# Patient Record
Sex: FEMALE | Race: WHITE | NOT HISPANIC OR LATINO | Employment: OTHER | URBAN - METROPOLITAN AREA
[De-identification: names, ages, dates, MRNs, and addresses within clinical notes are randomized per-mention and may not be internally consistent; named-entity substitution may affect disease eponyms.]

---

## 2017-02-01 ENCOUNTER — DOCUMENTATION (OUTPATIENT)
Dept: PHYSICAL THERAPY | Facility: CLINIC | Age: 67
End: 2017-02-01

## 2017-02-01 NOTE — PROGRESS NOTES
Discharge Summary  Discharge Summary from Physical Therapy Report      Dates  PT visit: 11/30/16  Number of Visits: 1     Discharge Status of Patient: See initial evaluation dated 11/30/16    Goals: Partially Met    Discharge Plan: Continue with current home exercise program as instructed    Comments Patient seen for one visit only with HEP established    Date of Discharge 02/01/17        Grace Yip, PT, DPT  Physical Therapist

## 2017-05-02 ENCOUNTER — OFFICE VISIT (OUTPATIENT)
Dept: INTERNAL MEDICINE | Facility: CLINIC | Age: 67
End: 2017-05-02

## 2017-05-02 VITALS
HEART RATE: 71 BPM | DIASTOLIC BLOOD PRESSURE: 82 MMHG | WEIGHT: 209 LBS | TEMPERATURE: 98.9 F | HEIGHT: 65 IN | SYSTOLIC BLOOD PRESSURE: 124 MMHG | BODY MASS INDEX: 34.82 KG/M2 | OXYGEN SATURATION: 96 %

## 2017-05-02 DIAGNOSIS — L30.9 DERMATITIS: ICD-10-CM

## 2017-05-02 DIAGNOSIS — J06.9 ACUTE URI: Primary | ICD-10-CM

## 2017-05-02 PROCEDURE — 99214 OFFICE O/P EST MOD 30 MIN: CPT | Performed by: NURSE PRACTITIONER

## 2017-05-02 RX ORDER — AZITHROMYCIN 250 MG/1
TABLET, FILM COATED ORAL
Qty: 6 TABLET | Refills: 0 | Status: SHIPPED | OUTPATIENT
Start: 2017-05-02 | End: 2017-05-16

## 2017-05-02 RX ORDER — LORATADINE 10 MG/1
10 TABLET ORAL DAILY
Qty: 10 TABLET
Start: 2017-05-02 | End: 2017-05-12

## 2017-05-02 RX ORDER — GUAIFENESIN 600 MG/1
1200 TABLET, EXTENDED RELEASE ORAL 2 TIMES DAILY
Qty: 14 TABLET
Start: 2017-05-02 | End: 2017-05-09

## 2017-05-02 RX ORDER — CLOBETASOL PROPIONATE 0.5 MG/G
CREAM TOPICAL 2 TIMES DAILY
Qty: 15 G | Refills: 0 | Status: SHIPPED | OUTPATIENT
Start: 2017-05-02 | End: 2020-01-03

## 2017-05-16 ENCOUNTER — OFFICE VISIT (OUTPATIENT)
Dept: INTERNAL MEDICINE | Facility: CLINIC | Age: 67
End: 2017-05-16

## 2017-05-16 VITALS
SYSTOLIC BLOOD PRESSURE: 108 MMHG | DIASTOLIC BLOOD PRESSURE: 68 MMHG | WEIGHT: 207 LBS | BODY MASS INDEX: 34.49 KG/M2 | HEIGHT: 65 IN

## 2017-05-16 DIAGNOSIS — R73.01 ELEVATED FASTING GLUCOSE: ICD-10-CM

## 2017-05-16 DIAGNOSIS — E03.9 HYPOTHYROIDISM, ADULT: ICD-10-CM

## 2017-05-16 DIAGNOSIS — E78.5 HYPERLIPIDEMIA, UNSPECIFIED HYPERLIPIDEMIA TYPE: Primary | ICD-10-CM

## 2017-05-16 LAB
ALBUMIN SERPL-MCNC: 4.18 G/DL (ref 3.4–4.6)
ALBUMIN/GLOB SERPL: 1.3 G/DL
ALP SERPL-CCNC: 98 U/L (ref 46–116)
ALT SERPL W P-5'-P-CCNC: 21 U/L (ref 14–59)
ANION GAP SERPL CALCULATED.3IONS-SCNC: 13 MMOL/L
AST SERPL-CCNC: 22 U/L (ref 7–37)
BILIRUB SERPL-MCNC: 0.4 MG/DL (ref 0.2–1)
BUN BLD-MCNC: 22 MG/DL (ref 6–22)
BUN/CREAT SERPL: 19.1 (ref 7–25)
CALCIUM SPEC-SCNC: 9.3 MG/DL (ref 8.6–10.5)
CHLORIDE SERPL-SCNC: 101 MMOL/L (ref 95–107)
CHOLEST SERPL-MCNC: 218 MG/DL (ref 0–200)
CO2 SERPL-SCNC: 25 MMOL/L (ref 23–32)
CREAT BLD-MCNC: 1.15 MG/DL (ref 0.55–1.02)
GFR SERPL CREATININE-BSD FRML MDRD: 47 ML/MIN/1.73
GLOBULIN UR ELPH-MCNC: 3.2 GM/DL
GLUCOSE BLD-MCNC: 97 MG/DL (ref 70–100)
HBA1C MFR BLD: 5.6 % (ref 4–6)
HDLC SERPL-MCNC: 53 MG/DL (ref 40–81)
LDLC SERPL CALC-MCNC: 132 MG/DL (ref 0–100)
LDLC/HDLC SERPL: 2.49 {RATIO}
POTASSIUM BLD-SCNC: 4.5 MMOL/L (ref 3.3–5.3)
PROT SERPL-MCNC: 7.4 G/DL (ref 6.3–8.4)
SODIUM BLD-SCNC: 139 MMOL/L (ref 136–145)
TRIGL SERPL-MCNC: 166 MG/DL (ref 0–150)
TSH SERPL DL<=0.05 MIU/L-ACNC: 2.18 MIU/ML (ref 0.4–4.2)
VLDLC SERPL-MCNC: 33.2 MG/DL

## 2017-05-16 PROCEDURE — 99214 OFFICE O/P EST MOD 30 MIN: CPT | Performed by: INTERNAL MEDICINE

## 2017-05-16 PROCEDURE — 80053 COMPREHEN METABOLIC PANEL: CPT | Performed by: INTERNAL MEDICINE

## 2017-05-16 PROCEDURE — 80061 LIPID PANEL: CPT | Performed by: INTERNAL MEDICINE

## 2017-05-16 PROCEDURE — 36415 COLL VENOUS BLD VENIPUNCTURE: CPT | Performed by: INTERNAL MEDICINE

## 2017-05-16 PROCEDURE — 84443 ASSAY THYROID STIM HORMONE: CPT | Performed by: INTERNAL MEDICINE

## 2017-05-16 PROCEDURE — 83036 HEMOGLOBIN GLYCOSYLATED A1C: CPT | Performed by: INTERNAL MEDICINE

## 2017-05-16 RX ORDER — GUAIFENESIN 600 MG/1
1200 TABLET, EXTENDED RELEASE ORAL 2 TIMES DAILY
Qty: 60 TABLET | Refills: 3 | Status: SHIPPED | OUTPATIENT
Start: 2017-05-16 | End: 2018-10-25

## 2017-05-19 RX ORDER — SUCRALFATE 1 G/1
TABLET ORAL
Qty: 180 TABLET | Refills: 3 | Status: SHIPPED | OUTPATIENT
Start: 2017-05-19 | End: 2021-07-02

## 2017-05-19 RX ORDER — LEVOTHYROXINE SODIUM 50 MCG
TABLET ORAL
Qty: 90 TABLET | Refills: 0 | Status: SHIPPED | OUTPATIENT
Start: 2017-05-19 | End: 2017-08-14 | Stop reason: SDUPTHER

## 2017-06-06 RX ORDER — FLUOXETINE HYDROCHLORIDE 90 MG/1
CAPSULE, DELAYED RELEASE PELLETS ORAL
Qty: 24 CAPSULE | Refills: 1 | Status: SHIPPED | OUTPATIENT
Start: 2017-06-06 | End: 2017-08-14 | Stop reason: SDUPTHER

## 2017-06-06 NOTE — TELEPHONE ENCOUNTER
She takes it every 6 days which is more frequent. In the past, I have have prescribed it twice a week so she gets #180

## 2017-06-06 NOTE — TELEPHONE ENCOUNTER
"Sorry.  It it were twice a day, she would get #180.  For twice a week, #24 should be correct for 90 day supply.  My math was not \"clicking\"  "

## 2017-06-06 NOTE — TELEPHONE ENCOUNTER
----- Message from Cee Mora sent at 6/6/2017 10:32 AM EDT -----  Contact: pt - Dr Serrano' pt - RE: Rx refill  Pt calling and would like a refill on Rx      FLUoxetine weekly (PROzac WEEKLY) 90 MG DR capsule 12 capsule 3 7/19/2016      Sig - Route: Take 1 capsule by mouth every 7 days. - Oral    [facility] - [place], [place] - [address] AT [address] [facility] - [phone]  - [phone] (Phone)  [phone] (Fax)    Pt # [phone]

## 2017-06-06 NOTE — TELEPHONE ENCOUNTER
Dr. Serrano, not sure if I sent the wrong request to you.  The one pt asked for says 1 po weekly, but the one sent in says 1 po twice a week.

## 2017-07-28 ENCOUNTER — TELEPHONE (OUTPATIENT)
Dept: INTERNAL MEDICINE | Facility: CLINIC | Age: 67
End: 2017-07-28

## 2017-07-28 DIAGNOSIS — T63.461A WASP STING, ACCIDENTAL OR UNINTENTIONAL, INITIAL ENCOUNTER: Primary | ICD-10-CM

## 2017-07-28 RX ORDER — EPINEPHRINE 0.3 MG/.3ML
0.3 INJECTION SUBCUTANEOUS ONCE
Qty: 1 EACH | Refills: 3 | Status: SHIPPED | OUTPATIENT
Start: 2017-07-28 | End: 2017-07-28

## 2017-07-28 NOTE — TELEPHONE ENCOUNTER
----- Message from Miky Donald sent at 7/28/2017  1:40 PM EDT -----  Contact: pt  Pt calling says that she go stung by a wasp , and was fine took benedryl. She then was stung by a yellow winston a couple of days later while hiking and did the same, but she had swelling all the way up her arm . Pt is requesting a prescription for an epi pen. Please advise.     #[phone]    She says to send it to [facility] or [facility] because she has seen then before.

## 2017-08-08 ENCOUNTER — TELEPHONE (OUTPATIENT)
Dept: INTERNAL MEDICINE | Facility: CLINIC | Age: 67
End: 2017-08-08

## 2017-08-08 NOTE — TELEPHONE ENCOUNTER
----- Message from Cee Mora sent at 8/8/2017 10:56 AM EDT -----  Contact: pt - Dr Serrano' pt - RE: referral / appt  Pt calling and would like a return call regarding pt had sx on knee. Pt informs had knee buckle, was seen in ER. Pt was informed to see Dr Jose WHITLOCK. Pt informs was told to call PCP to get appt w. Ortho ASAP. Could you please call ortho and put referral in pt's chart? Please advise. Thanks      Pt # [phone]

## 2017-08-08 NOTE — TELEPHONE ENCOUNTER
----- Message from Cee Mora sent at 8/8/2017  1:42 PM EDT -----  Contact: pt - Dr Serrano' pt - RE: (R) knee  Pt calling and would like to inform pt was seen @ [place], [place] - [phone]. Pt informs (R) knee was put in a knee stabilizer. Pt informs that arthritis may have caused pt's knee to buckle. Pt is on vacation and injury occurred during a hiking trip. Could you please call pt to discuss? Please advise. Thanks    Records have been requested and will be faxed to office. ( Flxpu [phone] fax #)

## 2017-08-08 NOTE — TELEPHONE ENCOUNTER
Returned her call, got voicemail and left message.  Recommended she make an appointment for follow-up on return from vacation.

## 2017-08-14 ENCOUNTER — OFFICE VISIT (OUTPATIENT)
Dept: INTERNAL MEDICINE | Facility: CLINIC | Age: 67
End: 2017-08-14

## 2017-08-14 VITALS
DIASTOLIC BLOOD PRESSURE: 80 MMHG | TEMPERATURE: 99 F | OXYGEN SATURATION: 98 % | HEIGHT: 65 IN | WEIGHT: 209 LBS | SYSTOLIC BLOOD PRESSURE: 132 MMHG | HEART RATE: 63 BPM | BODY MASS INDEX: 34.82 KG/M2

## 2017-08-14 DIAGNOSIS — M25.561 ACUTE PAIN OF RIGHT KNEE: Primary | ICD-10-CM

## 2017-08-14 PROCEDURE — 99213 OFFICE O/P EST LOW 20 MIN: CPT | Performed by: NURSE PRACTITIONER

## 2017-08-14 NOTE — PROGRESS NOTES
Subjective   Aydee Moore is a 67 y.o. female who presents due to right knee pain.    HPI Comments: She was hiking in [place], [place] last week when her right knee buckled and gave out on her. She denies previous knee pain (reports tear in knee when she was 15 but no problems since then). She was unable to bear weight and therefore presented to the ER 8/8/17, xray showed no significant abnl. She reports some improvement in discomfort (started taking Tylenol but c/o nausea with medication).    Knee Pain    The incident occurred more than 1 week ago. There was no injury mechanism. The pain is present in the right knee. The quality of the pain is described as aching. The pain is moderate. Associated symptoms include an inability to bear weight and a loss of motion. The symptoms are aggravated by movement, palpation and weight bearing. She has tried ice and acetaminophen for the symptoms. The treatment provided mild relief.        The following portions of the patient's history were reviewed and updated as appropriate: allergies, current medications, past social history and problem list.    Past Medical History:   Diagnosis Date   • Anxiety and depression    • Elevated fasting glucose    • Hyperlipidemia          Current Outpatient Prescriptions:   •  Bromelain 100 MG tablet, Take  by mouth., Disp: , Rfl:   •  clobetasol (TEMOVATE) 0.05 % cream, Apply  topically 2 (Two) Times a Day., Disp: 15 g, Rfl: 0  •  CRESTOR 10 MG tablet, TAKE 1 TABLET DAILY, Disp: 90 tablet, Rfl: 3  •  FIBER SELECT GUMMIES PO, Take  by mouth., Disp: , Rfl:   •  FLUoxetine weekly (PROzac WEEKLY) 90 MG DR capsule, Take 1 capsule by mouth every 7 days., Disp: 12 capsule, Rfl: 3  •  fluticasone (FLONASE) 50 MCG/ACT nasal spray, 2 sprays into each nostril daily. Administer 2 sprays in each nostril for each dose., Disp: , Rfl:   •  guaiFENesin (MUCINEX) 600 MG 12 hr tablet, Take 2 tablets by mouth 2 (Two) Times a Day., Disp: 60 tablet, Rfl: 3  •   "levothyroxine (SYNTHROID, LEVOTHROID) 50 MCG tablet, Take 50 mcg by mouth daily., Disp: , Rfl:   •  pantoprazole (PROTONIX) 40 MG EC tablet, Take 1 tablet by mouth daily., Disp: 90 tablet, Rfl: 1  •  sucralfate (CARAFATE) 1 G tablet, TAKE 1 TABLET TWICE A DAY, Disp: 180 tablet, Rfl: 3    Allergies   Allergen Reactions   • Penicillins    • Tetracyclines & Related        Review of Systems   Constitutional: Negative for chills, fatigue, fever and unexpected weight change.   HENT: Negative for congestion, ear pain, postnasal drip, sinus pressure, sore throat and trouble swallowing.    Eyes: Negative for visual disturbance.   Respiratory: Negative for cough, chest tightness and wheezing.    Cardiovascular: Negative for chest pain, palpitations and leg swelling.   Gastrointestinal: Negative for abdominal pain, blood in stool, nausea and vomiting.   Endocrine: Negative for cold intolerance and heat intolerance.   Genitourinary: Negative for dysuria, frequency and urgency.   Musculoskeletal: Positive for arthralgias and joint swelling.   Skin: Negative for color change.   Allergic/Immunologic: Negative for immunocompromised state.   Neurological: Negative for syncope, weakness and headaches.   Hematological: Does not bruise/bleed easily.       Objective   Vitals:    08/14/17 0755   BP: 132/80   BP Location: Left arm   Patient Position: Sitting   Cuff Size: Adult   Pulse: 63   Temp: 99 °F (37.2 °C)   TempSrc: Oral   SpO2: 98%   Weight: 209 lb (94.8 kg)   Height: 65\" (165.1 cm)     Physical Exam   Constitutional: She is oriented to person, place, and time. She appears well-developed and well-nourished. No distress.   HENT:   Head: Normocephalic and atraumatic.   Right Ear: External ear normal.   Left Ear: External ear normal.   Eyes: Conjunctivae are normal.   Neck: Normal range of motion. Neck supple.   Cardiovascular: Normal rate, regular rhythm, normal heart sounds and intact distal pulses.  Exam reveals no gallop and no " friction rub.    No murmur heard.  Pulmonary/Chest: Effort normal and breath sounds normal. No respiratory distress.   Musculoskeletal:        Right knee: She exhibits swelling. She exhibits normal range of motion and no erythema. Tenderness found. Lateral joint line tenderness noted.   Lymphadenopathy:     She has no cervical adenopathy.   Neurological: She is alert and oriented to person, place, and time.   Skin: Skin is warm and dry. No rash noted. No erythema.   Psychiatric: She has a normal mood and affect. Her behavior is normal.   Nursing note and vitals reviewed.      Assessment/Plan   Aydee was seen today for knee pain.    Diagnoses and all orders for this visit:    Acute pain of right knee  Comments:  reviewed xray results from ER, will elevated & apply ice and f/u with ortho for further mgmt  Orders:  -     Ambulatory Referral to Orthopedic Surgery  -     Ambulatory Referral to Physical Therapy Evaluate and treat

## 2017-09-14 ENCOUNTER — LAB (OUTPATIENT)
Dept: LAB | Facility: HOSPITAL | Age: 67
End: 2017-09-14
Attending: SPECIALIST

## 2017-09-14 ENCOUNTER — TRANSCRIBE ORDERS (OUTPATIENT)
Dept: ADMINISTRATIVE | Facility: HOSPITAL | Age: 67
End: 2017-09-14

## 2017-09-14 ENCOUNTER — HOSPITAL ENCOUNTER (OUTPATIENT)
Dept: CARDIOLOGY | Facility: HOSPITAL | Age: 67
Discharge: HOME | End: 2017-09-14
Attending: SPECIALIST | Admitting: SPECIALIST

## 2017-09-14 DIAGNOSIS — Z01.818 PRE-OP TESTING: ICD-10-CM

## 2017-09-14 DIAGNOSIS — I10 ESSENTIAL HYPERTENSION, MALIGNANT: ICD-10-CM

## 2017-09-14 DIAGNOSIS — Z01.818 PRE-OP TESTING: Primary | ICD-10-CM

## 2017-09-14 LAB
ANION GAP SERPL CALCULATED.3IONS-SCNC: 12.1 MMOL/L
BUN BLD-MCNC: 19 MG/DL (ref 8–23)
BUN/CREAT SERPL: 17.8 (ref 7–25)
CALCIUM SPEC-SCNC: 9.9 MG/DL (ref 8.6–10.5)
CHLORIDE SERPL-SCNC: 99 MMOL/L (ref 98–107)
CO2 SERPL-SCNC: 24.9 MMOL/L (ref 22–29)
CREAT BLD-MCNC: 1.07 MG/DL (ref 0.57–1)
DEPRECATED RDW RBC AUTO: 43.2 FL (ref 37–54)
ERYTHROCYTE [DISTWIDTH] IN BLOOD BY AUTOMATED COUNT: 13.1 % (ref 11.7–13)
GFR SERPL CREATININE-BSD FRML MDRD: 51 ML/MIN/1.73
GLUCOSE BLD-MCNC: 104 MG/DL (ref 65–99)
HCT VFR BLD AUTO: 44 % (ref 35.6–45.5)
HGB BLD-MCNC: 14.6 G/DL (ref 11.9–15.5)
MCH RBC QN AUTO: 30.1 PG (ref 26.9–32)
MCHC RBC AUTO-ENTMCNC: 33.2 G/DL (ref 32.4–36.3)
MCV RBC AUTO: 90.7 FL (ref 80.5–98.2)
PLATELET # BLD AUTO: 224 10*3/MM3 (ref 140–500)
PMV BLD AUTO: 11.1 FL (ref 6–12)
POTASSIUM BLD-SCNC: 4.9 MMOL/L (ref 3.5–5.2)
RBC # BLD AUTO: 4.85 10*6/MM3 (ref 3.9–5.2)
SODIUM BLD-SCNC: 136 MMOL/L (ref 136–145)
WBC NRBC COR # BLD: 7.65 10*3/MM3 (ref 4.5–10.7)

## 2017-09-14 PROCEDURE — 36415 COLL VENOUS BLD VENIPUNCTURE: CPT

## 2017-09-14 PROCEDURE — 93010 ELECTROCARDIOGRAM REPORT: CPT | Performed by: INTERNAL MEDICINE

## 2017-09-14 PROCEDURE — 80048 BASIC METABOLIC PNL TOTAL CA: CPT

## 2017-09-14 PROCEDURE — 93005 ELECTROCARDIOGRAM TRACING: CPT | Performed by: SPECIALIST

## 2017-09-14 PROCEDURE — 85027 COMPLETE CBC AUTOMATED: CPT

## 2017-09-28 ENCOUNTER — OFFICE VISIT (OUTPATIENT)
Dept: INTERNAL MEDICINE | Facility: CLINIC | Age: 67
End: 2017-09-28

## 2017-09-28 VITALS
DIASTOLIC BLOOD PRESSURE: 80 MMHG | BODY MASS INDEX: 33.15 KG/M2 | WEIGHT: 199 LBS | HEIGHT: 65 IN | SYSTOLIC BLOOD PRESSURE: 130 MMHG

## 2017-09-28 DIAGNOSIS — E03.9 HYPOTHYROIDISM, ADULT: ICD-10-CM

## 2017-09-28 DIAGNOSIS — F32.A ANXIETY AND DEPRESSION: ICD-10-CM

## 2017-09-28 DIAGNOSIS — R73.01 ELEVATED FASTING GLUCOSE: ICD-10-CM

## 2017-09-28 DIAGNOSIS — F41.9 ANXIETY AND DEPRESSION: ICD-10-CM

## 2017-09-28 DIAGNOSIS — E78.5 HYPERLIPIDEMIA, UNSPECIFIED HYPERLIPIDEMIA TYPE: Primary | ICD-10-CM

## 2017-09-28 LAB
ALBUMIN SERPL-MCNC: 4.9 G/DL (ref 3.5–5.2)
ALP SERPL-CCNC: 90 U/L (ref 39–117)
ALT SERPL W P-5'-P-CCNC: 15 U/L (ref 1–33)
AST SERPL-CCNC: 18 U/L (ref 1–32)
BILIRUB CONJ SERPL-MCNC: <0.2 MG/DL (ref 0–0.3)
BILIRUB INDIRECT SERPL-MCNC: NORMAL MG/DL
BILIRUB SERPL-MCNC: 0.4 MG/DL (ref 0.1–1.2)
CHOLEST SERPL-MCNC: 167 MG/DL (ref 0–200)
HBA1C MFR BLD: 5.7 % (ref 4.8–5.6)
HDLC SERPL-MCNC: 59 MG/DL (ref 40–60)
LDLC SERPL CALC-MCNC: 87 MG/DL (ref 0–100)
LDLC/HDLC SERPL: 1.47 {RATIO}
PROT SERPL-MCNC: 7.6 G/DL (ref 6–8.5)
TRIGL SERPL-MCNC: 105 MG/DL (ref 0–150)
VLDLC SERPL-MCNC: 21 MG/DL (ref 5–40)

## 2017-09-28 PROCEDURE — 80061 LIPID PANEL: CPT | Performed by: INTERNAL MEDICINE

## 2017-09-28 PROCEDURE — 99214 OFFICE O/P EST MOD 30 MIN: CPT | Performed by: INTERNAL MEDICINE

## 2017-09-28 PROCEDURE — 80076 HEPATIC FUNCTION PANEL: CPT | Performed by: INTERNAL MEDICINE

## 2017-09-28 PROCEDURE — 36415 COLL VENOUS BLD VENIPUNCTURE: CPT | Performed by: INTERNAL MEDICINE

## 2017-09-28 PROCEDURE — 83036 HEMOGLOBIN GLYCOSYLATED A1C: CPT | Performed by: INTERNAL MEDICINE

## 2017-09-28 NOTE — PROGRESS NOTES
Chief Complaint   Patient presents with   • Hypothyroidism   • Hyperlipidemia        Subjective   Aydee Moore is a 67 y.o. female     HPI:      Hypothyroidism: Current treatment levothyroxine. She is compliant with the medication, tolerates it well and reports good control of symptoms of hypothyroidism.  She has not noted any change in tolerance of heat or cold.  No change in hair or skin.  No constipation.  No symptoms of hyperthyroidism.      Hyperlipidemia:  This is a chronic problem.   No management changes were made at her last appointment.   Her most recent lipid panel was done on 5/16/2016.  Total cholesterol was 218, Triglycerides 166, HDL 53, .   Current treatment: statin.   Prudent diet and regular exercise have also been recommended. By report, there is good compliance with treatment and good tolerance.    She has not experienced statin associated myalgias, dyspepsia.  She has not had liver enzyme elevation.     Elevated fasting glucose:  She  has had elevated fasting glucose in the past.  She denies polyuria, polydipsia, vision change appetite change, unexplained weight loss.   Lab Results   Component Value Date    HGBA1C 5.6 05/16/2017        Knee injury: She has had surgery for meniscal tear.  She states that she is recovering well.  She is going to start physical therapy soon.    Anxiety and depression: She continues to take Prozac 90 mg weekly.  She sometimes takes it twice a week.  She tolerates twice a week well.  Most of the time, her symptoms are controlled with taking it once a week.  With her recent knee injury, she had increased anxiety area and she is feeling better from that standpoint.    The following portions of the patient's history were reviewed and updated as appropriate: allergies, current medications, past social history, problem list, past surgical history    Review of Systems   Constitutional: Negative for activity change and appetite change.   HENT: Negative for  "nosebleeds.    Eyes: Negative for visual disturbance.   Respiratory: Negative for cough and shortness of breath.    Cardiovascular: Negative for chest pain, palpitations and leg swelling.   Neurological: Negative for headaches.   Psychiatric/Behavioral: Negative for sleep disturbance.           Objective     /80  Ht 65\" (165.1 cm)  Wt 199 lb (90.3 kg)  BMI 33.12 kg/m2     Physical Exam   Constitutional: She is oriented to person, place, and time. She appears well-developed and well-nourished. No distress.   Neck: Normal carotid pulses present. Carotid bruit is not present.   Cardiovascular: Normal rate, regular rhythm, S1 normal, S2 normal and intact distal pulses.  Exam reveals no gallop and no friction rub.    No murmur heard.  Pulses:       Carotid pulses are 2+ on the right side, and 2+ on the left side.  Pulmonary/Chest: Effort normal and breath sounds normal. No respiratory distress. She has no wheezes. She has no rhonchi. She has no rales. Chest wall is not dull to percussion.   Musculoskeletal: She exhibits no edema.   Neurological: She is alert and oriented to person, place, and time.   Skin: Skin is warm and dry.   Nursing note and vitals reviewed.      Assessment/Plan   Problem List Items Addressed This Visit        Cardiovascular and Mediastinum    Hyperlipidemia - Primary    Relevant Orders    Lipid Panel    Hepatic Function Panel       Endocrine    Elevated fasting glucose    Relevant Orders    Hemoglobin A1c    Hypothyroidism, adult       Other    Anxiety and depression            "

## 2017-09-29 ENCOUNTER — TREATMENT (OUTPATIENT)
Dept: PHYSICAL THERAPY | Facility: CLINIC | Age: 67
End: 2017-09-29

## 2017-09-29 ENCOUNTER — TRANSCRIBE ORDERS (OUTPATIENT)
Dept: PHYSICAL THERAPY | Facility: CLINIC | Age: 67
End: 2017-09-29

## 2017-09-29 DIAGNOSIS — Z98.890 S/P ARTHROSCOPIC KNEE SURGERY: ICD-10-CM

## 2017-09-29 DIAGNOSIS — Z98.890 S/P ARTHROSCOPIC PARTIAL MEDIAL MENISCECTOMY: ICD-10-CM

## 2017-09-29 DIAGNOSIS — M25.561 RIGHT KNEE PAIN, UNSPECIFIED CHRONICITY: Primary | ICD-10-CM

## 2017-09-29 DIAGNOSIS — M25.561 ACUTE PAIN OF RIGHT KNEE: Primary | ICD-10-CM

## 2017-09-29 PROCEDURE — G8979 MOBILITY GOAL STATUS: HCPCS | Performed by: PHYSICAL THERAPIST

## 2017-09-29 PROCEDURE — G8978 MOBILITY CURRENT STATUS: HCPCS | Performed by: PHYSICAL THERAPIST

## 2017-09-29 PROCEDURE — 97161 PT EVAL LOW COMPLEX 20 MIN: CPT | Performed by: PHYSICAL THERAPIST

## 2017-09-29 PROCEDURE — 97530 THERAPEUTIC ACTIVITIES: CPT | Performed by: PHYSICAL THERAPIST

## 2017-09-29 PROCEDURE — 97110 THERAPEUTIC EXERCISES: CPT | Performed by: PHYSICAL THERAPIST

## 2017-09-29 NOTE — PROGRESS NOTES
"Physical Therapy Initial Evaluation and Plan of Care    TIME  TIME OUT 0914  Patient: Aydee Moore   : 1950  Diagnosis/ICD-10 Code:  Acute pain of right knee [M25.561]  Referring practitioner: No ref. provider found    Subjective Evaluation    History of Present Illness  Date of surgery: 9/15/2017  Mechanism of injury: S/p (R) knee partial medial meniscectomy.    In Aug 2017 patient had been hiking in [place] in [place] including climbing metal steps, clambering and crawling up mountains.  Shortly after she was walking on level ground and her (R) knee buckled/gave way.  She went to ER and was told she had OA (R) knee.  Once returned to [place] she saw Dr. Garcia who ordered MRI and found (R) medial meniscus tear.  She was scheduled for surgery.    \"Thought I'd be feeling better by now.\" Pain seems to be worse at night and patient has difficulty falling asleep. Uses pillow under (R) LE.      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 [employer] Quality of life: good    Pain  Current pain ratin  At best pain ratin  At worst pain ratin  Location: (R) anteromedial knee along joint line  Quality: throbbing  Relieving factors: ice  Aggravating factors: standing, stairs, ambulation, prolonged positioning and squatting (kneeling in [employer] (hasn't tried), prolonged sitting)  Progression: improved    Social Support  Lives in: multiple-level home  Lives with: spouse    Hand dominance: right    Diagnostic Tests  MRI studies: abnormal (R) knee medial meniscus tear    Treatments  Current treatment: physical therapy  Patient Goals  Patient goals for therapy: decreased pain  Patient goal: \"improve the pain, get leg stronger so I can walk on uneven ground and get back to hiking\"           Objective     Observations   Left Knee   Positive for incision.     Additional Observation Details  3 healing arthroscopic portals (R) anterior knee  Visible bruising around " incisions      Tenderness     Additional Tenderness Details  Min (+) TTP over incisions/portals    Active Range of Motion   Left Knee   Flexion: 130 degrees   Extension: 0 degrees   Extensor la degrees     Right Knee   Flexion: 108 degrees with pain  Extension: 4 degrees with pain  Extensor la degrees with pain    Passive Range of Motion     Right Knee   Flexion: 126 degrees with pain    Strength/Myotome Testing     Left Hip   Planes of Motion   Left hip flexors strength: 5-/5.    Right Hip   Planes of Motion   Flexion: 4+    Left Knee   Flexion: 5  Extension: 5  Quadriceps contraction: good    Right Knee   Flexion: 4+  Extension: 4+  Quadriceps contraction: fair    Left Ankle/Foot   Dorsiflexion: 5    Right Ankle/Foot   Right ankle dorsiflexion strength: 5-/5.    Swelling     Left Knee Girth Measurement (cm)   Joint line: 41.5 cm.    Right Knee Girth Measurement (cm)   Joint line: 43.5 cm.         Assessment & Plan     Assessment  Impairments: abnormal gait, abnormal muscle firing, abnormal or restricted ROM, activity intolerance, impaired balance, impaired physical strength, lacks appropriate home exercise program, pain with function, safety issue and weight-bearing intolerance  Other impairment: significantly limited ADL and functional mobility tolerance  Assessment details: STGs: to be met in 2 weeks  1. Patient will be independent with initial HEP  2. Patient will exhibit improved (R) knee girth by >/= 1.0 cm  3. Patient will report improved (R) knee s/s 0-3/10  4. Patient will demonstrate improved (R) knee AROM 0-130 deg, painfree  5. Patient will ambulate short community distances without notable antalgia (R) LE  6. Patient will report 50% improved sleep ability    LTGs: to be met in 4 weeks  1. Patient will be independent with progressed strength and balance HEP  2. Patient will report improved (R) knee s/s 0-1/10  3. Patient will demonstrate improved (R) LE strength 5/5, grossly  4. Patient will  "exhibit (R) knee girth within 0.5 cm of (L) knee  5. Patient will be neg TTP (R) knee  6. Patient will negotiate 12 (6\") steps reciprocally with handrail without increased s/s  7. Patient will walk short distances on uneven terrain without increased (R) knee s/s  8. Patient will have improved LEFS score >/= 50/80  Prognosis: good  Functional Limitations: lifting, sleeping, walking, pushing, uncomfortable because of pain, sitting, standing and stooping  Plan  Therapy options: will be seen for skilled physical therapy services  Planned modality interventions: cryotherapy, electrical stimulation/Russian stimulation, thermotherapy (hydrocollator packs) and ultrasound  Planned therapy interventions: balance/weight-bearing training, flexibility, functional ROM exercises, gait training, home exercise program and joint mobilization  Frequency: 3x week  Duration in weeks: 4  Treatment plan discussed with: patient  Plan details: Patient does not have scheduled follow-up appointment with Dr. Garcia        Manual Therapy:         mins  33167;  Therapeutic Exercise:    22     mins  19063;     Neuromuscular Enedelia:        mins  88905;    Therapeutic Activity:     14     mins  74675;     Gait Training:           mins  90061;     Ultrasound:          mins  10232;    Electrical Stimulation:         mins  15911 ( );  Dry Needling          mins self-pay    Timed Treatment:   36   mins   Total Treatment:     81   mins    PT SIGNATURE: Grace Yip PT, DPT   DATE TREATMENT INITIATED: 9/29/2017    Medicare Initial Certification  Certification Period: 12/28/2017  I certify that the therapy services are furnished while this patient is under my care.  The services outlined above are required by this patient, and will be reviewed every 90 days.     PHYSICIAN:       DATE:     Please sign and return via fax to [phone].. Thank you, [facility].        "

## 2017-10-03 ENCOUNTER — TREATMENT (OUTPATIENT)
Dept: PHYSICAL THERAPY | Facility: CLINIC | Age: 67
End: 2017-10-03

## 2017-10-03 DIAGNOSIS — Z98.890 S/P ARTHROSCOPIC KNEE SURGERY: ICD-10-CM

## 2017-10-03 DIAGNOSIS — Z98.890 S/P ARTHROSCOPIC PARTIAL MEDIAL MENISCECTOMY: ICD-10-CM

## 2017-10-03 DIAGNOSIS — M25.561 ACUTE PAIN OF RIGHT KNEE: Primary | ICD-10-CM

## 2017-10-03 PROCEDURE — 97530 THERAPEUTIC ACTIVITIES: CPT | Performed by: PHYSICAL THERAPIST

## 2017-10-03 PROCEDURE — G0283 ELEC STIM OTHER THAN WOUND: HCPCS | Performed by: PHYSICAL THERAPIST

## 2017-10-03 PROCEDURE — 97110 THERAPEUTIC EXERCISES: CPT | Performed by: PHYSICAL THERAPIST

## 2017-10-03 NOTE — PROGRESS NOTES
"Physical Therapy Daily Progress Note    Time In 1451  Time Out 1554    Aydee Moore reports: \"My knee has been hurting pretty bad since Friday, so I haven't been able to do the exercises very much.\"    Subjective     Objective   See Exercise, Manual, and Modality Logs for complete treatment.   *Added hip add trinity vs ball and hip flexion sitting  *HELD SLR and hip abduction sidelying    Assessment & Plan     Assessment  Assessment details: Patient presents to PT this date with an apparent exacerbation of s/s after initial PT session.  She struggles with SLR activities in plane of hip abduction > hip flexion, therefore held these activities for now as the likelihood of patient performing incorrectly or substituting, especially with home performance, is great.  She seemed to tolerate all activities performed this date without pain reported at the time of performance. She responds positively to application of electrical stimulation and ice this date.         Progress per Plan of Care         Manual Therapy:         mins  83169;  Therapeutic Exercise:    21     mins  87529;     Neuromuscular Enedelia:        mins  73201;    Therapeutic Activity:     13     mins  79324;     Gait Training:           mins  31666;     Ultrasound:          mins  40518;    Electrical Stimulation:    15     mins  42115 ( );  Dry Needling          mins self-pay    Timed Treatment:   34   mins   Total Treatment:     63   mins    Grace Yip, PT, DPT  Physical Therapist  [place] License # [id number]    "

## 2017-10-04 ENCOUNTER — TREATMENT (OUTPATIENT)
Dept: PHYSICAL THERAPY | Facility: CLINIC | Age: 67
End: 2017-10-04

## 2017-10-04 DIAGNOSIS — Z98.890 S/P ARTHROSCOPIC KNEE SURGERY: ICD-10-CM

## 2017-10-04 DIAGNOSIS — M25.561 ACUTE PAIN OF RIGHT KNEE: Primary | ICD-10-CM

## 2017-10-04 DIAGNOSIS — Z98.890 S/P ARTHROSCOPIC PARTIAL MEDIAL MENISCECTOMY: ICD-10-CM

## 2017-10-04 PROCEDURE — 97110 THERAPEUTIC EXERCISES: CPT | Performed by: PHYSICAL THERAPIST

## 2017-10-04 PROCEDURE — 97530 THERAPEUTIC ACTIVITIES: CPT | Performed by: PHYSICAL THERAPIST

## 2017-10-04 PROCEDURE — G0283 ELEC STIM OTHER THAN WOUND: HCPCS | Performed by: PHYSICAL THERAPIST

## 2017-10-04 NOTE — PROGRESS NOTES
"Physical Therapy Daily Progress Note    Time In 1330  Time Out 1443    Aydee Moore reports: \"My knee is doing alright I guess.  I didn't do any exercises last night or this morning and decided to give my leg a rest.  I had it elevated most of the evening yesterday.\"    Subjective     Objective   See Exercise, Manual, and Modality Logs for complete treatment.   *Added standing weightshifts  *Added LAQ  *Added SAQ    Assessment & Plan     Assessment  Assessment details: Patient tolerates exercise progressions well for strengthening of (R) LE.  She does not complain of significantly increased s/s during any activity performed. She ambulates short distances within PT clinic without A.D. and minimal evidence of (R) LE antalgia (decreased R weightshift and stance time).  She responds positively to ice and electrical stimulation application.        Progress strengthening /stabilization /functional activity.  Reassess (R) knee AROM, girth, and strength.         Manual Therapy:         mins  62035;  Therapeutic Exercise:    22     mins  97310;     Neuromuscular Enedelia:        mins  58801;    Therapeutic Activity:     14    mins  88477;     Gait Training:           mins  20930;     Ultrasound:          mins  90367;    Electrical Stimulation:    15     mins  09829 ( );  Dry Needling          mins self-pay    Timed Treatment:   36   mins   Total Treatment:     73   mins    Grace Yip PT, DPT  Physical Therapist  [place] License # [id number]    "

## 2017-10-05 ENCOUNTER — TREATMENT (OUTPATIENT)
Dept: PHYSICAL THERAPY | Facility: CLINIC | Age: 67
End: 2017-10-05

## 2017-10-05 DIAGNOSIS — Z98.890 S/P ARTHROSCOPIC KNEE SURGERY: Primary | ICD-10-CM

## 2017-10-05 DIAGNOSIS — Z98.890 S/P ARTHROSCOPIC PARTIAL MEDIAL MENISCECTOMY: ICD-10-CM

## 2017-10-05 DIAGNOSIS — M25.561 ACUTE PAIN OF RIGHT KNEE: ICD-10-CM

## 2017-10-05 PROCEDURE — 97110 THERAPEUTIC EXERCISES: CPT | Performed by: PHYSICAL THERAPIST

## 2017-10-05 PROCEDURE — 97530 THERAPEUTIC ACTIVITIES: CPT | Performed by: PHYSICAL THERAPIST

## 2017-10-05 PROCEDURE — G0283 ELEC STIM OTHER THAN WOUND: HCPCS | Performed by: PHYSICAL THERAPIST

## 2017-10-05 NOTE — PROGRESS NOTES
"Physical Therapy Daily Progress Note    Time In 1446  Time Out 1544    Aydee Moore reports: \"My knee is doing a little better.  It mainly just bothers me at night.  I did walk out onto the grass in the backyard earlier just to see if I could do it and I felt pretty good.\"     Subjective     Objective     Active Range of Motion     Right Knee   Flexion: 116 (\"tightness in the calf\") degrees     Swelling     Right Knee Girth Measurement (cm)   Joint line: 43.1 cm.     See Exercise, Manual, and Modality Logs for complete treatment.   *Increased LE bike time to 12 min    Assessment & Plan     Assessment  Assessment details: Patient demonstrates improved (R) knee AROM flexion and swelling at joint line as measured this date. She is highly motivated to progress all activities as allowed by PT.  It appears that (R) knee s/s are beginning to settle and stabilize a bit as compared to second session when patient appeared to be experiencing an exacerbation.  She requires verbal cueing and demo for control and speed of exercise performance for maximal effectiveness.        Progress strengthening /stabilization /functional activity, pending apparent settling of s/s.         Manual Therapy:         mins  70207;  Therapeutic Exercise:    25     mins  14459;     Neuromuscular Enedelia:        mins  95119;    Therapeutic Activity:     15     mins  57804;     Gait Training:           mins  30542;     Ultrasound:          mins  53771;    Electrical Stimulation:    15     mins  72245 ( );  Dry Needling          mins self-pay    Timed Treatment:   40   mins   Total Treatment:     58   mins    Grace Yip PT, DPT  Physical Therapist  [place] License # [id number]    "

## 2017-10-11 ENCOUNTER — TREATMENT (OUTPATIENT)
Dept: PHYSICAL THERAPY | Facility: CLINIC | Age: 67
End: 2017-10-11

## 2017-10-11 DIAGNOSIS — Z98.890 S/P ARTHROSCOPIC KNEE SURGERY: Primary | ICD-10-CM

## 2017-10-11 DIAGNOSIS — M25.561 ACUTE PAIN OF RIGHT KNEE: ICD-10-CM

## 2017-10-11 DIAGNOSIS — Z98.890 S/P ARTHROSCOPIC PARTIAL MEDIAL MENISCECTOMY: ICD-10-CM

## 2017-10-11 PROCEDURE — G0283 ELEC STIM OTHER THAN WOUND: HCPCS | Performed by: PHYSICAL THERAPIST

## 2017-10-11 PROCEDURE — 97110 THERAPEUTIC EXERCISES: CPT | Performed by: PHYSICAL THERAPIST

## 2017-10-11 PROCEDURE — 97530 THERAPEUTIC ACTIVITIES: CPT | Performed by: PHYSICAL THERAPIST

## 2017-10-11 NOTE — PROGRESS NOTES
"Physical Therapy Daily Progress Note    Time In 0956  Time Out 1116    Aydee Moore reports: \"I'm depressed.  I don't feel like my knee is getting better.  I haven't done anything except keep ice on it.  I did my exercises once since I saw you last.\"    Subjective     Objective   See Exercise, Manual, and Modality Logs for complete treatment.   *Added 4\" FW and LAT step-ups and standing heel raises  *Re-implemented SLR (R) LE    Assessment & Plan     Assessment  Assessment details: Patient expresses discouragement in general this date, requiring encouragement and verbal reminder of the importance of HEP compliance in quicker healing and recovery.  She is able to progress LE bike time as well as add 4\" fw and lat step-ups and re-implement SLR with emphasis on terminal knee extension throughout activity, smaller ROM arc, and control during performance.  She verbalizes understanding and renewed motivation to perform HEP to facilitate improved recovery.        Progress strengthening /stabilization /functional activity. Reassess (R) knee AROM and girth. Add resisted TKE.         Manual Therapy:         mins  55523;  Therapeutic Exercise:    23     mins  40391;     Neuromuscular Enedelia:        mins  35787;    Therapeutic Activity:     16     mins  39095;     Gait Training:           mins  09355;     Ultrasound:          mins  74768;    Electrical Stimulation:    15     mins  17865 ( );  Dry Needling          mins self-pay    Timed Treatment:   39   mins   Total Treatment:     80   mins    Grace Yip PT, DPT  Physical Therapist  [place] License # [id number]    "

## 2017-10-12 ENCOUNTER — TREATMENT (OUTPATIENT)
Dept: PHYSICAL THERAPY | Facility: CLINIC | Age: 67
End: 2017-10-12

## 2017-10-12 DIAGNOSIS — Z98.890 S/P ARTHROSCOPIC KNEE SURGERY: Primary | ICD-10-CM

## 2017-10-12 DIAGNOSIS — M25.561 ACUTE PAIN OF RIGHT KNEE: ICD-10-CM

## 2017-10-12 DIAGNOSIS — Z98.890 S/P ARTHROSCOPIC PARTIAL MEDIAL MENISCECTOMY: ICD-10-CM

## 2017-10-12 PROCEDURE — G0283 ELEC STIM OTHER THAN WOUND: HCPCS | Performed by: PHYSICAL THERAPIST

## 2017-10-12 PROCEDURE — 97110 THERAPEUTIC EXERCISES: CPT | Performed by: PHYSICAL THERAPIST

## 2017-10-12 PROCEDURE — 97530 THERAPEUTIC ACTIVITIES: CPT | Performed by: PHYSICAL THERAPIST

## 2017-10-12 NOTE — PROGRESS NOTES
"Physical Therapy Daily Progress Note    Time In 1014  Time Out 1120    Aydee Moore reports: \"My knee is feeling much better today.  I actually even did my exercises yesterday.\"    Subjective     Objective       Active Range of Motion     Right Knee   Flexion: 125 degrees   Extension: 0 degrees   Extensor la degrees      See Exercise, Manual, and Modality Logs for complete treatment.   *Increased to 6\" step height  *Increased most LE strengthening repetitions    Assessment & Plan     Assessment  Assessment details: Patient verbalizes moderate subjective improvements this date and overall seems more upbeat and encouraged about her progress and knee in general.  She ambulates short community distances without assistive device and mild antalgia (R) knee.  She demonstrates improved (R) knee AROM in planes of flexion and extension.  She remains motivated with all PT activities and interventions and is progressing toward all established PT goals.        Other - add resisted TKE.         Manual Therapy:         mins  11955;  Therapeutic Exercise:    32    mins  89443;     Neuromuscular Enedelia:        mins  56027;    Therapeutic Activity:     17     mins  77483;     Gait Training:           mins  68889;     Ultrasound:          mins  34293;    Electrical Stimulation:    15     mins  92325 ( );  Dry Needling          mins self-pay    Timed Treatment:   49   mins   Total Treatment:     66   mins    Grace Yip, PT, DPT  Physical Therapist  [place] License # [id number]    "

## 2017-10-16 ENCOUNTER — TREATMENT (OUTPATIENT)
Dept: PHYSICAL THERAPY | Facility: CLINIC | Age: 67
End: 2017-10-16

## 2017-10-16 DIAGNOSIS — M25.561 ACUTE PAIN OF RIGHT KNEE: ICD-10-CM

## 2017-10-16 DIAGNOSIS — Z98.890 S/P ARTHROSCOPIC PARTIAL MEDIAL MENISCECTOMY: ICD-10-CM

## 2017-10-16 DIAGNOSIS — Z98.890 S/P ARTHROSCOPIC KNEE SURGERY: Primary | ICD-10-CM

## 2017-10-16 PROCEDURE — 97110 THERAPEUTIC EXERCISES: CPT | Performed by: PHYSICAL THERAPIST

## 2017-10-16 PROCEDURE — G0283 ELEC STIM OTHER THAN WOUND: HCPCS | Performed by: PHYSICAL THERAPIST

## 2017-10-16 PROCEDURE — 97530 THERAPEUTIC ACTIVITIES: CPT | Performed by: PHYSICAL THERAPIST

## 2017-10-16 NOTE — PROGRESS NOTES
"Physical Therapy Daily Progress Note    Time In 0857  Time Out 1007    Aydee Moore reports: \"My knee is getting better.  I slept last night and the knee didn't wake me up.  On Saturday I walked on a flat gravel trail at [facility] but I had to carry a chair with me and only did about 10 steps at a time before stopping to rest.  When I would rest my knee would hurt.  I could only do about 0.5 mile total.\"    Subjective     Objective   See Exercise, Manual, and Modality Logs for complete treatment.   *Added resisted TKE vs red LATEX FREE band    Assessment & Plan     Assessment  Assessment details: Patient reports gradually but steadily improving (R) knee s/s and functional ability. She experiences apparent moderate difficulty fully extending (R) knee in closed chain position against red theraband, indicating persistent quadriceps weakness in weightbearing position.  She performs most strengthening exercises with increased speed this date, requiring occasional cueing to emphasize control and eccentric strengthening components of activity.         Progress strengthening /stabilization /functional activity         Manual Therapy:         mins  25435;  Therapeutic Exercise:    33     mins  37116;     Neuromuscular Enedelia:        mins  39865;    Therapeutic Activity:     18     mins  28153;     Gait Training:           mins  81208;     Ultrasound:          mins  45431;    Electrical Stimulation:    15     mins  88615 ( );  Dry Needling          mins self-pay    Timed Treatment:   51   mins   Total Treatment:     70   mins    Grace Yip PT, DPT  Physical Therapist  [place] License # [id number]    "

## 2017-10-18 ENCOUNTER — TREATMENT (OUTPATIENT)
Dept: PHYSICAL THERAPY | Facility: CLINIC | Age: 67
End: 2017-10-18

## 2017-10-18 DIAGNOSIS — Z98.890 S/P ARTHROSCOPIC KNEE SURGERY: Primary | ICD-10-CM

## 2017-10-18 DIAGNOSIS — M25.561 ACUTE PAIN OF RIGHT KNEE: ICD-10-CM

## 2017-10-18 DIAGNOSIS — Z98.890 S/P ARTHROSCOPIC PARTIAL MEDIAL MENISCECTOMY: ICD-10-CM

## 2017-10-18 PROCEDURE — 97530 THERAPEUTIC ACTIVITIES: CPT | Performed by: PHYSICAL THERAPIST

## 2017-10-18 PROCEDURE — 97110 THERAPEUTIC EXERCISES: CPT | Performed by: PHYSICAL THERAPIST

## 2017-10-18 PROCEDURE — G0283 ELEC STIM OTHER THAN WOUND: HCPCS | Performed by: PHYSICAL THERAPIST

## 2017-10-18 NOTE — PROGRESS NOTES
"Physical Therapy Daily Progress Note    Time In 0856  Time Out 1015    Aydee Moore reports: \"My knee feels ok this morning.  Yesterday while Nixon was mowing the grass I pushed the wheelbarrow and picked up piles of grass clippings.  My knee hurt terribly that night.\"     Subjective     Objective   See Exercise, Manual, and Modality Logs for complete treatment.   *Added sidelying hip abduction and bridging  *Progressed hamstring curls to green LATEX-FREE theraband    Assessment & Plan     Assessment  Assessment details: Patient performs most LE exercises without compensation and ambulates within PT clinic with very minimal antalgia.  She is able to initiate sidelying hip abduction and bridging with verbal and tactile cueing for proper technique, especially hip abduction in order to achieve and maintain correct technique for optimal benefit. She remains highly motivated and compliant with all activities and strength progressions.  Cautioned patient against overactivity outside of PT and avoiding \"rushing\" back into more strenuous activities (i.e yardwork). Patient hesitantly verbalizes understanding.        Progress strengthening /stabilization /functional activity. Reassess (R) knee AROM, girth, and strength.         Manual Therapy:         mins  99756;  Therapeutic Exercise:    32     mins  42254;     Neuromuscular Enedelia:        mins  27246;    Therapeutic Activity:     17     mins  91970;     Gait Training:           mins  95491;     Ultrasound:          mins  12156;    Electrical Stimulation:    15     mins  73784 ( );  Dry Needling          mins self-pay    Timed Treatment:   49   mins   Total Treatment:     79   mins    Grace Yip PT, DPT  Physical Therapist  [place] License # [id number]    "

## 2017-10-20 ENCOUNTER — TREATMENT (OUTPATIENT)
Dept: PHYSICAL THERAPY | Facility: CLINIC | Age: 67
End: 2017-10-20

## 2017-10-20 DIAGNOSIS — Z98.890 S/P ARTHROSCOPIC PARTIAL MEDIAL MENISCECTOMY: ICD-10-CM

## 2017-10-20 DIAGNOSIS — M25.561 ACUTE PAIN OF RIGHT KNEE: ICD-10-CM

## 2017-10-20 DIAGNOSIS — Z98.890 S/P ARTHROSCOPIC KNEE SURGERY: Primary | ICD-10-CM

## 2017-10-20 PROCEDURE — 97110 THERAPEUTIC EXERCISES: CPT | Performed by: PHYSICAL THERAPIST

## 2017-10-20 PROCEDURE — G0283 ELEC STIM OTHER THAN WOUND: HCPCS | Performed by: PHYSICAL THERAPIST

## 2017-10-20 PROCEDURE — 97530 THERAPEUTIC ACTIVITIES: CPT | Performed by: PHYSICAL THERAPIST

## 2017-10-20 NOTE — PROGRESS NOTES
"Physical Therapy Daily Progress Note    Time In 0859  Time Out 1012    Aydee Moore reports: \"I was carrying a laundry basket downstairs and accidentally stepped down with my left foot first instead of my right.  I had a terrible sharp pain in my knee, and I even had pain that woke me up in my sleep last night.\"    Subjective     Objective   See Exercise, Manual, and Modality Logs for complete treatment.       Assessment & Plan     Assessment  Assessment details: Patient experienced apparent mild to moderate exacerbation of (R) knee s/s due to attempt at descending steps reciprocally while carrying weighted object (laundry basket).  Encouraged patient to ice more frequently in an attempt to manage and decrease inflammation.  She verbalized understanding and intent to comply.  Otherwise, she is progressing well with ROM and strengthening activities s/p (R) knee arthroscopy.        Progress strengthening /stabilization /functional activity. Reassess (R) knee AROM, strength, and swelling.         Manual Therapy:         mins  50888;  Therapeutic Exercise:    14     mins  52192;     Neuromuscular Enedelia:        mins  90474;    Therapeutic Activity:     22     mins  29035;     Gait Training:           mins  63109;     Ultrasound:          mins  34396;    Electrical Stimulation:    15     mins  02717 ( );  Dry Needling          mins self-pay    Timed Treatment:   36   mins   Total Treatment:     73   mins    Grace Yip PT, DPT  Physical Therapist  [place] License # [id number]    "

## 2017-10-23 ENCOUNTER — TREATMENT (OUTPATIENT)
Dept: PHYSICAL THERAPY | Facility: CLINIC | Age: 67
End: 2017-10-23

## 2017-10-23 DIAGNOSIS — M25.561 ACUTE PAIN OF RIGHT KNEE: ICD-10-CM

## 2017-10-23 DIAGNOSIS — Z98.890 S/P ARTHROSCOPIC KNEE SURGERY: Primary | ICD-10-CM

## 2017-10-23 DIAGNOSIS — Z98.890 S/P ARTHROSCOPIC PARTIAL MEDIAL MENISCECTOMY: ICD-10-CM

## 2017-10-23 PROCEDURE — 97530 THERAPEUTIC ACTIVITIES: CPT | Performed by: PHYSICAL THERAPIST

## 2017-10-23 PROCEDURE — G0283 ELEC STIM OTHER THAN WOUND: HCPCS | Performed by: PHYSICAL THERAPIST

## 2017-10-23 PROCEDURE — 97110 THERAPEUTIC EXERCISES: CPT | Performed by: PHYSICAL THERAPIST

## 2017-10-23 NOTE — PROGRESS NOTES
"Physical Therapy Daily Progress Note    Time In 902  Time Out 1036    Aydee Moore reports: \"My knee is doing really well.  I went up and down my cellar steps about 5 times over the weekend, holding onto 2 handrails, and though I relied on the handrails heavily, I wasn't too sore afterward.  The only time my knee really gives me pain is laying in bed with my knee flat and nothing under it.  I just have to reposition a few times throughout the night.  I still have only walked about 1 block though.\"    Subjective Evaluation    Pain  Current pain ratin  At best pain ratin  At worst pain ratin  Location: (R) knee at anteromedial joint line and medial patella  Quality: sharp           Objective       Tenderness     Right Knee   Tenderness in the medial joint line and medial patella.     Active Range of Motion     Right Knee   Flexion: 130 degrees   Extensor lag: 10 degrees     Strength/Myotome Testing     Right Hip   Planes of Motion   Right hip flexors strength: 5-/5.    Right Knee   Flexion: 4 (with pain)  Extension: 4+  Quadriceps contraction: good    Swelling     Right Knee Girth Measurement (cm)   Joint line: 45.0 cm.     See Exercise, Manual, and Modality Logs for complete treatment.   *Progressed to forward stepovers  *Initiated treadmill ambulation    Assessment/Plan   STGs: to be met in 2 weeks  1. Patient will be independent with initial HEP - MET  2. Patient will exhibit improved (R) knee girth by >/= 1.0 cm - NOT MET  3. Patient will report improved (R) knee s/s 0-3/10 - PARTIALLY MET  4. Patient will demonstrate improved (R) knee AROM 0-130 deg, painfree - PARTIALLY MET  5. Patient will ambulate short community distances without notable antalgia (R) LE - NOT MET  6. Patient will report 50% improved sleep ability - MET    LTGs: to be met in 4 weeks  1. Patient will be independent with progressed strength and balance HEP - NOT MET  2. Patient will report improved (R) knee s/s 0-1/10 - NOT " "MET  3. Patient will demonstrate improved (R) LE strength 5/5, grossly - NOT MET  4. Patient will exhibit (R) knee girth within 0.5 cm of (L) knee - NOT MET  5. Patient will be neg TTP (R) knee - NOT MET  6. Patient will negotiate 12 (6-8\") steps reciprocally with handrail without increased s/s - PARTIALLY MET  7. Patient will walk short distances on uneven terrain without increased (R) knee s/s - NOT MET  8. Patient will have improved LEFS score >/= 40/80 - NOT MET    LEFS score = 13/80    Other - see POC updated this date.         Manual Therapy:         mins  47783;  Therapeutic Exercise:    17     mins  23972;     Neuromuscular Enedelia:        mins  23004;    Therapeutic Activity:     31     mins  66616;     Gait Training:           mins  67965;     Ultrasound:          mins  04861;    Electrical Stimulation:    15     mins  56742 ( );  Dry Needling          mins self-pay    Timed Treatment:   48   mins   Total Treatment:     94   mins    Grace Yip PT, DPT  Physical Therapist  [place] License # [id number]    "

## 2017-10-25 ENCOUNTER — TREATMENT (OUTPATIENT)
Dept: PHYSICAL THERAPY | Facility: CLINIC | Age: 67
End: 2017-10-25

## 2017-10-25 DIAGNOSIS — Z98.890 S/P ARTHROSCOPIC PARTIAL MEDIAL MENISCECTOMY: ICD-10-CM

## 2017-10-25 DIAGNOSIS — Z98.890 S/P ARTHROSCOPIC KNEE SURGERY: Primary | ICD-10-CM

## 2017-10-25 DIAGNOSIS — M25.561 ACUTE PAIN OF RIGHT KNEE: ICD-10-CM

## 2017-10-25 PROCEDURE — 97530 THERAPEUTIC ACTIVITIES: CPT | Performed by: PHYSICAL THERAPIST

## 2017-10-25 PROCEDURE — 97110 THERAPEUTIC EXERCISES: CPT | Performed by: PHYSICAL THERAPIST

## 2017-10-25 PROCEDURE — G0283 ELEC STIM OTHER THAN WOUND: HCPCS | Performed by: PHYSICAL THERAPIST

## 2017-10-25 NOTE — PROGRESS NOTES
"Physical Therapy Daily Progress Note    Time In 0856  Time Out 1014    Aydee Moore reports: \"I walked farther than I have so far after surgery to get my flu shot yesterday.  Should that make all of my joints hurt?\"    Subjective     Objective   See Exercise, Manual, and Modality Logs for complete treatment.       Assessment & Plan     Assessment  Assessment details: Patient experiences significantly increased (R) knee s/s midway through PT session this date, limiting her ability to actively flex (R) knee in sitting as well as perform supine straight leg raise.  Encouraged patient to perform exercises as able within painfree ROM but not hesitate to hold an exercise if need be if s/s escalate to the point that they do not allow painfree performance to any degree.  She verbalizes understanding.  Treadmill ambulation this date is limited by fatigue rather than pain.        Other - patient to follow up at Dr. Garcia's office tomorrow, 10/26/17.         Manual Therapy:         mins  89396;  Therapeutic Exercise:    20     mins  08380;     Neuromuscular Enedelia:        mins  91629;    Therapeutic Activity:     26     mins  09229;     Gait Training:           mins  00724;     Ultrasound:          mins  82083;    Electrical Stimulation:    15     mins  72707 ( );  Dry Needling          mins self-pay    Timed Treatment:   46   mins   Total Treatment:     78   mins    Grace Yip PT, DPT  Physical Therapist  [place] License # [id number]    "

## 2017-10-31 ENCOUNTER — TREATMENT (OUTPATIENT)
Dept: PHYSICAL THERAPY | Facility: CLINIC | Age: 67
End: 2017-10-31

## 2017-10-31 DIAGNOSIS — Z98.890 S/P ARTHROSCOPIC KNEE SURGERY: Primary | ICD-10-CM

## 2017-10-31 DIAGNOSIS — Z98.890 S/P ARTHROSCOPIC PARTIAL MEDIAL MENISCECTOMY: ICD-10-CM

## 2017-10-31 DIAGNOSIS — M25.561 ACUTE PAIN OF RIGHT KNEE: ICD-10-CM

## 2017-10-31 PROCEDURE — 97530 THERAPEUTIC ACTIVITIES: CPT | Performed by: PHYSICAL THERAPIST

## 2017-10-31 PROCEDURE — 97110 THERAPEUTIC EXERCISES: CPT | Performed by: PHYSICAL THERAPIST

## 2017-10-31 PROCEDURE — G0283 ELEC STIM OTHER THAN WOUND: HCPCS | Performed by: PHYSICAL THERAPIST

## 2017-10-31 NOTE — PROGRESS NOTES
"Physical Therapy Daily Progress Note    Time In 0916  Time Out 1041    Aydee Moore reports: \"I saw Dr. Garcia last Thursday.  She thinks my arthritis is holding me back a little bit and gave me a cortisone injection and told me not to exercise for about 72 hours. She wants to see me back in 2 weeks. Yesterday I was going down my front step and I think my left foot caught on a crack and I fell straight forward and down into a faceplant.  My knee really hurts.  I went inside and put ice on it immediately but it really hurts.\"     Subjective     Objective       Active Range of Motion     Right Knee   Flexion: 127 degrees     Swelling     Right Knee Girth Measurement (cm)   Joint line: 43.5.     See Exercise, Manual, and Modality Logs for complete treatment.       Assessment & Plan     Assessment  Assessment details: Despite report of fall yesterday, patient is not significantly tender to palpation (R) knee and exhibits no significant change in swelling, (R) knee flexion AROM, or gait pattern. Encouraged patient to persist with increased frequency of ice application over the next couple of days but it does not appear immediately evident that patient would have damaged the knee or surgical procedure in any way. She is able to progress treadmill ambulation time and responds positively to modality application.        Progress strengthening /stabilization /functional activity. Consider initiation of static vs dynamic LE balance activity.         Manual Therapy:         mins  10859;  Therapeutic Exercise:    23     mins  37225;     Neuromuscular Enedelia:        mins  94416;    Therapeutic Activity:     21     mins  69726;     Gait Training:           mins  64054;     Ultrasound:          mins  18429;    Electrical Stimulation:    15     mins  35722 ( );  Dry Needling          mins self-pay    Timed Treatment:   44   mins   Total Treatment:     85   mins    Grace Yip PT, DPT  Physical Therapist  [place] License # " [id number]

## 2017-11-01 ENCOUNTER — TREATMENT (OUTPATIENT)
Dept: PHYSICAL THERAPY | Facility: CLINIC | Age: 67
End: 2017-11-01

## 2017-11-01 DIAGNOSIS — Z98.890 S/P ARTHROSCOPIC PARTIAL MEDIAL MENISCECTOMY: ICD-10-CM

## 2017-11-01 DIAGNOSIS — M25.561 ACUTE PAIN OF RIGHT KNEE: ICD-10-CM

## 2017-11-01 DIAGNOSIS — Z98.890 S/P ARTHROSCOPIC KNEE SURGERY: Primary | ICD-10-CM

## 2017-11-01 PROCEDURE — 97110 THERAPEUTIC EXERCISES: CPT | Performed by: PHYSICAL THERAPIST

## 2017-11-01 PROCEDURE — G0283 ELEC STIM OTHER THAN WOUND: HCPCS | Performed by: PHYSICAL THERAPIST

## 2017-11-01 PROCEDURE — 97530 THERAPEUTIC ACTIVITIES: CPT | Performed by: PHYSICAL THERAPIST

## 2017-11-01 NOTE — PROGRESS NOTES
"Physical Therapy Daily Progress Note    Time In 0910  Time Out 1029    Aydee Moore reports: Not much new since yesterday.  May have a bruise coming out on the front of knee from fall.  \"Should I send my doctor a message/email letting them know about my fall?\"    Subjective     Objective   See Exercise, Manual, and Modality Logs for complete treatment.       Assessment & Plan     Assessment  Assessment details: Patient is able to increase LE bike time and treadmill ambulation distance and speed without significant increase in discernible antalgia or compensation. She is motivated to progress strengthening exercises as appropriate, but requires occasional minimal cueing for positional correction and proper performance for optimal anticipated benefit.  Light ecchymosis in an oval shape slightly larger than a quarter is developing at (R) anterolateral knee along joint line below patella.  Patient reports she is insure whether this is from cortisone injection or her fall.        Progress strengthening /stabilization /functional activity         Manual Therapy:         mins  65927;  Therapeutic Exercise:    27     mins  62630;     Neuromuscular Enedelia:        mins  79843;    Therapeutic Activity:     14     mins  37571;     Gait Training:           mins  77593;     Ultrasound:          mins  25435;    Electrical Stimulation:    15     mins  58544 ( );  Dry Needling          mins self-pay    Timed Treatment:   41   mins   Total Treatment:     79   mins    Grace Yip PT, DPT  Physical Therapist  [place] License # [id number]    "

## 2017-11-03 ENCOUNTER — TREATMENT (OUTPATIENT)
Dept: PHYSICAL THERAPY | Facility: CLINIC | Age: 67
End: 2017-11-03

## 2017-11-03 DIAGNOSIS — Z98.890 S/P ARTHROSCOPIC KNEE SURGERY: Primary | ICD-10-CM

## 2017-11-03 DIAGNOSIS — Z98.890 S/P ARTHROSCOPIC PARTIAL MEDIAL MENISCECTOMY: ICD-10-CM

## 2017-11-03 DIAGNOSIS — M25.561 ACUTE PAIN OF RIGHT KNEE: ICD-10-CM

## 2017-11-03 PROCEDURE — G0283 ELEC STIM OTHER THAN WOUND: HCPCS | Performed by: PHYSICAL THERAPIST

## 2017-11-03 PROCEDURE — 97530 THERAPEUTIC ACTIVITIES: CPT | Performed by: PHYSICAL THERAPIST

## 2017-11-03 PROCEDURE — 97110 THERAPEUTIC EXERCISES: CPT | Performed by: PHYSICAL THERAPIST

## 2017-11-03 NOTE — PROGRESS NOTES
"Physical Therapy Daily Progress Note    Time In 0948  Time Out 1112    Aydee Moore reports: \"I felt great on Wednesday.  Then I did quite a bit after therapy and when I woke up yesterday I could hardly bend my knee.  It could have also been the weather [rain] coming in.  Today it feels better than it did yesterday.\"    Subjective     Objective   See Exercise, Manual, and Modality Logs for complete treatment.       Assessment & Plan     Assessment  Assessment details: Patient tolerates all activities well this date despite apparent flare-up and/or arthritis exacerbation yesterday.  She is able to slightly increase treadmill ambulation time without increased antalgia (R) LE.  She exhibits good concentric and fair to good eccentric control with most strengthening activities, though she does require occasional cueing to decrease speed for improved benefit/gain.        Progress strengthening /stabilization /functional activity         Manual Therapy:         mins  09611;  Therapeutic Exercise:    32     mins  35586;     Neuromuscular Enedelia:        mins  42948;    Therapeutic Activity:     19     mins  98546;     Gait Training:           mins  74705;     Ultrasound:          mins  66481;    Electrical Stimulation:    15     mins  44429 ( );  Dry Needling          mins self-pay    Timed Treatment:   51   mins   Total Treatment:     74   mins    Grace Yip PT, DPT  Physical Therapist  [place] License # [id number]    "

## 2017-11-06 ENCOUNTER — TREATMENT (OUTPATIENT)
Dept: PHYSICAL THERAPY | Facility: CLINIC | Age: 67
End: 2017-11-06

## 2017-11-06 DIAGNOSIS — M25.561 ACUTE PAIN OF RIGHT KNEE: ICD-10-CM

## 2017-11-06 DIAGNOSIS — Z98.890 S/P ARTHROSCOPIC KNEE SURGERY: Primary | ICD-10-CM

## 2017-11-06 DIAGNOSIS — Z98.890 S/P ARTHROSCOPIC PARTIAL MEDIAL MENISCECTOMY: ICD-10-CM

## 2017-11-06 PROCEDURE — 97530 THERAPEUTIC ACTIVITIES: CPT | Performed by: PHYSICAL THERAPIST

## 2017-11-06 PROCEDURE — G0283 ELEC STIM OTHER THAN WOUND: HCPCS | Performed by: PHYSICAL THERAPIST

## 2017-11-06 PROCEDURE — 97110 THERAPEUTIC EXERCISES: CPT | Performed by: PHYSICAL THERAPIST

## 2017-11-06 NOTE — PROGRESS NOTES
"Physical Therapy Daily Progress Note    Time In 1019  Time Out 1147    Aydee Moore reports: \"My knee feels great. Can I start walking outside a little?  I'm having this weird, sharp pain along my right side [patient indicates along the shape of her ribs] that just started when I got in the car.  It almost feels like when I had my bruised ribs before.\"     Subjective     Objective   See Exercise, Manual, and Modality Logs for complete treatment.       Assessment & Plan     Assessment  Assessment details: Patient is more consistently reporting apparently stabilizing (R) knee s/s and performs most physical therapy activities without significant exacerbation of s/s.  She is able to ambulate 10 minutes on treadmill at 1.5 mph without evidence of increasing antalgia, so discussed with patient initiating outdoor ambulation beginning with reasonable distance and being cautious on uneven terrain.  Patient verbalizes understanding.  Persistent weakness is evident in hip stabilizer muscles, both in open and closed chain positions, but this is gradually improving as well.        Progress strengthening /stabilization /functional activity         Manual Therapy:         mins  06938;  Therapeutic Exercise:    18     mins  86131;     Neuromuscular Enedelia:        mins  47427;    Therapeutic Activity:     26     mins  93206;     Gait Training:           mins  56657;     Ultrasound:          mins  16620;    Electrical Stimulation:    15     mins  73067 ( );  Dry Needling          mins self-pay    Timed Treatment:   44   mins   Total Treatment:     88   mins    Grace Yip PT, DPT  Physical Therapist  [place] License # [id number]    "

## 2017-11-08 ENCOUNTER — TREATMENT (OUTPATIENT)
Dept: PHYSICAL THERAPY | Facility: CLINIC | Age: 67
End: 2017-11-08

## 2017-11-08 DIAGNOSIS — Z98.890 S/P ARTHROSCOPIC KNEE SURGERY: Primary | ICD-10-CM

## 2017-11-08 DIAGNOSIS — M25.561 ACUTE PAIN OF RIGHT KNEE: ICD-10-CM

## 2017-11-08 DIAGNOSIS — Z98.890 S/P ARTHROSCOPIC PARTIAL MEDIAL MENISCECTOMY: ICD-10-CM

## 2017-11-08 PROCEDURE — 97110 THERAPEUTIC EXERCISES: CPT | Performed by: PHYSICAL THERAPIST

## 2017-11-08 PROCEDURE — 97530 THERAPEUTIC ACTIVITIES: CPT | Performed by: PHYSICAL THERAPIST

## 2017-11-08 PROCEDURE — G0283 ELEC STIM OTHER THAN WOUND: HCPCS | Performed by: PHYSICAL THERAPIST

## 2017-11-10 ENCOUNTER — TREATMENT (OUTPATIENT)
Dept: PHYSICAL THERAPY | Facility: CLINIC | Age: 67
End: 2017-11-10

## 2017-11-10 DIAGNOSIS — Z98.890 S/P ARTHROSCOPIC PARTIAL MEDIAL MENISCECTOMY: ICD-10-CM

## 2017-11-10 DIAGNOSIS — M25.561 ACUTE PAIN OF RIGHT KNEE: ICD-10-CM

## 2017-11-10 DIAGNOSIS — Z98.890 S/P ARTHROSCOPIC KNEE SURGERY: Primary | ICD-10-CM

## 2017-11-10 PROCEDURE — 97530 THERAPEUTIC ACTIVITIES: CPT | Performed by: PHYSICAL THERAPIST

## 2017-11-10 PROCEDURE — G0283 ELEC STIM OTHER THAN WOUND: HCPCS | Performed by: PHYSICAL THERAPIST

## 2017-11-10 PROCEDURE — 97110 THERAPEUTIC EXERCISES: CPT | Performed by: PHYSICAL THERAPIST

## 2017-11-10 RX ORDER — LEVOTHYROXINE SODIUM 50 MCG
TABLET ORAL
Qty: 90 TABLET | Refills: 1 | Status: SHIPPED | OUTPATIENT
Start: 2017-11-10 | End: 2018-01-30 | Stop reason: SDUPTHER

## 2017-11-10 NOTE — PROGRESS NOTES
"Physical Therapy Daily Progress Note    Time In 1243  Time Out 1405    Aydee Moore reports: \"I saw Dr. Garcia yesterday.  She said I am doing really well but she wants me to continue PT to get stronger, better endurance, and get back to hiking/walking on level and unlevel ground.  I walked down the steps at home normally without even thinking about it.  It did hurt afterward.  Yesterday I started to walk down a small incline but I got so scared of falling.  I want my confidence back. I am doing so much better sleeping at night without the knee waking me up.\"     Subjective     Objective   See Exercise, Manual, and Modality Logs for complete treatment.   *Increased treadmill ambulation time  *Increased repetitions of closed chain knee extension  *Added 1# weight to LAQ, SLR, and SAQ  *Initiated SLR with VMO emphasis    Assessment & Plan     Assessment  Assessment details: Patient tolerates all activities and exercise progressions well this date.  She demonstrates fair cardiovascular endurance with aerobic and strengthening activities.  Mild VMO weakness is noted with initiation of VMO emphasis SLR with 1# weight.  Encouraged patient to begin using 1# ankle weights at home as well as utilizing treadmill to progress endurance and muscle strength.        Progress strengthening /stabilization /functional activity as able. Initiate ambulation with treadmill on incline.         Manual Therapy:         mins  42790;  Therapeutic Exercise:    29     mins  24869;     Neuromuscular Enedelia:        mins  15352;    Therapeutic Activity:     22     mins  63088;     Gait Training:           mins  73635;     Ultrasound:          mins  11005;    Electrical Stimulation:    15     mins  74727 ( );  Dry Needling          mins self-pay    Timed Treatment:   51   mins   Total Treatment:     82   mins    Grace Yip PT, DPT  Physical Therapist  [place] License # [id number]    "

## 2017-11-13 ENCOUNTER — TREATMENT (OUTPATIENT)
Dept: PHYSICAL THERAPY | Facility: CLINIC | Age: 67
End: 2017-11-13

## 2017-11-13 DIAGNOSIS — M25.561 ACUTE PAIN OF RIGHT KNEE: ICD-10-CM

## 2017-11-13 DIAGNOSIS — Z98.890 S/P ARTHROSCOPIC KNEE SURGERY: Primary | ICD-10-CM

## 2017-11-13 DIAGNOSIS — Z98.890 S/P ARTHROSCOPIC PARTIAL MEDIAL MENISCECTOMY: ICD-10-CM

## 2017-11-13 PROCEDURE — 97110 THERAPEUTIC EXERCISES: CPT | Performed by: PHYSICAL THERAPIST

## 2017-11-13 PROCEDURE — 97530 THERAPEUTIC ACTIVITIES: CPT | Performed by: PHYSICAL THERAPIST

## 2017-11-13 NOTE — PROGRESS NOTES
"Physical Therapy Daily Progress Note    Time In 0834  Time Out 0940    Aydee Moore reports: \"My knee actually hurt all weekend. But I didn't give into it and decided I must push onward with all the things I need to do anyway. I have to leave early today because Nixon has a doctor's appointment.    Subjective     Objective   See Exercise, Manual, and Modality Logs for complete treatment.   *Initiated     Assessment & Plan     Assessment  Assessment details: Patient wears backpack with one change of clothes inside throughout session.  Patient is able to initiate treadmill ambulation on incline this date without significant fatigue.  She overall demonstrates steadily improving strength and functional mobility, despite c/o knee pain over the weekend. She exhibits mildly improved apparent stability during single limb stance position. She remains highly motivated and compliant with PT interventions.        Progress per Plan of Care - progress to 8\" step.         Manual Therapy:         mins  24782;  Therapeutic Exercise:    13     mins  69165;     Neuromuscular Enedelia:        mins  41041;    Therapeutic Activity:     26     mins  57891;     Gait Training:           mins  25937;     Ultrasound:          mins  82897;    Electrical Stimulation:         mins  55733 ( );  Dry Needling          mins self-pay    Timed Treatment:   39   mins   Total Treatment:     66   mins    Grace Yip PT, DPT  Physical Therapist  [place] License # [id number]    "

## 2017-11-14 ENCOUNTER — OFFICE VISIT (OUTPATIENT)
Dept: INTERNAL MEDICINE | Facility: CLINIC | Age: 67
End: 2017-11-14

## 2017-11-14 VITALS
OXYGEN SATURATION: 99 % | HEIGHT: 65 IN | HEART RATE: 67 BPM | WEIGHT: 202 LBS | BODY MASS INDEX: 33.66 KG/M2 | TEMPERATURE: 98.5 F | SYSTOLIC BLOOD PRESSURE: 130 MMHG | DIASTOLIC BLOOD PRESSURE: 82 MMHG

## 2017-11-14 DIAGNOSIS — R07.89 RIGHT-SIDED CHEST WALL PAIN: Primary | ICD-10-CM

## 2017-11-14 PROCEDURE — 99213 OFFICE O/P EST LOW 20 MIN: CPT | Performed by: NURSE PRACTITIONER

## 2017-11-14 RX ORDER — MELOXICAM 15 MG/1
15 TABLET ORAL DAILY
Qty: 30 TABLET | Refills: 0 | Status: SHIPPED | OUTPATIENT
Start: 2017-11-14 | End: 2018-01-30

## 2017-11-15 ENCOUNTER — TREATMENT (OUTPATIENT)
Dept: PHYSICAL THERAPY | Facility: CLINIC | Age: 67
End: 2017-11-15

## 2017-11-15 DIAGNOSIS — M25.561 ACUTE PAIN OF RIGHT KNEE: ICD-10-CM

## 2017-11-15 DIAGNOSIS — Z98.890 S/P ARTHROSCOPIC KNEE SURGERY: Primary | ICD-10-CM

## 2017-11-15 DIAGNOSIS — Z98.890 S/P ARTHROSCOPIC PARTIAL MEDIAL MENISCECTOMY: ICD-10-CM

## 2017-11-15 PROCEDURE — 97530 THERAPEUTIC ACTIVITIES: CPT | Performed by: PHYSICAL THERAPIST

## 2017-11-15 PROCEDURE — 97110 THERAPEUTIC EXERCISES: CPT | Performed by: PHYSICAL THERAPIST

## 2017-11-15 PROCEDURE — G0283 ELEC STIM OTHER THAN WOUND: HCPCS | Performed by: PHYSICAL THERAPIST

## 2017-11-15 NOTE — PROGRESS NOTES
Subjective   Aydee Moore is a 67 y.o. female who presents due to right lateral rib pain.    HPI Comments: She was driving last week when she c/o sudden onset of right lateral rib pain (describes as sharp in nature), pain has persisted since then and is worse with twisting/turning. She c/o nausea earlier this week when leaning over. She denies shortness of breath, she did lose her balance and fall forward meri 1 week ago. Denies head injury or LOC.  She underwent surgery 9/2017 for medial mensicus tear and is doing well.    Chest Pain    This is a new problem. The current episode started 1 to 4 weeks ago (sx began suddenly last Monday). The onset quality is sudden. The problem occurs daily. The problem has been unchanged. The pain is present in the lateral region. The pain is moderate. The quality of the pain is described as sharp. Radiates to: anterior chest wall. Associated symptoms include nausea (c/o nausea x1 with leaning over). Pertinent negatives include no abdominal pain, back pain, cough, fever, headaches, palpitations, vomiting or weakness. The pain is aggravated by movement (twisting/turning). Risk factors: fell forward meri 1 week ago.        The following portions of the patient's history were reviewed and updated as appropriate: allergies, current medications, past social history and problem list.    Past Medical History:   Diagnosis Date   • Allergic     Penicillins, tetracyclines, surgical tape; latex sensitivity   • Anxiety and depression    • Elevated fasting glucose    • Hyperlipidemia          Current Outpatient Prescriptions:   •  ALOE PO, Take  by mouth Daily., Disp: , Rfl:   •  Bromelain 100 MG tablet, Take  by mouth., Disp: , Rfl:   •  clobetasol (TEMOVATE) 0.05 % cream, Apply  topically 2 (Two) Times a Day., Disp: 15 g, Rfl: 0  •  CRESTOR 10 MG tablet, TAKE 1 TABLET DAILY, Disp: 90 tablet, Rfl: 3  •  FIBER SELECT GUMMIES PO, Take  by mouth., Disp: , Rfl:   •  FLUoxetine weekly (PROzac  WEEKLY) 90 MG DR capsule, Take 1 capsule by mouth every 7 days., Disp: 12 capsule, Rfl: 3  •  fluticasone (FLONASE) 50 MCG/ACT nasal spray, 2 sprays into each nostril daily. Administer 2 sprays in each nostril for each dose., Disp: , Rfl:   •  guaiFENesin (MUCINEX) 600 MG 12 hr tablet, Take 2 tablets by mouth 2 (Two) Times a Day., Disp: 60 tablet, Rfl: 3  •  levothyroxine (SYNTHROID, LEVOTHROID) 50 MCG tablet, Take 50 mcg by mouth daily., Disp: , Rfl:   •  pantoprazole (PROTONIX) 40 MG EC tablet, Take 1 tablet by mouth daily., Disp: 90 tablet, Rfl: 1  •  sucralfate (CARAFATE) 1 G tablet, TAKE 1 TABLET TWICE A DAY, Disp: 180 tablet, Rfl: 3  •  SYNTHROID 50 MCG tablet, TAKE 1 TABLET DAILY, Disp: 90 tablet, Rfl: 1  •  meloxicam (MOBIC) 15 MG tablet, Take 1 tablet by mouth Daily. Take with food, Disp: 30 tablet, Rfl: 0    Allergies   Allergen Reactions   • Penicillins    • Tetracyclines & Related        Review of Systems   Constitutional: Negative for chills, fatigue, fever and unexpected weight change.   HENT: Negative for congestion, ear pain, postnasal drip, sinus pressure, sore throat and trouble swallowing.    Eyes: Negative for visual disturbance.   Respiratory: Negative for cough, chest tightness and wheezing.    Cardiovascular: Positive for chest pain. Negative for palpitations and leg swelling.   Gastrointestinal: Positive for nausea (c/o nausea x1 with leaning over). Negative for abdominal pain, blood in stool and vomiting.   Endocrine: Negative for cold intolerance and heat intolerance.   Genitourinary: Negative for dysuria, frequency and urgency.   Musculoskeletal: Negative for arthralgias, back pain and joint swelling.   Skin: Negative for color change.   Allergic/Immunologic: Negative for immunocompromised state.   Neurological: Negative for syncope, weakness and headaches.   Hematological: Does not bruise/bleed easily.       Objective   Vitals:    11/14/17 1344   BP: 130/82   BP Location: Left arm  "  Patient Position: Sitting   Cuff Size: Adult   Pulse: 67   Temp: 98.5 °F (36.9 °C)   TempSrc: Oral   SpO2: 99%   Weight: 202 lb (91.6 kg)   Height: 65\" (165.1 cm)     Physical Exam   Constitutional: She is oriented to person, place, and time. She appears well-developed and well-nourished. No distress.   HENT:   Head: Normocephalic and atraumatic.   Right Ear: External ear normal.   Left Ear: External ear normal.   Eyes: Conjunctivae are normal.   Neck: Normal range of motion. Neck supple.   Cardiovascular: Normal rate, regular rhythm, normal heart sounds and intact distal pulses.  Exam reveals no gallop and no friction rub.    No murmur heard.  Pulmonary/Chest: Effort normal and breath sounds normal. No respiratory distress.   Right lateral chest wall tenderness, no erythema or rashes noted   Genitourinary: There is breast tenderness.   Lymphadenopathy:     She has no cervical adenopathy.   Neurological: She is alert and oriented to person, place, and time.   Skin: Skin is warm and dry. No rash noted. No erythema.   Psychiatric: She has a normal mood and affect. Her behavior is normal.   Nursing note and vitals reviewed.      Assessment/Plan   Aydee was seen today for right side pain.    Diagnoses and all orders for this visit:    Right-sided chest wall pain  Comments:  sx appear more r/t cartilage inflammation (from recent fall?) will start Meloxicam and apply heat to area, consider CXR if sx persist (discussed)  Orders:  -     Cancel: XR Chest 2 View  -     meloxicam (MOBIC) 15 MG tablet; Take 1 tablet by mouth Daily. Take with food               "

## 2017-11-15 NOTE — PROGRESS NOTES
"Physical Therapy Daily Progress Note    Time In 0855  Time Out 1007    Aydee Moore reports: \"I finally went to my doctor to see why my side has been hurting me so badly. She said when I fell flat on my face the other day I reaggravated my rib fractures from one year ago. I actually did my stairs this morning without thinking about it.\"    Subjective     Objective   See Exercise, Manual, and Modality Logs for complete treatment.   *Progressed to 8 inch stepovers  *Increased treadmill speed and incline    Assessment & Plan     Assessment  Assessment details: Patient is able to increase step height as well as treadmill speed and incline this date without increased s/s, but noted increased effort and mild shortness of breath after completing.  Patient remains highly motivated with all PT activities and interventions. She exhibits fair to good apparent stability with eccentric component of step activity, both forward and lateral.        Progress strengthening /stabilization /functional activity         Manual Therapy:         mins  17893;  Therapeutic Exercise:    15     mins  89174;     Neuromuscular Enedelia:        mins  04213;    Therapeutic Activity:     19     mins  35039;     Gait Training:           mins  70921;     Ultrasound:          mins  40412;    Electrical Stimulation:    15     mins  73412 ( );  Dry Needling          mins self-pay    Timed Treatment:   34   mins   Total Treatment:     72   mins    Grace Yip, PT, DPT  Physical Therapist  [place] License # [id number]    "

## 2017-11-21 ENCOUNTER — TREATMENT (OUTPATIENT)
Dept: PHYSICAL THERAPY | Facility: CLINIC | Age: 67
End: 2017-11-21

## 2017-11-21 DIAGNOSIS — M25.561 ACUTE PAIN OF RIGHT KNEE: ICD-10-CM

## 2017-11-21 DIAGNOSIS — Z98.890 S/P ARTHROSCOPIC KNEE SURGERY: Primary | ICD-10-CM

## 2017-11-21 DIAGNOSIS — Z98.890 S/P ARTHROSCOPIC PARTIAL MEDIAL MENISCECTOMY: ICD-10-CM

## 2017-11-21 PROCEDURE — 97110 THERAPEUTIC EXERCISES: CPT | Performed by: PHYSICAL THERAPIST

## 2017-11-21 PROCEDURE — 97530 THERAPEUTIC ACTIVITIES: CPT | Performed by: PHYSICAL THERAPIST

## 2017-11-21 PROCEDURE — G0283 ELEC STIM OTHER THAN WOUND: HCPCS | Performed by: PHYSICAL THERAPIST

## 2017-11-21 NOTE — PROGRESS NOTES
"Physical Therapy Daily Progress Note    Time In 0850  Time Out 1003    Aydee Moore reports: \"I walked a gravel path for about 20 minutes on Friday and my knee acted achey as a result.  I tried to pretty much live a normal life and do my normal activities this weekend but last night I had a sleepless night.  Part of it was my ribs but my knee was definitely unhappy too.  I feel like I should be doing better than this. I'm frustrated.\"     Subjective     Objective   See Exercise, Manual, and Modality Logs for complete treatment.   *Decreased therapeutic exercise repetitions due to knee irritation this date    Assessment & Plan     Assessment  Assessment details: Patient expresses frustration at variable knee s/s over the weekend with attempts at normalizing functional mobility level and progression of ambulation. Discussed with patient the potential fluctuation of s/s (R) knee due to known presence of arthritis and necessity of management strategies to avoid further s/s exacerbation.  As such, patient decreases time on LE bike as well as repetitions of step activity (8\" height).        Progress strengthening /stabilization /functional activity         Manual Therapy:         mins  99610;  Therapeutic Exercise:    36     mins  87577;     Neuromuscular Enedelia:        mins  02159;    Therapeutic Activity:     14     mins  51656;     Gait Training:           mins  51579;     Ultrasound:          mins  54768;    Electrical Stimulation:    15     mins  42693 ( );  Dry Needling          mins self-pay    Timed Treatment:   50   mins   Total Treatment:     73   mins    Grace Yip PT, DPT  Physical Therapist  [place] License # [id number]    "

## 2017-11-22 ENCOUNTER — TREATMENT (OUTPATIENT)
Dept: PHYSICAL THERAPY | Facility: CLINIC | Age: 67
End: 2017-11-22

## 2017-11-22 DIAGNOSIS — Z98.890 S/P ARTHROSCOPIC KNEE SURGERY: Primary | ICD-10-CM

## 2017-11-22 DIAGNOSIS — Z98.890 S/P ARTHROSCOPIC PARTIAL MEDIAL MENISCECTOMY: ICD-10-CM

## 2017-11-22 DIAGNOSIS — M25.561 ACUTE PAIN OF RIGHT KNEE: ICD-10-CM

## 2017-11-22 PROCEDURE — 97530 THERAPEUTIC ACTIVITIES: CPT | Performed by: PHYSICAL THERAPIST

## 2017-11-22 PROCEDURE — 97110 THERAPEUTIC EXERCISES: CPT | Performed by: PHYSICAL THERAPIST

## 2017-11-22 PROCEDURE — G0283 ELEC STIM OTHER THAN WOUND: HCPCS | Performed by: PHYSICAL THERAPIST

## 2017-11-22 NOTE — PROGRESS NOTES
"Physical Therapy Daily Progress Note    Time In 0958  Time Out 1119    Aydee Moore reports: \"I'm feeling better today.  I didn't work my knee too hard yesterday. It feels a little better today, my stress level is down, and I was able to sleep last night. Today I was able to push our mattress back by myself after I changed the bed, whereas usually I call my  to do it because I don't feel like I can.\"    Subjective     Objective   See Exercise, Manual, and Modality Logs for complete treatment.   *Increased treadmill incline variability up to 6%    Assessment & Plan     Assessment  Assessment details: Patient reports improved (R) knee s/s this date compared to yesterday, after performing scaled-back version of HEP as well as resting the leg more yesterday.  She is able to increase treadmill incline variation to 6% with mildly increased cardiovascular effort and fatigue but no pain.  She reaches the point of muscular fatigue during SLR activity with 1# weight when performing it as next to last activity.  Overall, patient is steadily progressing toward all unmet PT goals.        Progress strengthening /stabilization /functional activity. Reassess (R) knee AROM and strength.         Manual Therapy:         mins  47339;  Therapeutic Exercise:    20    mins  52154;     Neuromuscular Enedelia:        mins  64414;    Therapeutic Activity:     28     mins  38160;     Gait Training:           mins  05113;     Ultrasound:          mins  97889;    Electrical Stimulation:    15     mins  27734 ( );  Dry Needling          mins self-pay    Timed Treatment:   48   mins   Total Treatment:     81   mins    Grace Yip PT, DPT  Physical Therapist  [place] License # [id number]    "

## 2017-11-27 ENCOUNTER — TREATMENT (OUTPATIENT)
Dept: PHYSICAL THERAPY | Facility: CLINIC | Age: 67
End: 2017-11-27

## 2017-11-27 DIAGNOSIS — Z98.890 S/P ARTHROSCOPIC PARTIAL MEDIAL MENISCECTOMY: ICD-10-CM

## 2017-11-27 DIAGNOSIS — Z98.890 S/P ARTHROSCOPIC KNEE SURGERY: Primary | ICD-10-CM

## 2017-11-27 DIAGNOSIS — M25.561 ACUTE PAIN OF RIGHT KNEE: ICD-10-CM

## 2017-11-27 PROCEDURE — 97110 THERAPEUTIC EXERCISES: CPT | Performed by: PHYSICAL THERAPIST

## 2017-11-27 PROCEDURE — 97530 THERAPEUTIC ACTIVITIES: CPT | Performed by: PHYSICAL THERAPIST

## 2017-11-27 PROCEDURE — G0283 ELEC STIM OTHER THAN WOUND: HCPCS | Performed by: PHYSICAL THERAPIST

## 2017-11-27 NOTE — PROGRESS NOTES
"Physical Therapy Daily Progress Note    Time In 0857  Time Out 1012    Aydee Moore reports: \"My knee is going to be my knee for the rest of my life.  It's just pesky. I went to my sister's farm for Thanksgiving and walked up a steep hill on her property, and I was very out of breath by the top but my knee wasn't really bothering me.\"    Subjective     Objective   See Exercise, Manual, and Modality Logs for complete treatment.   *Increased LE bike resistance to L5  *Progressed ambulation speed, time, and incline    Assessment & Plan     Assessment  Assessment details: Patient appears slightly SOA with treadmill ambulation activities this date, though she declines decreasing speed or incline.  She attempts 8% incline with increased s/s (R) knee so she decreases max incline to 7% with mild provocation (R) knee; this is varied at random intervals throughout 20 min time period and changed to random inclines.  She reports muscular fatigue and some irritation of (R) knee s/s with lateral step ups (by repetition 19) at 8\" height.        Other - reassess (R) knee AROM and strength.         Manual Therapy:         mins  77932;  Therapeutic Exercise:    15     mins  75982;     Neuromuscular Enedelia:        mins  71533;    Therapeutic Activity:     27     mins  69329;     Gait Training:           mins  67832;     Ultrasound:          mins  63375;    Electrical Stimulation:    15     mins  91980 ( );  Dry Needling          mins self-pay    Timed Treatment:   42   mins   Total Treatment:     75   mins    Grace Yip PT, DPT  Physical Therapist  [place] License # [id number]    "

## 2017-11-30 ENCOUNTER — TREATMENT (OUTPATIENT)
Dept: PHYSICAL THERAPY | Facility: CLINIC | Age: 67
End: 2017-11-30

## 2017-11-30 DIAGNOSIS — Z98.890 S/P ARTHROSCOPIC KNEE SURGERY: Primary | ICD-10-CM

## 2017-11-30 DIAGNOSIS — M25.561 ACUTE PAIN OF RIGHT KNEE: ICD-10-CM

## 2017-11-30 DIAGNOSIS — Z98.890 S/P ARTHROSCOPIC PARTIAL MEDIAL MENISCECTOMY: ICD-10-CM

## 2017-11-30 PROCEDURE — 97530 THERAPEUTIC ACTIVITIES: CPT | Performed by: PHYSICAL THERAPIST

## 2017-11-30 NOTE — PROGRESS NOTES
"Physical Therapy Daily Progress Note    Time In 0845  Time Out 0942    Aydee Moore reports: \"My  is sick today so I have to do a little shorter session so I can get back home.\"  No new c/o re: (R) knee.    Subjective     Objective   See Exercise, Manual, and Modality Logs for complete treatment.       Assessment & Plan     Assessment  Assessment details: Patient performs LE bike and variable hill climbing on treadmill this date as well as more progressed, weightbearing activities only due to need for shortened PT session with her  being sick at home.  She appears to experience mild cardiovascular fatigue but minimal muscular fatigue and does not report any increased s/s compared to arriving to PT.  Periodic variability of (R) knee s/s may be at least partially due to arthritic component of her knee.        Progress strengthening /stabilization /functional activity as able.         Manual Therapy:         mins  14269;  Therapeutic Exercise:    5     mins  58125;     Neuromuscular Enedelia:        mins  39702;    Therapeutic Activity:     24     mins  31369;     Gait Training:           mins  34289;     Ultrasound:          mins  51491;    Electrical Stimulation:         mins  53700 ( );  Dry Needling          mins self-pay    Timed Treatment:   29   mins   Total Treatment:     57   mins    Grace Yip PT, DPT  Physical Therapist  [place] License # [id number]    "

## 2017-12-04 ENCOUNTER — TREATMENT (OUTPATIENT)
Dept: PHYSICAL THERAPY | Facility: CLINIC | Age: 67
End: 2017-12-04

## 2017-12-04 DIAGNOSIS — Z98.890 S/P ARTHROSCOPIC KNEE SURGERY: Primary | ICD-10-CM

## 2017-12-04 DIAGNOSIS — Z98.890 S/P ARTHROSCOPIC PARTIAL MEDIAL MENISCECTOMY: ICD-10-CM

## 2017-12-04 DIAGNOSIS — M25.561 ACUTE PAIN OF RIGHT KNEE: ICD-10-CM

## 2017-12-04 PROCEDURE — G0283 ELEC STIM OTHER THAN WOUND: HCPCS | Performed by: PHYSICAL THERAPIST

## 2017-12-04 PROCEDURE — 97110 THERAPEUTIC EXERCISES: CPT | Performed by: PHYSICAL THERAPIST

## 2017-12-04 PROCEDURE — 97530 THERAPEUTIC ACTIVITIES: CPT | Performed by: PHYSICAL THERAPIST

## 2017-12-04 NOTE — PROGRESS NOTES
"Physical Therapy Daily Progress Note    Time In 0852  Time Out 0953    Aydee Moore reports: \"I did something I haven't done in over a year - I vacuumed.  My knee woke me up during the night after doing that.\"  Patient reports that she is slightly under the weather.    Subjective     Objective   See Exercise, Manual, and Modality Logs for complete treatment.       Assessment & Plan     Assessment  Assessment details: Patient attempts treadmill incline progression up to 11% but is unable to perform for very long at this degree of steepness.  She is increasing in independence level but verbalizes varying degree of tolerance to functional mobility activities as well as intermittent 'flareups' which may possibly be attributed in part to arthritic nature of her knee.          Progress strengthening /stabilization /functional activity. Reassess LE strength.         Manual Therapy:         mins  72872;  Therapeutic Exercise:    13     mins  15071;     Neuromuscular Enedelia:        mins  74128;    Therapeutic Activity:     18    mins  00098;     Gait Training:           mins  50240;     Ultrasound:          mins  00021;    Electrical Stimulation:    15     mins  71863 ( );  Dry Needling          mins self-pay    Timed Treatment:   31   mins   Total Treatment:     61   mins    Grace Yip PT, DPT  Physical Therapist  [place] License # [id number]    "

## 2017-12-07 ENCOUNTER — TREATMENT (OUTPATIENT)
Dept: PHYSICAL THERAPY | Facility: CLINIC | Age: 67
End: 2017-12-07

## 2017-12-07 DIAGNOSIS — Z98.890 S/P ARTHROSCOPIC PARTIAL MEDIAL MENISCECTOMY: ICD-10-CM

## 2017-12-07 DIAGNOSIS — Z98.890 S/P ARTHROSCOPIC KNEE SURGERY: Primary | ICD-10-CM

## 2017-12-07 DIAGNOSIS — M25.561 ACUTE PAIN OF RIGHT KNEE: ICD-10-CM

## 2017-12-07 PROCEDURE — 97530 THERAPEUTIC ACTIVITIES: CPT | Performed by: PHYSICAL THERAPIST

## 2017-12-07 PROCEDURE — 97110 THERAPEUTIC EXERCISES: CPT | Performed by: PHYSICAL THERAPIST

## 2017-12-07 NOTE — PROGRESS NOTES
"Physical Therapy Daily Progress Note    Time In 0852  Time Out 1014    Aydee Moore reports: \"My knee feels good today, and I haven't done any vacuuming. I am ready to work on the exercises on my own and hopefully start transitioning back into hiking outdoors.  I will start on pretty flat surfaces first and build up distance and then work toward some elevation changes.\"     Subjective Evaluation    Pain  Current pain ratin           Objective       Tenderness     Additional Tenderness Details  No significant TTP (R) knee.    Active Range of Motion     Right Knee   Flexion: 126 degrees   Extension: 0 degrees   Extensor la degrees     Strength/Myotome Testing     Right Hip   Planes of Motion   Flexion: 5    Right Knee   Flexion: 5  Extension: 5  Quadriceps contraction: good    Right Ankle/Foot   Dorsiflexion: 5    Ambulation     Observational Gait   Gait: within functional limits     Additional Observational Gait Details  Patient ambulates without discernible gait abnormalities or compensation for up to 1 mile on treadmill at variable inclines as well as short community distances.     See Exercise, Manual, and Modality Logs for complete treatment.       Assessment & Plan     Assessment  Assessment details: Patient demonstrates ability to walk for 1.0 mile on treadmill at varying inclines up to 14.5% without pain.  She exhibits normalized gait pattern for community distances on level and unlevel surfaces as well as normal (R) LE AROM and strength.  She has been encouraged to persist with HEP compliance until she is able to resume recreational hiking activities at desired level. Patient has met all established PT goals except STG #4 and LTG #8. She is likely ready for discharge from skilled PT services at this time to independent self management with HEP.        Other - discharge skilled PT services.          Manual Therapy:         mins  36586;  Therapeutic Exercise:    14     mins  39886;     Neuromuscular " Enedelia:        mins  03603;    Therapeutic Activity:     22     mins  29449;     Gait Training:           mins  16662;     Ultrasound:          mins  98041;    Electrical Stimulation:         mins  81574 ( );  Dry Needling          mins self-pay    Timed Treatment:   36   mins   Total Treatment:     82   mins    Grace Yip PT, DPT  Physical Therapist  [place] License # [id number]

## 2017-12-28 ENCOUNTER — TELEPHONE (OUTPATIENT)
Dept: INTERNAL MEDICINE | Facility: CLINIC | Age: 67
End: 2017-12-28

## 2017-12-28 RX ORDER — PANTOPRAZOLE SODIUM 40 MG/1
40 TABLET, DELAYED RELEASE ORAL DAILY
Qty: 90 TABLET | Refills: 1 | Status: SHIPPED | OUTPATIENT
Start: 2017-12-28 | End: 2018-01-30

## 2017-12-28 NOTE — TELEPHONE ENCOUNTER
----- Message from Sheryl Lowe MA sent at 12/28/2017  3:29 PM EST -----  Pt asks these msgs be sent to [facility] calling to restart medication and refill to mail order    Protonix 40mg     [facility]     ALSO pt says rib pains continue

## 2018-01-30 ENCOUNTER — OFFICE VISIT (OUTPATIENT)
Dept: INTERNAL MEDICINE | Facility: CLINIC | Age: 68
End: 2018-01-30

## 2018-01-30 VITALS
SYSTOLIC BLOOD PRESSURE: 154 MMHG | HEART RATE: 66 BPM | OXYGEN SATURATION: 100 % | BODY MASS INDEX: 34.59 KG/M2 | DIASTOLIC BLOOD PRESSURE: 82 MMHG | HEIGHT: 65 IN | WEIGHT: 207.6 LBS | TEMPERATURE: 98.5 F

## 2018-01-30 DIAGNOSIS — R73.01 ELEVATED FASTING GLUCOSE: ICD-10-CM

## 2018-01-30 DIAGNOSIS — E78.5 HYPERLIPIDEMIA, UNSPECIFIED HYPERLIPIDEMIA TYPE: Primary | ICD-10-CM

## 2018-01-30 DIAGNOSIS — E03.9 HYPOTHYROIDISM, ADULT: ICD-10-CM

## 2018-01-30 DIAGNOSIS — R12 HEARTBURN: ICD-10-CM

## 2018-01-30 LAB
ALBUMIN SERPL-MCNC: 4.3 G/DL (ref 3.5–5.2)
ALBUMIN/GLOB SERPL: 1.3 G/DL
ALP SERPL-CCNC: 94 U/L (ref 39–117)
ALT SERPL W P-5'-P-CCNC: 18 U/L (ref 1–33)
ANION GAP SERPL CALCULATED.3IONS-SCNC: 13.5 MMOL/L
AST SERPL-CCNC: 20 U/L (ref 1–32)
BACTERIA UR QL AUTO: ABNORMAL /HPF
BILIRUB SERPL-MCNC: 0.4 MG/DL (ref 0.1–1.2)
BILIRUB UR QL STRIP: NEGATIVE
BUN BLD-MCNC: 21 MG/DL (ref 8–23)
BUN/CREAT SERPL: 20.8 (ref 7–25)
CALCIUM SPEC-SCNC: 9.6 MG/DL (ref 8.6–10.5)
CHLORIDE SERPL-SCNC: 101 MMOL/L (ref 98–107)
CHOLEST SERPL-MCNC: 143 MG/DL (ref 0–200)
CLARITY UR: CLEAR
CO2 SERPL-SCNC: 26.5 MMOL/L (ref 22–29)
COLOR UR: YELLOW
CREAT BLD-MCNC: 1.01 MG/DL (ref 0.57–1)
GFR SERPL CREATININE-BSD FRML MDRD: 55 ML/MIN/1.73
GLOBULIN UR ELPH-MCNC: 3.2 GM/DL
GLUCOSE BLD-MCNC: 103 MG/DL (ref 65–99)
GLUCOSE UR STRIP-MCNC: NEGATIVE MG/DL
HBA1C MFR BLD: 5.28 % (ref 4.8–5.6)
HDLC SERPL-MCNC: 65 MG/DL (ref 40–60)
HGB UR QL STRIP.AUTO: NEGATIVE
HYALINE CASTS UR QL AUTO: ABNORMAL /LPF
KETONES UR QL STRIP: NEGATIVE
LDLC SERPL CALC-MCNC: 57 MG/DL (ref 0–100)
LDLC/HDLC SERPL: 0.87 {RATIO}
LEUKOCYTE ESTERASE UR QL STRIP.AUTO: ABNORMAL
NITRITE UR QL STRIP: NEGATIVE
PH UR STRIP.AUTO: 7 [PH] (ref 5–8)
POTASSIUM BLD-SCNC: 4.2 MMOL/L (ref 3.5–5.2)
PROT SERPL-MCNC: 7.5 G/DL (ref 6–8.5)
PROT UR QL STRIP: NEGATIVE
RBC # UR: ABNORMAL /HPF
REF LAB TEST METHOD: ABNORMAL
SODIUM BLD-SCNC: 141 MMOL/L (ref 136–145)
SP GR UR STRIP: 1.01 (ref 1–1.03)
SQUAMOUS #/AREA URNS HPF: ABNORMAL /HPF
TRIGL SERPL-MCNC: 106 MG/DL (ref 0–150)
TSH SERPL-ACNC: 3.18 MIU/ML (ref 0.27–4.2)
UROBILINOGEN UR QL STRIP: ABNORMAL
VLDLC SERPL-MCNC: 21.2 MG/DL (ref 5–40)
WBC UR QL AUTO: ABNORMAL /HPF

## 2018-01-30 PROCEDURE — 99214 OFFICE O/P EST MOD 30 MIN: CPT | Performed by: INTERNAL MEDICINE

## 2018-01-30 PROCEDURE — 83036 HEMOGLOBIN GLYCOSYLATED A1C: CPT | Performed by: INTERNAL MEDICINE

## 2018-01-30 PROCEDURE — 81001 URINALYSIS AUTO W/SCOPE: CPT | Performed by: INTERNAL MEDICINE

## 2018-01-30 PROCEDURE — 80053 COMPREHEN METABOLIC PANEL: CPT | Performed by: INTERNAL MEDICINE

## 2018-01-30 PROCEDURE — 80061 LIPID PANEL: CPT | Performed by: INTERNAL MEDICINE

## 2018-01-30 RX ORDER — SULFAMETHOXAZOLE AND TRIMETHOPRIM 800; 160 MG/1; MG/1
1 TABLET ORAL 2 TIMES DAILY
Qty: 10 TABLET | Refills: 1 | Status: SHIPPED | OUTPATIENT
Start: 2018-01-30 | End: 2018-06-14

## 2018-01-30 NOTE — PROGRESS NOTES
Chief Complaint   Patient presents with   • Follow-up     4 mon f/u    • Hyperlipidemia   • Heartburn   • Hypothyroidism   • Weight Gain        Subjective   Aydee Moore is a 67 y.o. female     Hyperlipidemia   This is a chronic problem. The problem is controlled. Recent lipid tests were reviewed and are normal. Exacerbating diseases include hypothyroidism. Pertinent negatives include no chest pain, leg pain, myalgias or shortness of breath. Current antihyperlipidemic treatment includes statins. The current treatment provides significant improvement of lipids.   Heartburn   She complains of heartburn. She reports no chest pain, no coughing or no nausea. This is a recurrent problem. The current episode started more than 1 month ago. The problem occurs constantly. The problem has been unchanged. The heartburn is located in the substernum. The heartburn is of moderate intensity. Chokes, wakes up at night coughing. Risk factors include caffeine use and ETOH use (she has cut back on etoh). She has tried a PPI, an antacid and head elevation for the symptoms. The treatment provided no relief. Past procedures include a UGI.   Hypothyroidism   Pertinent negatives include no chest pain, coughing, headaches, myalgias, nausea or vomiting.   :      Hypothyroidism: Current treatment levothyroxine. She is compliant with the medication, tolerates it well and reports good control of symptoms of hypothyroidism.  She has been more sensitive to cold recently.   No constipation.  No symptoms of hyperthyroidism.  She has noticed her fingernails break easily. She is having more problems with weight control.    Elevated fasting glucose:  She  has had elevated fasting glucose in the past.  She denies polyuria, polydipsia, vision change appetite change, unexplained weight loss.   Lab Results   Component Value Date    HGBA1C 5.70 (H) 09/28/2017      She has recurrent urinary tract infections and would like a refill on Bactrim DS.    The  "following portions of the patient's history were reviewed and updated as appropriate: allergies, current medications, past social history, problem list, past surgical history    Review of Systems   Constitutional: Negative for activity change and appetite change.   HENT: Negative for nosebleeds.    Eyes: Negative for visual disturbance.   Respiratory: Negative for cough and shortness of breath.    Cardiovascular: Negative for chest pain, palpitations and leg swelling.   Gastrointestinal: Positive for heartburn. Negative for diarrhea, nausea and vomiting.   Musculoskeletal: Negative for myalgias.   Neurological: Negative for headaches.   Psychiatric/Behavioral: Negative for sleep disturbance.           Objective     /82  Pulse 66  Temp 98.5 °F (36.9 °C)  Ht 165.1 cm (65\")  Wt 94.2 kg (207 lb 9.6 oz)  SpO2 100%  BMI 34.55 kg/m2     Physical Exam   Constitutional: She is oriented to person, place, and time. She appears well-developed and well-nourished. No distress.   Neck: Normal carotid pulses present. Carotid bruit is not present.   Cardiovascular: Normal rate, regular rhythm, S1 normal, S2 normal and intact distal pulses.  Exam reveals no gallop and no friction rub.    No murmur heard.  Pulses:       Carotid pulses are 2+ on the right side, and 2+ on the left side.  Pulmonary/Chest: Effort normal and breath sounds normal. No respiratory distress. She has no wheezes. She has no rhonchi. She has no rales. Chest wall is not dull to percussion.   Abdominal: Soft. She exhibits no distension. There is no hepatosplenomegaly. There is no tenderness. There is no rebound and no guarding.   Musculoskeletal: She exhibits no edema.   Neurological: She is alert and oriented to person, place, and time.   Skin: Skin is warm and dry.   Nursing note and vitals reviewed.      Assessment/Plan   Problem List Items Addressed This Visit        Cardiovascular and Mediastinum    Hyperlipidemia - Primary    Relevant Orders    " Urinalysis With / Microscopic If Indicated - Urine, Clean Catch (Completed)    Lipid Panel    Urinalysis, Microscopic Only - Urine, Clean Catch       Endocrine    Elevated fasting glucose    Relevant Orders    Hemoglobin A1c    Hypothyroidism, adult    Relevant Orders    Comprehensive Metabolic Panel    TSH Rfx On Abnormal To Free T4      Other Visit Diagnoses     Heartburn        Relevant Orders    FL Upper GI With Air Contrast      She will try taking Zantac for heartburn.  She has previously taken a PPI but has stopped that due to concern over kidney function.

## 2018-02-16 ENCOUNTER — HOSPITAL ENCOUNTER (OUTPATIENT)
Dept: GENERAL RADIOLOGY | Facility: HOSPITAL | Age: 68
Discharge: HOME | End: 2018-02-16
Attending: INTERNAL MEDICINE | Admitting: INTERNAL MEDICINE

## 2018-02-16 DIAGNOSIS — R12 HEARTBURN: ICD-10-CM

## 2018-02-16 PROCEDURE — 74246 X-RAY XM UPR GI TRC 2CNTRST: CPT

## 2018-02-16 RX ADMIN — BARIUM SULFATE 183 ML: 960 POWDER, FOR SUSPENSION ORAL at 10:05

## 2018-02-16 RX ADMIN — BARIUM SULFATE 135 ML: 980 POWDER, FOR SUSPENSION ORAL at 10:05

## 2018-02-16 RX ADMIN — ANTACID/ANTIFLATULENT 1 TABLET: 380; 550; 10; 10 GRANULE, EFFERVESCENT ORAL at 10:05

## 2018-02-19 DIAGNOSIS — R12 HEARTBURN: ICD-10-CM

## 2018-02-19 DIAGNOSIS — K44.9 HIATAL HERNIA: Primary | ICD-10-CM

## 2018-03-05 ENCOUNTER — OFFICE (AMBULATORY)
Dept: URBAN - METROPOLITAN AREA CLINIC 75 | Facility: CLINIC | Age: 68
End: 2018-03-05

## 2018-03-05 VITALS
HEIGHT: 65 IN | SYSTOLIC BLOOD PRESSURE: 130 MMHG | WEIGHT: 205 LBS | DIASTOLIC BLOOD PRESSURE: 80 MMHG | HEART RATE: 68 BPM

## 2018-03-05 DIAGNOSIS — Z86.010 PERSONAL HISTORY OF COLONIC POLYPS: ICD-10-CM

## 2018-03-05 DIAGNOSIS — Z12.11 ENCOUNTER FOR SCREENING FOR MALIGNANT NEOPLASM OF COLON: ICD-10-CM

## 2018-03-05 DIAGNOSIS — R93.3 ABNORMAL FINDINGS ON DIAGNOSTIC IMAGING OF OTHER PARTS OF DI: ICD-10-CM

## 2018-03-05 DIAGNOSIS — D12.3 BENIGN NEOPLASM OF TRANSVERSE COLON: ICD-10-CM

## 2018-03-05 DIAGNOSIS — K21.9 GASTRO-ESOPHAGEAL REFLUX DISEASE WITHOUT ESOPHAGITIS: ICD-10-CM

## 2018-03-05 DIAGNOSIS — J02.9 ACUTE PHARYNGITIS, UNSPECIFIED: ICD-10-CM

## 2018-03-05 DIAGNOSIS — K44.9 DIAPHRAGMATIC HERNIA WITHOUT OBSTRUCTION OR GANGRENE: ICD-10-CM

## 2018-03-05 DIAGNOSIS — K64.4 RESIDUAL HEMORRHOIDAL SKIN TAGS: ICD-10-CM

## 2018-03-05 PROCEDURE — 99214 OFFICE O/P EST MOD 30 MIN: CPT | Performed by: INTERNAL MEDICINE

## 2018-03-05 NOTE — SERVICENOTES
records reviewed.  TSH 3.18.  Hemoglobin A1c 5.28.  Glucose 103, remainder of comprehensive panel within normal limits.  Upper GI showed a small hiatal hernia with reflux and gastric polyps.

## 2018-03-05 NOTE — SERVICEHPINOTES
Ms. Buck presents for followup. She has a history of reflux, she was having problems over the holidays but she thinks it was due to drinking Egg Nog with bourbon in it. Currently she is doing better. She stopped her PPI. She's been using bicarbonate and Zantac when necessary. She also takes Carafate. She also has symptoms if she over doesn't terms of portion sizes. She also likes to have a glass of wine but she has a second last that causes GI upset. She also had a recent upper GI which did show small hiatal hernia and reflux, look and she also has some small gastric polyps. These are typically bowl benign but the patient's concerned about the finding. She says that she has remote family history of gastric cancer. Again I told her that these are typically benign findings but we'll await a know for sure is to do biopsies. She would like to proceed with endoscopy. She also has some nausea but there is no emesis. There is no dysphagia, odynophagia, melena or hematemesis.She's also had some constipation recently but she says that was due to the barium study. She also has a history of polyps. I did a colonoscopy on her in March of 2016. She did have a small adenoma, lipoma and hemorrhoids.She is in no distress, she does not look acutely ill. She's been having issues with her knee, she sees her orthopedic surgeon today. She's frustrated because she says she is on a low carb diet but still gaining weight because she cannot exercise. She is not a smoker. Otherwise there is no change in her past medical or past surgical history.

## 2018-03-19 VITALS
HEIGHT: 65 IN | SYSTOLIC BLOOD PRESSURE: 115 MMHG | SYSTOLIC BLOOD PRESSURE: 128 MMHG | HEART RATE: 63 BPM | HEART RATE: 67 BPM | OXYGEN SATURATION: 92 % | DIASTOLIC BLOOD PRESSURE: 72 MMHG | DIASTOLIC BLOOD PRESSURE: 96 MMHG | TEMPERATURE: 98.7 F | HEART RATE: 56 BPM | HEART RATE: 66 BPM | RESPIRATION RATE: 16 BRPM | SYSTOLIC BLOOD PRESSURE: 142 MMHG | OXYGEN SATURATION: 99 % | DIASTOLIC BLOOD PRESSURE: 68 MMHG | HEART RATE: 62 BPM | SYSTOLIC BLOOD PRESSURE: 112 MMHG | RESPIRATION RATE: 20 BRPM | OXYGEN SATURATION: 93 % | WEIGHT: 205 LBS | DIASTOLIC BLOOD PRESSURE: 69 MMHG | HEART RATE: 82 BPM | HEART RATE: 54 BPM | RESPIRATION RATE: 23 BRPM | SYSTOLIC BLOOD PRESSURE: 124 MMHG | TEMPERATURE: 97.3 F | OXYGEN SATURATION: 96 % | OXYGEN SATURATION: 88 %

## 2018-03-20 ENCOUNTER — OFFICE (AMBULATORY)
Dept: URBAN - METROPOLITAN AREA PATHOLOGY 4 | Facility: PATHOLOGY | Age: 68
End: 2018-03-20

## 2018-03-20 ENCOUNTER — AMBULATORY SURGICAL CENTER (AMBULATORY)
Dept: URBAN - METROPOLITAN AREA SURGERY 17 | Facility: SURGERY | Age: 68
End: 2018-03-20

## 2018-03-20 DIAGNOSIS — K29.50 UNSPECIFIED CHRONIC GASTRITIS WITHOUT BLEEDING: ICD-10-CM

## 2018-03-20 DIAGNOSIS — K31.7 POLYP OF STOMACH AND DUODENUM: ICD-10-CM

## 2018-03-20 DIAGNOSIS — K21.0 GASTRO-ESOPHAGEAL REFLUX DISEASE WITH ESOPHAGITIS: ICD-10-CM

## 2018-03-20 DIAGNOSIS — K31.89 OTHER DISEASES OF STOMACH AND DUODENUM: ICD-10-CM

## 2018-03-20 LAB
GI HISTOLOGY: A. UNSPECIFIED: (no result)
GI HISTOLOGY: B. UNSPECIFIED: (no result)
GI HISTOLOGY: C. UNSPECIFIED: (no result)
GI HISTOLOGY: D. UNSPECIFIED: (no result)
GI HISTOLOGY: PDF REPORT: (no result)

## 2018-03-20 PROCEDURE — 88305 TISSUE EXAM BY PATHOLOGIST: CPT | Performed by: INTERNAL MEDICINE

## 2018-03-20 PROCEDURE — 43239 EGD BIOPSY SINGLE/MULTIPLE: CPT | Mod: 59 | Performed by: INTERNAL MEDICINE

## 2018-03-20 PROCEDURE — 43251 EGD REMOVE LESION SNARE: CPT | Performed by: INTERNAL MEDICINE

## 2018-03-20 RX ADMIN — PROPOFOL 50 MG: 10 INJECTION, EMULSION INTRAVENOUS at 13:18

## 2018-03-20 RX ADMIN — PROPOFOL 25 MG: 10 INJECTION, EMULSION INTRAVENOUS at 13:20

## 2018-03-20 RX ADMIN — LIDOCAINE HYDROCHLORIDE 25 MG: 10 INJECTION, SOLUTION EPIDURAL; INFILTRATION; INTRACAUDAL; PERINEURAL at 13:17

## 2018-03-20 RX ADMIN — PROPOFOL 100 MG: 10 INJECTION, EMULSION INTRAVENOUS at 13:17

## 2018-03-20 RX ADMIN — PROPOFOL 25 MG: 10 INJECTION, EMULSION INTRAVENOUS at 13:22

## 2018-03-20 NOTE — SERVICEHPINOTES
Ms. Buck presents for followup. She has a history of reflux, she was having problems over the holidays but she thinks it was due to drinking Egg Nog with bourbon in it. Currently she is doing better. She stopped her PPI. She's been using bicarbonate and Zantac when necessary. She also takes Carafate. She also has symptoms if she over doesn't terms of portion sizes. She also likes to have a glass of wine but she has a second last that causes GI upset. She also had a recent upper GI which did show small hiatal hernia and reflux, look and she also has some small gastric polyps. These are typically bowl benign but the patient's concerned about the finding. She says that she has remote family history of gastric cancer. Again I told her that these are typically benign findings but we'll await a know for sure is to do biopsies. She would like to proceed with endoscopy. She also has some nausea but there is no emesis. There is no dysphagia, odynophagia, melena or hematemesis.She's also had some constipation recently but she says that was due to the barium study. She also has a history of polyps. I did a colonoscopy on her in March of 2016. She did have a small adenoma, lipoma and hemorrhoids.She is in no distress, she does not look acutely ill. She's been having issues with her knee, she sees her orthopedic surgeon today. She's frustrated because she says she is on a low carb diet but still gaining weight because she cannot exercise. She is not a smoker. Otherwise there is no change in her past medical or past surgical history. BR

## 2018-04-03 ENCOUNTER — DOCUMENTATION (OUTPATIENT)
Dept: PHYSICAL THERAPY | Facility: CLINIC | Age: 68
End: 2018-04-03

## 2018-05-10 RX ORDER — LEVOTHYROXINE SODIUM 50 MCG
TABLET ORAL
Qty: 90 TABLET | Refills: 1 | Status: SHIPPED | OUTPATIENT
Start: 2018-05-10 | End: 2019-01-11

## 2018-06-14 ENCOUNTER — OFFICE VISIT (OUTPATIENT)
Dept: INTERNAL MEDICINE | Facility: CLINIC | Age: 68
End: 2018-06-14

## 2018-06-14 VITALS
WEIGHT: 207 LBS | SYSTOLIC BLOOD PRESSURE: 122 MMHG | HEIGHT: 65 IN | DIASTOLIC BLOOD PRESSURE: 74 MMHG | BODY MASS INDEX: 34.49 KG/M2

## 2018-06-14 DIAGNOSIS — F41.9 ANXIETY AND DEPRESSION: ICD-10-CM

## 2018-06-14 DIAGNOSIS — E78.5 HYPERLIPIDEMIA, UNSPECIFIED HYPERLIPIDEMIA TYPE: ICD-10-CM

## 2018-06-14 DIAGNOSIS — F32.A ANXIETY AND DEPRESSION: ICD-10-CM

## 2018-06-14 DIAGNOSIS — R73.01 ELEVATED FASTING GLUCOSE: ICD-10-CM

## 2018-06-14 DIAGNOSIS — E03.9 HYPOTHYROIDISM, ADULT: Primary | ICD-10-CM

## 2018-06-14 LAB
ALBUMIN SERPL-MCNC: 4.6 G/DL (ref 3.5–5.2)
ALBUMIN/GLOB SERPL: 1.5 G/DL
ALP SERPL-CCNC: 96 U/L (ref 39–117)
ALT SERPL W P-5'-P-CCNC: 11 U/L (ref 1–33)
ANION GAP SERPL CALCULATED.3IONS-SCNC: 13.9 MMOL/L
AST SERPL-CCNC: 13 U/L (ref 1–32)
BILIRUB SERPL-MCNC: 0.4 MG/DL (ref 0.1–1.2)
BUN BLD-MCNC: 20 MG/DL (ref 8–23)
BUN/CREAT SERPL: 16.5 (ref 7–25)
CALCIUM SPEC-SCNC: 10.2 MG/DL (ref 8.6–10.5)
CHLORIDE SERPL-SCNC: 101 MMOL/L (ref 98–107)
CHOLEST SERPL-MCNC: 262 MG/DL (ref 0–200)
CO2 SERPL-SCNC: 27.1 MMOL/L (ref 22–29)
CREAT BLD-MCNC: 1.21 MG/DL (ref 0.57–1)
GFR SERPL CREATININE-BSD FRML MDRD: 45 ML/MIN/1.73
GLOBULIN UR ELPH-MCNC: 3 GM/DL
GLUCOSE BLD-MCNC: 104 MG/DL (ref 65–99)
HBA1C MFR BLD: 5.29 % (ref 4.8–5.6)
HDLC SERPL-MCNC: 69 MG/DL (ref 40–60)
LDLC SERPL CALC-MCNC: 157 MG/DL (ref 0–100)
LDLC/HDLC SERPL: 2.28 {RATIO}
POTASSIUM BLD-SCNC: 4.5 MMOL/L (ref 3.5–5.2)
PROT SERPL-MCNC: 7.6 G/DL (ref 6–8.5)
SODIUM BLD-SCNC: 142 MMOL/L (ref 136–145)
TRIGL SERPL-MCNC: 179 MG/DL (ref 0–150)
TSH SERPL-ACNC: 3.25 MIU/ML (ref 0.27–4.2)
VLDLC SERPL-MCNC: 35.8 MG/DL (ref 5–40)

## 2018-06-14 PROCEDURE — 83036 HEMOGLOBIN GLYCOSYLATED A1C: CPT | Performed by: INTERNAL MEDICINE

## 2018-06-14 PROCEDURE — 80061 LIPID PANEL: CPT | Performed by: INTERNAL MEDICINE

## 2018-06-14 PROCEDURE — 99214 OFFICE O/P EST MOD 30 MIN: CPT | Performed by: INTERNAL MEDICINE

## 2018-06-14 PROCEDURE — 80053 COMPREHEN METABOLIC PANEL: CPT | Performed by: INTERNAL MEDICINE

## 2018-06-14 RX ORDER — VIT C/B6/B5/MAGNESIUM/HERB 173 50-5-6-5MG
406 CAPSULE ORAL DAILY
COMMUNITY
End: 2021-02-04

## 2018-06-14 NOTE — PROGRESS NOTES
Chief Complaint   Patient presents with   • Hypothyroidism     4 month follow up   • Hyperlipidemia   • Anxiety   • Elevated fasting glucose       Subjective   Aydee Moore is a 68 y.o. female.     Hypothyroidism   This is a chronic problem. Pertinent negatives include no chest pain, coughing or myalgias.   Hyperlipidemia   This is a chronic problem. The problem is controlled. Recent lipid tests were reviewed and are normal. Exacerbating diseases include hypothyroidism. There are no known factors aggravating her hyperlipidemia. Pertinent negatives include no chest pain, leg pain, myalgias or shortness of breath. Current antihyperlipidemic treatment includes statins, exercise and diet change. The current treatment provides significant improvement of lipids. There are no compliance problems.    Anxiety   Presents for follow-up visit. Symptoms include depressed mood, excessive worry and nervous/anxious behavior. Patient reports no chest pain, palpitations or shortness of breath. Symptoms occur most days. The severity of symptoms is moderate.     Compliance with medications is %.      She continues to have problems with anxiety and depression.  She continues to take fluoxetine weekly.  She reports friction between she and her .  This apparently is an ongoing problem.  She attended counseling independently a number of years ago.  He declines to go.  The situation is aggravated, she reports,  due to his poor hearing.      Hypothyroidism: Current treatment levothyroxine. She is compliant with the medication, tolerates it well and reports good control of symptoms of hypothyroidism.  She has not noted any change in tolerance of heat or cold.  No change in hair or skin.  No constipation.  No symptoms of hyperthyroidism.      Elevated fasting glucose: This is a chronic problem.  She  has had elevated fasting glucose in the past.  She denies polyuria, polydipsia, vision change appetite change, unexplained weight  loss.   Lab Results   Component Value Date    HGBA1C 5.28 01/30/2018        The following portions of the patient's history were reviewed and updated as appropriate: allergies, current medications, past family history, past medical history, past social history, past surgical history and problem list.    Review of Systems   Constitutional: Negative for appetite change.   HENT: Negative for nosebleeds.    Eyes: Negative for blurred vision and double vision.   Respiratory: Negative for cough and shortness of breath.    Cardiovascular: Negative for chest pain, palpitations and leg swelling.   Endocrine: Negative for polydipsia and polyuria.   Musculoskeletal: Negative for myalgias.   Psychiatric/Behavioral: Positive for depressed mood. The patient is nervous/anxious.          Current Outpatient Prescriptions:   •  ALOE PO, Take  by mouth Daily., Disp: , Rfl:   •  Bromelain 100 MG tablet, Take  by mouth., Disp: , Rfl:   •  clobetasol (TEMOVATE) 0.05 % cream, Apply  topically 2 (Two) Times a Day., Disp: 15 g, Rfl: 0  •  CRESTOR 10 MG tablet, TAKE 1 TABLET DAILY, Disp: 90 tablet, Rfl: 3  •  FIBER SELECT GUMMIES PO, Take  by mouth., Disp: , Rfl:   •  FLUoxetine weekly (PROzac WEEKLY) 90 MG DR capsule, Take 1 capsule by mouth every 7 days., Disp: 12 capsule, Rfl: 3  •  fluticasone (FLONASE) 50 MCG/ACT nasal spray, 2 sprays into each nostril daily. Administer 2 sprays in each nostril for each dose., Disp: , Rfl:   •  GLUCOSAMINE-CHONDROITIN-MSM PO, Take 1,500 mg by mouth Daily., Disp: , Rfl:   •  guaiFENesin (MUCINEX) 600 MG 12 hr tablet, Take 2 tablets by mouth 2 (Two) Times a Day., Disp: 60 tablet, Rfl: 3  •  Magnesium 400 MG capsule, Take  by mouth., Disp: , Rfl:   •  sucralfate (CARAFATE) 1 G tablet, TAKE 1 TABLET TWICE A DAY, Disp: 180 tablet, Rfl: 3  •  SYNTHROID 50 MCG tablet, TAKE 1 TABLET DAILY., Disp: 90 tablet, Rfl: 1  •  Turmeric 500 MG capsule, Take 406 mg by mouth Daily., Disp: , Rfl:         Objective     BP  "122/74   Ht 165.1 cm (65\")   Wt 93.9 kg (207 lb)   BMI 34.45 kg/m²     Physical Exam   Constitutional: She is oriented to person, place, and time. She appears well-developed and well-nourished. No distress.   Neck: Normal carotid pulses present. Carotid bruit is not present.   Cardiovascular: Regular rhythm, S1 normal and S2 normal.  Exam reveals no gallop and no friction rub.    No murmur heard.  Pulses:       Carotid pulses are 2+ on the right side, and 2+ on the left side.  Pulmonary/Chest: Effort normal and breath sounds normal. She has no wheezes. She has no rhonchi. She has no rales. Chest wall is not dull to percussion.   Musculoskeletal: She exhibits no edema.   Neurological: She is alert and oriented to person, place, and time.   Skin: Skin is warm and dry.   Psychiatric: She has a normal mood and affect.   Nursing note and vitals reviewed.        Assessment/Plan   Aydee was seen today for hypothyroidism, hyperlipidemia, anxiety and elevated fasting glucose.    Diagnoses and all orders for this visit:    Hypothyroidism, adult  -     Comprehensive Metabolic Panel; Future  -     TSH Rfx On Abnormal To Free T4; Future  -     Lipid Panel; Future  -     Comprehensive Metabolic Panel  -     TSH Rfx On Abnormal To Free T4  -     Lipid Panel    Hyperlipidemia, unspecified hyperlipidemia type    Elevated fasting glucose  -     Hemoglobin A1c; Future  -     Hemoglobin A1c    Anxiety and depression               "

## 2018-10-01 ENCOUNTER — OFFICE (AMBULATORY)
Dept: URBAN - METROPOLITAN AREA CLINIC 75 | Facility: CLINIC | Age: 68
End: 2018-10-01

## 2018-10-01 VITALS
HEART RATE: 58 BPM | HEIGHT: 65 IN | WEIGHT: 213 LBS | DIASTOLIC BLOOD PRESSURE: 70 MMHG | SYSTOLIC BLOOD PRESSURE: 124 MMHG

## 2018-10-01 DIAGNOSIS — Z80.0 FAMILY HISTORY OF MALIGNANT NEOPLASM OF DIGESTIVE ORGANS: ICD-10-CM

## 2018-10-01 DIAGNOSIS — K29.70 GASTRITIS, UNSPECIFIED, WITHOUT BLEEDING: ICD-10-CM

## 2018-10-01 DIAGNOSIS — K64.4 RESIDUAL HEMORRHOIDAL SKIN TAGS: ICD-10-CM

## 2018-10-01 DIAGNOSIS — K20.9 ESOPHAGITIS, UNSPECIFIED: ICD-10-CM

## 2018-10-01 DIAGNOSIS — J02.9 ACUTE PHARYNGITIS, UNSPECIFIED: ICD-10-CM

## 2018-10-01 DIAGNOSIS — K31.7 POLYP OF STOMACH AND DUODENUM: ICD-10-CM

## 2018-10-01 DIAGNOSIS — R93.3 ABNORMAL FINDINGS ON DIAGNOSTIC IMAGING OF OTHER PARTS OF DI: ICD-10-CM

## 2018-10-01 DIAGNOSIS — Z86.010 PERSONAL HISTORY OF COLONIC POLYPS: ICD-10-CM

## 2018-10-01 DIAGNOSIS — K44.9 DIAPHRAGMATIC HERNIA WITHOUT OBSTRUCTION OR GANGRENE: ICD-10-CM

## 2018-10-01 DIAGNOSIS — K21.0 GASTRO-ESOPHAGEAL REFLUX DISEASE WITH ESOPHAGITIS: ICD-10-CM

## 2018-10-01 PROCEDURE — 99213 OFFICE O/P EST LOW 20 MIN: CPT | Performed by: INTERNAL MEDICINE

## 2018-10-04 ENCOUNTER — OFFICE VISIT (OUTPATIENT)
Dept: INTERNAL MEDICINE | Facility: CLINIC | Age: 68
End: 2018-10-04

## 2018-10-04 VITALS
WEIGHT: 214 LBS | TEMPERATURE: 98.4 F | SYSTOLIC BLOOD PRESSURE: 142 MMHG | OXYGEN SATURATION: 97 % | HEART RATE: 70 BPM | BODY MASS INDEX: 35.65 KG/M2 | DIASTOLIC BLOOD PRESSURE: 86 MMHG | HEIGHT: 65 IN

## 2018-10-04 DIAGNOSIS — L25.5 CONTACT DERMATITIS DUE TO PLANT: Primary | ICD-10-CM

## 2018-10-04 PROCEDURE — 99213 OFFICE O/P EST LOW 20 MIN: CPT | Performed by: NURSE PRACTITIONER

## 2018-10-04 RX ORDER — METHYLPREDNISOLONE 4 MG/1
TABLET ORAL
Qty: 21 TABLET | Refills: 0 | Status: SHIPPED | OUTPATIENT
Start: 2018-10-04 | End: 2018-10-25

## 2018-10-04 NOTE — PROGRESS NOTES
Teena Moore is a 68 y.o. female.     She was out in the yard cutting her flowers yesterday. She started with rash later that night. She has applied hydrocortisone but concerned because it has spread.      Rash   This is a new problem. The current episode started yesterday. The problem has been gradually worsening since onset. Location: left abdomen, bilateral arms  The rash is characterized by redness, itchiness and burning. Associated symptoms include a sore throat. Pertinent negatives include no cough, facial edema, fatigue, fever, rhinorrhea or shortness of breath. Past treatments include topical steroids (hydrocortisone ). The treatment provided mild relief.        The following portions of the patient's history were reviewed and updated as appropriate: allergies, current medications, past social history and problem list.    Review of Systems   Constitutional: Negative for activity change, appetite change, fatigue and fever.   HENT: Positive for sore throat. Negative for rhinorrhea.    Respiratory: Negative for cough, shortness of breath and wheezing.    Cardiovascular: Negative for chest pain, palpitations and leg swelling.   Gastrointestinal: Negative for abdominal pain.   Skin: Positive for rash (left abdomen, bilateral arms ).   Allergic/Immunologic: Positive for environmental allergies.       Objective   Physical Exam   Constitutional: She is oriented to person, place, and time. She appears well-developed and well-nourished.   HENT:   Head: Normocephalic.   Nose: Nose normal.   Cardiovascular: Regular rhythm and normal heart sounds.  Exam reveals no S3 and no S4.    No murmur heard.  Pulmonary/Chest: Effort normal and breath sounds normal. She has no decreased breath sounds. She has no wheezes. She has no rhonchi. She has no rales.   Musculoskeletal: She exhibits no edema.   Neurological: She is alert and oriented to person, place, and time. Gait normal.   Skin: Skin is warm and dry. Rash  noted. Rash is maculopapular.   Erythema based rash to left upper abdominal and   Bilateral arms    Psychiatric: She has a normal mood and affect.       Assessment/Plan   Aydee was seen today for rash.    Diagnoses and all orders for this visit:    Contact dermatitis due to plant  -     MethylPREDNISolone (MEDROL) 4 MG tablet; follow package directions  -     triamcinolone (KENALOG) 0.1 % ointment; Apply  topically to the appropriate area as directed 2 (Two) Times a Day. Apply to affected area    She will return for worsening of symptoms.

## 2018-10-04 NOTE — PATIENT INSTRUCTIONS
Contact Dermatitis  Dermatitis is redness, soreness, and swelling (inflammation) of the skin. Contact dermatitis is a reaction to certain substances that touch the skin. There are two types of contact dermatitis:  · Irritant contact dermatitis. This type is caused by something that irritates your skin, such as dry hands from washing them too much. This type does not require previous exposure to the substance for a reaction to occur. This type is more common.  · Allergic contact dermatitis. This type is caused by a substance that you are allergic to, such as a nickel allergy or poison ivy. This type only occurs if you have been exposed to the substance (allergen) before. Upon a repeat exposure, your body reacts to the substance. This type is less common.    What are the causes?  Many different substances can cause contact dermatitis. Irritant contact dermatitis is most commonly caused by exposure to:  · Makeup.  · Soaps.  · Detergents.  · Bleaches.  · Acids.  · Metal salts, such as nickel.    Allergic contact dermatitis is most commonly caused by exposure to:  · Poisonous plants.  · Chemicals.  · Jewelry.  · Latex.  · Medicines.  · Preservatives in products, such as clothing.    What increases the risk?  This condition is more likely to develop in:  · People who have jobs that expose them to irritants or allergens.  · People who have certain medical conditions, such as asthma or eczema.    What are the signs or symptoms?  Symptoms of this condition may occur anywhere on your body where the irritant has touched you or is touched by you. Symptoms include:  · Dryness or flaking.  · Redness.  · Cracks.  · Itching.  · Pain or a burning feeling.  · Blisters.  · Drainage of small amounts of blood or clear fluid from skin cracks.    With allergic contact dermatitis, there may also be swelling in areas such as the eyelids, mouth, or genitals.  How is this diagnosed?  This condition is diagnosed with a medical history and  physical exam. A patch skin test may be performed to help determine the cause. If the condition is related to your job, you may need to see an occupational medicine specialist.  How is this treated?  Treatment for this condition includes figuring out what caused the reaction and protecting your skin from further contact. Treatment may also include:  · Steroid creams or ointments. Oral steroid medicines may be needed in more severe cases.  · Antibiotics or antibacterial ointments, if a skin infection is present.  · Antihistamine lotion or an antihistamine taken by mouth to ease itching.  · A bandage (dressing).    Follow these instructions at home:  Skin Care  · Moisturize your skin as needed.  · Apply cool compresses to the affected areas.  · Try taking a bath with:  ? Epsom salts. Follow the instructions on the packaging. You can get these at your local pharmacy or grocery store.  ? Baking soda. Pour a small amount into the bath as directed by your health care provider.  ? Colloidal oatmeal. Follow the instructions on the packaging. You can get this at your local pharmacy or grocery store.  · Try applying baking soda paste to your skin. Stir water into baking soda until it reaches a paste-like consistency.  · Do not scratch your skin.  · Bathe less frequently, such as every other day.  · Bathe in lukewarm water. Avoid using hot water.  Medicines  · Take or apply over-the-counter and prescription medicines only as told by your health care provider.  · If you were prescribed an antibiotic medicine, take or apply your antibiotic as told by your health care provider. Do not stop using the antibiotic even if your condition starts to improve.  General instructions  · Keep all follow-up visits as told by your health care provider. This is important.  · Avoid the substance that caused your reaction. If you do not know what caused it, keep a journal to try to track what caused it. Write down:  ? What you eat.  ? What  cosmetic products you use.  ? What you drink.  ? What you wear in the affected area. This includes jewelry.  · If you were given a dressing, take care of it as told by your health care provider. This includes when to change and remove it.  Contact a health care provider if:  · Your condition does not improve with treatment.  · Your condition gets worse.  · You have signs of infection such as swelling, tenderness, redness, soreness, or warmth in the affected area.  · You have a fever.  · You have new symptoms.  Get help right away if:  · You have a severe headache, neck pain, or neck stiffness.  · You vomit.  · You feel very sleepy.  · You notice red streaks coming from the affected area.  · Your bone or joint underneath the affected area becomes painful after the skin has healed.  · The affected area turns darker.  · You have difficulty breathing.  This information is not intended to replace advice given to you by your health care provider. Make sure you discuss any questions you have with your health care provider.  Document Released: 12/15/2001 Document Revised: 05/25/2017 Document Reviewed: 05/04/2016  [employer] Interactive Patient Education © 2018 [employer]

## 2018-10-25 ENCOUNTER — OFFICE VISIT (OUTPATIENT)
Dept: INTERNAL MEDICINE | Facility: CLINIC | Age: 68
End: 2018-10-25

## 2018-10-25 VITALS
HEART RATE: 60 BPM | DIASTOLIC BLOOD PRESSURE: 80 MMHG | SYSTOLIC BLOOD PRESSURE: 124 MMHG | BODY MASS INDEX: 35.65 KG/M2 | WEIGHT: 214 LBS | OXYGEN SATURATION: 95 % | HEIGHT: 65 IN

## 2018-10-25 DIAGNOSIS — R73.01 ELEVATED FASTING GLUCOSE: ICD-10-CM

## 2018-10-25 DIAGNOSIS — E03.9 HYPOTHYROIDISM, ADULT: ICD-10-CM

## 2018-10-25 DIAGNOSIS — E78.5 HYPERLIPIDEMIA, UNSPECIFIED HYPERLIPIDEMIA TYPE: Primary | ICD-10-CM

## 2018-10-25 LAB
ALBUMIN SERPL-MCNC: 4.1 G/DL (ref 3.5–5.2)
ALBUMIN/GLOB SERPL: 1.5 G/DL
ALP SERPL-CCNC: 84 U/L (ref 39–117)
ALT SERPL W P-5'-P-CCNC: 16 U/L (ref 1–33)
ANION GAP SERPL CALCULATED.3IONS-SCNC: 11.9 MMOL/L
AST SERPL-CCNC: 16 U/L (ref 1–32)
BILIRUB SERPL-MCNC: 0.3 MG/DL (ref 0.1–1.2)
BUN BLD-MCNC: 16 MG/DL (ref 8–23)
BUN/CREAT SERPL: 15.1 (ref 7–25)
CALCIUM SPEC-SCNC: 10 MG/DL (ref 8.6–10.5)
CHLORIDE SERPL-SCNC: 103 MMOL/L (ref 98–107)
CHOLEST SERPL-MCNC: 185 MG/DL (ref 0–200)
CO2 SERPL-SCNC: 27.1 MMOL/L (ref 22–29)
CREAT BLD-MCNC: 1.06 MG/DL (ref 0.57–1)
GFR SERPL CREATININE-BSD FRML MDRD: 52 ML/MIN/1.73
GLOBULIN UR ELPH-MCNC: 2.8 GM/DL
GLUCOSE BLD-MCNC: 102 MG/DL (ref 65–99)
HBA1C MFR BLD: 5.39 % (ref 4.8–5.6)
HDLC SERPL-MCNC: 61 MG/DL (ref 40–60)
LDLC SERPL CALC-MCNC: 95 MG/DL (ref 0–100)
LDLC/HDLC SERPL: 1.55 {RATIO}
POTASSIUM BLD-SCNC: 4.6 MMOL/L (ref 3.5–5.2)
PROT SERPL-MCNC: 6.9 G/DL (ref 6–8.5)
SODIUM BLD-SCNC: 142 MMOL/L (ref 136–145)
TRIGL SERPL-MCNC: 146 MG/DL (ref 0–150)
TSH SERPL DL<=0.05 MIU/L-ACNC: 3.52 MIU/ML (ref 0.27–4.2)
VLDLC SERPL-MCNC: 29.2 MG/DL (ref 5–40)

## 2018-10-25 PROCEDURE — 80061 LIPID PANEL: CPT | Performed by: INTERNAL MEDICINE

## 2018-10-25 PROCEDURE — G0008 ADMIN INFLUENZA VIRUS VAC: HCPCS | Performed by: INTERNAL MEDICINE

## 2018-10-25 PROCEDURE — 84443 ASSAY THYROID STIM HORMONE: CPT | Performed by: INTERNAL MEDICINE

## 2018-10-25 PROCEDURE — 83036 HEMOGLOBIN GLYCOSYLATED A1C: CPT | Performed by: INTERNAL MEDICINE

## 2018-10-25 PROCEDURE — G0009 ADMIN PNEUMOCOCCAL VACCINE: HCPCS | Performed by: INTERNAL MEDICINE

## 2018-10-25 PROCEDURE — 90662 IIV NO PRSV INCREASED AG IM: CPT | Performed by: INTERNAL MEDICINE

## 2018-10-25 PROCEDURE — 90670 PCV13 VACCINE IM: CPT | Performed by: INTERNAL MEDICINE

## 2018-10-25 PROCEDURE — 36415 COLL VENOUS BLD VENIPUNCTURE: CPT | Performed by: INTERNAL MEDICINE

## 2018-10-25 PROCEDURE — 99214 OFFICE O/P EST MOD 30 MIN: CPT | Performed by: INTERNAL MEDICINE

## 2018-10-25 PROCEDURE — 80053 COMPREHEN METABOLIC PANEL: CPT | Performed by: INTERNAL MEDICINE

## 2018-10-25 RX ORDER — KETOROLAC TROMETHAMINE 4 MG/ML
SOLUTION/ DROPS OPHTHALMIC
COMMUNITY
Start: 2018-10-18 | End: 2019-01-11

## 2018-10-25 RX ORDER — OFLOXACIN 3 MG/ML
SOLUTION/ DROPS OPHTHALMIC
COMMUNITY
Start: 2018-10-18 | End: 2019-01-11

## 2018-10-25 RX ORDER — PREDNISOLONE ACETATE 10 MG/ML
SUSPENSION/ DROPS OPHTHALMIC
COMMUNITY
Start: 2018-10-18 | End: 2019-01-11

## 2018-10-25 NOTE — PROGRESS NOTES
Chief Complaint   Patient presents with   • Hyperlipidemia     4 month f/u   • Hypothyroidism       Subjective   Aydee Moore is a 68 y.o. female.     Hyperlipidemia   This is a chronic problem. Recent lipid tests were reviewed and are variable. Exacerbating diseases include hypothyroidism and obesity. She has no history of diabetes. Pertinent negatives include no chest pain, leg pain, myalgias or shortness of breath. Current antihyperlipidemic treatment includes statins, diet change and exercise. The current treatment provides moderate improvement of lipids. There are no compliance problems.    Hypothyroidism   Pertinent negatives include no chest pain, coughing or myalgias.        Hypothyroidism: Current treatment levothyroxine. She is compliant with the medication, tolerates it well and reports good control of symptoms of hypothyroidism.  She has not noted any change in tolerance of heat or cold.  No change in hair or skin.  No constipation.  No symptoms of hyperthyroidism.      Elevated fasting glucose:  She  has had elevated fasting glucose in the past.  She denies polyuria, polydipsia, vision change appetite change, unexplained weight loss.   Lab Results   Component Value Date    HGBA1C 5.29 06/14/2018      She has been checking her blood sugars at home.  Highest fasting has been 120    Anxiety and depression:  She is no longer taking SSRI    The following portions of the patient's history were reviewed and updated as appropriate: allergies, current medications, past family history, past medical history, past social history, past surgical history and problem list.    Review of Systems   Constitutional: Negative for appetite change.   HENT: Negative for nosebleeds.    Eyes: Positive for visual disturbance (right cataract removed yesterday). Negative for blurred vision and double vision.   Respiratory: Negative for cough and shortness of breath.    Cardiovascular: Negative for chest pain, palpitations and leg  "swelling.   Musculoskeletal: Negative for myalgias.         Current Outpatient Prescriptions:   •  ALOE PO, Take  by mouth Daily., Disp: , Rfl:   •  Bromelain 100 MG tablet, Take  by mouth., Disp: , Rfl:   •  clobetasol (TEMOVATE) 0.05 % cream, Apply  topically 2 (Two) Times a Day., Disp: 15 g, Rfl: 0  •  CRESTOR 10 MG tablet, TAKE 1 TABLET DAILY, Disp: 90 tablet, Rfl: 3  •  FIBER SELECT GUMMIES PO, Take  by mouth., Disp: , Rfl:   •  fluticasone (FLONASE) 50 MCG/ACT nasal spray, 2 sprays into each nostril daily. Administer 2 sprays in each nostril for each dose., Disp: , Rfl:   •  GLUCOSAMINE-CHONDROITIN-MSM PO, Take 1,500 mg by mouth Daily., Disp: , Rfl:   •  ketorolac (ACULAR) 0.4 % solution, , Disp: , Rfl:   •  Magnesium 400 MG capsule, Take  by mouth., Disp: , Rfl:   •  ofloxacin (OCUFLOX) 0.3 % ophthalmic solution, , Disp: , Rfl:   •  prednisoLONE acetate (PRED FORTE) 1 % ophthalmic suspension, , Disp: , Rfl:   •  sucralfate (CARAFATE) 1 G tablet, TAKE 1 TABLET TWICE A DAY, Disp: 180 tablet, Rfl: 3  •  SYNTHROID 50 MCG tablet, TAKE 1 TABLET DAILY., Disp: 90 tablet, Rfl: 1  •  triamcinolone (KENALOG) 0.1 % ointment, Apply  topically to the appropriate area as directed 2 (Two) Times a Day. Apply to affected area, Disp: 1 tube, Rfl: 2  •  Turmeric 500 MG capsule, Take 406 mg by mouth Daily., Disp: , Rfl:         Objective     /80 (BP Location: Right arm, Patient Position: Sitting, Cuff Size: Adult)   Pulse 60   Ht 165.1 cm (65\")   Wt 97.1 kg (214 lb)   SpO2 95%   BMI 35.61 kg/m²     Physical Exam   Constitutional: She is oriented to person, place, and time. She appears well-developed and well-nourished. No distress.   Neck: Normal carotid pulses present. Carotid bruit is not present.   Cardiovascular: Regular rhythm, S1 normal and S2 normal.  Exam reveals no gallop and no friction rub.    No murmur heard.  Pulses:       Carotid pulses are 2+ on the right side, and 2+ on the left side.  Pulmonary/Chest: " Effort normal and breath sounds normal. She has no wheezes. She has no rhonchi. She has no rales. Chest wall is not dull to percussion.   Musculoskeletal: She exhibits no edema.   Neurological: She is alert and oriented to person, place, and time.   Skin: Skin is warm and dry.   Nursing note and vitals reviewed.        Assessment/Plan   Aydee was seen today for hyperlipidemia and hypothyroidism.    Diagnoses and all orders for this visit:    Hyperlipidemia, unspecified hyperlipidemia type  -     Comprehensive Metabolic Panel; Future  -     Lipid Panel; Future    Hypothyroidism, adult  -     TSH Rfx On Abnormal To Free T4; Future    Elevated fasting glucose  -     Hemoglobin A1c; Future    Other orders  -     Fluzone High Dose =>65Years  -     Pneumococcal Conjugate Vaccine 13-[facility] All

## 2018-11-26 RX ORDER — ROSUVASTATIN CALCIUM 10 MG/1
TABLET, COATED ORAL
Qty: 90 TABLET | Refills: 3 | Status: SHIPPED | OUTPATIENT
Start: 2018-11-26 | End: 2020-05-06 | Stop reason: SDUPTHER

## 2019-01-09 RX ORDER — LEVOTHYROXINE SODIUM 50 MCG
TABLET ORAL
Qty: 90 TABLET | Refills: 1 | Status: SHIPPED | OUTPATIENT
Start: 2019-01-09 | End: 2019-08-27 | Stop reason: SDUPTHER

## 2019-01-11 ENCOUNTER — OFFICE VISIT (OUTPATIENT)
Dept: INTERNAL MEDICINE | Facility: CLINIC | Age: 69
End: 2019-01-11

## 2019-01-11 VITALS
OXYGEN SATURATION: 98 % | HEIGHT: 65 IN | TEMPERATURE: 98.6 F | WEIGHT: 217 LBS | SYSTOLIC BLOOD PRESSURE: 122 MMHG | DIASTOLIC BLOOD PRESSURE: 80 MMHG | BODY MASS INDEX: 36.15 KG/M2 | HEART RATE: 80 BPM

## 2019-01-11 DIAGNOSIS — R25.2 MUSCLE CRAMPS: ICD-10-CM

## 2019-01-11 DIAGNOSIS — R51.9 ACUTE NONINTRACTABLE HEADACHE, UNSPECIFIED HEADACHE TYPE: Primary | ICD-10-CM

## 2019-01-11 LAB
ANION GAP SERPL CALCULATED.3IONS-SCNC: 10.5 MMOL/L
BUN BLD-MCNC: 19 MG/DL (ref 8–23)
BUN/CREAT SERPL: 18.1 (ref 7–25)
CALCIUM SPEC-SCNC: 10.3 MG/DL (ref 8.6–10.5)
CHLORIDE SERPL-SCNC: 100 MMOL/L (ref 98–107)
CO2 SERPL-SCNC: 27.5 MMOL/L (ref 22–29)
CREAT BLD-MCNC: 1.05 MG/DL (ref 0.57–1)
GFR SERPL CREATININE-BSD FRML MDRD: 52 ML/MIN/1.73
GLUCOSE BLD-MCNC: 88 MG/DL (ref 65–99)
POTASSIUM BLD-SCNC: 4.1 MMOL/L (ref 3.5–5.2)
SODIUM BLD-SCNC: 138 MMOL/L (ref 136–145)

## 2019-01-11 PROCEDURE — 80048 BASIC METABOLIC PNL TOTAL CA: CPT | Performed by: NURSE PRACTITIONER

## 2019-01-11 PROCEDURE — 36415 COLL VENOUS BLD VENIPUNCTURE: CPT | Performed by: NURSE PRACTITIONER

## 2019-01-11 PROCEDURE — 99214 OFFICE O/P EST MOD 30 MIN: CPT | Performed by: NURSE PRACTITIONER

## 2019-01-11 RX ORDER — TIZANIDINE 4 MG/1
4 TABLET ORAL EVERY 8 HOURS PRN
Qty: 30 TABLET | Refills: 0 | Status: SHIPPED | OUTPATIENT
Start: 2019-01-11 | End: 2019-03-06

## 2019-01-12 LAB — ERYTHROCYTE [SEDIMENTATION RATE] IN BLOOD BY WESTERGREN METHOD: 18 MM/HR (ref 0–30)

## 2019-01-14 ENCOUNTER — TELEPHONE (OUTPATIENT)
Dept: INTERNAL MEDICINE | Facility: CLINIC | Age: 69
End: 2019-01-14

## 2019-01-14 NOTE — TELEPHONE ENCOUNTER
ER records from 1/8/19 obtained, discussed findings with patient. She continues to c/o neck stiffness which is gradually improving, will continue muscle relaxer and consider PT if sx persist. No acute abnl noted on xray.
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
## 2019-03-06 ENCOUNTER — OFFICE VISIT (OUTPATIENT)
Dept: INTERNAL MEDICINE | Facility: CLINIC | Age: 69
End: 2019-03-06

## 2019-03-06 VITALS
WEIGHT: 216 LBS | BODY MASS INDEX: 35.99 KG/M2 | SYSTOLIC BLOOD PRESSURE: 150 MMHG | DIASTOLIC BLOOD PRESSURE: 80 MMHG | HEIGHT: 65 IN

## 2019-03-06 DIAGNOSIS — Z76.89 SLEEP CONCERN: ICD-10-CM

## 2019-03-06 DIAGNOSIS — F32.A ANXIETY AND DEPRESSION: ICD-10-CM

## 2019-03-06 DIAGNOSIS — R53.83 FATIGUE, UNSPECIFIED TYPE: ICD-10-CM

## 2019-03-06 DIAGNOSIS — F41.9 ANXIETY AND DEPRESSION: ICD-10-CM

## 2019-03-06 DIAGNOSIS — E03.9 HYPOTHYROIDISM, ADULT: ICD-10-CM

## 2019-03-06 DIAGNOSIS — R73.01 ELEVATED FASTING GLUCOSE: ICD-10-CM

## 2019-03-06 DIAGNOSIS — R25.2 LEG CRAMPS: ICD-10-CM

## 2019-03-06 DIAGNOSIS — E78.5 HYPERLIPIDEMIA, UNSPECIFIED HYPERLIPIDEMIA TYPE: Primary | ICD-10-CM

## 2019-03-06 DIAGNOSIS — Z11.59 SCREENING FOR VIRAL DISEASE: ICD-10-CM

## 2019-03-06 LAB
ALBUMIN SERPL-MCNC: 4.6 G/DL (ref 3.5–5.2)
ALBUMIN/GLOB SERPL: 1.4 G/DL
ALP SERPL-CCNC: 95 U/L (ref 39–117)
ALT SERPL W P-5'-P-CCNC: 13 U/L (ref 1–33)
ANION GAP SERPL CALCULATED.3IONS-SCNC: 14.3 MMOL/L
AST SERPL-CCNC: 18 U/L (ref 1–32)
BASOPHILS # BLD AUTO: 0.03 10*3/MM3 (ref 0–0.2)
BASOPHILS NFR BLD AUTO: 0.4 % (ref 0–1.5)
BILIRUB SERPL-MCNC: 0.4 MG/DL (ref 0.1–1.2)
BUN BLD-MCNC: 18 MG/DL (ref 8–23)
BUN/CREAT SERPL: 14.4 (ref 7–25)
CALCIUM SPEC-SCNC: 10.5 MG/DL (ref 8.6–10.5)
CHLORIDE SERPL-SCNC: 103 MMOL/L (ref 98–107)
CHOLEST SERPL-MCNC: 139 MG/DL (ref 0–200)
CO2 SERPL-SCNC: 24.7 MMOL/L (ref 22–29)
CREAT BLD-MCNC: 1.25 MG/DL (ref 0.57–1)
DEPRECATED RDW RBC AUTO: 41 FL (ref 37–54)
EOSINOPHIL # BLD AUTO: 0.19 10*3/MM3 (ref 0–0.4)
EOSINOPHIL NFR BLD AUTO: 2.6 % (ref 0.3–6.2)
ERYTHROCYTE [DISTWIDTH] IN BLOOD BY AUTOMATED COUNT: 12.7 % (ref 12.3–15.4)
FOLATE SERPL-MCNC: 11.8 NG/ML (ref 4.78–24.2)
GFR SERPL CREATININE-BSD FRML MDRD: 43 ML/MIN/1.73
GLOBULIN UR ELPH-MCNC: 3.2 GM/DL
GLUCOSE BLD-MCNC: 100 MG/DL (ref 65–99)
HBA1C MFR BLD: 5.7 % (ref 4.8–5.6)
HCT VFR BLD AUTO: 41.7 % (ref 34–46.6)
HCV AB SER DONR QL: NORMAL
HDLC SERPL-MCNC: 64 MG/DL (ref 40–60)
HGB BLD-MCNC: 14 G/DL (ref 12–15.9)
LDLC SERPL CALC-MCNC: 58 MG/DL (ref 0–100)
LDLC/HDLC SERPL: 0.9 {RATIO}
LYMPHOCYTES # BLD AUTO: 2.1 10*3/MM3 (ref 0.7–3.1)
LYMPHOCYTES NFR BLD AUTO: 29.1 % (ref 19.6–45.3)
MCH RBC QN AUTO: 30.2 PG (ref 26.6–33)
MCHC RBC AUTO-ENTMCNC: 33.6 G/DL (ref 31.5–35.7)
MCV RBC AUTO: 89.9 FL (ref 79–97)
MONOCYTES # BLD AUTO: 0.41 10*3/MM3 (ref 0.1–0.9)
MONOCYTES NFR BLD AUTO: 5.7 % (ref 5–12)
NEUTROPHILS # BLD AUTO: 4.48 10*3/MM3 (ref 1.4–7)
NEUTROPHILS NFR BLD AUTO: 62.2 % (ref 42.7–76)
PLATELET # BLD AUTO: 218 10*3/MM3 (ref 140–450)
PMV BLD AUTO: 10.6 FL (ref 6–12)
POTASSIUM BLD-SCNC: 4.5 MMOL/L (ref 3.5–5.2)
PROT SERPL-MCNC: 7.8 G/DL (ref 6–8.5)
RBC # BLD AUTO: 4.64 10*6/MM3 (ref 3.77–5.28)
SODIUM BLD-SCNC: 142 MMOL/L (ref 136–145)
TRIGL SERPL-MCNC: 86 MG/DL (ref 0–150)
TSH SERPL-ACNC: 3.56 MIU/ML (ref 0.27–4.2)
VIT B12 SERPL-MCNC: 572 PG/ML (ref 211–946)
VLDLC SERPL-MCNC: 17.2 MG/DL (ref 5–40)
WBC NRBC COR # BLD: 7.21 10*3/MM3 (ref 3.4–10.8)

## 2019-03-06 PROCEDURE — 80053 COMPREHEN METABOLIC PANEL: CPT | Performed by: INTERNAL MEDICINE

## 2019-03-06 PROCEDURE — 85025 COMPLETE CBC W/AUTO DIFF WBC: CPT | Performed by: INTERNAL MEDICINE

## 2019-03-06 PROCEDURE — 80061 LIPID PANEL: CPT | Performed by: INTERNAL MEDICINE

## 2019-03-06 PROCEDURE — 86803 HEPATITIS C AB TEST: CPT | Performed by: INTERNAL MEDICINE

## 2019-03-06 PROCEDURE — 99214 OFFICE O/P EST MOD 30 MIN: CPT | Performed by: INTERNAL MEDICINE

## 2019-03-06 RX ORDER — QUININE SULFATE 324 MG/1
324 CAPSULE ORAL
Qty: 90 CAPSULE | Refills: 3 | Status: SHIPPED | OUTPATIENT
Start: 2019-03-06 | End: 2019-08-27

## 2019-03-06 RX ORDER — LISINOPRIL 5 MG/1
5 TABLET ORAL DAILY
Qty: 90 TABLET | Refills: 1 | Status: SHIPPED | OUTPATIENT
Start: 2019-03-06 | End: 2019-05-13

## 2019-03-06 RX ORDER — FLUOXETINE HYDROCHLORIDE 90 MG/1
90 CAPSULE, DELAYED RELEASE PELLETS ORAL
Qty: 12 CAPSULE | Refills: 3 | Status: SHIPPED | OUTPATIENT
Start: 2019-03-06 | End: 2020-09-25

## 2019-03-06 NOTE — PROGRESS NOTES
Chief Complaint   Patient presents with   • Hypothyroidism     4 month follow up   • Hyperlipidemia       Subjective   Aydee Moore is a 68 y.o. female.     History of Present Illness     She comes in for follow up of hypothyroidism and hyperlipidemia. She has several other concerns.    Weight:  She finds she needs to wear a larger bra.  By the afternoon, bra feels too tight. Feels OK in the am, but by 4 she has to take it off.  Feels tight and constricting so takes it off. Weight is not the same from day to day.  It can vary from 213 to 220. If she weighs 220, bras feel small and she does not wear one. Her diet does not change.  She does not notice swelling of her legs. With weight, she is wondering if KIAN is worsening.  She used CPAP about 10 years. She was in [place] then, weighed around 190 and electrical current was different so stopped using it.     She is wondering about B12 injections for fatigue and taking Vitamin d.     She is walking for exercise.     Leg cramps: Has at night.  Needs to get up and stand.  She has been drinking tonic water with quinine. She would like to try quinine capsues instead.    Pain left thigh:  Happens when she lies down to put eye drops in at night. Does not happen every time. Burning pain.     Rash back of her neck.  Itches.      Stress:   has CAD, DM.  May have some depression.  Worries about his blood glucose  She does not feel depressed.  However, she used to take prozac weekly.  She stopped taking it several months ago.      Hypothryoidism:  This is a chronic problem.  She continues to take levothyroxine. She has not had change in heat/cold tolerance.  Energy is fair to good.  Appetite good.  Weight fluctuates.  No palpitations, tremors, symptoms of hyperthyroidism.     Hyperlipidemia:     Her most recent lipid panel was done on 10/25/2018.    Total cholesterol was 185, Triglycerides 146, HDL 61, LDL 95.   Current treatment: statin  Prudent diet and regular  exercise have also been recommended.  No management changes were made at her last appointment.   By report, there is good compliance with treatment, good tolerance of treatment.  She has not experienced statin associated myalgias, dyspepsia.  She has not had liver enzyme elevation.              The following portions of the patient's history were reviewed and updated as appropriate: allergies, current medications, past family history, past medical history, past social history, past surgical history and problem list.    Review of Systems      Current Outpatient Medications:   •  ALOE PO, Take  by mouth Daily., Disp: , Rfl:   •  Bromelain 100 MG tablet, Take  by mouth., Disp: , Rfl:   •  Celecoxib (CELEBREX PO), Take  by mouth., Disp: , Rfl:   •  clobetasol (TEMOVATE) 0.05 % cream, Apply  topically 2 (Two) Times a Day., Disp: 15 g, Rfl: 0  •  FIBER SELECT GUMMIES PO, Take  by mouth., Disp: , Rfl:   •  fluticasone (FLONASE) 50 MCG/ACT nasal spray, 2 sprays into each nostril daily. Administer 2 sprays in each nostril for each dose., Disp: , Rfl:   •  GLUCOSAMINE-CHONDROITIN-MSM PO, Take 1,500 mg by mouth Daily., Disp: , Rfl:   •  Lifitegrast (XIIDRA) 5 % solution, Apply  to eye(s) as directed by provider., Disp: , Rfl:   •  Magnesium 400 MG capsule, Take  by mouth., Disp: , Rfl:   •  rosuvastatin (CRESTOR) 10 MG tablet, TAKE 1 TABLET DAILY, Disp: 90 tablet, Rfl: 3  •  sucralfate (CARAFATE) 1 G tablet, TAKE 1 TABLET TWICE A DAY, Disp: 180 tablet, Rfl: 3  •  SYNTHROID 50 MCG tablet, TAKE 1 TABLET DAILY., Disp: 90 tablet, Rfl: 1  •  triamcinolone (KENALOG) 0.1 % ointment, Apply  topically to the appropriate area as directed 2 (Two) Times a Day. Apply to affected area, Disp: 1 tube, Rfl: 2  •  Turmeric 500 MG capsule, Take 406 mg by mouth Daily., Disp: , Rfl:   •  FLUoxetine weekly (PROzac WEEKLY) 90 MG DR capsule, Take 1 capsule by mouth Every 7 (Seven) Days., Disp: 12 capsule, Rfl: 3  •  lisinopril (PRINIVIL,ZESTRIL) 5 MG  "tablet, Take 1 tablet by mouth Daily., Disp: 90 tablet, Rfl: 1  •  quiNINE (QUALAQUIN) 324 MG capsule, Take 1 capsule by mouth every night at bedtime., Disp: 90 capsule, Rfl: 3        Objective     /80   Ht 165.1 cm (65\")   Wt 98 kg (216 lb)   BMI 35.94 kg/m²     Physical Exam   Constitutional: She is oriented to person, place, and time. She appears well-developed and well-nourished. No distress.   Neck: Normal carotid pulses present. Carotid bruit is not present.   Cardiovascular: Regular rhythm, S1 normal and S2 normal. Exam reveals no gallop and no friction rub.   No murmur heard.  Pulses:       Carotid pulses are 2+ on the right side, and 2+ on the left side.  Pulmonary/Chest: Effort normal and breath sounds normal. She has no wheezes. She has no rhonchi. She has no rales. Chest wall is not dull to percussion.   Musculoskeletal: She exhibits no edema.   Neurological: She is alert and oriented to person, place, and time.   Skin: Skin is warm and dry.   Psychiatric: Her mood appears anxious.   Nursing note and vitals reviewed.        Assessment/Plan   Aydee was seen today for hypothyroidism and hyperlipidemia.    Diagnoses and all orders for this visit:    Hyperlipidemia, unspecified hyperlipidemia type    Hypothyroidism, adult  -     Comprehensive Metabolic Panel; Future  -     CBC & Differential; Future  -     Lipid Panel; Future  -     TSH Rfx On Abnormal To Free T4; Future  -     Comprehensive Metabolic Panel  -     CBC & Differential  -     Lipid Panel  -     TSH Rfx On Abnormal To Free T4  -     CBC Auto Differential    Elevated fasting glucose  -     Hemoglobin A1c; Future  -     Hemoglobin A1c    Anxiety and depression    Leg cramps    Fatigue, unspecified type  -     Vitamin B12 & Folate; Future  -     Vitamin B12 & Folate    Sleep concern  -     Ambulatory Referral to Sleep Medicine    Screening for viral disease  -     Hepatitis C Antibody; Future    Other orders  -     FLUoxetine weekly " (PROzac WEEKLY) 90 MG DR capsule; Take 1 capsule by mouth Every 7 (Seven) Days.  -     quiNINE (QUALAQUIN) 324 MG capsule; Take 1 capsule by mouth every night at bedtime.  -     lisinopril (PRINIVIL,ZESTRIL) 5 MG tablet; Take 1 tablet by mouth Daily.      Stress:  Discussed.  Recommended she resume fluoxetine weekly.  Systolic pressure high.  Lisinopril started today.

## 2019-03-19 ENCOUNTER — OFFICE VISIT (OUTPATIENT)
Dept: SLEEP MEDICINE | Facility: HOSPITAL | Age: 69
End: 2019-03-19

## 2019-03-19 VITALS
HEIGHT: 65 IN | OXYGEN SATURATION: 96 % | BODY MASS INDEX: 35.89 KG/M2 | WEIGHT: 215.4 LBS | DIASTOLIC BLOOD PRESSURE: 87 MMHG | HEART RATE: 69 BPM | SYSTOLIC BLOOD PRESSURE: 154 MMHG

## 2019-03-19 DIAGNOSIS — R53.82 CHRONIC FATIGUE: ICD-10-CM

## 2019-03-19 DIAGNOSIS — G47.33 OSA (OBSTRUCTIVE SLEEP APNEA): Primary | ICD-10-CM

## 2019-03-19 DIAGNOSIS — I10 ESSENTIAL HYPERTENSION: ICD-10-CM

## 2019-03-19 PROCEDURE — G0463 HOSPITAL OUTPT CLINIC VISIT: HCPCS

## 2019-03-21 ENCOUNTER — HOSPITAL ENCOUNTER (OUTPATIENT)
Dept: SLEEP MEDICINE | Facility: HOSPITAL | Age: 69
Discharge: HOME | End: 2019-03-21
Admitting: PSYCHIATRY & NEUROLOGY

## 2019-03-21 DIAGNOSIS — I10 ESSENTIAL HYPERTENSION: ICD-10-CM

## 2019-03-21 DIAGNOSIS — R53.82 CHRONIC FATIGUE: ICD-10-CM

## 2019-03-21 DIAGNOSIS — G47.33 OSA (OBSTRUCTIVE SLEEP APNEA): ICD-10-CM

## 2019-03-21 PROCEDURE — 95810 POLYSOM 6/> YRS 4/> PARAM: CPT

## 2019-04-02 ENCOUNTER — TELEPHONE (OUTPATIENT)
Dept: SLEEP MEDICINE | Facility: HOSPITAL | Age: 69
End: 2019-04-02

## 2019-04-02 NOTE — TELEPHONE ENCOUNTER
Tech called pt answered and tech went over neg  study results and 's impression. Patient had no questions and tech mailed out copy of study with interp for patient records. MAB

## 2019-04-11 ENCOUNTER — OFFICE (AMBULATORY)
Dept: URBAN - METROPOLITAN AREA CLINIC 75 | Facility: CLINIC | Age: 69
End: 2019-04-11

## 2019-04-11 VITALS
DIASTOLIC BLOOD PRESSURE: 78 MMHG | RESPIRATION RATE: 14 BRPM | OXYGEN SATURATION: 97 % | HEART RATE: 67 BPM | HEIGHT: 65 IN | SYSTOLIC BLOOD PRESSURE: 124 MMHG | WEIGHT: 211 LBS

## 2019-04-11 DIAGNOSIS — J02.9 ACUTE PHARYNGITIS, UNSPECIFIED: ICD-10-CM

## 2019-04-11 DIAGNOSIS — K64.4 RESIDUAL HEMORRHOIDAL SKIN TAGS: ICD-10-CM

## 2019-04-11 DIAGNOSIS — D12.3 BENIGN NEOPLASM OF TRANSVERSE COLON: ICD-10-CM

## 2019-04-11 DIAGNOSIS — K20.9 ESOPHAGITIS, UNSPECIFIED: ICD-10-CM

## 2019-04-11 DIAGNOSIS — R93.3 ABNORMAL FINDINGS ON DIAGNOSTIC IMAGING OF OTHER PARTS OF DI: ICD-10-CM

## 2019-04-11 DIAGNOSIS — Z12.11 ENCOUNTER FOR SCREENING FOR MALIGNANT NEOPLASM OF COLON: ICD-10-CM

## 2019-04-11 DIAGNOSIS — K21.9 GASTRO-ESOPHAGEAL REFLUX DISEASE WITHOUT ESOPHAGITIS: ICD-10-CM

## 2019-04-11 DIAGNOSIS — Z86.010 PERSONAL HISTORY OF COLONIC POLYPS: ICD-10-CM

## 2019-04-11 DIAGNOSIS — K21.0 GASTRO-ESOPHAGEAL REFLUX DISEASE WITH ESOPHAGITIS: ICD-10-CM

## 2019-04-11 DIAGNOSIS — K31.7 POLYP OF STOMACH AND DUODENUM: ICD-10-CM

## 2019-04-11 DIAGNOSIS — K44.9 DIAPHRAGMATIC HERNIA WITHOUT OBSTRUCTION OR GANGRENE: ICD-10-CM

## 2019-04-11 DIAGNOSIS — K29.70 GASTRITIS, UNSPECIFIED, WITHOUT BLEEDING: ICD-10-CM

## 2019-04-11 PROCEDURE — 99213 OFFICE O/P EST LOW 20 MIN: CPT | Performed by: NURSE PRACTITIONER

## 2019-04-11 RX ORDER — HYDROCORTISONE 25 MG/G
OINTMENT TOPICAL
Qty: 30 | Refills: 3 | Status: COMPLETED
Start: 2019-04-11 | End: 2024-02-21

## 2019-05-13 RX ORDER — AMLODIPINE BESYLATE 5 MG/1
5 TABLET ORAL DAILY
Qty: 30 TABLET | Refills: 1 | Status: SHIPPED | OUTPATIENT
Start: 2019-05-13 | End: 2019-06-28 | Stop reason: SDUPTHER

## 2019-06-28 RX ORDER — AMLODIPINE BESYLATE 5 MG/1
5 TABLET ORAL DAILY
Qty: 30 TABLET | Refills: 1 | Status: SHIPPED | OUTPATIENT
Start: 2019-06-28 | End: 2020-06-29

## 2019-07-11 RX ORDER — LEVOTHYROXINE SODIUM 50 MCG
TABLET ORAL
Qty: 90 TABLET | Refills: 1 | Status: SHIPPED | OUTPATIENT
Start: 2019-07-11 | End: 2020-02-17

## 2019-08-21 RX ORDER — AMLODIPINE BESYLATE 5 MG/1
TABLET ORAL
Qty: 30 TABLET | Refills: 5 | Status: SHIPPED | OUTPATIENT
Start: 2019-08-21 | End: 2019-08-27 | Stop reason: SDUPTHER

## 2019-08-27 ENCOUNTER — OFFICE VISIT (OUTPATIENT)
Dept: INTERNAL MEDICINE | Facility: CLINIC | Age: 69
End: 2019-08-27

## 2019-08-27 VITALS
DIASTOLIC BLOOD PRESSURE: 90 MMHG | SYSTOLIC BLOOD PRESSURE: 156 MMHG | OXYGEN SATURATION: 97 % | HEIGHT: 65 IN | BODY MASS INDEX: 36.32 KG/M2 | HEART RATE: 65 BPM | WEIGHT: 218 LBS

## 2019-08-27 DIAGNOSIS — R73.01 ELEVATED FASTING GLUCOSE: ICD-10-CM

## 2019-08-27 DIAGNOSIS — E03.9 HYPOTHYROIDISM, ADULT: ICD-10-CM

## 2019-08-27 DIAGNOSIS — I10 ESSENTIAL HYPERTENSION: ICD-10-CM

## 2019-08-27 DIAGNOSIS — E78.5 HYPERLIPIDEMIA, UNSPECIFIED HYPERLIPIDEMIA TYPE: Primary | ICD-10-CM

## 2019-08-27 LAB
ALBUMIN SERPL-MCNC: 4.3 G/DL (ref 3.5–5.2)
ALBUMIN/GLOB SERPL: 1.5 G/DL
ALP SERPL-CCNC: 85 U/L (ref 39–117)
ALT SERPL W P-5'-P-CCNC: 13 U/L (ref 1–33)
ANION GAP SERPL CALCULATED.3IONS-SCNC: 11.6 MMOL/L (ref 5–15)
AST SERPL-CCNC: 18 U/L (ref 1–32)
BASOPHILS # BLD AUTO: 0.02 10*3/MM3 (ref 0–0.2)
BASOPHILS NFR BLD AUTO: 0.3 % (ref 0–1.5)
BILIRUB SERPL-MCNC: 0.3 MG/DL (ref 0.2–1.2)
BUN BLD-MCNC: 19 MG/DL (ref 8–23)
BUN/CREAT SERPL: 18.4 (ref 7–25)
CALCIUM SPEC-SCNC: 9.8 MG/DL (ref 8.6–10.5)
CHLORIDE SERPL-SCNC: 105 MMOL/L (ref 98–107)
CHOLEST SERPL-MCNC: 219 MG/DL (ref 0–200)
CO2 SERPL-SCNC: 26.4 MMOL/L (ref 22–29)
CREAT BLD-MCNC: 1.03 MG/DL (ref 0.57–1)
DEPRECATED RDW RBC AUTO: 44.4 FL (ref 37–54)
EOSINOPHIL # BLD AUTO: 0.23 10*3/MM3 (ref 0–0.4)
EOSINOPHIL NFR BLD AUTO: 3.1 % (ref 0.3–6.2)
ERYTHROCYTE [DISTWIDTH] IN BLOOD BY AUTOMATED COUNT: 13.4 % (ref 12.3–15.4)
GFR SERPL CREATININE-BSD FRML MDRD: 53 ML/MIN/1.73
GLOBULIN UR ELPH-MCNC: 2.9 GM/DL
GLUCOSE BLD-MCNC: 102 MG/DL (ref 65–99)
HBA1C MFR BLD: 5.4 % (ref 4.8–5.6)
HCT VFR BLD AUTO: 41.7 % (ref 34–46.6)
HDLC SERPL-MCNC: 70 MG/DL (ref 40–60)
HGB BLD-MCNC: 13.7 G/DL (ref 12–15.9)
LDLC SERPL CALC-MCNC: 129 MG/DL (ref 0–100)
LDLC/HDLC SERPL: 1.84 {RATIO}
LYMPHOCYTES # BLD AUTO: 1.93 10*3/MM3 (ref 0.7–3.1)
LYMPHOCYTES NFR BLD AUTO: 26.3 % (ref 19.6–45.3)
MCH RBC QN AUTO: 30.1 PG (ref 26.6–33)
MCHC RBC AUTO-ENTMCNC: 32.9 G/DL (ref 31.5–35.7)
MCV RBC AUTO: 91.6 FL (ref 79–97)
MONOCYTES # BLD AUTO: 0.51 10*3/MM3 (ref 0.1–0.9)
MONOCYTES NFR BLD AUTO: 7 % (ref 5–12)
NEUTROPHILS # BLD AUTO: 4.64 10*3/MM3 (ref 1.7–7)
NEUTROPHILS NFR BLD AUTO: 63.3 % (ref 42.7–76)
PLATELET # BLD AUTO: 227 10*3/MM3 (ref 140–450)
PMV BLD AUTO: 10.8 FL (ref 6–12)
POTASSIUM BLD-SCNC: 4.8 MMOL/L (ref 3.5–5.2)
PROT SERPL-MCNC: 7.2 G/DL (ref 6–8.5)
RBC # BLD AUTO: 4.55 10*6/MM3 (ref 3.77–5.28)
SODIUM BLD-SCNC: 143 MMOL/L (ref 136–145)
TRIGL SERPL-MCNC: 100 MG/DL (ref 0–150)
TSH SERPL-ACNC: 3.77 MIU/ML (ref 0.27–4.2)
VLDLC SERPL-MCNC: 20 MG/DL (ref 5–40)
WBC NRBC COR # BLD: 7.33 10*3/MM3 (ref 3.4–10.8)

## 2019-08-27 PROCEDURE — 80061 LIPID PANEL: CPT | Performed by: NURSE PRACTITIONER

## 2019-08-27 PROCEDURE — 85025 COMPLETE CBC W/AUTO DIFF WBC: CPT | Performed by: NURSE PRACTITIONER

## 2019-08-27 PROCEDURE — 80053 COMPREHEN METABOLIC PANEL: CPT | Performed by: NURSE PRACTITIONER

## 2019-08-27 PROCEDURE — 99213 OFFICE O/P EST LOW 20 MIN: CPT | Performed by: NURSE PRACTITIONER

## 2019-08-27 NOTE — PROGRESS NOTES
Subjective   Aydee Moore is a 69 y.o. female.     She presents today for a 5 month f/u for HLD, elevated BP, hypothyroidism, and elevated glucose. She feel well overall today. Her energy level has improved from last visit. She just got back from a month long cruise in [place] and reports splurging and not eating the healthiest of diets. She did have a sleep study since her last visit but a CPAP was not ordered due to the lack of apnea episodes.      Hyperlipidemia   This is a chronic problem. The current episode started more than 1 year ago. The problem is controlled. Exacerbating diseases include hypothyroidism and obesity. Associated symptoms include leg pain (r/t previous injuries). Pertinent negatives include no chest pain, focal sensory loss, focal weakness, myalgias or shortness of breath. Current antihyperlipidemic treatment includes statins. The current treatment provides moderate improvement of lipids. Compliance problems include adherence to diet.  Risk factors for coronary artery disease include post-menopausal, dyslipidemia and obesity.   Hypothyroidism   This is a chronic problem. The current episode started more than 1 year ago. Pertinent negatives include no chest pain, coughing, fatigue, fever, myalgias, nausea or vomiting. Treatments tried: levothyroxine. The treatment provided moderate relief.   Hypertension   This is a new problem. Episode onset: 5 months ago. The problem is controlled. Pertinent negatives include no anxiety, blurred vision, chest pain, malaise/fatigue, palpitations, peripheral edema or shortness of breath. Current antihypertension treatment includes calcium channel blockers.        The following portions of the patient's history were reviewed and updated as appropriate: allergies, current medications, past family history, past medical history, past social history, past surgical history and problem list.    Review of Systems   Constitutional: Negative for fatigue, fever and  malaise/fatigue.   Eyes: Negative for blurred vision.   Respiratory: Negative for cough, shortness of breath and wheezing.    Cardiovascular: Negative for chest pain and palpitations.   Gastrointestinal: Negative for constipation, diarrhea, nausea and vomiting.   Musculoskeletal: Negative for myalgias.   Neurological: Negative for focal weakness.       Objective   Physical Exam   Constitutional: She appears well-developed and well-nourished.   HENT:   Head: Normocephalic and atraumatic.   Cardiovascular: Normal rate, regular rhythm and normal heart sounds.   No murmur heard.  Pulmonary/Chest: Effort normal and breath sounds normal. No respiratory distress.   Musculoskeletal: Normal range of motion.   Neurological: She is alert.   Skin: Skin is warm and dry.   Psychiatric: She has a normal mood and affect. Her behavior is normal. Judgment and thought content normal.   Vitals reviewed.      Assessment/Plan   Aydee was seen today for hyperlipidemia and hypothyroidism.    Diagnoses and all orders for this visit:    Hyperlipidemia, unspecified hyperlipidemia type  -     Lipid Panel    Hypothyroidism, adult  -     TSH Rfx On Abnormal To Free T4    Elevated fasting glucose  -     Hemoglobin A1c    Essential hypertension  -     CBC Auto Differential  -     Comprehensive Metabolic Panel      BP recheck 132/84-continue on amlopine 5 mg daily.    Encouraged healthy diet, regular exercise, maintenance of healthy weight, good sleep habits, avoidance of tobacco products and excessive alcohol.    Return in 4 months for AWV with Dr. Serrano.

## 2019-08-29 DIAGNOSIS — Z78.0 POST-MENOPAUSAL: Primary | ICD-10-CM

## 2019-08-30 ENCOUNTER — CLINICAL SUPPORT (OUTPATIENT)
Dept: INTERNAL MEDICINE | Facility: CLINIC | Age: 69
End: 2019-08-30

## 2019-08-30 DIAGNOSIS — Z78.0 POST-MENOPAUSAL: ICD-10-CM

## 2019-08-30 PROCEDURE — 77080 DXA BONE DENSITY AXIAL: CPT | Performed by: INTERNAL MEDICINE

## 2019-12-09 ENCOUNTER — OFFICE (AMBULATORY)
Dept: URBAN - METROPOLITAN AREA CLINIC 75 | Facility: CLINIC | Age: 69
End: 2019-12-09

## 2019-12-09 VITALS — HEIGHT: 65 IN

## 2019-12-09 DIAGNOSIS — K62.5 HEMORRHAGE OF ANUS AND RECTUM: ICD-10-CM

## 2019-12-09 DIAGNOSIS — K64.8 OTHER HEMORRHOIDS: ICD-10-CM

## 2019-12-09 PROCEDURE — 46221 LIGATION OF HEMORRHOID(S): CPT | Performed by: INTERNAL MEDICINE

## 2019-12-09 NOTE — SERVICEHPINOTES
69-year-old female with a history of polyps. She also has a history of hemorrhoids. She has occasional bleeding, she'll have burning and itching. She also has what sounds like a possible rectocele or cystocele. She says she'll have a small bowel movement which is formed with that occurs every time she urinates. She does use fiber gummies. She presents for hemorrhoid banding.

## 2020-01-03 ENCOUNTER — OFFICE VISIT (OUTPATIENT)
Dept: INTERNAL MEDICINE | Facility: CLINIC | Age: 70
End: 2020-01-03

## 2020-01-03 VITALS
WEIGHT: 220 LBS | DIASTOLIC BLOOD PRESSURE: 66 MMHG | SYSTOLIC BLOOD PRESSURE: 132 MMHG | HEART RATE: 76 BPM | BODY MASS INDEX: 36.65 KG/M2 | OXYGEN SATURATION: 96 % | HEIGHT: 65 IN

## 2020-01-03 DIAGNOSIS — E66.01 MORBIDLY OBESE (HCC): ICD-10-CM

## 2020-01-03 DIAGNOSIS — E03.9 HYPOTHYROIDISM, ADULT: ICD-10-CM

## 2020-01-03 DIAGNOSIS — I10 ESSENTIAL HYPERTENSION: ICD-10-CM

## 2020-01-03 DIAGNOSIS — E78.5 HYPERLIPIDEMIA, UNSPECIFIED HYPERLIPIDEMIA TYPE: ICD-10-CM

## 2020-01-03 DIAGNOSIS — R26.89 BALANCE PROBLEM: ICD-10-CM

## 2020-01-03 DIAGNOSIS — Z00.00 MEDICARE ANNUAL WELLNESS VISIT, SUBSEQUENT: Primary | ICD-10-CM

## 2020-01-03 DIAGNOSIS — R73.01 ELEVATED FASTING GLUCOSE: ICD-10-CM

## 2020-01-03 DIAGNOSIS — Z12.31 VISIT FOR SCREENING MAMMOGRAM: ICD-10-CM

## 2020-01-03 DIAGNOSIS — Z23 NEED FOR PNEUMOCOCCAL VACCINE: ICD-10-CM

## 2020-01-03 PROCEDURE — G0439 PPPS, SUBSEQ VISIT: HCPCS | Performed by: INTERNAL MEDICINE

## 2020-01-03 PROCEDURE — G0009 ADMIN PNEUMOCOCCAL VACCINE: HCPCS | Performed by: INTERNAL MEDICINE

## 2020-01-03 PROCEDURE — 90732 PPSV23 VACC 2 YRS+ SUBQ/IM: CPT | Performed by: INTERNAL MEDICINE

## 2020-01-03 PROCEDURE — 99214 OFFICE O/P EST MOD 30 MIN: CPT | Performed by: INTERNAL MEDICINE

## 2020-01-03 NOTE — PROGRESS NOTES
Chief Complaint   Patient presents with   • Medicare Wellness-subsequent   • Hypertension   • Hyperlipidemia       Subjective   Aydee Moore is a 69 y.o. female.     Hypertension   This is a chronic problem. The problem is unchanged. The problem is controlled. Associated symptoms include shortness of breath (some ALVAREZ). Pertinent negatives include no blurred vision, chest pain, orthopnea, palpitations, peripheral edema or PND. Associated agents: She takes an occasional NSAID. Risk factors for coronary artery disease include dyslipidemia, obesity and post-menopausal state. Current antihypertension treatment includes calcium channel blockers. The current treatment provides moderate improvement. There are no compliance problems.    Hyperlipidemia   This is a chronic problem. The problem is controlled. Recent lipid tests were reviewed and are high. Exacerbating diseases include hypothyroidism and obesity. She has no history of diabetes. Associated symptoms include shortness of breath (some ALVAERZ). Pertinent negatives include no chest pain. Current antihyperlipidemic treatment includes diet change, exercise and statins. The current treatment provides moderate improvement of lipids. There are no compliance problems.          Hypothyroidism:  This is a chronic problem.  Symptoms do not include unexpected weight changes, changes in heat/cold tolerance, fatigue, weakness, constipation, goiter, appetite change, change in hair/skin.  Current treatment: Levothyroxine.  By report, there is good compliance with treatment, good tolerance of treatment and good symptom control.       Pre-diabetes/Elevated fasting glucose:  This is a chronic problem.  Patient has been advised to follow prudent diet, avoid sugar, exercise regularly.  She denies polyuria, polydipsia, vision change appetite change, unexplained weight loss.   Lab Results   Component Value Date    HGBA1C 5.40 08/27/2019          The following portions of the patient's  history were reviewed and updated as appropriate: allergies, current medications, past family history, past medical history, past social history, past surgical history and problem list.    Review of Systems   Constitutional: Negative for appetite change.   HENT: Positive for sinus pressure. Negative for nosebleeds.    Eyes: Negative for blurred vision and double vision.   Respiratory: Positive for shortness of breath (some ALVAREZ). Negative for cough.    Cardiovascular: Negative for chest pain, palpitations, orthopnea, leg swelling and PND.   Gastrointestinal: Positive for GERD.   Endocrine: Negative for cold intolerance and heat intolerance.   Neurological: Positive for headache (sinus).   Hematological: Bruises/bleeds easily.         Current Outpatient Medications:   •  ALOE PO, Take  by mouth Daily., Disp: , Rfl:   •  amLODIPine (NORVASC) 5 MG tablet, Take 1 tablet by mouth Daily., Disp: 30 tablet, Rfl: 1  •  Bromelain 100 MG tablet, Take  by mouth., Disp: , Rfl:   •  Celecoxib (CELEBREX PO), Take  by mouth., Disp: , Rfl:   •  FIBER SELECT GUMMIES PO, Take  by mouth., Disp: , Rfl:   •  FLUoxetine weekly (PROzac WEEKLY) 90 MG DR capsule, Take 1 capsule by mouth Every 7 (Seven) Days., Disp: 12 capsule, Rfl: 3  •  fluticasone (FLONASE) 50 MCG/ACT nasal spray, 2 sprays into each nostril daily. Administer 2 sprays in each nostril for each dose., Disp: , Rfl:   •  GLUCOSAMINE-CHONDROITIN-MSM PO, Take 1,500 mg by mouth Daily., Disp: , Rfl:   •  Lifitegrast (XIIDRA) 5 % solution, Apply  to eye(s) as directed by provider., Disp: , Rfl:   •  Magnesium 400 MG capsule, Take  by mouth., Disp: , Rfl:   •  rosuvastatin (CRESTOR) 10 MG tablet, TAKE 1 TABLET DAILY, Disp: 90 tablet, Rfl: 3  •  sucralfate (CARAFATE) 1 G tablet, TAKE 1 TABLET TWICE A DAY, Disp: 180 tablet, Rfl: 3  •  SYNTHROID 50 MCG tablet, TAKE 1 TABLET DAILY., Disp: 90 tablet, Rfl: 1  •  triamcinolone (KENALOG) 0.1 % ointment, Apply  topically to the appropriate  "area as directed 2 (Two) Times a Day. Apply to affected area, Disp: 1 tube, Rfl: 2  •  Turmeric 500 MG capsule, Take 406 mg by mouth Daily., Disp: , Rfl:         Objective     /66   Pulse 76   Ht 165.1 cm (65\")   Wt 99.8 kg (220 lb)   SpO2 96%   BMI 36.61 kg/m²     Physical Exam   Constitutional: She is oriented to person, place, and time. She appears well-developed and well-nourished. No distress.   Neck: Normal carotid pulses present. Carotid bruit is not present.   Cardiovascular: Regular rhythm, S1 normal and S2 normal. Exam reveals no gallop and no friction rub.   No murmur heard.  Pulses:       Carotid pulses are 2+ on the right side, and 2+ on the left side.  Pulmonary/Chest: Effort normal and breath sounds normal. She has no wheezes. She has no rhonchi. She has no rales. Chest wall is not dull to percussion.   Musculoskeletal: She exhibits no edema.   Neurological: She is alert and oriented to person, place, and time.   Skin: Skin is warm and dry.   Nursing note and vitals reviewed.        Assessment/Plan   Aydee was seen today for medicare wellness-subsequent, hypertension and hyperlipidemia.    Diagnoses and all orders for this visit:    Medicare annual wellness visit, subsequent    Need for pneumococcal vaccine  -     Pneumococcal Polysaccharide Vaccine 23-Valent Greater Than or Equal To 1yo Subcutaneous / IM    Essential hypertension  -     Comprehensive Metabolic Panel; Future  -     CBC (No Diff); Future  -     Urinalysis With Microscopic If Indicated (No Culture) - Urine, Clean Catch; Future  -     Lipid Panel; Future  -     TSH Rfx On Abnormal To Free T4; Future    Hyperlipidemia, unspecified hyperlipidemia type    Elevated fasting glucose  -     Hemoglobin A1c; Future    Visit for screening mammogram  -     Mammo Screening Bilateral With CAD; Future    Morbidly obese (CMS/[facility])    Balance problem  -     Ambulatory Referral to Physical Therapy Evaluate and treat    Hypothyroidism, " adult      Encouraged prudent diet, regular exercise, maintenance of healthy weight, good sleep habits,  avoidance of tobacco products, excessive alcohol.  Discussed vaccines.  She states she received flu shot and Tdap at her local pharmacy in September.  Our records indicate that she also had the first Shingrix vaccine.  She states she did not.  Requested she go by her pharmacy and get a list of all the vaccines she has received there.  She currently has sensation of sinus pressure over her left forehead.  She will treat this conservatively.

## 2020-01-03 NOTE — PROGRESS NOTES
The ABCs of the Annual Wellness Visit  Subsequent Medicare Wellness Visit    Chief Complaint   Patient presents with   • Medicare Wellness-subsequent   • Hypertension   • Hyperlipidemia       Subjective   History of Present Illness:  Aydee Moore is a 69 y.o. female who presents for a Subsequent Medicare Wellness Visit.    HEALTH RISK ASSESSMENT    Recent Hospitalizations:  No hospitalization(s) within the last year.    Current Medical Providers:  Patient Care Team:  Oksana Serrano MD as PCP - General  Oksana Serrano MD as PCP - Family Medicine    Smoking Status:  Social History     Tobacco Use   Smoking Status Never Smoker   Smokeless Tobacco Never Used       Alcohol Consumption:  Social History     Substance and Sexual Activity   Alcohol Use Yes    Comment: social       Depression Screen:   PHQ-2/PHQ-9 Depression Screening 1/3/2020   Little interest or pleasure in doing things 0   Feeling down, depressed, or hopeless 1   Trouble falling or staying asleep, or sleeping too much 1   Feeling tired or having little energy 0   Poor appetite or overeating 1   Feeling bad about yourself - or that you are a failure or have let yourself or your family down 1   Trouble concentrating on things, such as reading the newspaper or watching television 0   Moving or speaking so slowly that other people could have noticed. Or the opposite - being so fidgety or restless that you have been moving around a lot more than usual 0   Thoughts that you would be better off dead, or of hurting yourself in some way 1   Total Score 5   If you checked off any problems, how difficult have these problems made it for you to do your work, take care of things at home, or get along with other people? Somewhat difficult       Fall Risk Screen:  STEADI Fall Risk Assessment was completed, and patient is at HIGH risk for falls. Assessment completed on:1/3/2020    Health Habits and Functional and Cognitive Screening:  Functional & Cognitive Status  1/3/2020   Do you have difficulty preparing food and eating? No   Do you have difficulty bathing yourself, getting dressed or grooming yourself? No   Do you have difficulty using the toilet? No   Do you have difficulty moving around from place to place? No   Do you have trouble with steps or getting out of a bed or a chair? No   Current Diet Well Balanced Diet   Dental Exam Up to date   Eye Exam Up to date   Exercise (times per week) 0 times per week   Current Exercise Activities Include (No Data)   Do you need help using the phone?  No   Are you deaf or do you have serious difficulty hearing?  No   Do you need help with transportation? No   Do you need help shopping? No   Do you need help preparing meals?  No   Do you need help with housework?  No   Do you need help with laundry? No   Do you need help taking your medications? No   Do you need help managing money? No   Do you ever drive or ride in a car without wearing a seat belt? No   Have you felt unusual stress, anger or loneliness in the last month? Yes   Who do you live with? Spouse   If you need help, do you have trouble finding someone available to you? No   Have you been bothered in the last four weeks by sexual problems? No   Do you have difficulty concentrating, remembering or making decisions? Yes         Does the patient have evidence of cognitive impairment? No    Asprin use counseling:Does not need ASA (and currently is not on it)    Age-appropriate Screening Schedule:  Refer to the list below for future screening recommendations based on patient's age, sex and/or medical conditions. Orders for these recommended tests are listed in the plan section. The patient has been provided with a written plan.    Health Maintenance   Topic Date Due   • COLONOSCOPY  06/15/2016   • MAMMOGRAM  12/02/2018   • ZOSTER VACCINE (2 of 2) 11/18/2019   • LIPID PANEL  08/27/2020   • TDAP/TD VACCINES (2 - Td) 09/23/2029   • INFLUENZA VACCINE  Completed          The following  portions of the patient's history were reviewed and updated as appropriate: allergies, current medications, past family history, past medical history, past social history, past surgical history and problem list.    Outpatient Medications Prior to Visit   Medication Sig Dispense Refill   • ALOE PO Take  by mouth Daily.     • amLODIPine (NORVASC) 5 MG tablet Take 1 tablet by mouth Daily. 30 tablet 1   • Bromelain 100 MG tablet Take  by mouth.     • Celecoxib (CELEBREX PO) Take  by mouth.     • FIBER SELECT GUMMIES PO Take  by mouth.     • FLUoxetine weekly (PROzac WEEKLY) 90 MG DR capsule Take 1 capsule by mouth Every 7 (Seven) Days. 12 capsule 3   • fluticasone (FLONASE) 50 MCG/ACT nasal spray 2 sprays into each nostril daily. Administer 2 sprays in each nostril for each dose.     • GLUCOSAMINE-CHONDROITIN-MSM PO Take 1,500 mg by mouth Daily.     • Lifitegrast (XIIDRA) 5 % solution Apply  to eye(s) as directed by provider.     • Magnesium 400 MG capsule Take  by mouth.     • rosuvastatin (CRESTOR) 10 MG tablet TAKE 1 TABLET DAILY 90 tablet 3   • sucralfate (CARAFATE) 1 G tablet TAKE 1 TABLET TWICE A  tablet 3   • SYNTHROID 50 MCG tablet TAKE 1 TABLET DAILY. 90 tablet 1   • triamcinolone (KENALOG) 0.1 % ointment Apply  topically to the appropriate area as directed 2 (Two) Times a Day. Apply to affected area 1 tube 2   • Turmeric 500 MG capsule Take 406 mg by mouth Daily.     • clobetasol (TEMOVATE) 0.05 % cream Apply  topically 2 (Two) Times a Day. 15 g 0     No facility-administered medications prior to visit.        Patient Active Problem List   Diagnosis   • Hyperlipidemia   • Elevated fasting glucose   • Anxiety and depression   • Hypothyroidism, adult   • Wasp sting   • Essential hypertension   • Morbidly obese (CMS/[facility])       Advanced Care Planning:  Patient does not have an advance directive - information provided to the patient today    Review of Systems    Compared to one year ago, the patient feels  "her physical health is the same.  Compared to one year ago, the patient feels her mental health is the same.    Reviewed chart for potential of high risk medication in the elderly: not applicable  Reviewed chart for potential of harmful drug interactions in the elderly:not applicable    Objective         Vitals:    01/03/20 1301   BP: 132/66   Pulse: 76   SpO2: 96%   Weight: 99.8 kg (220 lb)   Height: 165.1 cm (65\")       Body mass index is 36.61 kg/m².  Discussed the patient's BMI with her. The BMI above avderage -she will continue prudent diet, regular exercise.    Physical Exam          Assessment/Plan   Medicare Risks and Personalized Health Plan  CMS Preventative Services Quick Reference  Advance Directive Discussion  Breast Cancer/Mammogram Screening  Colon Cancer Screening  Obesity/Overweight   reviewed vaccines.  She states she has not had Shingrix.      The above risks/problems have been discussed with the patient.  Pertinent information has been shared with the patient in the After Visit Summary.  Follow up plans and orders are seen below in the Assessment/Plan Section.    Diagnoses and all orders for this visit:    1. Medicare annual wellness visit, subsequent (Primary)    2. Need for pneumococcal vaccine  -     Pneumococcal Polysaccharide Vaccine 23-Valent Greater Than or Equal To 1yo Subcutaneous / IM    3. Essential hypertension  -     Comprehensive Metabolic Panel; Future  -     CBC (No Diff); Future  -     Urinalysis With Microscopic If Indicated (No Culture) - Urine, Clean Catch; Future  -     Lipid Panel; Future  -     TSH Rfx On Abnormal To Free T4; Future    4. Hyperlipidemia, unspecified hyperlipidemia type    5. Elevated fasting glucose  -     Hemoglobin A1c; Future    6. Visit for screening mammogram  -     Mammo Screening Bilateral With CAD; Future    7. Morbidly obese (CMS/[facility])    8. Balance problem  -     Ambulatory Referral to Physical Therapy Evaluate and treat    9. Hypothyroidism, " adult      Follow Up:  Return in about 4 months (around 5/3/2020) for 30 min follow up, Fasting.     An After Visit Summary and PPPS were given to the patient.

## 2020-01-03 NOTE — PATIENT INSTRUCTIONS
Medicare Wellness  Personal Prevention Plan of Service     Date of Office Visit:  2020  Encounter Provider:  Oksana Serrano MD  Place of Service:  [facility] INTER[facility] MED[facility]  Patient Name: Aydee Moore  :  1950    As part of the Medicare Wellness portion of your visit today, we are providing you with this personalized preventive plan of services (PPPS). This plan is based upon recommendations of the [place] Preventive Services Task Force (USPSTF) and the Advisory Committee on Immunization Practices (ACIP).    This lists the preventive care services that should be considered, and provides dates of when you are due. Items listed as completed are up-to-date and do not require any further intervention.    Health Maintenance   Topic Date Due   • MEDICARE ANNUAL WELLNESS  06/15/2016   • COLONOSCOPY  06/15/2016   • Pneumococcal Vaccine Once at 65 Years Old  2017   • MAMMOGRAM  2018   • ZOSTER VACCINE (2 of 2) 2019   • LIPID PANEL  2020   • TDAP/TD VACCINES (2 - Td) 2029   • HEPATITIS C SCREENING  Completed   • INFLUENZA VACCINE  Completed       Orders Placed This Encounter   Procedures   • Mammo Screening Bilateral With CAD     Standing Status:   Future     Standing Expiration Date:   1/3/2021     Order Specific Question:   Reason for Exam:     Answer:   screening   • Pneumococcal Polysaccharide Vaccine 23-Valent Greater Than or Equal To 1yo Subcutaneous / IM   • Comprehensive Metabolic Panel     Standing Status:   Future   • CBC (No Diff)     Standing Status:   Future     Standing Expiration Date:   2021   • Urinalysis With Microscopic If Indicated (No Culture) - Urine, Clean Catch     Standing Status:   Future     Standing Expiration Date:   1/3/2021   • Lipid Panel     Standing Status:   Future     Standing Expiration Date:   2021   • TSH Rfx On Abnormal To Free T4     Standing Status:   Future     Standing Expiration Date:   2021   •  Hemoglobin A1c     Standing Status:   Future   • Ambulatory Referral to Physical Therapy Evaluate and treat     Referral Priority:   Routine     Referral Type:   Therapy     Referral Reason:   Specialty Services Required     Requested Specialty:   Physical Therapy     Number of Visits Requested:   1       Return in about 4 months (around 5/3/2020) for 30 min follow up, Fasting.    Ask Dr. Melissa when you are due for colonoscopy.      Advance Directive    Advance directives are legal documents that let you make choices ahead of time about your health care and medical treatment in case you become unable to communicate for yourself. Advance directives are a way for you to communicate your wishes to family, friends, and health care providers. This can help convey your decisions about end-of-life care if you become unable to communicate.  Discussing and writing advance directives should happen over time rather than all at once. Advance directives can be changed depending on your situation and what you want, even after you have signed the advance directives.  If you do not have an advance directive, some states assign family decision makers to act on your behalf based on how closely you are related to them. Each state has its own laws regarding advance directives. You may want to check with your health care provider, , or state representative about the laws in your state. There are different types of advance directives, such as:  · Medical power of .  · Living will.  · Do not resuscitate (DNR) or do not attempt resuscitation (DNAR) order.  Health care proxy and medical power of   A health care proxy, also called a health care agent, is a person who is appointed to make medical decisions for you in cases in which you are unable to make the decisions yourself. Generally, people choose someone they know well and trust to represent their preferences. Make sure to ask this person for an agreement to act as  your proxy. A proxy may have to exercise judgment in the event of a medical decision for which your wishes are not known.  A medical power of  is a legal document that names your health care proxy. Depending on the laws in your state, after the document is written, it may also need to be:  · Signed.  · Notarized.  · Dated.  · Copied.  · Witnessed.  · Incorporated into your medical record.  You may also want to appoint someone to manage your financial affairs in a situation in which you are unable to do so. This is called a durable power of  for finances. It is a separate legal document from the durable power of  for health care. You may choose the same person or someone different from your health care proxy to act as your agent in financial matters.  If you do not appoint a proxy, or if there is a concern that the proxy is not acting in your best interests, a court-appointed guardian may be designated to act on your behalf.  Living will  A living will is a set of instructions documenting your wishes about medical care when you cannot express them yourself. Health care providers should keep a copy of your living will in your medical record. You may want to give a copy to family members or friends. To alert caregivers in case of an emergency, you can place a card in your wallet to let them know that you have a living will and where they can find it. A living will is used if you become:  · Terminally ill.  · Incapacitated.  · Unable to communicate or make decisions.  Items to consider in your living will include:  · The use or non-use of life-sustaining equipment, such as dialysis machines and breathing machines (ventilators).  · A DNR or DNAR order, which is the instruction not to use cardiopulmonary resuscitation (CPR) if breathing or heartbeat stops.  · The use or non-use of tube feeding.  · Withholding of food and fluids.  · Comfort (palliative) care when the goal becomes comfort rather than  a cure.  · Organ and tissue donation.  A living will does not give instructions for distributing your money and property if you should pass away. It is recommended that you seek the advice of a  when writing a will. Decisions about taxes, beneficiaries, and asset distribution will be legally binding. This process can relieve your family and friends of any concerns surrounding disputes or questions that may come up about the distribution of your assets.  DNR or DNAR  A DNR or DNAR order is a request not to have CPR in the event that your heart stops beating or you stop breathing. If a DNR or DNAR order has not been made and shared, a health care provider will try to help any patient whose heart has stopped or who has stopped breathing. If you plan to have surgery, talk with your health care provider about how your DNR or DNAR order will be followed if problems occur.  Summary  · Advance directives are the legal documents that allow you to make choices ahead of time about your health care and medical treatment in case you become unable to communicate for yourself.  · The process of discussing and writing advance directives should happen over time. You can change the advance directives, even after you have signed them.  · Advance directives include DNR or DNAR orders, living cheng, and designating an agent as your medical power of .  This information is not intended to replace advice given to you by your health care provider. Make sure you discuss any questions you have with your health care provider.  Document Released: 03/26/2009 Document Revised: 11/06/2017 Document Reviewed: 11/06/2017  [employer] Interactive Patient Education © 2019 [employer]

## 2020-01-06 ENCOUNTER — OFFICE (AMBULATORY)
Dept: URBAN - METROPOLITAN AREA CLINIC 75 | Facility: CLINIC | Age: 70
End: 2020-01-06

## 2020-01-06 VITALS — HEIGHT: 65 IN

## 2020-01-06 DIAGNOSIS — K64.9 UNSPECIFIED HEMORRHOIDS: ICD-10-CM

## 2020-01-06 DIAGNOSIS — K62.5 HEMORRHAGE OF ANUS AND RECTUM: ICD-10-CM

## 2020-01-06 DIAGNOSIS — K64.0 FIRST DEGREE HEMORRHOIDS: ICD-10-CM

## 2020-01-06 PROCEDURE — 46221 LIGATION OF HEMORRHOID(S): CPT | Performed by: INTERNAL MEDICINE

## 2020-01-06 NOTE — SERVICEHPINOTES
69-year-old female with a history of polyps. She also has a history of hemorrhoids. She has occasional bleeding, she'll have burning and itching. She also has what sounds like a possible rectocele or cystocele. She says she'll have a small bowel movement which is formed with that occurs every time she urinates. She does use fiber gummies. She presents for her 2nd hemorrhoid banding. she also has reflux as well as nausea at times.  She wants to avoid PPIs.  I'm going to try her on Pepcid.  Also told her that since she specifically has symptoms of alcohol she may need to awarded or take Pepcid prior to consuming alcohol or try Pepto-Bismol or iron and acid prior to consuming alcohol.

## 2020-01-07 ENCOUNTER — LAB (OUTPATIENT)
Dept: INTERNAL MEDICINE | Facility: CLINIC | Age: 70
End: 2020-01-07

## 2020-01-07 ENCOUNTER — OFFICE VISIT (OUTPATIENT)
Dept: INTERNAL MEDICINE | Facility: CLINIC | Age: 70
End: 2020-01-07

## 2020-01-07 ENCOUNTER — APPOINTMENT (OUTPATIENT)
Dept: WOMENS IMAGING | Facility: HOSPITAL | Age: 70
End: 2020-01-07

## 2020-01-07 DIAGNOSIS — Z12.31 VISIT FOR SCREENING MAMMOGRAM: ICD-10-CM

## 2020-01-07 DIAGNOSIS — R73.01 ELEVATED FASTING GLUCOSE: ICD-10-CM

## 2020-01-07 DIAGNOSIS — I10 ESSENTIAL HYPERTENSION: Primary | ICD-10-CM

## 2020-01-07 DIAGNOSIS — R82.90 ABNORMAL URINE FINDINGS: ICD-10-CM

## 2020-01-07 PROCEDURE — 77067 SCR MAMMO BI INCL CAD: CPT | Performed by: RADIOLOGY

## 2020-01-07 PROCEDURE — 77067 SCR MAMMO BI INCL CAD: CPT | Performed by: INTERNAL MEDICINE

## 2020-01-08 LAB
ALBUMIN SERPL-MCNC: 4.5 G/DL (ref 3.5–5.2)
ALBUMIN/GLOB SERPL: 1.7 G/DL
ALP SERPL-CCNC: 103 U/L (ref 39–117)
ALT SERPL-CCNC: 15 U/L (ref 1–33)
APPEARANCE UR: CLEAR
AST SERPL-CCNC: 18 U/L (ref 1–32)
BILIRUB SERPL-MCNC: 0.4 MG/DL (ref 0.2–1.2)
BILIRUB UR QL STRIP: NEGATIVE
BUN SERPL-MCNC: 16 MG/DL (ref 8–23)
BUN/CREAT SERPL: 15.2 (ref 7–25)
CALCIUM SERPL-MCNC: 9.3 MG/DL (ref 8.6–10.5)
CASTS URNS MICRO: ABNORMAL
CHLORIDE SERPL-SCNC: 100 MMOL/L (ref 98–107)
CHOLEST SERPL-MCNC: 159 MG/DL (ref 0–200)
CO2 SERPL-SCNC: 23.6 MMOL/L (ref 22–29)
COLOR UR: YELLOW
CONV BACTERIA IN URINE MICRO: ABNORMAL /HPF
CREAT SERPL-MCNC: 1.05 MG/DL (ref 0.57–1)
EPI CELLS #/AREA URNS HPF: ABNORMAL /HPF
ERYTHROCYTE [DISTWIDTH] IN BLOOD BY AUTOMATED COUNT: 12.8 % (ref 12.3–15.4)
GLOBULIN SER CALC-MCNC: 2.6 GM/DL
GLUCOSE SERPL-MCNC: 104 MG/DL (ref 65–99)
GLUCOSE UR QL: NEGATIVE
HBA1C MFR BLD: 5.7 % (ref 4.8–5.6)
HCT VFR BLD AUTO: 43 % (ref 34–46.6)
HDLC SERPL-MCNC: 60 MG/DL (ref 40–60)
HGB BLD-MCNC: 13.7 G/DL (ref 12–15.9)
HGB UR QL STRIP: NEGATIVE
KETONES UR QL STRIP: NEGATIVE
LDLC SERPL CALC-MCNC: 76 MG/DL (ref 0–100)
LEUKOCYTE ESTERASE UR QL STRIP: ABNORMAL
MCH RBC QN AUTO: 29.5 PG (ref 26.6–33)
MCHC RBC AUTO-ENTMCNC: 31.9 G/DL (ref 31.5–35.7)
MCV RBC AUTO: 92.5 FL (ref 79–97)
NITRITE UR QL STRIP: NEGATIVE
PH UR STRIP.AUTO: 6 [PH] (ref 5–8)
PLATELET # BLD AUTO: 262 10*3/MM3 (ref 140–450)
POTASSIUM SERPL-SCNC: 4.5 MMOL/L (ref 3.5–5.2)
PROT SERPL-MCNC: 7.1 G/DL (ref 6–8.5)
PROTEIN: NEGATIVE
RBC # BLD AUTO: 4.65 10*6/MM3 (ref 3.77–5.28)
RBC #/AREA URNS HPF: ABNORMAL /HPF
SODIUM SERPL-SCNC: 138 MMOL/L (ref 136–145)
SP GR UR: 1.02 (ref 1–1.03)
TRIGL SERPL-MCNC: 117 MG/DL (ref 0–150)
TSH SERPL-ACNC: 3.89 UIU/ML (ref 0.27–4.2)
UROBILINOGEN UR STRIP-MCNC: ABNORMAL MG/DL
VLDLC SERPL-MCNC: 23.4 MG/DL
WBC # BLD AUTO: 7.31 10*3/MM3 (ref 3.4–10.8)
WBC #/AREA URNS HPF: ABNORMAL /HPF

## 2020-01-09 LAB
BACTERIA UR CULT: NORMAL
Lab: NORMAL

## 2020-01-14 ENCOUNTER — TREATMENT (OUTPATIENT)
Dept: PHYSICAL THERAPY | Facility: CLINIC | Age: 70
End: 2020-01-14

## 2020-01-14 DIAGNOSIS — R26.89 BALANCE PROBLEM: Primary | ICD-10-CM

## 2020-01-14 DIAGNOSIS — Z91.81 RISK FOR FALLS: ICD-10-CM

## 2020-01-14 PROCEDURE — 97110 THERAPEUTIC EXERCISES: CPT | Performed by: PHYSICAL THERAPIST

## 2020-01-14 PROCEDURE — 97112 NEUROMUSCULAR REEDUCATION: CPT | Performed by: PHYSICAL THERAPIST

## 2020-01-14 PROCEDURE — 97162 PT EVAL MOD COMPLEX 30 MIN: CPT | Performed by: PHYSICAL THERAPIST

## 2020-01-14 NOTE — PROGRESS NOTES
Physical Therapy Initial Evaluation and Plan of Care        Subjective Evaluation    History of Present Illness  Mechanism of injury: Has had several falls over past several months; Hx of R double ankle sprain, torn meniscus R knee with surgery; notice that left foot drags at times and toes tingle but no Dx tests of lumbar spine; denies dizziness problems but dry R eye      Patient Occupation: NA   Precautions and Work Restrictions: nonePain  No pain reported  Aggravating factors: stairs and ambulation    Social Support  Lives in: multiple-level home  Lives with: spouse    Diagnostic Tests  No diagnostic tests performed    Treatments  No previous or current treatments  Current treatment: physical therapy  Patient Goals  Patient goals for therapy: improved balance  Patient goal: stop falling           Objective       Neurological Testing     Sensation     Lumbar   Left   Intact: light touch    Right   Intact: light touch    Reflexes   Left   Patellar (L4): trace (1+)  Achilles (S1): trace (1+)    Right   Patellar (L4): trace (1+)  Achilles (S1): trace (1+)    Active Range of Motion     Additional Active Range of Motion Details  All WFL  All WFL without rad sxs into LEs or pain    Strength/Myotome Testing     Lumbar   Left   Normal strength  Heel walk: normal  Toe walk: normal    Right   Normal strength  Heel walk: normal  Toe walk: normal    Additional Strength Details  Abdominals 4-/5    Tests     Lumbar     Left   Negative passive SLR.     Right   Negative passive SLR.     Additional Tests Details  Good HS flexibility but poor gastroc flexibilty    Ambulation     Comments   No notable limp; heel-toe gait    Functional Assessment     Comments  Good balance EO stable; fair-good EC stable and EO unstable; poor balance (2 sec) EC and unstable         Assessment & Plan     Assessment  Impairments: impaired balance, impaired physical strength and safety issue  Assessment details: 70 yo F presents with report of multiple  falls and Hx of R knee and ankle injuries, L foot paresthesias and possible foot drag.  Moderate core weakness and calf tightness but no obvious radicular signs.  Balance deficits with eyes closed and unstable surface but no reports of dizziness.  Prognosis: good  Functional Limitations: walking  Goals  Plan Goals: STGs x 2 wks  1. Patient demos carla for and compliance with HEP  2. Improved LE flexibility for decreased foot drag and mm cramps  3. Increasing strength core for improved stability with ADLs  4. No falls in past week    LTGs x 8 wks  1. Independent with HEP and precautions for falls prevention  2. Functional ROM for improved gait and other ADLs  3. Balance on unstable surface EC for 30 sec without LOB and SLB > 15 sec without LOB  4. No falls for past 3 wks to allow return to normal ADLs    Plan  Therapy options: will be seen for skilled physical therapy services  Planned therapy interventions: abdominal trunk stabilization, balance/weight-bearing training, stretching, strengthening and home exercise program  Frequency: 1-2 x wk.  Duration in weeks: 8  Treatment plan discussed with: patient        Manual Therapy:    0     mins  11790;  Therapeutic Exercise:    14     mins  80682;     Neuromuscular Enedelia:    10    mins  [id number];      Timed Treatment:   24   mins   Total Treatment:     51   mins    PT SIGNATURE: Bianca Cadena PT   DATE TREATMENT INITIATED: 1/14/2020    Medicare Initial Certification  Certification Period: 4/13/2020  I certify that the therapy services are furnished while this patient is under my care.  The services outlined above are required by this patient, and will be reviewed every 90 days.     PHYSICIAN: Oksana Serrano MD      DATE:     Please sign and return via fax to [phone].. Thank you, [facility].

## 2020-01-21 ENCOUNTER — TREATMENT (OUTPATIENT)
Dept: PHYSICAL THERAPY | Facility: CLINIC | Age: 70
End: 2020-01-21

## 2020-01-21 DIAGNOSIS — R26.89 BALANCE PROBLEM: Primary | ICD-10-CM

## 2020-01-21 DIAGNOSIS — Z91.81 RISK FOR FALLS: ICD-10-CM

## 2020-01-21 PROCEDURE — 97112 NEUROMUSCULAR REEDUCATION: CPT | Performed by: PHYSICAL THERAPIST

## 2020-01-21 PROCEDURE — 97110 THERAPEUTIC EXERCISES: CPT | Performed by: PHYSICAL THERAPIST

## 2020-01-21 NOTE — PROGRESS NOTES
Physical Therapy Daily Progress Note      Visit # 2      Subjective   Doing fine with exercises.  No falls or near falls.    Objective   See Exercise, Manual, and Modality Logs for complete treatment.       Assessment/Plan    Responding favorably to Rx with improved balance with corner activities and tolerance for increased ex and activities    Progress core and LE strengthening and balance activities             Manual Therapy:    0     mins  81633;  Therapeutic Exercise:    28     mins  95255;     Neuromuscular Enedelia:    21    mins  10776;        Timed Treatment:   49   mins   Total Treatment:     49   mins    Bianca Cadena PT  Physical Therapist  [place] License #[id number]

## 2020-02-03 ENCOUNTER — TREATMENT (OUTPATIENT)
Dept: PHYSICAL THERAPY | Facility: CLINIC | Age: 70
End: 2020-02-03

## 2020-02-03 DIAGNOSIS — Z91.81 RISK FOR FALLS: ICD-10-CM

## 2020-02-03 DIAGNOSIS — R26.89 BALANCE PROBLEM: Primary | ICD-10-CM

## 2020-02-03 PROCEDURE — 97110 THERAPEUTIC EXERCISES: CPT | Performed by: PHYSICAL THERAPIST

## 2020-02-03 PROCEDURE — 97112 NEUROMUSCULAR REEDUCATION: CPT | Performed by: PHYSICAL THERAPIST

## 2020-02-03 NOTE — PROGRESS NOTES
Physical Therapy Daily Progress Note      Visit # 3      Subjective   Walked on uneven terrain in [place] and did well.      Objective   See Exercise, Manual, and Modality Logs for complete treatment.       Assessment/Plan    Balance improving with no episodes of falls or near falls.  Good progress.    Reassess in 2 wks for need to continue or D/C to HEP             Manual Therapy:    0     mins  85071;  Therapeutic Exercise:    24     mins  51519;     Neuromuscular Enedelia:    26    mins  81176;          Timed Treatment:   50   mins   Total Treatment:     50   mins    Bianca Cadena PT  Physical Therapist  [place] License #[id number]

## 2020-02-17 ENCOUNTER — TREATMENT (OUTPATIENT)
Dept: PHYSICAL THERAPY | Facility: CLINIC | Age: 70
End: 2020-02-17

## 2020-02-17 DIAGNOSIS — R26.89 BALANCE PROBLEM: Primary | ICD-10-CM

## 2020-02-17 DIAGNOSIS — Z91.81 RISK FOR FALLS: ICD-10-CM

## 2020-02-17 PROCEDURE — 97112 NEUROMUSCULAR REEDUCATION: CPT | Performed by: PHYSICAL THERAPIST

## 2020-02-17 RX ORDER — LEVOTHYROXINE SODIUM 50 MCG
TABLET ORAL
Qty: 90 TABLET | Refills: 1 | Status: SHIPPED | OUTPATIENT
Start: 2020-02-17 | End: 2020-07-16

## 2020-02-17 NOTE — PROGRESS NOTES
Physical Therapy  Progress Note        2/17/2020  Oksana Serrano MD    Re: Aydee Moore  ________________________________________________________________    Ms. Aydee Moore, has attended 4 PT sessions.  Treatment has consisted of: there-ex/act, NMR, pt education     S: Ms. Aydee Moore states: getting better.  Feel stronger and more sure of myself.  No falls or near falls.  Able to do normal activities and ready to get back to hiking trails.  Ready to just do at home now.        Subjective     Objective       Neurological Testing     Sensation     Lumbar   Left   Intact: light touch    Right   Intact: light touch    Reflexes   Left   Patellar (L4): trace (1+)  Achilles (S1): trace (1+)    Right   Patellar (L4): trace (1+)  Achilles (S1): trace (1+)    Active Range of Motion     Additional Active Range of Motion Details  All WFL  All WFL without rad sxs into LEs or pain    Strength/Myotome Testing     Lumbar   Left   Normal strength  Heel walk: normal  Toe walk: normal    Right   Normal strength  Heel walk: normal  Toe walk: normal    Additional Strength Details  Abdominals 4-/5    Tests     Lumbar     Left   Negative passive SLR.     Right   Negative passive SLR.     Additional Tests Details  Good HS flexibility but poor gastroc flexibilty    Ambulation     Comments   No notable limp; heel-toe gait    Functional Assessment     Comments  Good balance EO stable; fair-good EC stable and EO unstable; poor balance (2 sec) EC and unstable     See Exercise, Manual, and Modality Logs for complete treatment.       Assessment/Plan    LEFS score 63 (21% disability) today vs 53 (34%) at [facility].    Good overall improvement with no reports of falls or near falls, improved balance with activities in clinic and demonstrated independence with HEP.  Goals met.    D/C to HEP             Manual Therapy:    0     mins  77776;  Therapeutic Exercise:    0     mins  41397;     Neuromuscular Enedelia:    29    mins  05232;      Timed  Treatment:   29   mins   Total Treatment:     29   mins    Bianca Cadena, PT  Physical Therapist

## 2020-03-20 ENCOUNTER — TELEPHONE (OUTPATIENT)
Dept: INTERNAL MEDICINE | Facility: CLINIC | Age: 70
End: 2020-03-20

## 2020-03-20 ENCOUNTER — OFFICE VISIT (OUTPATIENT)
Dept: INTERNAL MEDICINE | Facility: CLINIC | Age: 70
End: 2020-03-20

## 2020-03-20 VITALS
HEIGHT: 65 IN | WEIGHT: 214 LBS | SYSTOLIC BLOOD PRESSURE: 142 MMHG | HEART RATE: 85 BPM | DIASTOLIC BLOOD PRESSURE: 82 MMHG | TEMPERATURE: 98.6 F | BODY MASS INDEX: 35.65 KG/M2 | OXYGEN SATURATION: 94 %

## 2020-03-20 DIAGNOSIS — J06.9 ACUTE URI: Primary | ICD-10-CM

## 2020-03-20 PROCEDURE — 99213 OFFICE O/P EST LOW 20 MIN: CPT | Performed by: INTERNAL MEDICINE

## 2020-03-20 RX ORDER — AZITHROMYCIN 250 MG/1
TABLET, FILM COATED ORAL
Qty: 6 TABLET | Refills: 0 | Status: SHIPPED | OUTPATIENT
Start: 2020-03-20 | End: 2020-03-27 | Stop reason: SDUPTHER

## 2020-03-20 NOTE — PROGRESS NOTES
Chief Complaint   Patient presents with   • Cough     cough, headache, sore throat   • Sore Throat   • Headache       Subjective   Aydee Moore is a 69 y.o. female.     History of Present Illness     She has had a loose cough for about 12 days.  It is rarely productive.  If it is, she brings up some clear mucus.  Initially, the cough was dry.  She has had a sore throat.  She has had a headache over her left eye.  No earache.  No dental pain, no shortness of breath, no sneezing, no nasal drainage.  She might have a little bit of wheezing when she first lies down but that does not last long.        The following portions of the patient's history were reviewed and updated as appropriate: allergies, current medications, past family history, past medical history, past social history, past surgical history and problem list.    Review of Systems   Constitutional: Positive for appetite change (decreased). Negative for chills, diaphoresis and fever.   HENT: Positive for sore throat. Negative for rhinorrhea, sinus pressure, sneezing and swollen glands.    Respiratory: Positive for cough and wheezing (a little). Negative for shortness of breath.    Cardiovascular: Negative for chest pain, palpitations and leg swelling.   Gastrointestinal: Negative for diarrhea, nausea and vomiting.         Current Outpatient Medications:   •  ALOE PO, Take  by mouth Daily., Disp: , Rfl:   •  amLODIPine (NORVASC) 5 MG tablet, Take 1 tablet by mouth Daily., Disp: 30 tablet, Rfl: 1  •  Bromelain 100 MG tablet, Take  by mouth., Disp: , Rfl:   •  Celecoxib (CELEBREX PO), Take  by mouth., Disp: , Rfl:   •  FIBER SELECT GUMMIES PO, Take  by mouth., Disp: , Rfl:   •  FLUoxetine weekly (PROzac WEEKLY) 90 MG DR capsule, Take 1 capsule by mouth Every 7 (Seven) Days., Disp: 12 capsule, Rfl: 3  •  fluticasone (FLONASE) 50 MCG/ACT nasal spray, 2 sprays into each nostril daily. Administer 2 sprays in each nostril for each dose., Disp: , Rfl:   •   "GLUCOSAMINE-CHONDROITIN-MSM PO, Take 1,500 mg by mouth Daily., Disp: , Rfl:   •  Lifitegrast (XIIDRA) 5 % solution, Apply  to eye(s) as directed by provider., Disp: , Rfl:   •  Magnesium 400 MG capsule, Take  by mouth., Disp: , Rfl:   •  rosuvastatin (CRESTOR) 10 MG tablet, TAKE 1 TABLET DAILY, Disp: 90 tablet, Rfl: 3  •  sucralfate (CARAFATE) 1 G tablet, TAKE 1 TABLET TWICE A DAY, Disp: 180 tablet, Rfl: 3  •  SYNTHROID 50 MCG tablet, TAKE 1 TABLET DAILY., Disp: 90 tablet, Rfl: 1  •  triamcinolone (KENALOG) 0.1 % ointment, Apply  topically to the appropriate area as directed 2 (Two) Times a Day. Apply to affected area, Disp: 1 tube, Rfl: 2  •  Turmeric 500 MG capsule, Take 406 mg by mouth Daily., Disp: , Rfl:   •  azithromycin (Zithromax Z-Stevenson) 250 MG tablet, Take 2 tablets the first day, then 1 tablet daily for 4 days., Disp: 6 tablet, Rfl: 0        Objective     /82   Pulse 85   Temp 98.6 °F (37 °C)   Ht 165.1 cm (65\")   Wt 97.1 kg (214 lb)   SpO2 94%   BMI 35.61 kg/m²     Physical Exam   Constitutional: She is oriented to person, place, and time. She appears well-developed and well-nourished. No distress.   HENT:   Right Ear: Tympanic membrane normal.   Left Ear: Tympanic membrane normal.   Nose: Right sinus exhibits no maxillary sinus tenderness and no frontal sinus tenderness. Left sinus exhibits no maxillary sinus tenderness and no frontal sinus tenderness.   Mouth/Throat: Oropharynx is clear and moist.   Neck: Normal carotid pulses present. Carotid bruit is not present.   Cardiovascular: Regular rhythm, S1 normal and S2 normal. Exam reveals no gallop and no friction rub.   No murmur heard.  Pulses:       Carotid pulses are 2+ on the right side, and 2+ on the left side.  Pulmonary/Chest: Effort normal and breath sounds normal. She has no wheezes. She has no rhonchi. She has no rales. Chest wall is not dull to percussion.   Moist cough present   Musculoskeletal: She exhibits no edema. "   Neurological: She is alert and oriented to person, place, and time.   Skin: Skin is warm and dry.   Nursing note and vitals reviewed.        Assessment/Plan   Aydee was seen today for cough, sore throat and headache.    Diagnoses and all orders for this visit:    Acute URI    Other orders  -     azithromycin (Zithromax Z-Stevenson) 250 MG tablet; Take 2 tablets the first day, then 1 tablet daily for 4 days.      Encouraged her to keep up with good fluid intake, she may also use Flonase nasal spray.  She has that at home but has not been using it.  She may use Delsym cough syrup over-the-counter.

## 2020-03-20 NOTE — TELEPHONE ENCOUNTER
Patient had sent a message through my chart in regards to what she has tried to help with her symptoms but today she feels worse, faint feeling, congestion, cough, sweating no fever. Wanted to be seen today

## 2020-03-27 RX ORDER — AZITHROMYCIN 250 MG/1
TABLET, FILM COATED ORAL
Qty: 6 TABLET | Refills: 0 | Status: SHIPPED | OUTPATIENT
Start: 2020-03-27 | End: 2020-05-06

## 2020-05-06 ENCOUNTER — OFFICE VISIT (OUTPATIENT)
Dept: INTERNAL MEDICINE | Facility: CLINIC | Age: 70
End: 2020-05-06

## 2020-05-06 DIAGNOSIS — E03.9 HYPOTHYROIDISM, ADULT: ICD-10-CM

## 2020-05-06 DIAGNOSIS — I10 ESSENTIAL HYPERTENSION: Primary | ICD-10-CM

## 2020-05-06 DIAGNOSIS — R73.01 ELEVATED FASTING GLUCOSE: ICD-10-CM

## 2020-05-06 DIAGNOSIS — E78.5 HYPERLIPIDEMIA, UNSPECIFIED HYPERLIPIDEMIA TYPE: ICD-10-CM

## 2020-05-06 PROCEDURE — 99442 PR PHYS/QHP TELEPHONE EVALUATION 11-20 MIN: CPT | Performed by: INTERNAL MEDICINE

## 2020-05-06 RX ORDER — ROSUVASTATIN CALCIUM 5 MG/1
5 TABLET, COATED ORAL DAILY
Qty: 90 TABLET | Refills: 3
Start: 2020-05-06 | End: 2020-09-25 | Stop reason: SDUPTHER

## 2020-05-06 NOTE — PROGRESS NOTES
Chief Complaint   Patient presents with   • Hypertension   • Hyperlipidemia   • Cough       Subjective   Aydee Moore is a 70 y.o. female.     You have chosen to receive care through a telephone visit. Do you consent to use a telephone visit for your medical care today? Yes     Telephone visit done today to follow-up on hypertension, hyperlipidemia, cough and elevated fasting glucose.  She reports her cough has resolved.  She continues on her medications as listed.    Hypertension   This is a chronic problem. The problem is controlled. Pertinent negatives include no blurred vision, chest pain, headaches, orthopnea, palpitations, peripheral edema, PND or shortness of breath. Agents associated with hypertension include NSAIDs (She sometimes takes Celebrex). Current antihypertension treatment includes calcium channel blockers and lifestyle changes. The current treatment provides moderate improvement. There are no compliance problems.    Hyperlipidemia   This is a chronic problem. The problem is controlled. Recent lipid tests were reviewed and are normal. Exacerbating diseases include obesity. She has no history of diabetes ( She does not have diabetes but has had elevated fasting glucose.). There are no known factors aggravating her hyperlipidemia. Pertinent negatives include no chest pain, leg pain, myalgias or shortness of breath. Current antihyperlipidemic treatment includes statins ( She would like to take 5 mg rather than 10 mg of rosuvastatin.). The current treatment provides significant improvement of lipids. There are no compliance problems.    Cough   The problem has been resolved. Pertinent negatives include no chest pain, headaches, myalgias or shortness of breath.      Pre-diabetes/Elevated fasting glucose:  This is a chronic problem.  Patient has been advised to follow prudent diet, avoid sugar, exercise regularly.  She denies polyuria, polydipsia, vision change appetite change, unexplained weight loss.    Lab Results   Component Value Date    HGBA1C 5.70 (H) 01/07/2020        The following portions of the patient's history were reviewed and updated as appropriate: allergies, current medications, past family history, past medical history, past social history, past surgical history and problem list.    Review of Systems   Constitutional: Negative for appetite change.   HENT: Negative for nosebleeds.    Eyes: Negative for blurred vision and double vision.   Respiratory: Negative for cough and shortness of breath.    Cardiovascular: Negative for chest pain, palpitations, orthopnea, leg swelling and PND.   Musculoskeletal: Negative for myalgias.   Neurological: Negative for headache.         Current Outpatient Medications:   •  ALOE PO, Take  by mouth Daily., Disp: , Rfl:   •  amLODIPine (NORVASC) 5 MG tablet, Take 1 tablet by mouth Daily., Disp: 30 tablet, Rfl: 1  •  Bromelain 100 MG tablet, Take  by mouth., Disp: , Rfl:   •  Celecoxib (CELEBREX PO), Take  by mouth., Disp: , Rfl:   •  FIBER SELECT GUMMIES PO, Take  by mouth., Disp: , Rfl:   •  FLUoxetine weekly (PROzac WEEKLY) 90 MG DR capsule, Take 1 capsule by mouth Every 7 (Seven) Days., Disp: 12 capsule, Rfl: 3  •  fluticasone (FLONASE) 50 MCG/ACT nasal spray, 2 sprays into each nostril daily. Administer 2 sprays in each nostril for each dose., Disp: , Rfl:   •  GLUCOSAMINE-CHONDROITIN-MSM PO, Take 1,500 mg by mouth Daily., Disp: , Rfl:   •  Lifitegrast (XIIDRA) 5 % solution, Apply  to eye(s) as directed by provider., Disp: , Rfl:   •  Magnesium 400 MG capsule, Take  by mouth., Disp: , Rfl:   •  rosuvastatin (CRESTOR) 5 MG tablet, Take 1 tablet by mouth Daily., Disp: 90 tablet, Rfl: 3  •  sucralfate (CARAFATE) 1 G tablet, TAKE 1 TABLET TWICE A DAY, Disp: 180 tablet, Rfl: 3  •  SYNTHROID 50 MCG tablet, TAKE 1 TABLET DAILY., Disp: 90 tablet, Rfl: 1  •  triamcinolone (KENALOG) 0.1 % ointment, Apply  topically to the appropriate area as directed 2 (Two) Times a Day.  Apply to affected area, Disp: 1 tube, Rfl: 2  •  Turmeric 500 MG capsule, Take 406 mg by mouth Daily., Disp: , Rfl:         Objective     There were no vitals taken for this visit.    Physical Exam   Constitutional: She is oriented to person, place, and time.   Pulmonary/Chest: Effort normal. No respiratory distress.   Neurological: She is alert and oriented to person, place, and time.   Psychiatric: Her speech is normal.         Assessment/Plan   Aydee was seen today for hypertension, hyperlipidemia and cough.    Diagnoses and all orders for this visit:    Essential hypertension  -     Comprehensive Metabolic Panel; Future  -     Lipid Panel; Future    Hyperlipidemia, unspecified hyperlipidemia type    Elevated fasting glucose  -     Hemoglobin A1c; Future    Hypothyroidism, adult  -     TSH Rfx On Abnormal To Free T4; Future    Other orders  -     rosuvastatin (CRESTOR) 5 MG tablet; Take 1 tablet by mouth Daily.      Advised her she may reduce rosuvastatin from 10 mg to 5 mg.  She will cut her current supply of 10 mg tablets in half.  In regard to COVID pandemic, encouraged social distancing, wearing a mask while out, frequent handwashing.     Telephone visit duration 11 minutes.

## 2020-05-11 ENCOUNTER — RESULTS ENCOUNTER (OUTPATIENT)
Dept: INTERNAL MEDICINE | Facility: CLINIC | Age: 70
End: 2020-05-11

## 2020-05-11 DIAGNOSIS — R73.01 ELEVATED FASTING GLUCOSE: ICD-10-CM

## 2020-05-11 DIAGNOSIS — E03.9 HYPOTHYROIDISM, ADULT: ICD-10-CM

## 2020-05-11 DIAGNOSIS — I10 ESSENTIAL HYPERTENSION: ICD-10-CM

## 2020-05-12 LAB
ALBUMIN SERPL-MCNC: 4.6 G/DL (ref 3.5–5.2)
ALBUMIN/GLOB SERPL: 1.9 G/DL
ALP SERPL-CCNC: 97 U/L (ref 39–117)
ALT SERPL-CCNC: 28 U/L (ref 1–33)
AST SERPL-CCNC: 26 U/L (ref 1–32)
BILIRUB SERPL-MCNC: 0.4 MG/DL (ref 0.2–1.2)
BUN SERPL-MCNC: 19 MG/DL (ref 8–23)
BUN/CREAT SERPL: 17.9 (ref 7–25)
CALCIUM SERPL-MCNC: 9.6 MG/DL (ref 8.6–10.5)
CHLORIDE SERPL-SCNC: 101 MMOL/L (ref 98–107)
CHOLEST SERPL-MCNC: 182 MG/DL (ref 0–200)
CO2 SERPL-SCNC: 24.4 MMOL/L (ref 22–29)
CREAT SERPL-MCNC: 1.06 MG/DL (ref 0.57–1)
GLOBULIN SER CALC-MCNC: 2.4 GM/DL
GLUCOSE SERPL-MCNC: 106 MG/DL (ref 65–99)
HBA1C MFR BLD: 5.5 % (ref 4.8–5.6)
HDLC SERPL-MCNC: 59 MG/DL (ref 40–60)
LDLC SERPL CALC-MCNC: 94 MG/DL (ref 0–100)
POTASSIUM SERPL-SCNC: 4.6 MMOL/L (ref 3.5–5.2)
PROT SERPL-MCNC: 7 G/DL (ref 6–8.5)
SODIUM SERPL-SCNC: 139 MMOL/L (ref 136–145)
TRIGL SERPL-MCNC: 143 MG/DL (ref 0–150)
TSH SERPL DL<=0.005 MIU/L-ACNC: 4.35 UIU/ML (ref 0.27–4.2)
VLDLC SERPL CALC-MCNC: 28.6 MG/DL

## 2020-05-13 LAB
T4 FREE SERPL-MCNC: 1.18 NG/DL (ref 0.93–1.7)
WRITTEN AUTHORIZATION: NORMAL

## 2020-05-15 ENCOUNTER — TELEMEDICINE (OUTPATIENT)
Dept: INTERNAL MEDICINE | Facility: CLINIC | Age: 70
End: 2020-05-15

## 2020-05-15 DIAGNOSIS — L30.9 DERMATITIS: Primary | ICD-10-CM

## 2020-05-15 PROCEDURE — 99213 OFFICE O/P EST LOW 20 MIN: CPT | Performed by: NURSE PRACTITIONER

## 2020-05-16 NOTE — PROGRESS NOTES
Subjective   Aydee Moore is a 70 y.o. female who presents due to redness and irritation of her left elbow.    You have chosen to receive care through a telehealth visit.  Do you consent to use a video/audio connection for your medical care today? Yes    She had blood drawn Tuesday morning and a gauze & band aid were applied to the area. She c/o irritation and redness when she removed the band-aid later that evening. However, she c/o worsening symptoms since then, reports difficulty sleeping due to discomfort and pain.       The following portions of the patient's history were reviewed and updated as appropriate: allergies, current medications, past social history and problem list.    Past Medical History:   Diagnosis Date   • Allergic     Penicillins, tetracyclines, surgical tape; latex sensitivity   • Anxiety and depression    • Elevated fasting glucose    • Hyperlipidemia          Current Outpatient Medications:   •  ALOE PO, Take  by mouth Daily., Disp: , Rfl:   •  amLODIPine (NORVASC) 5 MG tablet, Take 1 tablet by mouth Daily., Disp: 30 tablet, Rfl: 1  •  Bromelain 100 MG tablet, Take  by mouth., Disp: , Rfl:   •  Celecoxib (CELEBREX PO), Take  by mouth., Disp: , Rfl:   •  FIBER SELECT GUMMIES PO, Take  by mouth., Disp: , Rfl:   •  FLUoxetine weekly (PROzac WEEKLY) 90 MG DR capsule, Take 1 capsule by mouth Every 7 (Seven) Days., Disp: 12 capsule, Rfl: 3  •  fluticasone (FLONASE) 50 MCG/ACT nasal spray, 2 sprays into each nostril daily. Administer 2 sprays in each nostril for each dose., Disp: , Rfl:   •  GLUCOSAMINE-CHONDROITIN-MSM PO, Take 1,500 mg by mouth Daily., Disp: , Rfl:   •  Lifitegrast (XIIDRA) 5 % solution, Apply  to eye(s) as directed by provider., Disp: , Rfl:   •  Magnesium 400 MG capsule, Take  by mouth., Disp: , Rfl:   •  rosuvastatin (CRESTOR) 5 MG tablet, Take 1 tablet by mouth Daily., Disp: 90 tablet, Rfl: 3  •  sucralfate (CARAFATE) 1 G tablet, TAKE 1 TABLET TWICE A DAY, Disp: 180 tablet,  Rfl: 3  •  SYNTHROID 50 MCG tablet, TAKE 1 TABLET DAILY., Disp: 90 tablet, Rfl: 1  •  Turmeric 500 MG capsule, Take 406 mg by mouth Daily., Disp: , Rfl:   •  hydrocortisone 2.5 % cream, Apply  topically to the appropriate area as directed 2 (Two) Times a Day., Disp: 28 g, Rfl: 0  •  silver sulfadiazine (SILVADENE, SSD) 1 % cream, Apply  topically to the appropriate area as directed Daily., Disp: 20 g, Rfl: 0  •  triamcinolone (KENALOG) 0.1 % ointment, Apply  topically to the appropriate area as directed 2 (Two) Times a Day. Apply to affected area, Disp: 1 tube, Rfl: 2    Allergies   Allergen Reactions   • Penicillins    • Tetracyclines & Related        Review of Systems   Constitutional: Negative for chills, fatigue, fever and unexpected weight change.   HENT: Negative for congestion, ear pain, postnasal drip, sinus pressure, sore throat and trouble swallowing.    Eyes: Negative for visual disturbance.   Respiratory: Negative for cough, chest tightness and wheezing.    Cardiovascular: Negative for chest pain, palpitations and leg swelling.   Gastrointestinal: Negative for abdominal pain, blood in stool, nausea and vomiting.   Genitourinary: Negative for dysuria, frequency and urgency.   Musculoskeletal: Negative for arthralgias and joint swelling.   Skin: Positive for rash and wound. Negative for color change.   Neurological: Negative for syncope, weakness and headaches.   Hematological: Does not bruise/bleed easily.       Objective   There were no vitals filed for this visit.  There is no height or weight on file to calculate BMI.  Physical Exam   Skin:   Picture submitted (taken Tues)shows two erythematous patches with papules on the antecubital area of the left elbow, patch on each side of blood draw.   Picture submitted from today shows decreased erythema, no open wounds of drainage   Psychiatric: She has a normal mood and affect. Her behavior is normal. Judgment and thought content normal.       Assessment/Plan    Aydee was seen today for rash.    Diagnoses and all orders for this visit:    Dermatitis  -     silver sulfadiazine (SILVADENE, SSD) 1 % cream; Apply  topically to the appropriate area as directed Daily.  -     hydrocortisone 2.5 % cream; Apply  topically to the appropriate area as directed 2 (Two) Times a Day.    Sx are consistent with contact dermatits from recent bandage. She may apply Hdyrocortisone for itching and inflammation.  She requests Silvadene as well-advised to use only with an open wound and/or burn.  She may apply warm compresses to area due to pain (underlying phlebitis?) and continue to monitor, notify office if sx persist or worsen.    History and medical judgement obtained from video conversation with patient. No hands-on physical examination was performed. This visit has been rescheduled as a phone visit to comply with patient safety concerns in accordance with CDC recommendations. Total time of discussion was 12 minutes.

## 2020-06-29 RX ORDER — AMLODIPINE BESYLATE 5 MG/1
TABLET ORAL
Qty: 30 TABLET | Refills: 4 | Status: SHIPPED | OUTPATIENT
Start: 2020-06-29 | End: 2020-12-14

## 2020-07-16 RX ORDER — LEVOTHYROXINE SODIUM 50 MCG
TABLET ORAL
Qty: 90 TABLET | Refills: 1 | Status: SHIPPED | OUTPATIENT
Start: 2020-07-16 | End: 2020-12-21

## 2020-09-15 PROCEDURE — A6448 LT COMPRES BAND <3"/YD: HCPCS | Performed by: NURSE PRACTITIONER

## 2020-09-15 PROCEDURE — 73130 X-RAY EXAM OF HAND: CPT | Performed by: NURSE PRACTITIONER

## 2020-09-15 PROCEDURE — 99203 OFFICE O/P NEW LOW 30 MIN: CPT | Performed by: NURSE PRACTITIONER

## 2020-09-25 ENCOUNTER — OFFICE VISIT (OUTPATIENT)
Dept: INTERNAL MEDICINE | Facility: CLINIC | Age: 70
End: 2020-09-25

## 2020-09-25 VITALS
DIASTOLIC BLOOD PRESSURE: 74 MMHG | OXYGEN SATURATION: 98 % | BODY MASS INDEX: 31.16 KG/M2 | HEART RATE: 72 BPM | WEIGHT: 187 LBS | HEIGHT: 65 IN | TEMPERATURE: 98.4 F | SYSTOLIC BLOOD PRESSURE: 134 MMHG

## 2020-09-25 DIAGNOSIS — R73.01 ELEVATED FASTING GLUCOSE: ICD-10-CM

## 2020-09-25 DIAGNOSIS — E78.5 HYPERLIPIDEMIA, UNSPECIFIED HYPERLIPIDEMIA TYPE: ICD-10-CM

## 2020-09-25 DIAGNOSIS — I10 ESSENTIAL HYPERTENSION: Primary | ICD-10-CM

## 2020-09-25 DIAGNOSIS — E03.9 HYPOTHYROIDISM, ADULT: ICD-10-CM

## 2020-09-25 PROCEDURE — 99214 OFFICE O/P EST MOD 30 MIN: CPT | Performed by: INTERNAL MEDICINE

## 2020-09-25 RX ORDER — ROSUVASTATIN CALCIUM 5 MG/1
5 TABLET, COATED ORAL DAILY
Qty: 90 TABLET | Refills: 3 | Status: SHIPPED | OUTPATIENT
Start: 2020-09-25 | End: 2021-11-08

## 2020-09-25 RX ORDER — CEPHALEXIN 500 MG/1
500 CAPSULE ORAL 2 TIMES DAILY
Qty: 14 CAPSULE | Refills: 0 | Status: SHIPPED | OUTPATIENT
Start: 2020-09-25 | End: 2021-02-04

## 2020-09-25 NOTE — PROGRESS NOTES
Chief Complaint   Patient presents with   • Hypertension     follow up   • Hyperlipidemia   • Constipation     no normal bowel movements since 9-14-20   • Back Pain   • Rash       Subjective   Aydee Moore is a 70 y.o. female.     History of Present Illness     Hypertension: This is a chronic problem.  She has not had symptoms of confusion, visual disturbance, dizziness or shortness of breath. She has headaches from time to time.  Her blood pressures have generally been controlled.  She has not had associated symptoms of chest pain/chest pressure, PND, orthopnea, edema, syncope or near syncope.  Current treatment includes CCB.  Prudent diet and regular exercise have also been recommended.  By report, there is good compliance with treatment and good tolerance of treatment.    Hyperlipidemia:    This is a chronic problem.  Her most recent lipid panel showed:  Lab Results   Component Value Date    CHOL 219 (H) 08/27/2019    CHLPL 182 05/12/2020    TRIG 143 05/12/2020    HDL 59 05/12/2020    LDL 94 05/12/2020     Current treatment: rosuvastatin.  Prudent diet and regular exercise have also been recommended.  No management changes were made at her last appointment.   By report, there is good compliance with treatment, good tolerance of treatment.  She has not experienced statin associated myalgias, dyspepsia.  She has not had liver enzyme elevation.         Hypothyroidism:  This is a chronic problem.  Symptoms do not include unexpected weight changes, changes in heat/cold tolerance, fatigue, weakness, constipation, goiter, appetite change, change in hair/skin.  Current treatment: Levothyroxine.  By report, there is good compliance with treatment, good tolerance of treatment and good symptom control.     Pre-diabetes/Elevated fasting glucose:  This is a chronic problem.  Patient has been advised to follow prudent diet, avoid sugar, exercise regularly.  She denies polyuria, polydipsia, vision change appetite change,  "unexplained weight loss.   Lab Results   Component Value Date    HGBA1C 5.50 05/12/2020          She has been doing better with diet and continues to exercise regularly. She has lost weight.    Over Labor Day weekend, she did some gardening work.  This involved placing a drainage pipe and doing things do not work.  She \"wore out my back\".  She had some pain related to this.  She took some old hydrocodone from 2017 that she had on hand.  She has developed constipation as a result of that.  She is noticing bloating, gas.  Her  tried to administer an enema.  She states that he could not do it due to the presence of stool.  On 14 September, she had a fall at home.  She was scratched over to give her 's insulin shot.  She lost her balance, she broke a finger in her left hand.  She went to immediate care.  Her finger is braced.  She is to see a hand specialist.    She has been noticing small red dots on her skin.  This is been going on since about May.  They itch.  She currently has one small one on her right upper arm, a small wart on the side of her right breast, small one under her right breast.  She has been using hydrocortisone cream and Lotrisone.  It has not helped.    The following portions of the patient's history were reviewed and updated as appropriate: allergies, current medications, past family history, past medical history, past social history, past surgical history and problem list.    Review of Systems   HENT: Negative for nosebleeds.    Eyes: Negative for blurred vision and double vision.   Respiratory: Negative for cough and shortness of breath.    Cardiovascular: Negative for chest pain, palpitations and leg swelling.   Neurological: Positive for headache.         Current Outpatient Medications:   •  ALOE PO, Take  by mouth Daily., Disp: , Rfl:   •  amLODIPine (NORVASC) 5 MG tablet, TAKE ONE TABLET BY MOUTH DAILY, Disp: 30 tablet, Rfl: 4  •  Bromelain 100 MG tablet, Take  by mouth., Disp: " ", Rfl:   •  cephalexin (KEFLEX) 500 MG capsule, Take 1 capsule by mouth 2 (Two) Times a Day., Disp: 14 capsule, Rfl: 0  •  FIBER SELECT GUMMIES PO, Take  by mouth., Disp: , Rfl:   •  fluticasone (FLONASE) 50 MCG/ACT nasal spray, 2 sprays into each nostril daily. Administer 2 sprays in each nostril for each dose., Disp: , Rfl:   •  GLUCOSAMINE-CHONDROITIN-MSM PO, Take 1,500 mg by mouth Daily., Disp: , Rfl:   •  hydrocortisone 2.5 % cream, Apply  topically to the appropriate area as directed 2 (Two) Times a Day., Disp: 28 g, Rfl: 0  •  Lifitegrast (XIIDRA) 5 % solution, Apply  to eye(s) as directed by provider., Disp: , Rfl:   •  Magnesium 400 MG capsule, Take  by mouth., Disp: , Rfl:   •  rosuvastatin (CRESTOR) 5 MG tablet, Take 1 tablet by mouth Daily., Disp: 90 tablet, Rfl: 3  •  silver sulfadiazine (SILVADENE, SSD) 1 % cream, Apply  topically to the appropriate area as directed Daily., Disp: 20 g, Rfl: 0  •  sucralfate (CARAFATE) 1 G tablet, TAKE 1 TABLET TWICE A DAY, Disp: 180 tablet, Rfl: 3  •  SYNTHROID 50 MCG tablet, TAKE 1 TABLET DAILY., Disp: 90 tablet, Rfl: 1  •  triamcinolone (KENALOG) 0.1 % ointment, Apply  topically to the appropriate area as directed 2 (Two) Times a Day. Apply to affected area, Disp: 1 tube, Rfl: 2  •  Turmeric 500 MG capsule, Take 406 mg by mouth Daily., Disp: , Rfl:         Objective     /74   Pulse 72   Temp 98.4 °F (36.9 °C)   Ht 165.1 cm (65\")   Wt 84.8 kg (187 lb)   SpO2 98%   BMI 31.12 kg/m²     Physical Exam  Vitals signs and nursing note reviewed.   Constitutional:       General: She is not in acute distress.     Appearance: She is well-developed.   Neck:      Vascular: Normal carotid pulses. No carotid bruit.   Cardiovascular:      Rate and Rhythm: Regular rhythm.      Pulses:           Carotid pulses are 2+ on the right side and 2+ on the left side.     Heart sounds: S1 normal and S2 normal. No murmur. No friction rub. No gallop.    Pulmonary:      Effort: " Pulmonary effort is normal.      Breath sounds: Normal breath sounds. No wheezing, rhonchi or rales.   Chest:      Chest wall: There is no dullness to percussion.   Musculoskeletal:      Comments: Left hand splinted   Skin:     General: Skin is warm and dry.      Comments: Very small red lesions noted.  One on the right upper arm on the lateral aspect of her right breast, 1 under her right breast.    Neurological:      Mental Status: She is alert and oriented to person, place, and time.           Assessment/Plan   Aydee was seen today for hypertension, hyperlipidemia, constipation, back pain and rash.    Diagnoses and all orders for this visit:    Essential hypertension  -     Comprehensive Metabolic Panel  -     Lipid Panel With / Chol / HDL Ratio  -     TSH Rfx On Abnormal To Free T4    Hyperlipidemia, unspecified hyperlipidemia type  -     Lipid Panel With / Chol / HDL Ratio    Hypothyroidism, adult  -     TSH Rfx On Abnormal To Free T4    Elevated fasting glucose  -     Hemoglobin A1c    Other orders  -     cephalexin (KEFLEX) 500 MG capsule; Take 1 capsule by mouth 2 (Two) Times a Day.      She will treat lower back discomfort conservatively.  She is no longer taking the hydrocodone.  Advised her she could try MiraLAX for the constipation.  She may also try senna.  Advised her we could try cephalexin for the small skin lesions she has been noticing.  If this does not help, dermatology consultation would be the next step.  She will follow-up with hand surgery in regard to the hand injury.

## 2020-09-26 LAB
ALBUMIN SERPL-MCNC: 4.9 G/DL (ref 3.5–5.2)
ALBUMIN/GLOB SERPL: 2.3 G/DL
ALP SERPL-CCNC: 151 U/L (ref 39–117)
ALT SERPL-CCNC: 23 U/L (ref 1–33)
AST SERPL-CCNC: 23 U/L (ref 1–32)
BILIRUB SERPL-MCNC: 0.3 MG/DL (ref 0–1.2)
BUN SERPL-MCNC: 13 MG/DL (ref 8–23)
BUN/CREAT SERPL: 11.8 (ref 7–25)
CALCIUM SERPL-MCNC: 9.9 MG/DL (ref 8.6–10.5)
CHLORIDE SERPL-SCNC: 102 MMOL/L (ref 98–107)
CHOLEST SERPL-MCNC: 131 MG/DL (ref 0–200)
CHOLEST/HDLC SERPL: 2.11 {RATIO}
CO2 SERPL-SCNC: 25.2 MMOL/L (ref 22–29)
CREAT SERPL-MCNC: 1.1 MG/DL (ref 0.57–1)
GLOBULIN SER CALC-MCNC: 2.1 GM/DL
GLUCOSE SERPL-MCNC: 95 MG/DL (ref 65–99)
HBA1C MFR BLD: 5.3 % (ref 4.8–5.6)
HDLC SERPL-MCNC: 62 MG/DL (ref 40–60)
LDLC SERPL CALC-MCNC: 46 MG/DL (ref 0–100)
POTASSIUM SERPL-SCNC: 4.2 MMOL/L (ref 3.5–5.2)
PROT SERPL-MCNC: 7 G/DL (ref 6–8.5)
SODIUM SERPL-SCNC: 138 MMOL/L (ref 136–145)
TRIGL SERPL-MCNC: 113 MG/DL (ref 0–150)
TSH SERPL DL<=0.005 MIU/L-ACNC: 2.57 UIU/ML (ref 0.27–4.2)
VLDLC SERPL CALC-MCNC: 22.6 MG/DL

## 2020-10-02 DIAGNOSIS — R74.8 ALKALINE PHOSPHATASE ELEVATION: Primary | ICD-10-CM

## 2020-10-12 ENCOUNTER — APPOINTMENT (OUTPATIENT)
Dept: ULTRASOUND IMAGING | Facility: HOSPITAL | Age: 70
End: 2020-10-12

## 2020-10-15 ENCOUNTER — HOSPITAL ENCOUNTER (OUTPATIENT)
Dept: ULTRASOUND IMAGING | Facility: HOSPITAL | Age: 70
Discharge: HOME | End: 2020-10-15
Admitting: INTERNAL MEDICINE

## 2020-10-15 DIAGNOSIS — R74.8 ALKALINE PHOSPHATASE ELEVATION: ICD-10-CM

## 2020-10-15 PROCEDURE — 76705 ECHO EXAM OF ABDOMEN: CPT

## 2020-12-14 RX ORDER — AMLODIPINE BESYLATE 5 MG/1
TABLET ORAL
Qty: 30 TABLET | Refills: 3 | Status: SHIPPED | OUTPATIENT
Start: 2020-12-14 | End: 2021-04-15

## 2020-12-21 RX ORDER — LEVOTHYROXINE SODIUM 50 MCG
TABLET ORAL
Qty: 90 TABLET | Refills: 1 | Status: SHIPPED | OUTPATIENT
Start: 2020-12-21 | End: 2021-06-14

## 2021-02-04 ENCOUNTER — OFFICE VISIT (OUTPATIENT)
Dept: INTERNAL MEDICINE | Facility: CLINIC | Age: 71
End: 2021-02-04

## 2021-02-04 VITALS
WEIGHT: 178 LBS | OXYGEN SATURATION: 99 % | TEMPERATURE: 97.6 F | BODY MASS INDEX: 29.66 KG/M2 | SYSTOLIC BLOOD PRESSURE: 126 MMHG | DIASTOLIC BLOOD PRESSURE: 62 MMHG | HEART RATE: 66 BPM | HEIGHT: 65 IN

## 2021-02-04 DIAGNOSIS — I10 ESSENTIAL HYPERTENSION: Primary | ICD-10-CM

## 2021-02-04 DIAGNOSIS — E78.5 HYPERLIPIDEMIA, UNSPECIFIED HYPERLIPIDEMIA TYPE: ICD-10-CM

## 2021-02-04 DIAGNOSIS — R73.01 ELEVATED FASTING GLUCOSE: ICD-10-CM

## 2021-02-04 DIAGNOSIS — E03.9 HYPOTHYROIDISM, ADULT: ICD-10-CM

## 2021-02-04 LAB
ALBUMIN SERPL-MCNC: 4.5 G/DL (ref 3.5–5.2)
ALBUMIN/GLOB SERPL: 2 G/DL
ALP SERPL-CCNC: 103 U/L (ref 39–117)
ALT SERPL-CCNC: 10 U/L (ref 1–33)
AST SERPL-CCNC: 18 U/L (ref 1–32)
BILIRUB SERPL-MCNC: 0.3 MG/DL (ref 0–1.2)
BUN SERPL-MCNC: 18 MG/DL (ref 8–23)
BUN/CREAT SERPL: 18.2 (ref 7–25)
CALCIUM SERPL-MCNC: 9.9 MG/DL (ref 8.6–10.5)
CHLORIDE SERPL-SCNC: 101 MMOL/L (ref 98–107)
CHOLEST SERPL-MCNC: 178 MG/DL (ref 0–200)
CHOLEST/HDLC SERPL: 2.83 {RATIO}
CO2 SERPL-SCNC: 26.1 MMOL/L (ref 22–29)
CREAT SERPL-MCNC: 0.99 MG/DL (ref 0.57–1)
GLOBULIN SER CALC-MCNC: 2.2 GM/DL
GLUCOSE SERPL-MCNC: 105 MG/DL (ref 65–99)
HBA1C MFR BLD: 5.1 % (ref 4.8–5.6)
HDLC SERPL-MCNC: 63 MG/DL (ref 40–60)
LDLC SERPL CALC-MCNC: 99 MG/DL (ref 0–100)
POTASSIUM SERPL-SCNC: 4.5 MMOL/L (ref 3.5–5.2)
PROT SERPL-MCNC: 6.7 G/DL (ref 6–8.5)
SODIUM SERPL-SCNC: 138 MMOL/L (ref 136–145)
TRIGL SERPL-MCNC: 88 MG/DL (ref 0–150)
TSH SERPL DL<=0.005 MIU/L-ACNC: 3.49 UIU/ML (ref 0.27–4.2)
VLDLC SERPL CALC-MCNC: 16 MG/DL (ref 5–40)

## 2021-02-04 PROCEDURE — 99214 OFFICE O/P EST MOD 30 MIN: CPT | Performed by: INTERNAL MEDICINE

## 2021-02-04 RX ORDER — FLUTICASONE PROPIONATE 50 MCG
2 SPRAY, SUSPENSION (ML) NASAL DAILY
Qty: 9.9 ML | Refills: 3 | Status: SHIPPED | OUTPATIENT
Start: 2021-02-04 | End: 2021-02-05 | Stop reason: SDUPTHER

## 2021-02-04 NOTE — PROGRESS NOTES
Chief Complaint   Patient presents with   • Hypothyroidism   • Hyperlipidemia   • Med Refill   • Hypertension       Subjective   Aydee Moore is a 70 y.o. female.     History of Present Illness        Hypothyroidism:  This is a chronic problem.  Current treatment: Levothyroxine.  By report, there is good compliance with treatment, good tolerance of treatment and good symptom control.   Lab Results   Component Value Date    TSH 2.570 09/25/2020      Hyperlipidemia:    This is a chronic problem.  Her most recent lipid panel showed:  Lab Results   Component Value Date    CHOL 219 (H) 08/27/2019    CHLPL 131 09/25/2020    TRIG 113 09/25/2020    HDL 62 (H) 09/25/2020    LDL 46 09/25/2020     Current treatment: statin  Prudent diet and regular exercise have also been recommended.  No management changes were made at her last appointment.      Hypertension: This is a chronic problem.  Current treatment includes CCB.  Prudent diet and regular exercise have also been recommended.  By report, there is good compliance with treatment and good tolerance of treatment.       Pre-diabetes/Elevated fasting glucose:  This is a chronic problem.  Patient has been advised to follow prudent diet, avoid sugar, exercise regularly.  She denies polyuria, polydipsia, vision change appetite change, unexplained weight loss.   Lab Results   Component Value Date    HGBA1C 5.30 09/25/2020              The following portions of the patient's history were reviewed and updated as appropriate: allergies, current medications, past family history, past medical history, past social history, past surgical history and problem list.    Review of Systems   HENT: Negative for nosebleeds.    Eyes: Negative for blurred vision and double vision.   Respiratory: Negative for cough and shortness of breath.    Cardiovascular: Negative for chest pain, palpitations and leg swelling.   Endocrine: Negative for polydipsia and polyuria.   Neurological: Positive for  "headache.           Current Outpatient Medications:   •  ALOE PO, Take  by mouth Daily., Disp: , Rfl:   •  amLODIPine (NORVASC) 5 MG tablet, TAKE ONE TABLET BY MOUTH DAILY, Disp: 30 tablet, Rfl: 3  •  Bromelain 100 MG tablet, Take  by mouth., Disp: , Rfl:   •  FIBER SELECT GUMMIES PO, Take  by mouth., Disp: , Rfl:   •  fluticasone (FLONASE) 50 MCG/ACT nasal spray, 2 sprays into the nostril(s) as directed by provider Daily. Administer 2 sprays in each nostril for each dose., Disp: 9.9 mL, Rfl: 3  •  Magnesium 400 MG capsule, Take  by mouth., Disp: , Rfl:   •  rosuvastatin (CRESTOR) 5 MG tablet, Take 1 tablet by mouth Daily., Disp: 90 tablet, Rfl: 3  •  sucralfate (CARAFATE) 1 G tablet, TAKE 1 TABLET TWICE A DAY, Disp: 180 tablet, Rfl: 3  •  Synthroid 50 MCG tablet, TAKE 1 TABLET DAILY, Disp: 90 tablet, Rfl: 1  •  triamcinolone (KENALOG) 0.1 % ointment, Apply  topically to the appropriate area as directed 2 (Two) Times a Day. Apply to affected area, Disp: 1 tube, Rfl: 2        Objective     /62   Pulse 66   Temp 97.6 °F (36.4 °C) Comment: axillary  Ht 165.1 cm (65\")   Wt 80.7 kg (178 lb)   SpO2 99%   BMI 29.62 kg/m²     Physical Exam  Vitals signs and nursing note reviewed.   Constitutional:       General: She is not in acute distress.     Appearance: She is well-developed.   Neck:      Vascular: Normal carotid pulses. No carotid bruit.   Cardiovascular:      Rate and Rhythm: Regular rhythm.      Pulses:           Carotid pulses are 2+ on the right side and 2+ on the left side.     Heart sounds: S1 normal and S2 normal. No murmur. No friction rub. No gallop.    Pulmonary:      Effort: Pulmonary effort is normal.      Breath sounds: Normal breath sounds. No wheezing, rhonchi or rales.   Musculoskeletal:      Right lower leg: No edema.      Left lower leg: No edema.   Skin:     General: Skin is warm and dry.   Neurological:      Mental Status: She is alert and oriented to person, place, and time. "           Assessment/Plan   Diagnoses and all orders for this visit:    1. Essential hypertension (Primary)  -     Comprehensive Metabolic Panel    2. Hyperlipidemia, unspecified hyperlipidemia type  -     Lipid Panel With / Chol / HDL Ratio    3. Elevated fasting glucose  -     Hemoglobin A1c    4. Hypothyroidism, adult  -     TSH Rfx On Abnormal To Free T4    Other orders  -     fluticasone (FLONASE) 50 MCG/ACT nasal spray; 2 sprays into the nostril(s) as directed by provider Daily. Administer 2 sprays in each nostril for each dose.  Dispense: 9.9 mL; Refill: 3

## 2021-02-05 RX ORDER — FLUTICASONE PROPIONATE 50 MCG
2 SPRAY, SUSPENSION (ML) NASAL DAILY
Qty: 16 G | Refills: 3 | Status: SHIPPED | OUTPATIENT
Start: 2021-02-05 | End: 2022-06-16 | Stop reason: SDUPTHER

## 2021-03-19 ENCOUNTER — BULK ORDERING (OUTPATIENT)
Dept: CASE MANAGEMENT | Facility: OTHER | Age: 71
End: 2021-03-19

## 2021-03-19 DIAGNOSIS — Z23 IMMUNIZATION DUE: ICD-10-CM

## 2021-04-05 DIAGNOSIS — Z76.89 SLEEP CONCERN: Primary | ICD-10-CM

## 2021-04-15 RX ORDER — AMLODIPINE BESYLATE 5 MG/1
TABLET ORAL
Qty: 30 TABLET | Refills: 2 | Status: SHIPPED | OUTPATIENT
Start: 2021-04-15 | End: 2021-07-16

## 2021-06-08 VITALS
RESPIRATION RATE: 16 BRPM | RESPIRATION RATE: 8 BRPM | TEMPERATURE: 97.1 F | RESPIRATION RATE: 10 BRPM | DIASTOLIC BLOOD PRESSURE: 57 MMHG | RESPIRATION RATE: 14 BRPM | OXYGEN SATURATION: 96 % | HEART RATE: 58 BPM | HEART RATE: 54 BPM | SYSTOLIC BLOOD PRESSURE: 114 MMHG | RESPIRATION RATE: 18 BRPM | SYSTOLIC BLOOD PRESSURE: 119 MMHG | DIASTOLIC BLOOD PRESSURE: 68 MMHG | SYSTOLIC BLOOD PRESSURE: 120 MMHG | HEART RATE: 57 BPM | WEIGHT: 175 LBS | HEART RATE: 59 BPM | DIASTOLIC BLOOD PRESSURE: 66 MMHG | RESPIRATION RATE: 19 BRPM | SYSTOLIC BLOOD PRESSURE: 142 MMHG | DIASTOLIC BLOOD PRESSURE: 79 MMHG | DIASTOLIC BLOOD PRESSURE: 91 MMHG | SYSTOLIC BLOOD PRESSURE: 124 MMHG | HEART RATE: 60 BPM | HEART RATE: 53 BPM | DIASTOLIC BLOOD PRESSURE: 75 MMHG | OXYGEN SATURATION: 99 % | HEART RATE: 61 BPM | SYSTOLIC BLOOD PRESSURE: 116 MMHG | OXYGEN SATURATION: 98 % | DIASTOLIC BLOOD PRESSURE: 60 MMHG | HEART RATE: 55 BPM | TEMPERATURE: 98 F | DIASTOLIC BLOOD PRESSURE: 65 MMHG | HEIGHT: 65 IN | SYSTOLIC BLOOD PRESSURE: 117 MMHG | HEART RATE: 50 BPM | RESPIRATION RATE: 22 BRPM | OXYGEN SATURATION: 100 % | DIASTOLIC BLOOD PRESSURE: 62 MMHG | SYSTOLIC BLOOD PRESSURE: 131 MMHG

## 2021-06-09 ENCOUNTER — OFFICE (AMBULATORY)
Dept: URBAN - METROPOLITAN AREA PATHOLOGY 4 | Facility: PATHOLOGY | Age: 71
End: 2021-06-09

## 2021-06-09 ENCOUNTER — AMBULATORY SURGICAL CENTER (AMBULATORY)
Dept: URBAN - METROPOLITAN AREA SURGERY 17 | Facility: SURGERY | Age: 71
End: 2021-06-09

## 2021-06-09 DIAGNOSIS — D12.3 BENIGN NEOPLASM OF TRANSVERSE COLON: ICD-10-CM

## 2021-06-09 DIAGNOSIS — Z86.010 PERSONAL HISTORY OF COLONIC POLYPS: ICD-10-CM

## 2021-06-09 PROBLEM — K63.5 POLYP OF COLON: Status: ACTIVE | Noted: 2021-06-09

## 2021-06-09 LAB
GI HISTOLOGY: A. UNSPECIFIED: (no result)
GI HISTOLOGY: B. UNSPECIFIED: (no result)
GI HISTOLOGY: PDF REPORT: (no result)

## 2021-06-09 PROCEDURE — 45385 COLONOSCOPY W/LESION REMOVAL: CPT | Mod: PT | Performed by: INTERNAL MEDICINE

## 2021-06-09 PROCEDURE — 88305 TISSUE EXAM BY PATHOLOGIST: CPT | Performed by: INTERNAL MEDICINE

## 2021-06-09 NOTE — SERVICEHPINOTES
71-year-old without GI complaints.  Family history is negative for polyps or colon cancer.  She has a history of polyps and presents for a follow-up colonoscopy.

## 2021-06-14 RX ORDER — LEVOTHYROXINE SODIUM 50 MCG
TABLET ORAL
Qty: 90 TABLET | Refills: 1 | Status: SHIPPED | OUTPATIENT
Start: 2021-06-14 | End: 2021-11-08

## 2021-06-16 ENCOUNTER — OFFICE VISIT (OUTPATIENT)
Dept: INTERNAL MEDICINE | Facility: CLINIC | Age: 71
End: 2021-06-16

## 2021-06-16 VITALS
BODY MASS INDEX: 29.06 KG/M2 | WEIGHT: 174.4 LBS | TEMPERATURE: 98 F | HEIGHT: 65 IN | RESPIRATION RATE: 16 BRPM | DIASTOLIC BLOOD PRESSURE: 62 MMHG | SYSTOLIC BLOOD PRESSURE: 112 MMHG

## 2021-06-16 DIAGNOSIS — R10.32 LLQ ABDOMINAL PAIN: Primary | ICD-10-CM

## 2021-06-16 LAB
ALBUMIN SERPL-MCNC: 4.8 G/DL (ref 3.5–5.2)
ALBUMIN/GLOB SERPL: 2.1 G/DL
ALP SERPL-CCNC: 96 U/L (ref 39–117)
ALT SERPL-CCNC: 12 U/L (ref 1–33)
AST SERPL-CCNC: 16 U/L (ref 1–32)
BILIRUB SERPL-MCNC: 0.4 MG/DL (ref 0–1.2)
BUN SERPL-MCNC: 25 MG/DL (ref 8–23)
BUN/CREAT SERPL: 23.8 (ref 7–25)
CALCIUM SERPL-MCNC: 10.2 MG/DL (ref 8.6–10.5)
CHLORIDE SERPL-SCNC: 104 MMOL/L (ref 98–107)
CO2 SERPL-SCNC: 25 MMOL/L (ref 22–29)
CREAT SERPL-MCNC: 1.05 MG/DL (ref 0.57–1)
ERYTHROCYTE [DISTWIDTH] IN BLOOD BY AUTOMATED COUNT: 13 % (ref 12.3–15.4)
GLOBULIN SER CALC-MCNC: 2.3 GM/DL
GLUCOSE SERPL-MCNC: 106 MG/DL (ref 65–99)
HCT VFR BLD AUTO: 42.4 % (ref 34–46.6)
HGB BLD-MCNC: 13.9 G/DL (ref 12–15.9)
MCH RBC QN AUTO: 30 PG (ref 26.6–33)
MCHC RBC AUTO-ENTMCNC: 32.8 G/DL (ref 31.5–35.7)
MCV RBC AUTO: 91.4 FL (ref 79–97)
PLATELET # BLD AUTO: 225 10*3/MM3 (ref 140–450)
POTASSIUM SERPL-SCNC: 4.7 MMOL/L (ref 3.5–5.2)
PROT SERPL-MCNC: 7.1 G/DL (ref 6–8.5)
RBC # BLD AUTO: 4.64 10*6/MM3 (ref 3.77–5.28)
SODIUM SERPL-SCNC: 144 MMOL/L (ref 136–145)
WBC # BLD AUTO: 5.55 10*3/MM3 (ref 3.4–10.8)

## 2021-06-16 PROCEDURE — 99214 OFFICE O/P EST MOD 30 MIN: CPT | Performed by: INTERNAL MEDICINE

## 2021-06-16 RX ORDER — SODIUM PICOSULFATE, MAGNESIUM OXIDE, AND ANHYDROUS CITRIC ACID 10; 3.5; 12 MG/160ML; G/160ML; G/160ML
LIQUID ORAL
COMMUNITY
Start: 2021-06-01 | End: 2021-07-02

## 2021-06-16 NOTE — PROGRESS NOTES
Chief Complaint   Patient presents with   • Abdominal Pain     Pt states that her stomach hurts when sitting        Subjective   Aydee Moore is a 71 y.o. female.     History of Present Illness     On June 1, she did some heavy lifting. Later in the day, she noticed left lower abdominal pain.  Initially, she had twinges of pain.  She has continued to have pain and it seems to be getting worse.  She had a colonoscopy during this time.  That was normal.  The pain is cramping.  She has noticed that it has been worse with eating and drinking.  Also when she is seated.  She is more comfortable standing and walking.  She has not had constipation, diarrhea, fevers.  She has not noticed any abdominal swelling.    The following portions of the patient's history were reviewed and updated as appropriate: allergies, current medications, past family history, past medical history, past social history, past surgical history and problem list.      Current Outpatient Medications:   •  ALOE PO, Take  by mouth Daily., Disp: , Rfl:   •  amLODIPine (NORVASC) 5 MG tablet, TAKE ONE TABLET BY MOUTH DAILY, Disp: 30 tablet, Rfl: 2  •  Bromelain 100 MG tablet, Take  by mouth., Disp: , Rfl:   •  Clenpiq 10-3.5-12 MG-GM -GM/160ML solution, , Disp: , Rfl:   •  FIBER SELECT GUMMIES PO, Take  by mouth., Disp: , Rfl:   •  fluticasone (FLONASE) 50 MCG/ACT nasal spray, 2 sprays into the nostril(s) as directed by provider Daily. Administer 2 sprays in each nostril for each dose., Disp: 16 g, Rfl: 3  •  Magnesium 400 MG capsule, Take  by mouth., Disp: , Rfl:   •  rosuvastatin (CRESTOR) 5 MG tablet, Take 1 tablet by mouth Daily., Disp: 90 tablet, Rfl: 3  •  sucralfate (CARAFATE) 1 G tablet, TAKE 1 TABLET TWICE A DAY, Disp: 180 tablet, Rfl: 3  •  Synthroid 50 MCG tablet, TAKE 1 TABLET DAILY, Disp: 90 tablet, Rfl: 1  •  triamcinolone (KENALOG) 0.1 % ointment, Apply  topically to the appropriate area as directed 2 (Two) Times a Day. Apply to affected  "area, Disp: 1 tube, Rfl: 2          Review of Systems   Constitutional: Negative for appetite change and fever.   Respiratory: Negative for cough and shortness of breath.    Cardiovascular: Negative for chest pain and palpitations.           Objective     /62 (BP Location: Left arm, Patient Position: Standing, Cuff Size: Adult)   Temp 98 °F (36.7 °C) (Temporal)   Resp 16   Ht 165.1 cm (65\")   Wt 79.1 kg (174 lb 6.4 oz)   BMI 29.02 kg/m²       Physical Exam  Vitals and nursing note reviewed.   Constitutional:       Appearance: Normal appearance.   Eyes:      General: No scleral icterus.  Cardiovascular:      Rate and Rhythm: Normal rate and regular rhythm.      Heart sounds: No murmur heard.   No friction rub. No gallop.    Pulmonary:      Effort: Pulmonary effort is normal. No respiratory distress.      Breath sounds: Normal breath sounds. No wheezing, rhonchi or rales.   Abdominal:      General: Abdomen is flat.      Palpations: Abdomen is soft.      Tenderness: There is abdominal tenderness in the left lower quadrant. There is no guarding or rebound.      Hernia: No hernia is present.   Musculoskeletal:      Cervical back: Normal range of motion.   Lymphadenopathy:      Cervical: No cervical adenopathy.   Neurological:      Mental Status: She is alert and oriented to person, place, and time.   Psychiatric:         Speech: Speech normal.           Assessment/Plan   Diagnoses and all orders for this visit:    1. LLQ abdominal pain (Primary)  -     CT Abdomen Pelvis With Contrast; Future  -     Comprehensive Metabolic Panel  -     CBC (No Diff)      Will further evaluate abdominal pain as above.           "

## 2021-06-25 ENCOUNTER — HOSPITAL ENCOUNTER (OUTPATIENT)
Dept: CT IMAGING | Facility: HOSPITAL | Age: 71
Discharge: HOME | End: 2021-06-25
Admitting: INTERNAL MEDICINE

## 2021-06-25 DIAGNOSIS — R10.32 LLQ ABDOMINAL PAIN: ICD-10-CM

## 2021-06-25 PROCEDURE — 0 DIATRIZOATE MEGLUMINE & SODIUM PER 1 ML: Performed by: INTERNAL MEDICINE

## 2021-06-25 PROCEDURE — 25010000002 IOPAMIDOL 61 % SOLUTION: Performed by: INTERNAL MEDICINE

## 2021-06-25 PROCEDURE — 74177 CT ABD & PELVIS W/CONTRAST: CPT

## 2021-06-25 RX ADMIN — DIATRIZOATE MEGLUMINE AND DIATRIZOATE SODIUM 30 ML: 660; 100 LIQUID ORAL; RECTAL at 12:35

## 2021-06-25 RX ADMIN — IOPAMIDOL 85 ML: 612 INJECTION, SOLUTION INTRAVENOUS at 13:37

## 2021-07-02 ENCOUNTER — OFFICE VISIT (OUTPATIENT)
Dept: NEUROLOGY | Facility: CLINIC | Age: 71
End: 2021-07-02

## 2021-07-02 VITALS
HEART RATE: 68 BPM | BODY MASS INDEX: 29.32 KG/M2 | WEIGHT: 176 LBS | OXYGEN SATURATION: 98 % | SYSTOLIC BLOOD PRESSURE: 118 MMHG | DIASTOLIC BLOOD PRESSURE: 80 MMHG | HEIGHT: 65 IN

## 2021-07-02 DIAGNOSIS — G47.33 OBSTRUCTIVE SLEEP APNEA: Primary | ICD-10-CM

## 2021-07-02 DIAGNOSIS — G47.59 OTHER PARASOMNIA: ICD-10-CM

## 2021-07-02 PROCEDURE — 99214 OFFICE O/P EST MOD 30 MIN: CPT | Performed by: PSYCHIATRY & NEUROLOGY

## 2021-07-02 NOTE — PROGRESS NOTES
Subjective:     Patient ID: Aydee Moore is a 71 y.o. female.    Ms. Moore a 62-year-old right-handed female with a history of hypertension, hypothyroidism, allergies, hyperlipidemia, headache, cataracts who presents to the neurology clinic today as a new patient for sleep evaluation.  The patient was last seen in the sleep clinic in 2019.  She is accompanied by her  Nixon.  I reviewed the patient's records.  I reviewed her last sleep clinic note from 2019.  Reports that the patient had a history of sleep apnea and was on CPAP.  Her [place] was a 7 out of 24.  Reports that she was diagnosed with sleep apnea 1995 but was having recurrence of sleep symptoms.  She had a sleep study in 2019.  Her weight at that time was 215 pounds.  176 today.  It did not show sleep apnea with an AHI less than 1.  The patient drinks 2 mugs of coffee a day.  She frequently falls asleep watching TV.  She goes to bed around 10 PM it takes her hours to fall asleep.  She gets up at 7 AM.  She naps daily for an hour.  She does report gasping and choking and witnessed pauses in her breathing.  Her [place] sleepiness score today is an 8.  She also brings in a sleep study from 2005.  Her weight at that time was 214 pounds.  It reports that she had obstructive sleep apnea with an RDI of 21 that was best treated on CPAP 9.  The patient also feels like she may have had an EEG when she was a teenager.    In March, had an episode where she couldn't move and couldn't breath.  Had never happened before.  Not since.  Not sure how long it lasted.  Afterwards, felt terrible and drained.  Not sure what brought it on.  No med changes.  Feels like she stops breathing in her sleep.  This has been going on since she was a teenager.  Happens every night.  Happens once a night.  It is as she is falling asleep and she will sit up.  And then will start breathing again.  She reports that she continued to have the symptoms when she was on CPAP in the  past.  Some snoring.  Is gasping.  Does wake up with headaches.  Has nocturia 1-2/night.  Tends to be a light sleeper.  HOB is elevated and that has treated her nocturnal GERD.       The following portions of the patient's history were reviewed and updated as appropriate: allergies, current medications, past family history, past medical history, past social history, past surgical history and problem list.    Review of Systems     Objective:    Neurologic Exam    Physical Exam   Constitutional:  Vital signs reviewed.  No apparent distress.  Well groomed.  Eyes:  No injection, no icterus.    Ears, Nose, Mouth, Throat: Neck circumference is 37 cm  Respiratory:  Normal effort.  Clear to auscultation bilaterally.  Cardiovascular:  Regular rate and rhythm.  No murmurs.  No carotid bruits.   Musculoskeletal: Gait steady.  Muscle tone and bulk normal.  Strength is 5/5 in the bilateral upper and lower extremities proximally and distally.  Skin:  No rashes.  Warm, dry, and intact.  Psychiatric:  Good mood.  Normal affect.  Neurologic:  The patient is alert and oriented. Attention/concentration is within normal limits.  Speech is fluent without dysarthria.  The patient is able to follow complex commands without difficulty. Fund of knowledge normal.  Pupils equally round and reactive to light. Extraocular movements intact.  Facial sensation intact.    SCM and trapezius are 5/5 bilaterally.       Assessment/Plan:    The patient is a 62-year-old right-handed female with history of hypertension, hypothyroidism, allergies, hyperlipidemia, headaches, and cataracts who presents today for sleep evaluation.    1.  Sleep disturbance-the patient reports that as she falls asleep she feels like she is not breathing and will have to sit up to resume her breathing.  This has been happening since she was a teenager.  She reports that she had sleep apnea in the past but the symptoms continued when she was on CPAP.  She had a sleep study 2  years ago that did not show sleep apnea and her weight is down since that study.  She also had an episode back in March that sounds like sleep paralysis.  I do not feel that her symptoms are likely related to sleep apnea.  Other things in the differential include possible seizures.  We can repeat an in lab sleep study with a full EEG montage to evaluate for possible seizure-like activity.  If this is negative, then these could possibly be nocturnal panic attacks.  I will see the patient back after sleep study is completed.       Problems Addressed this Visit     None      Visit Diagnoses     Obstructive sleep apnea    -  Primary    Relevant Orders    Polysomnography 4 or More Parameters    Other parasomnia        Relevant Orders    Polysomnography 4 or More Parameters      Diagnoses       Codes Comments    Obstructive sleep apnea    -  Primary ICD-10-CM: G47.33  ICD-9-CM: 327.23     Other parasomnia     ICD-10-CM: G47.59  ICD-9-CM: 327.49

## 2021-07-02 NOTE — PATIENT INSTRUCTIONS
• At night, only use the bed and bedroom for sleep and sex only. Do not read, watch TV, eat, or worry in bed.   • Lie down only when you are sleepy.   • If you are unable to fall asleep, get up and go to another room. Avoid stimulating activities if you do get up.   • No clocks (if you tend to look at the clock throughout the night) or TV (or other electronic screens) in the bedroom.   • Avoid screens (TV, computer, tablet, cell phone) the hour prior to bedtime and during your sleep period.   • Establish a regular bedtime routine and a regular sleep/wake schedule. Keep this schedule 7 days a week.   • Do not eat or drink close to bedtime.   • Make sure your bedroom is dark, cool, and comfortable.   • If you need background noise, use a fan or a white noise machine.   • Avoid caffeine (regular coffee, decaf coffee, tea, sodas, chocolate, energy drinks).   • Avoid alcohol and nicotine close to bedtime.   • Exercise during the day, but not within 3 hours of bedtime.   • Avoid naps.   • Check if any of your night time medications may cause sleep problems.   • If you have heart burn or indigestion at night: avoid eating close to bedtime; elevated your head of bed; avoid spicy foods, tomatoes, citrus, alcohol, caffeine, and mint at night; take your antacid at night; sleep on your left side.   • If you have nasal stuffiness or congestion: consider taking an over the counter allergy medicine such as zyrtec, claritin, or allegra at night as well as a nasal spray such as Flonase or Nasacort.   • If you gasp for air at night, stop breathing in your sleep, have night sweats, snore, unrefreshing sleep, feel tired during the day, have morning headaches or dry mouth in the morning, you may be at risk for having problems with your breathing at night, likely sleep apnea. You may want to talk to your doctor about these symptoms.   • Consider trying melatonin at night. This can be purchased over the counter without a prescription.  I  recommend doses between 0.5-3 mg.  Try [employer], [employer], Life Extension (on Amazon) or REM Fresh brands.    • Consider the [employer]  meri or [employer]  for your smart device.

## 2021-07-14 DIAGNOSIS — I10 ESSENTIAL HYPERTENSION: Primary | ICD-10-CM

## 2021-07-16 RX ORDER — AMLODIPINE BESYLATE 5 MG/1
TABLET ORAL
Qty: 30 TABLET | Refills: 0 | Status: SHIPPED | OUTPATIENT
Start: 2021-07-16 | End: 2021-08-09

## 2021-07-16 NOTE — TELEPHONE ENCOUNTER
PATIENT CALLED TO CHECK ON STATUS OF REFILLS OF AMLODIPINE, WILL BE OUT OF MEDICATION OVER THE WEEKEND

## 2021-08-03 ENCOUNTER — TELEPHONE (OUTPATIENT)
Dept: NEUROLOGY | Facility: CLINIC | Age: 71
End: 2021-08-03

## 2021-08-03 DIAGNOSIS — G47.59 OTHER PARASOMNIA: ICD-10-CM

## 2021-08-03 DIAGNOSIS — G47.33 OBSTRUCTIVE SLEEP APNEA: Primary | ICD-10-CM

## 2021-08-03 NOTE — TELEPHONE ENCOUNTER
Please review and advise. Patient called in stating that she canceled her in lab sleep study, and is requesting other testing options. After reviewing your note it appears that an in lab one ws recommended due to her history.      Thank you

## 2021-08-03 NOTE — TELEPHONE ENCOUNTER
Provider:     Caller: PATIENT    Relationship to Patient: SELF    Pharmacy: NA    Phone Number: [phone]    Reason for Call: PATIENT STATES SHE CANCELLED POLYSOMNOGRAPH TESTING AND WANTED TO SPEAK WITH YI OR MA ABOUT WHAT OTHER OPTIONS THERE ARE OTHER THAN OVERNIGHT AT HOSPITAL. PLEASE ADVISE. PATIENT ALSO STATES SHE WAS RECENTLY EXPOSED TO SOMEONE WITH COVID.     When was the patient last seen: 7-2-21

## 2021-08-03 NOTE — TELEPHONE ENCOUNTER
The alternative would be two separate tests.  A home sleep study in addition to a 24 hour in home video EEG.

## 2021-08-04 NOTE — TELEPHONE ENCOUNTER
Patient is agreeable to doing the two separate tests at home. She asked that the orders be placed. However, she would like to put it off for about 6 weeks or so as she has been recently exposed to covid.     Please review.  Thank you

## 2021-08-08 DIAGNOSIS — I10 ESSENTIAL HYPERTENSION: ICD-10-CM

## 2021-08-09 RX ORDER — AMLODIPINE BESYLATE 5 MG/1
TABLET ORAL
Qty: 30 TABLET | Refills: 0 | Status: SHIPPED | OUTPATIENT
Start: 2021-08-09 | End: 2021-08-24 | Stop reason: SDUPTHER

## 2021-08-09 NOTE — TELEPHONE ENCOUNTER
[facility] [address] [place] [address]  Phone:  [phone] - Fax:  [phone]   [facility] Progress Report and Disability Certification  Date of Service: 5/9/2021   No Show:  No  Date / Time of Next Visit: 5/19/2021   Claim Information   Patient Name: Jenni Garcia  Claim Number:     Employer: [employer]  Date of Injury: 5/3/2021     Insurer / TPA: [employer]  ID / SSN:     Occupation:   Diagnosis: The encounter diagnosis was Sprain of left knee, unspecified ligament, subsequent encounter.    Medical Information   Related to Industrial Injury? Yes    Subjective Complaints:  Date of injury 5/3/2029: Patient presents to urgent care for her second visit after an anterior knee strain.  Her work restrictions have not been able to be accommodated so she has been resting at home.  She notes her pain has significantly improved.  She has not noticed any knee instability.  She has been wearing the brace well ambulatory without any difficulty.  She feels ready to go back to part-time, she is slightly concerned about working a full 40+ hour work week.  She has not noticed any new weakness or numbness.  She has no second job and no pre-existing contributory conditions.   Objective Findings: Alert nontoxic female no acute distress.  Knee brace in place on the left knee, when it is removed there is nontenderness, no edema, no deformity.  Ambulatory with a steady station and gait, no antalgia.  Neurovascularly intact with full strength of the extremity.  No ligamentous laxity   Pre-Existing Condition(s):     Assessment:   Condition Improved    Status: Additional Care Required  Permanent Disability:No    Plan:      Diagnostics:      Comments:       Disability Information   Status: Released to Restricted Duty    From:  5/9/2021  Through: 5/19/2021 Restrictions are: Temporary   Physical Restrictions   Sitting:  < or = to 6 hrs/day  Last OV 6/16/2021  Next OV 8/19/2021   Standing:  < or = to 6 hrs/day Stooping:    Bending:      Squattin hrs/day Walking:  < or = to 6 hrs/day Climbing:    Pushing:      Pulling:    Other:    Reaching Above Shoulder (L):   Reaching Above Shoulder (R):       Reaching Below Shoulder (L):    Reaching Below Shoulder (R):      Not to exceed Weight Limits   Carrying(hrs):   Weight Limit(lb):   Lifting(hrs):   Weight  Limit(lb):     Comments: Patient must wear knee brace at work.  I recommended patient have half of full day of work, or be reintroduced to work hours gradually.  She should continue her current home regimen with icing and elevating as much as possible.  She can continue over-the-counter anti-inflammatories.  Due to a vacation we will have her follow-up on the , she is to return to clinic sooner if she has any issues including being able to tolerate her limited work hours.  No squatting as above or activities where the patient's knee is  flexed and weighted.    Repetitive Actions   Hands: i.e. Fine Manipulations from Grasping:     Feet: i.e. Operating Foot Controls:     Driving / Operate Machinery:     Provider Name:   Nic Francisco P.A.-C. Physician Signature:  Physician Name:     Clinic Name / Location: [address] [address] Clinic Phone Number: Dept: [phone]   Appointment Time: 10:00 Am Visit Start Time: 10:19 AM   Check-In Time:  10:14 Am Visit Discharge Time:  11:00 AM   Original-Treating Physician or Chiropractor    Page 2-Insurer/TPA    Page 3-Employer    Page 4-Employee

## 2021-08-16 ENCOUNTER — APPOINTMENT (OUTPATIENT)
Dept: SLEEP MEDICINE | Facility: HOSPITAL | Age: 71
End: 2021-08-16

## 2021-08-19 ENCOUNTER — OFFICE VISIT (OUTPATIENT)
Dept: INTERNAL MEDICINE | Facility: CLINIC | Age: 71
End: 2021-08-19

## 2021-08-19 VITALS
HEART RATE: 67 BPM | DIASTOLIC BLOOD PRESSURE: 62 MMHG | BODY MASS INDEX: 28.82 KG/M2 | HEIGHT: 65 IN | OXYGEN SATURATION: 99 % | TEMPERATURE: 99.2 F | WEIGHT: 173 LBS | SYSTOLIC BLOOD PRESSURE: 126 MMHG

## 2021-08-19 DIAGNOSIS — E78.5 HYPERLIPIDEMIA, UNSPECIFIED HYPERLIPIDEMIA TYPE: Chronic | ICD-10-CM

## 2021-08-19 DIAGNOSIS — Z00.00 MEDICARE ANNUAL WELLNESS VISIT, SUBSEQUENT: Primary | ICD-10-CM

## 2021-08-19 DIAGNOSIS — I10 ESSENTIAL HYPERTENSION: Chronic | ICD-10-CM

## 2021-08-19 DIAGNOSIS — R73.01 ELEVATED FASTING GLUCOSE: Chronic | ICD-10-CM

## 2021-08-19 DIAGNOSIS — E03.9 HYPOTHYROIDISM, ADULT: Chronic | ICD-10-CM

## 2021-08-19 LAB
ALBUMIN SERPL-MCNC: 4.6 G/DL (ref 3.5–5.2)
ALBUMIN/GLOB SERPL: 2 G/DL
ALP SERPL-CCNC: 100 U/L (ref 39–117)
ALT SERPL-CCNC: 19 U/L (ref 1–33)
AST SERPL-CCNC: 29 U/L (ref 1–32)
BILIRUB SERPL-MCNC: 0.4 MG/DL (ref 0–1.2)
BUN SERPL-MCNC: 26 MG/DL (ref 8–23)
BUN/CREAT SERPL: 26.8 (ref 7–25)
CALCIUM SERPL-MCNC: 10 MG/DL (ref 8.6–10.5)
CHLORIDE SERPL-SCNC: 101 MMOL/L (ref 98–107)
CHOLEST SERPL-MCNC: 170 MG/DL (ref 0–200)
CHOLEST/HDLC SERPL: 2.7 {RATIO}
CO2 SERPL-SCNC: 26.8 MMOL/L (ref 22–29)
CREAT SERPL-MCNC: 0.97 MG/DL (ref 0.57–1)
GLOBULIN SER CALC-MCNC: 2.3 GM/DL
GLUCOSE SERPL-MCNC: 95 MG/DL (ref 65–99)
HBA1C MFR BLD: 4.9 % (ref 4.8–5.6)
HDLC SERPL-MCNC: 63 MG/DL (ref 40–60)
LDLC SERPL CALC-MCNC: 93 MG/DL (ref 0–100)
POTASSIUM SERPL-SCNC: 4.6 MMOL/L (ref 3.5–5.2)
PROT SERPL-MCNC: 6.9 G/DL (ref 6–8.5)
SODIUM SERPL-SCNC: 138 MMOL/L (ref 136–145)
TRIGL SERPL-MCNC: 75 MG/DL (ref 0–150)
TSH SERPL DL<=0.005 MIU/L-ACNC: 2.56 UIU/ML (ref 0.27–4.2)
VLDLC SERPL CALC-MCNC: 14 MG/DL (ref 5–40)

## 2021-08-19 PROCEDURE — G0439 PPPS, SUBSEQ VISIT: HCPCS | Performed by: INTERNAL MEDICINE

## 2021-08-19 NOTE — PATIENT INSTRUCTIONS
Medicare Wellness  Personal Prevention Plan of Service     Date of Office Visit:  2021  Encounter Provider:  Oksana Serrano MD  Place of Service:  [facility]  Patient Name: Aydee Moore  :  1950    As part of the Medicare Wellness portion of your visit today, we are providing you with this personalized preventive plan of services (PPPS). This plan is based upon recommendations of the [place] Preventive Services Task Force (USPSTF) and the Advisory Committee on Immunization Practices (ACIP).    This lists the preventive care services that should be considered, and provides dates of when you are due. Items listed as completed are up-to-date and do not require any further intervention.    Health Maintenance   Topic Date Due   • ZOSTER VACCINE (2 of 2) 2019   • ANNUAL WELLNESS VISIT  2021   • DXA SCAN  2021   • INFLUENZA VACCINE  10/01/2021   • MAMMOGRAM  2022   • LIPID PANEL  2022   • TDAP/TD VACCINES (2 - Td or Tdap) 2029   • COLORECTAL CANCER SCREENING  2031   • HEPATITIS C SCREENING  Completed   • COVID-19 Vaccine  Completed   • Pneumococcal Vaccine 65+  Completed   • PAP SMEAR  Discontinued       Orders Placed This Encounter   Procedures   • Comprehensive Metabolic Panel     Order Specific Question:   Release to patient     Answer:   Immediate   • Lipid Panel With / Chol / HDL Ratio     Order Specific Question:   Release to patient     Answer:   Immediate   • Hemoglobin A1c     Order Specific Question:   Release to patient     Answer:   Immediate   • TSH Rfx On Abnormal To Free T4     Order Specific Question:   Release to patient     Answer:   Immediate       Return in about 6 months (around 2022) for Follow up, Fasting.    Check with your pharmacy about the new zoster (Shingles) vaccine.    Please send us a copy of your living will/advanced directive       Fall Prevention in the Home, Adult  Falls can cause  injuries. They can happen to people of all ages. There are many things you can do to make your home safe and to help prevent falls. Ask for help when making these changes, if needed.  What actions can I take to prevent falls?  General Instructions  · Use good lighting in all rooms. Replace any light bulbs that burn out.  · Turn on the lights when you go into a dark area. Use night-lights.  · Keep items that you use often in easy-to-reach places. Lower the shelves around your home if necessary.  · Set up your furniture so you have a clear path. Avoid moving your furniture around.  · Do not have throw rugs and other things on the floor that can make you trip.  · Avoid walking on wet floors.  · If any of your floors are uneven, fix them.  · Add color or contrast paint or tape to clearly alma rosa and help you see:  ? Any grab bars or handrails.  ? First and last steps of stairways.  ? Where the edge of each step is.  · If you use a stepladder:  ? Make sure that it is fully opened. Do not climb a closed stepladder.  ? Make sure that both sides of the stepladder are locked into place.  ? Ask someone to hold the stepladder for you while you use it.  · If there are any pets around you, be aware of where they are.  What can I do in the bathroom?         · Keep the floor dry. Clean up any water that spills onto the floor as soon as it happens.  · Remove soap buildup in the tub or shower regularly.  · Use non-skid mats or decals on the floor of the tub or shower.  · Attach bath mats securely with double-sided, non-slip rug tape.  · If you need to sit down in the shower, use a plastic, non-slip stool.  · Install grab bars by the toilet and in the tub and shower. Do not use towel bars as grab bars.  What can I do in the bedroom?  · Make sure that you have a light by your bed that is easy to reach.  · Do not use any sheets or blankets that are too big for your bed. They should not hang down onto the floor.  · Have a firm chair that  has side arms. You can use this for support while you get dressed.  What can I do in the kitchen?  · Clean up any spills right away.  · If you need to reach something above you, use a strong step stool that has a grab bar.  · Keep electrical cords out of the way.  · Do not use floor polish or wax that makes floors slippery. If you must use wax, use non-skid floor wax.  What can I do with my stairs?  · Do not leave any items on the stairs.  · Make sure that you have a light switch at the top of the stairs and the bottom of the stairs. If you do not have them, ask someone to add them for you.  · Make sure that there are handrails on both sides of the stairs, and use them. Fix handrails that are broken or loose. Make sure that handrails are as long as the stairways.  · Install non-slip stair treads on all stairs in your home.  · Avoid having throw rugs at the top or bottom of the stairs. If you do have throw rugs, attach them to the floor with carpet tape.  · Choose a carpet that does not hide the edge of the steps on the stairway.  · Check any carpeting to make sure that it is firmly attached to the stairs. Fix any carpet that is loose or worn.  What can I do on the outside of my home?  · Use bright outdoor lighting.  · Regularly fix the edges of walkways and driveways and fix any cracks.  · Remove anything that might make you trip as you walk through a door, such as a raised step or threshold.  · Trim any bushes or trees on the path to your home.  · Regularly check to see if handrails are loose or broken. Make sure that both sides of any steps have handrails.  · Install guardrails along the edges of any raised decks and porches.  · Clear walking paths of anything that might make someone trip, such as tools or rocks.  · Have any leaves, snow, or ice cleared regularly.  · Use sand or salt on walking paths during winter.  · Clean up any spills in your garage right away. This includes grease or oil spills.  What other  actions can I take?  · Wear shoes that:  ? Have a low heel. Do not wear high heels.  ? Have rubber bottoms.  ? Are comfortable and fit you well.  ? Are closed at the toe. Do not wear open-toe sandals.  · Use tools that help you move around (mobility aids) if they are needed. These include:  ? Canes.  ? Walkers.  ? Scooters.  ? Crutches.  · Review your medicines with your doctor. Some medicines can make you feel dizzy. This can increase your chance of falling.  Ask your doctor what other things you can do to help prevent falls.  Where to find more information  · Centers for Disease Control and Prevention, STEADI: https://cdc.gov  · [place] [employer] A[place]: https://wu6cnzi.bassam.nih.gov  Contact a doctor if:  · You are afraid of falling at home.  · You feel weak, drowsy, or dizzy at home.  · You fall at home.  Summary  · There are many simple things that you can do to make your home safe and to help prevent falls.  · Ways to make your home safe include removing tripping hazards and installing grab bars in the bathroom.  · Ask for help when making these changes in your home.  This information is not intended to replace advice given to you by your health care provider. Make sure you discuss any questions you have with your health care provider.  Document Revised: 04/09/2020 Document Reviewed: 08/02/2018  Elsevier Patient Education © 2021 Elsevier Inc.

## 2021-08-19 NOTE — PROGRESS NOTES
The ABCs of the Annual Wellness Visit  Subsequent Medicare Wellness Visit    Chief Complaint   Patient presents with   • Medicare Wellness-subsequent       Subjective   History of Present Illness:  Aydee Moore is a 71 y.o. female who presents for a Subsequent Medicare Wellness Visit.    HEALTH RISK ASSESSMENT    Recent Hospitalizations:  No hospitalization(s) within the last year.    Current Medical Providers:  Patient Care Team:  Oksana Serrano MD as PCP - General  Oksana Serrano MD as PCP - Family Medicine    Smoking Status:  Social History     Tobacco Use   Smoking Status Never Smoker   Smokeless Tobacco Never Used       Alcohol Consumption:  Social History     Substance and Sexual Activity   Alcohol Use Yes    Comment: social       Depression Screen:   PHQ-2/PHQ-9 Depression Screening 8/19/2021   Little interest or pleasure in doing things 0   Feeling down, depressed, or hopeless 0   Trouble falling or staying asleep, or sleeping too much 1   Feeling tired or having little energy 0   Poor appetite or overeating 0   Feeling bad about yourself - or that you are a failure or have let yourself or your family down 0   Trouble concentrating on things, such as reading the newspaper or watching television 0   Moving or speaking so slowly that other people could have noticed. Or the opposite - being so fidgety or restless that you have been moving around a lot more than usual 0   Thoughts that you would be better off dead, or of hurting yourself in some way 0   Total Score 1   If you checked off any problems, how difficult have these problems made it for you to do your work, take care of things at home, or get along with other people? Not difficult at all       Fall Risk Screen:  STEADI Fall Risk Assessment was completed, and patient is at MODERATE risk for falls. Assessment completed on:8/19/2021    Health Habits and Functional and Cognitive Screening:  Functional & Cognitive Status 8/19/2021   Do you have  difficulty preparing food and eating? No   Do you have difficulty bathing yourself, getting dressed or grooming yourself? No   Do you have difficulty using the toilet? No   Do you have difficulty moving around from place to place? No   Do you have trouble with steps or getting out of a bed or a chair? No   Current Diet Well Balanced Diet   Dental Exam Up to date   Eye Exam Up to date   Exercise (times per week) 7 times per week   Current Exercises Include Hiking;Walking   Current Exercise Activities Include -   Do you need help using the phone?  No   Are you deaf or do you have serious difficulty hearing?  No   Do you need help with transportation? No   Do you need help shopping? No   Do you need help preparing meals?  No   Do you need help with housework?  No   Do you need help with laundry? No   Do you need help taking your medications? No   Do you need help managing money? No   Do you ever drive or ride in a car without wearing a seat belt? No   Have you felt unusual stress, anger or loneliness in the last month? Yes   Who do you live with? Spouse   If you need help, do you have trouble finding someone available to you? No   Have you been bothered in the last four weeks by sexual problems? No   Do you have difficulty concentrating, remembering or making decisions? No         Does the patient have evidence of cognitive impairment? No    Asprin use counseling:Does not need ASA (and currently is not on it)    Age-appropriate Screening Schedule:  Refer to the list below for future screening recommendations based on patient's age, sex and/or medical conditions. Orders for these recommended tests are listed in the plan section. The patient has been provided with a written plan.    Health Maintenance   Topic Date Due   • ZOSTER VACCINE (2 of 2) 11/18/2019   • DXA SCAN  08/30/2021   • INFLUENZA VACCINE  10/01/2021   • MAMMOGRAM  01/07/2022   • LIPID PANEL  02/04/2022   • TDAP/TD VACCINES (2 - Td or Tdap) 09/23/2029   •  PAP SMEAR  Discontinued          The following portions of the patient's history were reviewed and updated as appropriate: allergies, current medications, past family history, past medical history, past social history, past surgical history and problem list.    Outpatient Medications Prior to Visit   Medication Sig Dispense Refill   • amLODIPine (NORVASC) 5 MG tablet TAKE ONE TABLET BY MOUTH DAILY 30 tablet 0   • Bromelain 100 MG tablet Take  by mouth As Needed.     • FIBER SELECT GUMMIES PO Take  by mouth.     • fluticasone (FLONASE) 50 MCG/ACT nasal spray 2 sprays into the nostril(s) as directed by provider Daily. Administer 2 sprays in each nostril for each dose. 16 g 3   • Magnesium 400 MG capsule Take  by mouth.     • rosuvastatin (CRESTOR) 5 MG tablet Take 1 tablet by mouth Daily. 90 tablet 3   • Synthroid 50 MCG tablet TAKE 1 TABLET DAILY 90 tablet 1   • triamcinolone (KENALOG) 0.1 % ointment Apply  topically to the appropriate area as directed 2 (Two) Times a Day. Apply to affected area (Patient taking differently: Apply  topically to the appropriate area as directed As Needed. Apply to affected area) 1 tube 2     No facility-administered medications prior to visit.       Patient Active Problem List   Diagnosis   • Hyperlipidemia   • Elevated fasting glucose   • Anxiety and depression   • Hypothyroidism, adult   • Wasp sting   • Essential hypertension   • Morbidly obese (CMS/[facility])       Advanced Care Planning:  ACP discussion was held with the patient during this visit. Patient has an advance directive (not in EMR), copy requested.    Review of Systems    Compared to one year ago, the patient feels her physical health is better.  Compared to one year ago, the patient feels her mental health is the same.    Reviewed chart for potential of high risk medication in the elderly: yes  Reviewed chart for potential of harmful drug interactions in the elderly:yes    Objective         Vitals:    08/19/21 0841   BP:  "126/62   Pulse: 67   Temp: 99.2 °F (37.3 °C)   SpO2: 99%   Weight: 78.5 kg (173 lb)   Height: 165.1 cm (65\")   PainSc: 4  Comment: pain right knee       Body mass index is 28.79 kg/m².  Discussed the patient's BMI with her. The BMI is above average, she has lost weight and will continue prudent diet and regular exercise..    Physical Exam    Lab Results   Component Value Date     (H) 06/16/2021        Assessment/Plan   Medicare Risks and Personalized Health Plan  CMS Preventative Services Quick Reference  Advance Directive Discussion  Breast Cancer/Mammogram Screening  Fall Risk  Immunizations Discussed/Encouraged (specific immunizations; Shingrix )  Obesity/Overweight   Osteoporosis Risk    The above risks/problems have been discussed with the patient.  Pertinent information has been shared with the patient in the After Visit Summary.  Follow up plans and orders are seen below in the Assessment/Plan Section.    Diagnoses and all orders for this visit:    1. Medicare annual wellness visit, subsequent (Primary)    2. Essential hypertension  -     Comprehensive Metabolic Panel    3. Hyperlipidemia, unspecified hyperlipidemia type  -     Lipid Panel With / Chol / HDL Ratio    4. Elevated fasting glucose  -     Hemoglobin A1c    5. Hypothyroidism, adult  -     TSH Rfx On Abnormal To Free T4      Follow Up:  Return in about 6 months (around 2/19/2022) for Follow up, Fasting.     An After Visit Summary and PPPS were given to the patient.               "

## 2021-08-20 ENCOUNTER — APPOINTMENT (OUTPATIENT)
Dept: SLEEP MEDICINE | Facility: HOSPITAL | Age: 71
End: 2021-08-20

## 2021-08-20 ENCOUNTER — PATIENT MESSAGE (OUTPATIENT)
Dept: INTERNAL MEDICINE | Facility: CLINIC | Age: 71
End: 2021-08-20

## 2021-08-20 DIAGNOSIS — I10 ESSENTIAL HYPERTENSION: ICD-10-CM

## 2021-08-23 NOTE — TELEPHONE ENCOUNTER
From: Aydee Moore  To: Oksana Serrano MD  Sent: 8/20/2021 10:33 PM EDT  Subject: Prescription Question    could my blood pressure medicine come from the mail order pharmacy, please. I spoke to Jessie about this, but did not mention it to you. sorry. But I really want to limit my outside trips and going once a month to the [facility] [place] my avoid list now. Thanks  Aydee Moore

## 2021-08-24 ENCOUNTER — TELEPHONE (OUTPATIENT)
Dept: INTERNAL MEDICINE | Facility: CLINIC | Age: 71
End: 2021-08-24

## 2021-08-24 DIAGNOSIS — I10 ESSENTIAL HYPERTENSION: ICD-10-CM

## 2021-08-24 RX ORDER — AMLODIPINE BESYLATE 5 MG/1
5 TABLET ORAL DAILY
Qty: 90 TABLET | Refills: 1 | Status: SHIPPED | OUTPATIENT
Start: 2021-08-24 | End: 2022-02-10 | Stop reason: SDUPTHER

## 2021-08-24 RX ORDER — AMLODIPINE BESYLATE 5 MG/1
5 TABLET ORAL DAILY
Qty: 90 TABLET | Refills: 1 | Status: SHIPPED | OUTPATIENT
Start: 2021-08-24 | End: 2021-08-24 | Stop reason: SDUPTHER

## 2021-08-24 NOTE — TELEPHONE ENCOUNTER
Caller: Aydee Moore    Relationship: Self    Best call back number:   [phone]    Medication needed:   Requested Prescriptions     Pending Prescriptions Disp Refills   • amLODIPine (NORVASC) 5 MG tablet 30 tablet 0     Sig: Take 1 tablet by mouth Daily.       When do you need the refill by: ASAP    What additional details did the patient provide when requesting the medication: PATIENT IS OUT. SHE SAID SHE NEEDS THE PRESCRIPTION SENT TO THE [facility] AND NOT [facility]  Does the patient have less than a 3 day supply:  [x] Yes  [] No    What is the patient's preferred pharmacy: [facility] [place] [place] - [address] AT [address]AL TO [facility] - [phone]  - [phone]

## 2021-11-08 RX ORDER — LEVOTHYROXINE SODIUM 50 MCG
TABLET ORAL
Qty: 90 TABLET | Refills: 1 | Status: SHIPPED | OUTPATIENT
Start: 2021-11-08 | End: 2022-02-21 | Stop reason: SDUPTHER

## 2021-11-08 RX ORDER — ROSUVASTATIN CALCIUM 5 MG/1
TABLET, COATED ORAL
Qty: 90 TABLET | Refills: 3 | Status: SHIPPED | OUTPATIENT
Start: 2021-11-08 | End: 2022-02-21 | Stop reason: SDUPTHER

## 2021-11-11 ENCOUNTER — OFFICE VISIT (OUTPATIENT)
Dept: INTERNAL MEDICINE | Facility: CLINIC | Age: 71
End: 2021-11-11

## 2021-11-11 VITALS
TEMPERATURE: 98.6 F | RESPIRATION RATE: 18 BRPM | BODY MASS INDEX: 28.99 KG/M2 | OXYGEN SATURATION: 95 % | SYSTOLIC BLOOD PRESSURE: 109 MMHG | WEIGHT: 174 LBS | HEIGHT: 65 IN | DIASTOLIC BLOOD PRESSURE: 71 MMHG | HEART RATE: 67 BPM

## 2021-11-11 DIAGNOSIS — J40 BRONCHITIS: Primary | ICD-10-CM

## 2021-11-11 PROCEDURE — 99213 OFFICE O/P EST LOW 20 MIN: CPT | Performed by: NURSE PRACTITIONER

## 2021-11-11 RX ORDER — TRIAMCINOLONE ACETONIDE 1 MG/G
CREAM TOPICAL
COMMUNITY
Start: 2021-10-13 | End: 2022-01-24

## 2021-11-11 RX ORDER — GUAIFENESIN 600 MG/1
1200 TABLET, EXTENDED RELEASE ORAL 2 TIMES DAILY
Qty: 28 TABLET | Refills: 0 | Status: SHIPPED | OUTPATIENT
Start: 2021-11-11 | End: 2021-11-18

## 2021-11-11 RX ORDER — AZITHROMYCIN 250 MG/1
TABLET, FILM COATED ORAL
Qty: 6 TABLET | Refills: 0 | Status: SHIPPED | OUTPATIENT
Start: 2021-11-11 | End: 2021-12-13

## 2021-11-11 NOTE — PATIENT INSTRUCTIONS
Cough, Adult  Coughing is a reflex that clears your throat and your airways (respiratory system). Coughing helps to heal and protect your lungs. It is normal to cough occasionally, but a cough that happens with other symptoms or lasts a long time may be a sign of a condition that needs treatment. An acute cough may only last 2-3 weeks, while a chronic cough may last 8 or more weeks.  Coughing is commonly caused by:  · Infection of the respiratory systemby viruses or bacteria.  · Breathing in substances that irritate your lungs.  · Allergies.  · Asthma.  · Mucus that runs down the back of your throat (postnasal drip).  · Smoking.  · Acid backing up from the stomach into the esophagus (gastroesophageal reflux).  · Certain medicines.  · Chronic lung problems.  · Other medical conditions such as heart failure or a blood clot in the lung (pulmonary embolism).  Follow these instructions at home:  Medicines  · Take over-the-counter and prescription medicines only as told by your health care provider.  · Talk with your health care provider before you take a cough suppressant medicine.  Lifestyle    · Avoid cigarette smoke. Do not use any products that contain nicotine or tobacco, such as cigarettes, e-cigarettes, and chewing tobacco. If you need help quitting, ask your health care provider.  · Drink enough fluid to keep your urine pale yellow.  · Avoid caffeine.  · Do not drink alcohol if your health care provider tells you not to drink.    General instructions    · Pay close attention to changes in your cough. Tell your health care provider about them.  · Always cover your mouth when you cough.  · Avoid things that make you cough, such as perfume, candles, cleaning products, or campfire or tobacco smoke.  · If the air is dry, use a cool mist vaporizer or humidifier in your bedroom or your home to help loosen secretions.  · If your cough is worse at night, try to sleep in a semi-upright position.  · Rest as needed.  · Keep  all follow-up visits as told by your health care provider. This is important.    Contact a health care provider if you:  · Have new symptoms.  · Cough up pus.  · Have a cough that does not get better after 2-3 weeks or gets worse.  · Cannot control your cough with cough suppressant medicines and you are losing sleep.  · Have pain that gets worse or pain that is not helped with medicine.  · Have a fever.  · Have unexplained weight loss.  · Have night sweats.  Get help right away if:  · You cough up blood.  · You have difficulty breathing.  · Your heartbeat is very fast.  These symptoms may represent a serious problem that is an emergency. Do not wait to see if the symptoms will go away. Get medical help right away. Call your local emergency services (911 in the [place] Do not drive yourself to the hospital.  Summary  · Coughing is a reflex that clears your throat and your airways. It is normal to cough occasionally, but a cough that happens with other symptoms or lasts a long time may be a sign of a condition that needs treatment.  · Take over-the-counter and prescription medicines only as told by your health care provider.  · Always cover your mouth when you cough.  · Contact a health care provider if you have new symptoms or a cough that does not get better after 2-3 weeks or gets worse.  This information is not intended to replace advice given to you by your health care provider. Make sure you discuss any questions you have with your health care provider.  Document Revised: 01/06/2020 Document Reviewed: 01/06/2020  [employer] Patient Education © 2021 Elsevier Inc.

## 2021-11-11 NOTE — PROGRESS NOTES
"Chief Complaint  Cough (Pt presents here today with concerns of having bronchitis.) and Bronchitis    Subjective          Aydee Moore presents to [facility]  History of Present Illness    Patient is a pleasant 71 year old female who typically sees Dr. Serrano here in the office. She is new to me and here today with c/o fatigue, cough (worse at night), and SOB. \"Hurts to lay down\". Patient's  is currently being treated for Bronchitis and he has a negative Covid test. They both are UTD with Covid boosters and deny being around anybody who have been sick. She has not been taking anything at home. She denies any chest pressure/pain.     Objective   Vital Signs:   /71 (BP Location: Right arm, Patient Position: Sitting, Cuff Size: Adult)   Pulse 67   Temp 98.6 °F (37 °C)   Resp 18   Ht 165.1 cm (65\")   Wt 78.9 kg (174 lb)   SpO2 95%   BMI 28.96 kg/m²     Physical Exam  Vitals and nursing note reviewed.   Constitutional:       Appearance: Normal appearance.      Comments: Cough and fatigue.    HENT:      Head: Normocephalic.      Nose: Nose normal. No congestion or rhinorrhea.      Mouth/Throat:      Mouth: Mucous membranes are moist.      Pharynx: No oropharyngeal exudate or posterior oropharyngeal erythema.   Cardiovascular:      Rate and Rhythm: Normal rate and regular rhythm.      Pulses: Normal pulses.      Heart sounds: Normal heart sounds.      Comments: No peripheral edema noted.   Pulmonary:      Effort: Pulmonary effort is normal. No respiratory distress.      Breath sounds: Normal breath sounds. No stridor. No wheezing, rhonchi or rales.   Chest:      Chest wall: No tenderness.   Musculoskeletal:         General: Normal range of motion.   Skin:     General: Skin is warm.      Capillary Refill: Capillary refill takes less than 2 seconds.   Neurological:      Mental Status: She is alert and oriented to person, place, and time.   Psychiatric:         Behavior: " Behavior normal.        Result Review :            Office Visit with Oksana Serrano MD (08/19/2021)  Comprehensive Metabolic Panel (08/19/2021 9:22 AM)  CBC (No Diff) (06/16/2021 11:13 AM)       Assessment and Plan    Diagnoses and all orders for this visit:    1. Bronchitis (Primary)    Other orders  -     azithromycin (Zithromax Z-Stevenson) 250 MG tablet; Take 2 tablets by mouth on day 1, then 1 tablet daily on days 2-5  Dispense: 6 tablet; Refill: 0  -     guaiFENesin (Mucinex) 600 MG 12 hr tablet; Take 2 tablets by mouth 2 (Two) Times a Day for 7 days.  Dispense: 28 tablet; Refill: 0    Patient will stop at the pharmacy and  her prescriptions and take them as directed. If she has increased SOB, fatigue, or develops any chest pain or pressure to go to the ER. She is to take a MV and eat and drink well for the next couple weeks.       Follow Up   Return if symptoms worsen or fail to improve.  Patient was given instructions and counseling regarding her condition or for health maintenance advice. Please see specific information pulled into the AVS if appropriate.

## 2021-12-13 ENCOUNTER — APPOINTMENT (OUTPATIENT)
Dept: WOMENS IMAGING | Facility: HOSPITAL | Age: 71
End: 2021-12-13

## 2021-12-13 ENCOUNTER — OFFICE VISIT (OUTPATIENT)
Dept: INTERNAL MEDICINE | Facility: CLINIC | Age: 71
End: 2021-12-13

## 2021-12-13 VITALS
TEMPERATURE: 99 F | HEIGHT: 65 IN | OXYGEN SATURATION: 99 % | HEART RATE: 68 BPM | DIASTOLIC BLOOD PRESSURE: 64 MMHG | WEIGHT: 173 LBS | BODY MASS INDEX: 28.82 KG/M2 | SYSTOLIC BLOOD PRESSURE: 126 MMHG

## 2021-12-13 DIAGNOSIS — M25.552 LEFT HIP PAIN: Primary | ICD-10-CM

## 2021-12-13 DIAGNOSIS — R27.8 CLUMSINESS: ICD-10-CM

## 2021-12-13 PROCEDURE — 99213 OFFICE O/P EST LOW 20 MIN: CPT | Performed by: INTERNAL MEDICINE

## 2021-12-13 PROCEDURE — 73502 X-RAY EXAM HIP UNI 2-3 VIEWS: CPT | Performed by: RADIOLOGY

## 2021-12-13 PROCEDURE — 73502 X-RAY EXAM HIP UNI 2-3 VIEWS: CPT | Performed by: INTERNAL MEDICINE

## 2021-12-13 NOTE — PROGRESS NOTES
Chief Complaint   Patient presents with   • Toe Pain     left foot 2 toes numbness and pain   • Hip Pain     left hip/ buttock       Subjective   Aydee Moore is a 71 y.o. female.     History of Present Illness     Left toe problem:  The  two toes close to the big toe are affected on her left foot.  She has had numbness in these toes for a long time.  Recently, her  rolled over on her left foot and she had a searing pain in her foot that did not last long. It has not happened again.     Left hip/buttock pain: She is having pain in left hip/buttock area.  This has started recently.  She cannot go up stairs comfortably - hurts more.  Also hurts more when she sits. This is an achy sensation. It can bother her as she starts hiking. After about 2 miles, it feels better.     She also complains of clumsiness. She tends to drop things.    The following portions of the patient's history were reviewed and updated as appropriate: allergies, current medications, past family history, past medical history, past social history, past surgical history and problem list.      Current Outpatient Medications:   •  amLODIPine (NORVASC) 5 MG tablet, Take 1 tablet by mouth Daily., Disp: 90 tablet, Rfl: 1  •  Bromelain 100 MG tablet, Take  by mouth As Needed., Disp: , Rfl:   •  FIBER SELECT GUMMIES PO, Take  by mouth., Disp: , Rfl:   •  fluticasone (FLONASE) 50 MCG/ACT nasal spray, 2 sprays into the nostril(s) as directed by provider Daily. Administer 2 sprays in each nostril for each dose., Disp: 16 g, Rfl: 3  •  Magnesium 400 MG capsule, Take  by mouth., Disp: , Rfl:   •  rosuvastatin (CRESTOR) 5 MG tablet, TAKE 1 TABLET DAILY, Disp: 90 tablet, Rfl: 3  •  Synthroid 50 MCG tablet, TAKE 1 TABLET DAILY, Disp: 90 tablet, Rfl: 1  •  triamcinolone (KENALOG) 0.1 % cream, , Disp: , Rfl:   •  triamcinolone (KENALOG) 0.1 % ointment, Apply  topically to the appropriate area as directed 2 (Two) Times a Day. Apply to affected area (Patient  "taking differently: Apply  topically to the appropriate area as directed As Needed. Apply to affected area), Disp: 1 tube, Rfl: 2  •  azithromycin (Zithromax Z-Stevenson) 250 MG tablet, Take 2 tablets by mouth on day 1, then 1 tablet daily on days 2-5, Disp: 6 tablet, Rfl: 0           Review of Systems   Constitutional: Negative for fever.   Cardiovascular: Negative for leg swelling.   Musculoskeletal: Negative for gait problem.           Objective     /64   Pulse 68   Temp 99 °F (37.2 °C)   Ht 165.1 cm (65\")   Wt 78.5 kg (173 lb)   SpO2 99%   BMI 28.79 kg/m²       Physical Exam  Constitutional:       Appearance: Normal appearance.   Cardiovascular:      Rate and Rhythm: Normal rate and regular rhythm.   Pulmonary:      Effort: Pulmonary effort is normal. No respiratory distress.      Breath sounds: Normal breath sounds.   Musculoskeletal:      Lumbar back: No bony tenderness. Normal range of motion. Negative left straight leg raise test.      Right foot: Normal. Normal pulse.      Left foot: Normal. Normal pulse.   Neurological:      Mental Status: She is alert.           Assessment/Plan   Diagnoses and all orders for this visit:    1. Left hip pain (Primary)  -     XR Hip With or Without Pelvis 2 - 3 View Left    2. Clumsiness  -     Ambulatory Referral to Occupational Therapy      Possible bursitis of hip.  Discussed. OK to take tylenol.  Will do xray today. Observe isolated sensation of numbness left 2nd and 3rd toes. For clumsiness, will try OT.             No follow-ups on file.  "

## 2021-12-20 ENCOUNTER — TELEPHONE (OUTPATIENT)
Dept: INTERNAL MEDICINE | Facility: CLINIC | Age: 71
End: 2021-12-20

## 2021-12-20 NOTE — TELEPHONE ENCOUNTER
Caller: Aydee Moore    Relationship: Self    Best call back number: [phone] (H)    What test was performed: X-RAY    When was the test performed: 12/13/21    Where was the test performed: IN OFFICE     Additional notes:       PATIENT IS RETURNING A MISSED CALL. SHE WAS IN THE WOODS AND MISSED IT BY A SECOND  PLEASE REACH BACK OUT TO PATIENT, SHE IS HOME AND AVAILABLE NOW.

## 2021-12-23 DIAGNOSIS — M16.12 ARTHRITIS OF LEFT HIP: Primary | ICD-10-CM

## 2022-01-04 ENCOUNTER — HOSPITAL ENCOUNTER (OUTPATIENT)
Dept: OCCUPATIONAL THERAPY | Facility: HOSPITAL | Age: 72
Setting detail: THERAPIES SERIES
Discharge: HOME | End: 2022-01-04

## 2022-01-04 DIAGNOSIS — R27.8 CLUMSINESS: Primary | ICD-10-CM

## 2022-01-04 PROCEDURE — 97165 OT EVAL LOW COMPLEX 30 MIN: CPT

## 2022-01-04 PROCEDURE — 97110 THERAPEUTIC EXERCISES: CPT

## 2022-01-04 NOTE — THERAPY DISCHARGE NOTE
"Outpatient Occupational Therapy Rehab Program Initial Evaluation/Discharge  [facility]     Patient Name: Aydee Moore  : 1950  MRN: [id number]  Today's Date: 2022      Visit Date: 2022    Patient Active Problem List   Diagnosis   • Hyperlipidemia   • Elevated fasting glucose   • Anxiety and depression   • Hypothyroidism, adult   • Wasp sting   • Essential hypertension   • Morbidly obese (HCC)        Past Medical History:   Diagnosis Date   • Allergic     Penicillins, tetracyclines, surgical tape; latex sensitivity   • Anxiety and depression    • Elevated fasting glucose    • Hyperlipidemia         Past Surgical History:   Procedure Laterality Date   • CATARACT EXTRACTION Right 10/24/2018   • COLONOSCOPY     • HYSTERECTOMY     • KNEE SURGERY Right 2017   • TONSILLECTOMY           Visit Dx:    ICD-10-CM ICD-9-CM   1. Clumsiness  R27.8 781.3        Patient History     Row Name 22 1500 22 0031          History    Chief Complaint Other 1 (comment)  -MR Other 1 (comment) (P)    -patient     Hx Chief Complaint Comment 1  Pt referred to outpatient OT by PCP due to \"clumsiness\".  -MR clumsy (P)    -patient     Type of Pain Back pain; Hip pain; Knee pain  -MR Back pain; Hip pain; Knee pain (P)    -patient     Date Current Problem(s) Began 20  -MR 20 (P)    -patient     Brief Description of Current Complaint knock things over; kick into things, fall alot  -MR knock things over; kick into things, fall alot (P)    -patient     Patient/Caregiver Goals Know what to do to help the symptoms  -MR Know what to do to help the symptoms (P)    -patient     Hand Dominance right-handed  -MR right-handed (P)    -patient     Occupation/sports/leisure activities retired; walk; knit  -MR retired; walk; knit (P)    -patient     Patient seeing anyone else for problem(s)? arthritis in hip; waiting on referal  -MR arthritis in hip; waiting on referal (P)    -patient     What clinical tests have " "you had for this problem? X-ray  -MR X-ray (P)    -patient     Are you or can you be pregnant No  -MR No (P)    -patient            Fall Risk Assessment    Any falls in the past year: Yes  -MR Yes (P)    -patient     Number of falls reported in the last 12 months 4-5  pt/ reports falls have mostly occured [place]  -MR --     Factors that contributed to the fall: Tripped  -MR Tripped (P)    -patient            Services    Prior Rehab/Home Health Experiences No  -MR No (P)    -patient     Are you currently receiving Home Health services No  -MR No (P)    -patient     Do you plan to receive Home Health services in the near future No  -MR No (P)    -patient            Daily Activities    Primary Language English  -MR English (P)    -patient     Are you able to read Yes  -MR Yes (P)    -patient     Are you able to write Yes  -MR Yes (P)    -patient     How does patient learn best? Reading  -MR Reading (P)    -patient     Recommended Referrals Physical Therapy; Other (comment)  discussed possible need for PT referral to assess balance/gait; pt also to follow up with PCP to check thyroid level, as family member suggested this could be contributing factor to clumsiness  -MR --     Pt Participated in POC and Goals Yes  -MR --            Safety    Are you being hurt, hit, or frightened by anyone at home or in your life? No  -MR No (P)    -patient     Are you being neglected by a caregiver No  -MR No (P)    -patient     Have you had any of the following issues with N/A  -MR N/A (P)    -patient           User Key  (r) = Recorded By, (t) = Taken By, (c) = Cosigned By    Initials Name Provider Type    MR Oksana Springer, OT Occupational Therapist    patient Aydee Moore --                   OT Neuro     Row Name 01/04/22 1500             Subjective Comments    Subjective Comments \"I knock things over easily.\"  -MR              Subjective Pain    Able to rate subjective pain? yes  -MR      " Pre-Treatment Pain Level 0  denies pain at time of eval  -MR              Home Living    Current Living Arrangements home/apartment/condo  resides with spouse  -MR              Cognitive Assessment/Intervention    Current Cognitive/Communication Assessment functional  -MR      Follows Commands (Cognition) WFL  -MR              Sensation    Additional Comments UE sensation intact  -MR              Coordination    Coordination Tests Box and Blocks; 9-Hole Peg  -MR      Box and Blocks Left 65 blocks  -MR      Box and Blocks Right 73 blocks  -MR      9-Hole Peg Left 25 sec  -MR      9-Hole Peg Right 18 sec  -MR              General ROM    GENERAL ROM COMMENTS BUE AROM WFL  -MR              MMT (Manual Muscle Testing)    General MMT Comments BUE strength 4 to 4+/5  -MR              ADL Assessment/Intervention    ADL's Assessed? --  pt reports independence with self care tasks  -MR            User Key  (r) = Recorded By, (t) = Taken By, (c) = Cosigned By    Initials Name Provider Type    Oksana Castro, OT Occupational Therapist              Hand Therapy (last 24 hours)     Hand Eval     Row Name 01/04/22 1500             Hand  Strength     Strength Affected Side Bilateral  -MR               Strength Right    # Reps 3  -MR      Right Rung 2  -MR      Right  Test 1 45  -MR      Right  Test 2 47  -MR      Right  Test 3 45  -MR       Strength Average Right 45.67  -MR               Strength Left    # Reps 3  -MR      Left Rung 2  -MR      Left  Test 1 35  -MR      Left  Test 2 35  -MR      Left  Test 3 35  -MR       Strength Average Left 35  -MR              Pinch Strength    Affected Side Bilateral  -MR              Right Hand Strength - Pinch (lbs)    Lateral 15 lbs  -MR              Left Hand Strength - Pinch (lbs)    Lateral 13 lbs  -MR              Therapy Education    Education Details role of OT; results of OT eval; HEP: UE strengthening, GM coordination  activities, UE weight bearing for proprioceptive input  -MR      Given HEP; Symptoms/condition management  -MR      Program New  -MR      How Provided Verbal; Demonstration; Written  -MR      Provided to Patient; Caregiver  -MR      Level of Understanding Teach back education performed; Verbalized; Demonstrated  -MR            User Key  (r) = Recorded By, (t) = Taken By, (c) = Cosigned By    Initials Name Provider Type    Oksana Castro, OT Occupational Therapist                           OT Goals     Row Name 01/04/22 1500          Long Term Goals    LTG Date to Achieve 01/04/22  -MR     LTG 1 Pt/family to be educated on results of OT eval and home program.  -MR     LTG 1 Progress Met  -MR           User Key  (r) = Recorded By, (t) = Taken By, (c) = Cosigned By    Initials Name Provider Type    Oksana Castro, OT Occupational Therapist                 OT Assessment/Plan     Row Name 01/04/22 1545          OT Assessment    Functional Limitations Limitation in home management; Limitations in community activities; Limitations in functional capacity and performance  -MR     Assessment Comments Pt is a 71 y.o. female referred for outpatient OT eval by primary care MD due to clumsiness. Per pt report she often trips, spills things, knocks items over, etc. Pt reports she has had issues with this for approximately 25 years. Upon assessment, pt's UE strength, ROM, sensation, and FM/GM coordination are WFL. Pt requests exercises/activities for home vs. returning for additional outpatient therapy due to current COVID-19 variant. Pt and  instructed on UE strengthening exercises, coordination activities, and UE weight bearing activities for increased proprioceptive input. Pt verbalizes/demonstrates understanding of home program recommendations  -MR     Please refer to paper survey for additional self-reported information Yes  -MR     Patient/caregiver participated in establishment of treatment plan and  goals Yes  -MR            OT Plan    OT Frequency One time visit  -MR     Planned CPT's? OT EVAL LOW COMPLEXITY: 51000; OT THER PROC EA 15 MIN: [id number]  -MR     Planned Therapy Interventions (Optional Details) home exercise program; patient/family education  -MR           User Key  (r) = Recorded By, (t) = Taken By, (c) = Cosigned By    Initials Name Provider Type    Oksana Castro, OT Occupational Therapist                 OT Exercises     Row Name 01/04/22 1500             Exercise 1    Exercise Name 1 Pt performs UE strengthening using theraband following OT demo/cues. Instructed on shoulder flexion, abduction, horizontal ab/adduction, elbow flexion and extension.  -MR      Cueing 1 Verbal; Demo  -MR      Equipment 1 Theraband  -MR      Resistance 1 Red  -MR            User Key  (r) = Recorded By, (t) = Taken By, (c) = Cosigned By    Initials Name Provider Type    Oksana Castro, OT Occupational Therapist                     Outcome Measures     Row Name 01/04/22 1500             Other Outcome Measure Tool Used    Other Outcome Measure Tool Comments Upper Extremity Functional Index: 73/80  -MR              Functional Assessment    Outcome Measure Options Other Outcome Measure  -MR            User Key  (r) = Recorded By, (t) = Taken By, (c) = Cosigned By    Initials Name Provider Type    Oksana Castro, OT Occupational Therapist                  Time Calculation:   OT Start Time: 1300  OT Stop Time: 1355  OT Time Calculation (min): 55 min  Total Timed Code Minutes- OT: 10 minute(s)  Timed Charges  41622 - OT Therapeutic Exercise Minutes: 10  Untimed Charges  OT Eval/Re-eval Minutes: 45  Total Minutes  Timed Charges Total Minutes: 10  Untimed Charges Total Minutes: 45   Total Minutes: 55     Therapy Charges for Today     Code Description Service Date Service Provider Modifiers Qty    74761621922 HC OT THER PROC EA 15 MIN 1/4/2022 Oksana Springer, OT GO 1    69711458575 HC OT EVAL LOW  COMPLEXITY 3 1/4/2022 Oksana Springer, OT GO 1                      Patient was wearing a face mask during this therapy encounter. Therapist used appropriate personal protective equipment including mask.  Mask used was standard procedure mask. Appropriate PPE was worn during the entire therapy session. Hand hygiene was completed before and after therapy session. Patient is not in enhanced droplet precautions.            Oksana Springer, OT  1/4/2022

## 2022-01-10 ENCOUNTER — TELEPHONE (OUTPATIENT)
Dept: INTERNAL MEDICINE | Facility: CLINIC | Age: 72
End: 2022-01-10

## 2022-01-10 NOTE — TELEPHONE ENCOUNTER
Left patient message, she is to contact our office back to schedule an appointment with APRN for thyroid issues

## 2022-01-10 NOTE — TELEPHONE ENCOUNTER
Patient stopped by the office.  Needs a referral to Dr. Sterling. She has been going to OT and they think it could be thyroid related.

## 2022-01-11 ENCOUNTER — APPOINTMENT (OUTPATIENT)
Dept: OCCUPATIONAL THERAPY | Facility: HOSPITAL | Age: 72
End: 2022-01-11

## 2022-01-18 ENCOUNTER — APPOINTMENT (OUTPATIENT)
Dept: OCCUPATIONAL THERAPY | Facility: HOSPITAL | Age: 72
End: 2022-01-18

## 2022-01-24 RX ORDER — TRIAMCINOLONE ACETONIDE 1 MG/G
CREAM TOPICAL 2 TIMES DAILY
Qty: 45 G | Refills: 0 | Status: SHIPPED | OUTPATIENT
Start: 2022-01-24

## 2022-01-25 ENCOUNTER — APPOINTMENT (OUTPATIENT)
Dept: OCCUPATIONAL THERAPY | Facility: HOSPITAL | Age: 72
End: 2022-01-25

## 2022-02-01 ENCOUNTER — APPOINTMENT (OUTPATIENT)
Dept: OCCUPATIONAL THERAPY | Facility: HOSPITAL | Age: 72
End: 2022-02-01

## 2022-02-08 ENCOUNTER — APPOINTMENT (OUTPATIENT)
Dept: OCCUPATIONAL THERAPY | Facility: HOSPITAL | Age: 72
End: 2022-02-08

## 2022-02-10 DIAGNOSIS — I10 ESSENTIAL HYPERTENSION: ICD-10-CM

## 2022-02-10 NOTE — TELEPHONE ENCOUNTER
Caller: Aydee Moore    Relationship: Self    Requested Prescriptions:      amLODIPine (NORVASC) 5 MG tablet  5 mg, Daily 1 ordered  EditCancel Reorder       Summary: Take 1 tablet by mouth Daily        Pharmacy where request should be sent:    CAN YOU SEND AN EMERGENCY SUPPLY TO [facility], AND HER 90 DAY SUPPLY TO HER MAIL ORDER PHARMACY PLEASE       Additional details provided by patient: HAS 2 DAYS LEFT     Does the patient have less than a 3 day supply:  [] Yes  [] No    Genna Lainez Rep   02/10/22 15:10 EST       [facility]  [facility] [address] [address] [facility] - [phone] [facility] [phone]   [phone]       Pharmacy - [place], [place] - [address] AT [facility] - [phone]  - [phone] FX

## 2022-02-11 RX ORDER — AMLODIPINE BESYLATE 5 MG/1
5 TABLET ORAL DAILY
Qty: 90 TABLET | Refills: 1 | Status: SHIPPED | OUTPATIENT
Start: 2022-02-11 | End: 2022-02-21 | Stop reason: SDUPTHER

## 2022-02-15 ENCOUNTER — APPOINTMENT (OUTPATIENT)
Dept: OCCUPATIONAL THERAPY | Facility: HOSPITAL | Age: 72
End: 2022-02-15

## 2022-02-21 ENCOUNTER — OFFICE VISIT (OUTPATIENT)
Dept: INTERNAL MEDICINE | Facility: CLINIC | Age: 72
End: 2022-02-21

## 2022-02-21 ENCOUNTER — TELEPHONE (OUTPATIENT)
Dept: INTERNAL MEDICINE | Facility: CLINIC | Age: 72
End: 2022-02-21

## 2022-02-21 VITALS
BODY MASS INDEX: 28.82 KG/M2 | DIASTOLIC BLOOD PRESSURE: 60 MMHG | HEIGHT: 65 IN | SYSTOLIC BLOOD PRESSURE: 122 MMHG | WEIGHT: 173 LBS | OXYGEN SATURATION: 98 % | HEART RATE: 62 BPM | TEMPERATURE: 98.2 F

## 2022-02-21 DIAGNOSIS — F41.9 ANXIETY AND DEPRESSION: ICD-10-CM

## 2022-02-21 DIAGNOSIS — F32.A ANXIETY AND DEPRESSION: ICD-10-CM

## 2022-02-21 DIAGNOSIS — E03.9 HYPOTHYROIDISM, ADULT: Chronic | ICD-10-CM

## 2022-02-21 DIAGNOSIS — E66.01 MORBIDLY OBESE: ICD-10-CM

## 2022-02-21 DIAGNOSIS — I10 ESSENTIAL HYPERTENSION: Chronic | ICD-10-CM

## 2022-02-21 DIAGNOSIS — Z76.89 ENCOUNTER TO ESTABLISH CARE: Primary | ICD-10-CM

## 2022-02-21 DIAGNOSIS — E78.5 HYPERLIPIDEMIA, UNSPECIFIED HYPERLIPIDEMIA TYPE: ICD-10-CM

## 2022-02-21 PROCEDURE — 99214 OFFICE O/P EST MOD 30 MIN: CPT | Performed by: FAMILY MEDICINE

## 2022-02-21 RX ORDER — AMLODIPINE BESYLATE 5 MG/1
5 TABLET ORAL DAILY
Qty: 90 TABLET | Refills: 3 | Status: SHIPPED | OUTPATIENT
Start: 2022-02-21

## 2022-02-21 RX ORDER — LEVOTHYROXINE SODIUM 0.05 MG/1
50 TABLET ORAL DAILY
Qty: 90 TABLET | Refills: 3 | Status: SHIPPED | OUTPATIENT
Start: 2022-02-21

## 2022-02-21 RX ORDER — ROSUVASTATIN CALCIUM 5 MG/1
5 TABLET, COATED ORAL DAILY
Qty: 90 TABLET | Refills: 3 | Status: SHIPPED | OUTPATIENT
Start: 2022-02-21

## 2022-02-21 NOTE — ASSESSMENT & PLAN NOTE
Lipid abnormalities are chronic, stable. .  continue crestor, refilled today during visit.   Lipids will be reassessed in 6 months.

## 2022-02-21 NOTE — PROGRESS NOTES
"Chief Complaint  Establish Care, Hypothyroidism, and Hypertension    Subjective    History of Present Illness {CC  Problem List  Visit  Diagnosis   Encounters  Notes  Medications  Labs  Result Review Imaging  Media :23}     Aydee Moore presents to [facility] for   History of Present Illness   70 yo female presents to [facility]. She is new to me, previously seeing Dr. Serrano. She has a history of hypertension, hyperlipidemia, hypothyroidism, anxiety, and depression.    Blood pressure controlled with weight loss and medication. She previously weighed 225.    R knee pain, history of torn meniscus.   She has had thyroid disease for 20-30 years. Taking medication as prescribed.   Family history of uterine cancer, s/p hysterectomy.     Social History     Socioeconomic History   • Marital status:    Tobacco Use   • Smoking status: Never Smoker   • Smokeless tobacco: Never Used   Vaping Use   • Vaping Use: Never used   Substance and Sexual Activity   • Alcohol use: Yes     Comment: social drinker no more than 1 drink/day if that   • Drug use: No   • Sexual activity: Never       Objective     Vital Signs:   /60   Pulse 62   Temp 98.2 °F (36.8 °C)   Ht 165.1 cm (65\")   Wt 78.5 kg (173 lb)   SpO2 98%   BMI 28.79 kg/m²   Physical Exam  Vitals reviewed.   HENT:      Head: Normocephalic.      Right Ear: Tympanic membrane normal.      Left Ear: Tympanic membrane normal.   Eyes:      Conjunctiva/sclera: Conjunctivae normal.   Cardiovascular:      Rate and Rhythm: Regular rhythm.      Pulses: Normal pulses.      Heart sounds: Normal heart sounds.   Pulmonary:      Effort: Pulmonary effort is normal.      Breath sounds: Normal breath sounds.   Abdominal:      General: Bowel sounds are normal.      Palpations: Abdomen is soft.   Musculoskeletal:      Right lower leg: No edema.      Left lower leg: No edema.   Neurological:      Mental Status: She is alert. "   Psychiatric:         Mood and Affect: Mood normal.        Result Review  Data Reviewed:{ Labs  Result Review  Imaging  Med Tab  Media :23}   The following data was reviewed by: Jo Ann Erickson MD on 02/21/2022  Lab Results - Last 18 Months   Lab Units 08/19/21  0922 06/16/21  1113 02/04/21  0931 09/25/20  1535 09/25/20  1535   BUN mg/dL 26* 25* 18   < > 13   CREATININE mg/dL 0.97 1.05* 0.99   < > 1.10*   EGFR IF NONAFRICN AM mL/min/1.73 57* 52* 56*   < > 49*   EGFR IF AFRICN AM mL/min/1.73 69 63 68   < > 60*   SODIUM mmol/L 138 144 138   < > 138   POTASSIUM mmol/L 4.6 4.7 4.5   < > 4.2   CHLORIDE mmol/L 101 104 101   < > 102   CALCIUM mg/dL 10.0 10.2 9.9   < > 9.9   ALBUMIN g/dL 4.60 4.80 4.50   < > 4.90   BILIRUBIN mg/dL 0.4 0.4 0.3   < > 0.3   ALK PHOS U/L 100 96 103   < > 151*   AST (SGOT) U/L 29 16 18   < > 23   ALT (SGPT) U/L 19 12 10   < > 23   CHOLESTEROL mg/dL 170  --  178  --  131   TRIGLYCERIDES mg/dL 75  --  88  --  113   HDL CHOL mg/dL 63*  --  63*  --  62*   VLDL CHOLESTEROL SUSANNA mg/dL 14  --  16  --  22.6   LDL CHOL mg/dL 93  --  99  --  46   HEMOGLOBIN A1C % 4.90  --  5.10  --  5.30   WBC 10*3/mm3  --  5.55  --   --   --    RBC 10*6/mm3  --  4.64  --   --   --    HEMATOCRIT %  --  42.4  --   --   --    MCV fL  --  91.4  --   --   --    MCH pg  --  30.0  --   --   --    TSH uIU/mL 2.560  --  3.490  --  2.570    < > = values in this interval not displayed.            Current Outpatient Medications:   •  amLODIPine (NORVASC) 5 MG tablet, Take 1 tablet by mouth Daily., Disp: 90 tablet, Rfl: 3  •  Bromelain 100 MG tablet, Take  by mouth As Needed., Disp: , Rfl:   •  FIBER SELECT GUMMIES PO, Take  by mouth., Disp: , Rfl:   •  fluticasone (FLONASE) 50 MCG/ACT nasal spray, 2 sprays into the nostril(s) as directed by provider Daily. Administer 2 sprays in each nostril for each dose., Disp: 16 g, Rfl: 3  •  levothyroxine (Synthroid) 50 MCG tablet, Take 1 tablet by mouth Daily., Disp: 90 tablet, Rfl: 3  •   Magnesium 400 MG capsule, Take  by mouth., Disp: , Rfl:   •  rosuvastatin (CRESTOR) 5 MG tablet, Take 1 tablet by mouth Daily., Disp: 90 tablet, Rfl: 3  •  triamcinolone (KENALOG) 0.1 % cream, Apply  topically to the appropriate area as directed 2 (Two) Times a Day., Disp: 45 g, Rfl: 0     Assessment and Plan {CC Problem List  Visit Diagnosis  ROS  Review (Popup)  Health Maintenance  Quality  BestPractice  Medications  SmartSets  SnapShot Encounters  Media :23}   Problem List Items Addressed This Visit        Cardiac and Vasculature    Hyperlipidemia (Chronic)    Current Assessment & Plan     Lipid abnormalities are chronic, stable. .  continue crestor, refilled today during visit.   Lipids will be reassessed in 6 months.         Relevant Medications    rosuvastatin (CRESTOR) 5 MG tablet    Other Relevant Orders    Lipid panel    Comprehensive metabolic panel    Essential hypertension (Chronic)    Current Assessment & Plan     Hypertension is chronic, stable.  Dietary sodium restriction.  Regular aerobic exercise.  Continue current medications.  continue amlodipine, refilled today   Blood pressure will be reassessed at the next regular appointment.         Relevant Medications    amLODIPine (NORVASC) 5 MG tablet    Other Relevant Orders    Comprehensive metabolic panel       Endocrine and Metabolic    Hypothyroidism, adult (Chronic)    Current Assessment & Plan     Diagnosed 20-30 years ago  Taking medication daily as prescribed.   States she is clumsy  Check TSH today   Continue current medication at this time, make adjustments if needed based upon lab report.         Relevant Medications    levothyroxine (Synthroid) 50 MCG tablet    Other Relevant Orders    TSH    RESOLVED: Morbidly obese (HCC)       Mental Health    Anxiety and depression    Current Assessment & Plan     Patient's depression is in remission at this time.   She stop taking prozac a year ago.  She was taking previously for several  years. Current thinks mood is controlled well.    Sometimes angry, but does not believe she needs support of medication at this time.   Walking daily with  which has helped a lot.   Advise call if she has issues or concern.   Follow up next visit.            Other Visit Diagnoses     Encounter to establish care    -  Primary          Follow Up   Return in about 6 months (around 8/21/2022) for Recheck.  Patient was given instructions and counseling regarding her condition or for health maintenance advice. Please see specific information pulled into the AVS if appropriate.    EpicAct:MR_WS_AMB_ORDERS,RunParams:STARTUPTYPE=FOLLOW    MR_[email or web address]_DISCHARGE

## 2022-02-21 NOTE — ASSESSMENT & PLAN NOTE
Diagnosed 20-30 years ago  Taking medication daily as prescribed.   States she is clumsy  Check TSH today   Continue current medication at this time, make adjustments if needed based upon lab report.

## 2022-02-21 NOTE — ASSESSMENT & PLAN NOTE
Patient's depression is in remission at this time.   She stop taking prozac a year ago.  She was taking previously for several years. Current thinks mood is controlled well.    Sometimes angry, but does not believe she needs support of medication at this time.   Walking daily with  which has helped a lot.   Advise call if she has issues or concern.   Follow up next visit.

## 2022-02-21 NOTE — ASSESSMENT & PLAN NOTE
Hypertension is chronic, stable.  Dietary sodium restriction.  Regular aerobic exercise.  Continue current medications.  continue amlodipine, refilled today   Blood pressure will be reassessed at the next regular appointment.

## 2022-02-22 ENCOUNTER — APPOINTMENT (OUTPATIENT)
Dept: OCCUPATIONAL THERAPY | Facility: HOSPITAL | Age: 72
End: 2022-02-22

## 2022-02-22 LAB
ALBUMIN SERPL-MCNC: 4.8 G/DL (ref 3.8–4.8)
ALBUMIN/GLOB SERPL: 1.9 {RATIO} (ref 1.2–2.2)
ALP SERPL-CCNC: 96 IU/L (ref 44–121)
ALT SERPL-CCNC: 23 IU/L (ref 0–32)
AST SERPL-CCNC: 23 IU/L (ref 0–40)
BILIRUB SERPL-MCNC: 0.6 MG/DL (ref 0–1.2)
BUN SERPL-MCNC: 21 MG/DL (ref 8–27)
BUN/CREAT SERPL: 21 (ref 12–28)
CALCIUM SERPL-MCNC: 10.1 MG/DL (ref 8.7–10.3)
CHLORIDE SERPL-SCNC: 101 MMOL/L (ref 96–106)
CHOLEST SERPL-MCNC: 170 MG/DL (ref 100–199)
CO2 SERPL-SCNC: 23 MMOL/L (ref 20–29)
CREAT SERPL-MCNC: 1.02 MG/DL (ref 0.57–1)
GLOBULIN SER CALC-MCNC: 2.5 G/DL (ref 1.5–4.5)
GLUCOSE SERPL-MCNC: 95 MG/DL (ref 65–99)
HDLC SERPL-MCNC: 76 MG/DL
LDLC SERPL CALC-MCNC: 79 MG/DL (ref 0–99)
POTASSIUM SERPL-SCNC: 4.3 MMOL/L (ref 3.5–5.2)
PROT SERPL-MCNC: 7.3 G/DL (ref 6–8.5)
SODIUM SERPL-SCNC: 139 MMOL/L (ref 134–144)
TRIGL SERPL-MCNC: 78 MG/DL (ref 0–149)
TSH SERPL DL<=0.005 MIU/L-ACNC: 3.72 UIU/ML (ref 0.45–4.5)
VLDLC SERPL CALC-MCNC: 15 MG/DL (ref 5–40)

## 2022-03-01 ENCOUNTER — APPOINTMENT (OUTPATIENT)
Dept: OCCUPATIONAL THERAPY | Facility: HOSPITAL | Age: 72
End: 2022-03-01

## 2022-03-16 ENCOUNTER — OFFICE VISIT (OUTPATIENT)
Dept: INTERNAL MEDICINE | Facility: CLINIC | Age: 72
End: 2022-03-16

## 2022-03-16 VITALS
HEART RATE: 61 BPM | HEIGHT: 65 IN | WEIGHT: 178 LBS | OXYGEN SATURATION: 98 % | BODY MASS INDEX: 29.66 KG/M2 | TEMPERATURE: 98.8 F | DIASTOLIC BLOOD PRESSURE: 64 MMHG | SYSTOLIC BLOOD PRESSURE: 124 MMHG

## 2022-03-16 DIAGNOSIS — N30.00 ACUTE CYSTITIS WITHOUT HEMATURIA: Primary | ICD-10-CM

## 2022-03-16 LAB
BILIRUB UR QL STRIP: NEGATIVE
CLARITY UR: CLEAR
COLOR UR: YELLOW
GLUCOSE UR STRIP-MCNC: NEGATIVE MG/DL
HGB UR QL STRIP.AUTO: NEGATIVE
KETONES UR QL STRIP: NEGATIVE
LEUKOCYTE ESTERASE UR QL STRIP.AUTO: NEGATIVE
NITRITE UR QL STRIP: NEGATIVE
PH UR STRIP.AUTO: 6.5 [PH] (ref 5–8)
PROT UR QL STRIP: NEGATIVE
SP GR UR STRIP: 1.02 (ref 1–1.03)
UROBILINOGEN UR QL STRIP: NORMAL

## 2022-03-16 PROCEDURE — 99213 OFFICE O/P EST LOW 20 MIN: CPT | Performed by: NURSE PRACTITIONER

## 2022-03-16 PROCEDURE — 81003 URINALYSIS AUTO W/O SCOPE: CPT | Performed by: NURSE PRACTITIONER

## 2022-03-16 RX ORDER — NITROFURANTOIN 25; 75 MG/1; MG/1
100 CAPSULE ORAL 2 TIMES DAILY
Qty: 14 CAPSULE | Refills: 0 | Status: SHIPPED | OUTPATIENT
Start: 2022-03-16 | End: 2022-03-23

## 2022-03-16 NOTE — PROGRESS NOTES
"Chief Complaint  Difficulty Urinating (Painful urination, medication has not helped) and Fatigue    Subjective          Aydee Moore presents to [facility]  History of Present Illness  This is a 72 y/o female presenting with frequency of urination and dysuria. Patient reports she came down with symptoms on 3/11/22-- was treated with bactrim. Patient reports symptoms have continued to persist. Patient denies any fever. Patient reports feeling \"off.\" Patient denies n/v/d.     Objective   Vital Signs:   /64   Pulse 61   Temp 98.8 °F (37.1 °C)   Ht 165.1 cm (65\")   Wt 80.7 kg (178 lb)   SpO2 98%   BMI 29.62 kg/m²     Physical Exam  Vitals and nursing note reviewed.   Constitutional:       Appearance: Normal appearance.   HENT:      Head: Normocephalic and atraumatic.   Eyes:      Extraocular Movements: Extraocular movements intact.      Conjunctiva/sclera: Conjunctivae normal.      Pupils: Pupils are equal, round, and reactive to light.   Cardiovascular:      Rate and Rhythm: Normal rate and regular rhythm.      Pulses: Normal pulses.      Heart sounds: Normal heart sounds. No murmur heard.    No friction rub. No gallop.   Pulmonary:      Effort: Pulmonary effort is normal. No respiratory distress.      Breath sounds: Normal breath sounds. No stridor. No wheezing, rhonchi or rales.   Abdominal:      Palpations: Abdomen is soft.   Musculoskeletal:         General: Normal range of motion.      Cervical back: Normal range of motion and neck supple.   Skin:     General: Skin is warm and dry.   Neurological:      General: No focal deficit present.      Mental Status: She is alert and oriented to person, place, and time. Mental status is at baseline.      Motor: No weakness.   Psychiatric:         Mood and Affect: Mood normal.         Behavior: Behavior normal.         Thought Content: Thought content normal.         Judgment: Judgment normal.        Result Review :   The " following data was reviewed by: ALVARO Echeverria on 03/16/2022:  Common labs    Common Labsle 6/16/21 6/16/21 8/19/21 8/19/21 8/19/21 2/21/22 2/21/22    1113 1113 0922 0922 0922 0000 0000   Glucose 106 (A)  95    95   BUN 25 (A)  26 (A)    21   Creatinine 1.05 (A)  0.97    1.02 (A)   eGFR Non African Am 52 (A)  57 (A)    56 (A)   eGFR  Am 63  69    65   Sodium 144  138    139   Potassium 4.7  4.6    4.3   Chloride 104  101    101   Calcium 10.2  10.0    10.1   Total Protein 7.1  6.9    7.3   Albumin 4.80  4.60    4.8   Total Bilirubin 0.4  0.4    0.6   Alkaline Phosphatase 96  100    96   AST (SGOT) 16  29    23   ALT (SGPT) 12  19    23   WBC  5.55        Hemoglobin  13.9        Hematocrit  42.4        Platelets  225        Total Cholesterol    170  170    Triglycerides    75  78    HDL Cholesterol    63 (A)  76    LDL Cholesterol     93  79    Hemoglobin A1C     4.90     (A) Abnormal value       Comments are available for some flowsheets but are not being displayed.                  Assessment and Plan    Diagnoses and all orders for this visit:    1. Acute cystitis without hematuria (Primary)  Assessment & Plan:  Macrobid x 10 days.   Continue with hydration to 8 to 10 glasses of fluid daily.   Culture pending.     Orders:  -     Cancel: Urinalysis With Culture If Indicated -; Future  -     Urinalysis With Culture If Indicated -; Future  -     Urinalysis With Culture If Indicated - Urine, Clean Catch  -     nitrofurantoin, macrocrystal-monohydrate, (Macrobid) 100 MG capsule; Take 1 capsule by mouth 2 (Two) Times a Day for 7 days.  Dispense: 14 capsule; Refill: 0  -     Urine Culture - Urine, Urine, Clean Catch      Follow Up   Return if symptoms worsen or fail to improve.  Patient was given instructions and counseling regarding her condition or for health maintenance advice. Please see specific information pulled into the AVS if appropriate.

## 2022-03-16 NOTE — ASSESSMENT & PLAN NOTE
Macrobid x 10 days.   Continue with hydration to 8 to 10 glasses of fluid daily.   Culture pending.

## 2022-03-17 ENCOUNTER — PATIENT MESSAGE (OUTPATIENT)
Dept: INTERNAL MEDICINE | Facility: CLINIC | Age: 72
End: 2022-03-17

## 2022-03-17 NOTE — TELEPHONE ENCOUNTER
From: Aydee Moore  To: ALVARO Echeverria  Sent: 3/17/2022 9:22 AM EDT  Subject: urine test results    should I stop taking this medicine (so far 2 tablets) or finish the script?

## 2022-03-19 LAB
BACTERIA UR CULT: ABNORMAL
BACTERIA UR CULT: ABNORMAL
OTHER ANTIBIOTIC SUSC ISLT: ABNORMAL

## 2022-03-21 ENCOUNTER — PATIENT MESSAGE (OUTPATIENT)
Dept: INTERNAL MEDICINE | Facility: CLINIC | Age: 72
End: 2022-03-21

## 2022-03-21 DIAGNOSIS — R15.9 INCONTINENCE OF FECES, UNSPECIFIED FECAL INCONTINENCE TYPE: Primary | ICD-10-CM

## 2022-03-22 NOTE — TELEPHONE ENCOUNTER
"From: Aydee Moore  To: ALVARO Echeverria  Sent: 3/21/2022 5:39 PM EDT  Subject: referral to Gi doc    based on the E. Faecalis in urine and what I showed my  and his response of \"get that taken care of\" please do schedule me to see a GI doc to stop this bowel leakage.  "

## 2022-05-02 ENCOUNTER — OFFICE VISIT (OUTPATIENT)
Dept: INTERNAL MEDICINE | Facility: CLINIC | Age: 72
End: 2022-05-02

## 2022-05-02 VITALS
HEIGHT: 65 IN | BODY MASS INDEX: 29.82 KG/M2 | OXYGEN SATURATION: 98 % | SYSTOLIC BLOOD PRESSURE: 128 MMHG | WEIGHT: 179 LBS | TEMPERATURE: 97.8 F | HEART RATE: 70 BPM | DIASTOLIC BLOOD PRESSURE: 72 MMHG

## 2022-05-02 DIAGNOSIS — B37.2 YEAST DERMATITIS: Primary | ICD-10-CM

## 2022-05-02 PROCEDURE — 99213 OFFICE O/P EST LOW 20 MIN: CPT | Performed by: FAMILY MEDICINE

## 2022-05-02 RX ORDER — NYSTATIN 100000 U/G
1 OINTMENT TOPICAL 2 TIMES DAILY
Qty: 30 G | Refills: 0 | Status: SHIPPED | OUTPATIENT
Start: 2022-05-02 | End: 2023-01-04 | Stop reason: SDUPTHER

## 2022-05-02 NOTE — PROGRESS NOTES
"    Chief Complaint  Vaginal Itching and bowel problem    Subjective    History of Present Illness {CC  Problem List  Visit  Diagnosis   Encounters  Notes  Medications  Labs  Result Review Imaging  Media :23}     Aydee Moore presents to [facility] for   History of Present Illness   73 yo female present for follow up visit. Pt states for several weeks she has had issues with bowel incontinence. She was treated for UTI, found to have blockage. Taking stool softener daily   She has had accident when away from her normal routine.   Notice some vaginal itching and irritation. Has tried monostat and preparation H which has not helped.     Social History     Socioeconomic History   • Marital status:    Tobacco Use   • Smoking status: Never Smoker   • Smokeless tobacco: Never Used   Vaping Use   • Vaping Use: Never used   Substance and Sexual Activity   • Alcohol use: Yes     Comment: social drinker no more than 1 drink/day if that   • Drug use: No   • Sexual activity: Never      Objective     Vital Signs:   /72 (BP Location: Left arm, Patient Position: Sitting, Cuff Size: Adult)   Pulse 70   Temp 97.8 °F (36.6 °C)   Ht 165.1 cm (65\")   Wt 81.2 kg (179 lb)   SpO2 98%   BMI 29.79 kg/m²      Physical Exam  Exam conducted with a chaperone present.   Constitutional:       General: She is not in acute distress.  HENT:      Head: Normocephalic.   Eyes:      Conjunctiva/sclera: Conjunctivae normal.   Genitourinary:     Labia:         Right: Rash present. No tenderness.         Left: Rash present. No tenderness.           Comments: Erythematous macules diffusely around labia external, non tender  Neurological:      Mental Status: She is alert.        Result Review  Data Reviewed:{ Labs  Result Review  Imaging  Med Tab  Media :23}   The following data was reviewed by: Jo Ann Erickson MD on 05/02/2022  Lab Results - Last 18 Months   Lab Units 02/21/22  0000 " 08/19/21  0922 06/16/21  1113 02/04/21  0931   BUN mg/dL 21 26* 25* 18   CREATININE mg/dL 1.02* 0.97 1.05* 0.99   EGFR IF NONAFRICN AM mL/min/1.73 56* 57* 52* 56*   EGFR IF AFRICN AM mL/min/1.73 65 69 63 68   SODIUM mmol/L 139 138 144 138   POTASSIUM mmol/L 4.3 4.6 4.7 4.5   CHLORIDE mmol/L 101 101 104 101   CALCIUM mg/dL 10.1 10.0 10.2 9.9   ALBUMIN g/dL 4.8 4.60 4.80 4.50   BILIRUBIN mg/dL 0.6 0.4 0.4 0.3   ALK PHOS IU/L 96 100 96 103   AST (SGOT) IU/L 23 29 16 18   ALT (SGPT) IU/L 23 19 12 10   CHOLESTEROL mg/dL 170 170  --  178   TRIGLYCERIDES mg/dL 78 75  --  88   HDL CHOL mg/dL 76 63*  --  63*   VLDL CHOLESTEROL SUSANNA mg/dL 15 14  --  16   LDL CHOL mg/dL 79 93  --  99   HEMOGLOBIN A1C %  --  4.90  --  5.10   WBC 10*3/mm3  --   --  5.55  --    RBC 10*6/mm3  --   --  4.64  --    HEMATOCRIT %  --   --  42.4  --    MCV fL  --   --  91.4  --    MCH pg  --   --  30.0  --    TSH uIU/mL 3.720 2.560  --  3.490              Current Outpatient Medications:   •  amLODIPine (NORVASC) 5 MG tablet, Take 1 tablet by mouth Daily., Disp: 90 tablet, Rfl: 3  •  Bromelain 100 MG tablet, Take  by mouth As Needed., Disp: , Rfl:   •  FIBER SELECT GUMMIES PO, Take  by mouth., Disp: , Rfl:   •  fluticasone (FLONASE) 50 MCG/ACT nasal spray, 2 sprays into the nostril(s) as directed by provider Daily. Administer 2 sprays in each nostril for each dose., Disp: 16 g, Rfl: 3  •  levothyroxine (Synthroid) 50 MCG tablet, Take 1 tablet by mouth Daily., Disp: 90 tablet, Rfl: 3  •  Magnesium 400 MG capsule, Take  by mouth., Disp: , Rfl:   •  rosuvastatin (CRESTOR) 5 MG tablet, Take 1 tablet by mouth Daily., Disp: 90 tablet, Rfl: 3  •  triamcinolone (KENALOG) 0.1 % cream, Apply  topically to the appropriate area as directed 2 (Two) Times a Day., Disp: 45 g, Rfl: 0  •  nystatin (MYCOSTATIN) 742636 UNIT/GM ointment, Apply 1 application topically to the appropriate area as directed 2 (Two) Times a Day., Disp: 30 g, Rfl: 0      Assessment and Plan {CC  Problem List  Visit Diagnosis  ROS  Review (Popup)  Health Maintenance  Quality  BestPractice  Medications  SmartSets  SnapShot Encounters  Media :23}   Problem List Items Addressed This Visit    None     Visit Diagnoses     Yeast dermatitis    -  Primary    Relevant Medications    nystatin (MYCOSTATIN) 188466 UNIT/GM ointment        Advise to use the ointment on affected area  Keep area clean and dry.   Referral placed previously to see the GI specialist for issues of incontinence.    Call if no improvement.     Keep appt with GI, will try to see if can move up appointment.           Follow Up   Return in about 3 months (around 8/2/2022).  Patient was given instructions and counseling regarding her condition or for health maintenance advice. Please see specific information pulled into the AVS if appropriate.    EpicAct:MR_WS_AMB_ORDERS,RunParams:STARTUPTYPE=FOLLOW    MR_WS_AMB_DISCHARGE

## 2022-06-16 RX ORDER — FLUTICASONE PROPIONATE 50 MCG
2 SPRAY, SUSPENSION (ML) NASAL DAILY
Qty: 16 G | Refills: 3 | Status: SHIPPED | OUTPATIENT
Start: 2022-06-16

## 2022-06-16 NOTE — TELEPHONE ENCOUNTER
Caller: [facility] - [place], [place] - [address] AT PORTAL TO REGISTERED [facility] - [phone] PH - [phone] FX    Relationship: Pharmacy    Best call back number: [phone]    Requested Prescriptions:   Requested Prescriptions     Pending Prescriptions Disp Refills   • fluticasone (FLONASE) 50 MCG/ACT nasal spray 16 g 3     Si sprays into the nostril(s) as directed by provider Daily. Administer 2 sprays in each nostril for each dose.        Pharmacy where request should be sent: [facility] - [place], [place] - [address] AT [address]AL TO [facility] - [phone]  - [phone] FX     Additional details provided by patient: PHARMACY NEEDS A NEW PRESCRIPTION REFLECTING THE CURRENT PROVIDERS NAME    Does the patient have less than a 3 day supply:  [x] Yes  [] No    Genna Samano Rep   22 10:16 EDT

## 2022-07-07 ENCOUNTER — TELEPHONE (OUTPATIENT)
Dept: GASTROENTEROLOGY | Facility: CLINIC | Age: 72
End: 2022-07-07

## 2022-07-07 ENCOUNTER — OFFICE VISIT (OUTPATIENT)
Dept: GASTROENTEROLOGY | Facility: CLINIC | Age: 72
End: 2022-07-07

## 2022-07-07 VITALS
SYSTOLIC BLOOD PRESSURE: 110 MMHG | HEIGHT: 66 IN | TEMPERATURE: 96.3 F | DIASTOLIC BLOOD PRESSURE: 70 MMHG | HEART RATE: 61 BPM | WEIGHT: 171.4 LBS | BODY MASS INDEX: 27.55 KG/M2 | OXYGEN SATURATION: 96 %

## 2022-07-07 DIAGNOSIS — K59.04 CHRONIC IDIOPATHIC CONSTIPATION: Primary | ICD-10-CM

## 2022-07-07 DIAGNOSIS — R12 HEARTBURN: ICD-10-CM

## 2022-07-07 DIAGNOSIS — R15.1 FECAL SMEARING: ICD-10-CM

## 2022-07-07 PROCEDURE — 99203 OFFICE O/P NEW LOW 30 MIN: CPT | Performed by: INTERNAL MEDICINE

## 2022-07-07 RX ORDER — DOCUSATE SODIUM 100 MG/1
100 CAPSULE, LIQUID FILLED ORAL DAILY
Qty: 90 CAPSULE | Refills: 3 | Status: SHIPPED | OUTPATIENT
Start: 2022-07-07 | End: 2023-01-04 | Stop reason: SDUPTHER

## 2022-07-07 NOTE — PATIENT INSTRUCTIONS
Continue colace daily  Start benefiber 1 tsp  Daily (can increase if needed)  Drink gatorade (few ounces prior to bedtime)  Use mylanta or gaviscon as needed for heartburn  Continue diet modification with small meals multiple times daily, decrease alcohol (no more than one drink daily), avoid excess caffeine

## 2022-07-07 NOTE — TELEPHONE ENCOUNTER
Message sent to Dr Rodriguez for 2-3 mo f/u appt. Pt is gone Sept and will be back on 10/10.  Pt does not want appt with PA.

## 2022-07-07 NOTE — PROGRESS NOTES
Subjective   Chief Complaint   Patient presents with   • Fecal Incontinence   • Constipation   • Abdominal Pain   • Nausea       Aydee Moore is a  72 y.o. female here for abdominal pain, constipation, fecal incontinence.  Pt is a former patient of Dr Melissa.      Patient reports history of right-sided abdominal pain that prompted an urgent care visit in March.  She had an x-ray at that time that showed constipation.  She started Colace and the pain resolved and she has been having more regular bowel movements usually once in the morning but she sometimes skips a day.  She does occasionally have fecal smearing which is small-volume but requires clean up.  This happens without warning.    She reports a history of throat burning which was previously diagnosed as acid reflux.  She was on a proton pump inhibitor for many years but stopped this due to concerns about side effects of the drug.  She is not having regular symptoms its fairly infrequent.  She does report that when she drinks wine or whiskey it seems to be worse.  She reports that she has 1-3 drinks per day.  She does try to modify her diet to reduce her symptoms.  She takes bicarbonate tablets for this.    She also complains of leg cramps.  For this she takes magnesium as well as drinks Gatorade.    Last c/s 6/21 - one adenoma  HPI  Past Medical History:   Diagnosis Date   • Allergic     Penicillins, tetracyclines, surgical tape; latex sensitivity   • Anxiety and depression    • Arthritis    • Cataract     rt eye surgery 10/24/2018   • Colon polyp Dr Melissa   • Elevated fasting glucose    • GERD (gastroesophageal reflux disease) Dr Melissa   • Headache    • Hyperlipidemia    • Hypothyroidism    • 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   • Obesity    • Urinary tract infection often michelle when away from home     Past Surgical History:   Procedure Laterality Date   • CATARACT EXTRACTION Right 10/24/2018   • COLONOSCOPY     • HYSTERECTOMY     • KNEE SURGERY Right 08/08/2017   •  TONSILLECTOMY         Current Outpatient Medications:   •  amLODIPine (NORVASC) 5 MG tablet, Take 1 tablet by mouth Daily., Disp: 90 tablet, Rfl: 3  •  Bromelain 100 MG tablet, Take  by mouth As Needed., Disp: , Rfl:   •  FIBER SELECT GUMMIES PO, Take  by mouth., Disp: , Rfl:   •  fluticasone (FLONASE) 50 MCG/ACT nasal spray, 2 sprays into the nostril(s) as directed by provider Daily. Administer 2 sprays in each nostril for each dose., Disp: 16 g, Rfl: 3  •  levothyroxine (Synthroid) 50 MCG tablet, Take 1 tablet by mouth Daily., Disp: 90 tablet, Rfl: 3  •  Magnesium 400 MG capsule, Take  by mouth., Disp: , Rfl:   •  nystatin (MYCOSTATIN) 730428 UNIT/GM ointment, Apply 1 application topically to the appropriate area as directed 2 (Two) Times a Day., Disp: 30 g, Rfl: 0  •  rosuvastatin (CRESTOR) 5 MG tablet, Take 1 tablet by mouth Daily., Disp: 90 tablet, Rfl: 3  •  triamcinolone (KENALOG) 0.1 % cream, Apply  topically to the appropriate area as directed 2 (Two) Times a Day., Disp: 45 g, Rfl: 0  •  docusate sodium (Colace) 100 MG capsule, Take 1 capsule by mouth Daily., Disp: 90 capsule, Rfl: 3  PRN Meds:.  Allergies   Allergen Reactions   • Tetracyclines & Related Rash     Rash and drowsiness   • Penicillins Rash     Social History     Socioeconomic History   • Marital status:    Tobacco Use   • Smoking status: Never Smoker   • Smokeless tobacco: Never Used   Vaping Use   • Vaping Use: Never used   Substance and Sexual Activity   • Alcohol use: Yes     Comment: social drinker no more than 1 drink/day if that   • Drug use: No   • Sexual activity: Never     Family History   Problem Relation Age of Onset   • Breast cancer Mother    • Colon cancer Mother    • Thyroid disease Mother    • Vision loss Mother    • Cancer Father         mesothelioma   • Diabetes Sister         twin   • Uterine cancer Sister    • Thyroid disease Sister    • Uterine cancer Sister         suicide 2016   • Early death Sister    • No Known  Problems Brother      Review of Systems   Constitutional: Negative for appetite change and unexpected weight change.   Gastrointestinal: Negative for abdominal pain, blood in stool and nausea.     Vitals:    07/07/22 0854   BP: 110/70   Pulse: 61   Temp: 96.3 °F (35.7 °C)   SpO2: 96%         07/07/22  0854   Weight: 77.7 kg (171 lb 6.4 oz)       Objective   Physical Exam  Constitutional:       Appearance: Normal appearance. She is well-developed.   HENT:      Head: Normocephalic and atraumatic.   Eyes:      General: No scleral icterus.     Conjunctiva/sclera: Conjunctivae normal.   Pulmonary:      Effort: Pulmonary effort is normal.   Abdominal:      General: There is no distension.      Palpations: Abdomen is soft.   Musculoskeletal:      Cervical back: Normal range of motion and neck supple.   Skin:     General: Skin is warm and dry.   Neurological:      Mental Status: She is alert.   Psychiatric:         Mood and Affect: Mood normal.         Behavior: Behavior normal.       No radiology results for the last 7 days    Assessment & Plan   Diagnoses and all orders for this visit:    Chronic idiopathic constipation    Fecal smearing    Heartburn    Other orders  -     docusate sodium (Colace) 100 MG capsule; Take 1 capsule by mouth Daily.      Plan:  · Continue colace daily  · Start benefiber 1 tsp  Daily (can increase if needed)  · Drink gatorade (few ounces prior to bedtime)  · Use mylanta or gaviscon as needed for heartburn  · Continue diet modification with small meals multiple times daily, decrease alcohol (no more than one drink daily), avoid excess caffeine  · If bowel movements and smearing do not improve with the addition of fiber supplementation, may need to eliminate magnesium which she uses for leg cramps

## 2022-07-11 NOTE — TELEPHONE ENCOUNTER
Call to pt.  States will be OOT 9/12 - 10/12.  Unfortunately cannot accept appt.     Message to  DR Rodriguez.

## 2022-08-22 ENCOUNTER — OFFICE VISIT (OUTPATIENT)
Dept: INTERNAL MEDICINE | Facility: CLINIC | Age: 72
End: 2022-08-22

## 2022-08-22 VITALS
SYSTOLIC BLOOD PRESSURE: 132 MMHG | TEMPERATURE: 98.7 F | BODY MASS INDEX: 29.66 KG/M2 | HEART RATE: 78 BPM | OXYGEN SATURATION: 98 % | WEIGHT: 178 LBS | DIASTOLIC BLOOD PRESSURE: 70 MMHG | HEIGHT: 65 IN

## 2022-08-22 DIAGNOSIS — I10 ESSENTIAL HYPERTENSION: ICD-10-CM

## 2022-08-22 DIAGNOSIS — J30.89 ENVIRONMENTAL AND SEASONAL ALLERGIES: ICD-10-CM

## 2022-08-22 DIAGNOSIS — F41.9 ANXIETY AND DEPRESSION: ICD-10-CM

## 2022-08-22 DIAGNOSIS — Z12.31 ENCOUNTER FOR SCREENING MAMMOGRAM FOR BREAST CANCER: ICD-10-CM

## 2022-08-22 DIAGNOSIS — F32.A ANXIETY AND DEPRESSION: ICD-10-CM

## 2022-08-22 DIAGNOSIS — Z78.0 POST-MENOPAUSAL: ICD-10-CM

## 2022-08-22 DIAGNOSIS — M25.559 HIP PAIN: ICD-10-CM

## 2022-08-22 DIAGNOSIS — Z91.81 AT MODERATE RISK FOR FALL: ICD-10-CM

## 2022-08-22 DIAGNOSIS — R21 RASH: ICD-10-CM

## 2022-08-22 DIAGNOSIS — Z00.00 MEDICARE ANNUAL WELLNESS VISIT, SUBSEQUENT: Primary | ICD-10-CM

## 2022-08-22 DIAGNOSIS — E03.9 HYPOTHYROIDISM, ADULT: ICD-10-CM

## 2022-08-22 DIAGNOSIS — E78.5 HYPERLIPIDEMIA, UNSPECIFIED HYPERLIPIDEMIA TYPE: ICD-10-CM

## 2022-08-22 PROCEDURE — G0439 PPPS, SUBSEQ VISIT: HCPCS | Performed by: FAMILY MEDICINE

## 2022-08-22 PROCEDURE — 1125F AMNT PAIN NOTED PAIN PRSNT: CPT | Performed by: FAMILY MEDICINE

## 2022-08-22 PROCEDURE — 1170F FXNL STATUS ASSESSED: CPT | Performed by: FAMILY MEDICINE

## 2022-08-22 PROCEDURE — 1159F MED LIST DOCD IN RCRD: CPT | Performed by: FAMILY MEDICINE

## 2022-08-22 RX ORDER — DULOXETIN HYDROCHLORIDE 20 MG/1
20 CAPSULE, DELAYED RELEASE ORAL DAILY
Qty: 30 CAPSULE | Refills: 3 | Status: SHIPPED | OUTPATIENT
Start: 2022-08-22 | End: 2022-12-01

## 2022-08-22 NOTE — ASSESSMENT & PLAN NOTE
Chronic, stable   Monitor TSH  Continue current medication adjust as needed based upon lab report.

## 2022-08-22 NOTE — ASSESSMENT & PLAN NOTE
Patient's depression is chronic, stable for years   Recently begin to feel depressed  Used prozac in the past  Advise try duloxetine as alternative help with pain also

## 2022-08-22 NOTE — ASSESSMENT & PLAN NOTE
Lipid abnormalities are chronic, stable.  continue monitor diet, low fat, high fiber return when fasting for lipid panel  Lipids will be reassessed in 6 months.

## 2022-08-22 NOTE — ASSESSMENT & PLAN NOTE
Hypertension is chronic stable.  Continue current treatment regimen.  Dietary sodium restriction.  Continue current medications.  Ambulatory blood pressure monitoring.  Blood pressure will be reassessed at the next regular appointment.

## 2022-08-22 NOTE — PATIENT INSTRUCTIONS
Advance Care Planning and Advance Directives     You make decisions on a daily basis - decisions about where you want to live, your career, your home, your life. Perhaps one of the most important decisions you face is your choice for future medical care. Take time to talk with your family and your healthcare team and start planning today.  Advance Care Planning is a process that can help you:  · Understand possible future healthcare decisions in light of your own experiences  · Reflect on those decision in light of your goals and values  · Discuss your decisions with those closest to you and the healthcare professionals that care for you  · Make a plan by creating a document that reflects your wishes    Surrogate Decision Maker  In the event of a medical emergency, which has left you unable to communicate or to make your own decisions, you would need someone to make decisions for you.  It is important to discuss your preferences for medical treatment with this person while you are in good health.     Qualities of a surrogate decision maker:  • Willing to take on this role and responsibility  • Knows what you want for future medical care  • Willing to follow your wishes even if they don't agree with them  • Able to make difficult medical decisions under stressful circumstances    Advance Directives  These are legal documents you can create that will guide your healthcare team and decision maker(s) when needed. These documents can be stored in the electronic medical record.    · Living Will - a legal document to guide your care if you have a terminal condition or a serious illness and are unable to communicate. States vary by statute in document names/types, but most forms may include one or more of the following:        -  Directions regarding life-prolonging treatments        -  Directions regarding artificially provided nutrition/hydration        -  Choosing a healthcare decision maker        -  Direction  regarding organ/tissue donation    · Durable Power of  for Healthcare - this document names an -in-fact to make medical decisions for you, but it may also allow this person to make personal and financial decisions for you. Please seek the advice of an  if you need this type of document.    **Advance Directives are not required and no one may discriminate against you if you do not sign one.    Medical Orders  Many states allow specific forms/orders signed by your physician to record your wishes for medical treatment in your current state of health. This form, signed in personal communication with your physician, addresses resuscitation and other medical interventions that you may or may not want.      For more information or to schedule a time with a [facility] Advance Care Planning Facilitator contact: [facility] or call [phone] and someone will contact you directly.    Fall Prevention in the Home, Adult  Falls can cause injuries and can affect people from all age groups. There are many simple things that you can do to make your home safe and to help prevent falls. Ask for help when making these changes, if needed.  What actions can I take to prevent falls?  General instructions  · Use good lighting in all rooms. Replace any light bulbs that burn out.  · Turn on lights if it is dark. Use night-lights.  · Place frequently used items in easy-to-reach places. Lower the shelves around your home if necessary.  · Set up furniture so that there are clear paths around it. Avoid moving your furniture around.  · Remove throw rugs and other tripping hazards from the floor.  · Avoid walking on wet floors.  · Fix any uneven floor surfaces.  · Add color or contrast paint or tape to grab bars and handrails in your home. Place contrasting color strips on the first and last steps of stairways.  · When you use a stepladder, make sure that it is completely opened and that the sides are  firmly locked. Have someone hold the ladder while you are using it. Do not climb a closed stepladder.  · Be aware of any and all pets.  What can I do in the bathroom?         · Keep the floor dry. Immediately clean up any water that spills onto the floor.  · Remove soap buildup in the tub or shower on a regular basis.  · Use non-skid mats or decals on the floor of the tub or shower.  · Attach bath mats securely with double-sided, non-slip rug tape.  · If you need to sit down while you are in the shower, use a plastic, non-slip stool.  · Install grab bars by the toilet and in the tub and shower. Do not use towel bars as grab bars.  What can I do in the bedroom?  · Make sure that a bedside light is easy to reach.  · Do not use oversized bedding that drapes onto the floor.  · Have a firm chair that has side arms to use for getting dressed.  What can I do in the kitchen?  · Clean up any spills right away.  · If you need to reach for something above you, use a sturdy step stool that has a grab bar.  · Keep electrical cables out of the way.  · Do not use floor polish or wax that makes floors slippery. If you must use wax, make sure that it is non-skid floor wax.  What can I do in the stairways?  · Do not leave any items on the stairs.  · Make sure that you have a light switch at the top of the stairs and the bottom of the stairs. Have them installed if you do not have them.  · Make sure that there are handrails on both sides of the stairs. Fix handrails that are broken or loose. Make sure that handrails are as long as the stairways.  · Install non-slip stair treads on all stairs in your home.  · Avoid having throw rugs at the top or bottom of stairways, or secure the rugs with carpet tape to prevent them from moving.  · Choose a carpet design that does not hide the edge of steps on the stairway.  · Check any carpeting to make sure that it is firmly attached to the stairs. Fix any carpet that is loose or worn.  What can I  do on the outside of my home?  · Use bright outdoor lighting.  · Regularly repair the edges of walkways and driveways and fix any cracks.  · Remove high doorway thresholds.  · Trim any shrubbery on the main path into your home.  · Regularly check that handrails are securely fastened and in good repair. Both sides of any steps should have handrails.  · Install guardrails along the edges of any raised decks or porches.  · Clear walkways of debris and clutter, including tools and rocks.  · Have leaves, snow, and ice cleared regularly.  · Use sand or salt on walkways during winter months.  · In the garage, clean up any spills right away, including grease or oil spills.  What other actions can I take?  · Wear closed-toe shoes that fit well and support your feet. Wear shoes that have rubber soles or low heels.  · Use mobility aids as needed, such as canes, walkers, scooters, and crutches.  · Review your medicines with your health care provider. Some medicines can cause dizziness or changes in blood pressure, which increase your risk of falling.  Talk with your health care provider about other ways that you can decrease your risk of falls. This may include working with a physical therapist or  to improve your strength, balance, and endurance.  Where to find more information  · Centers for Disease Control and Prevention, DAINAADI: https://www.cdc.gov  · National [place] [employer]: https://vo8lpdm.bassam.nih.gov  Contact a health care provider if:  · You are afraid of falling at home.  · You feel weak, drowsy, or dizzy at home.  · You fall at home.  Summary  · There are many simple things that you can do to make your home safe and to help prevent falls.  · Ways to make your home safe include removing tripping hazards and installing grab bars in the bathroom.  · Ask for help when making these changes in your home.  This information is not intended to replace advice given to you by your health care provider. Make sure you  discuss any questions you have with your health care provider.  Document Revised: 11/30/2018 Document Reviewed: 08/02/2018  Elsevier Patient Education © 2021 [employer]      Sit-to-Stand Exercise    The sit-to-stand exercise (also known as the chair stand or chair rise exercise) strengthens your lower body and helps you maintain or improve your mobility and independence. The goal is to do the sit-to-stand exercise without using your hands. This will be easier as you become stronger. You should always talk with your health care provider before starting any exercise program, especially if you have had recent surgery.  Do the exercise exactly as told by your health care provider and adjust it as directed. It is normal to feel mild stretching, pulling, tightness, or discomfort as you do this exercise, but you should stop right away if you feel sudden pain or your pain gets worse. Do not begin doing this exercise until told by your health care provider.  What the sit-to-stand exercise does  The sit-to-stand exercise helps to strengthen the muscles in your thighs and the muscles in the center of your body that give you stability (core muscles). This exercise is especially helpful if:  · You have had knee or hip surgery.  · You have trouble getting up from a chair, out of a car, or off the toilet.  How to do the sit-to-stand exercise  1. Sit toward the front edge of a sturdy chair without armrests. Your knees should be bent and your feet should be flat on the floor and shoulder-width apart.  2. Place your hands lightly on each side of the seat. Keep your back and neck as straight as possible, with your chest slightly forward.  3. Breathe in slowly. Lean forward and slightly shift your weight to the front of your feet.  4. Breathe out as you slowly stand up. Use your hands as little as possible.  5. Stand and pause for a full breath in and out.  6. Breathe in as you sit down slowly. Tighten your core and abdominal  muscles to control your lowering as much as possible.  7. Breathe out slowly.  8. Do this exercise 10-15 times. If needed, do it fewer times until you build up strength.  9. Rest for 1 minute, then do another set of 10-15 repetitions.  To change the difficulty of the sit-to-stand exercise  · If the exercise is too difficult, use a chair with sturdy armrests, and push off the armrests to help you come to the standing position. You can also use the armrests to help slowly lower yourself back to sitting. As this gets easier, try to use your arms less. You can also place a firm cushion or pillow on the chair to make the surface higher.  · If this exercise is too easy, do not use your arms to help raise or lower yourself. You can also wear a weighted vest, use hand weights, increase your repetitions, or try a lower chair.  General tips  · You may feel tired when starting an exercise routine. This is normal.  · You may have muscle soreness that lasts a few days. This is normal. As you get stronger, you may not feel muscle soreness.  · Use smooth, steady movements.  · Do not  hold your breath during strength exercises. This can cause unsafe changes in your blood pressure.  · Breathe in slowly through your nose, and breathe out slowly through your mouth.  Summary  · Strengthening your lower body is an important step to help you move safely and independently.  · The sit-to-stand exercise helps strengthen the muscles in your thighs and core.  · You should always talk with your health care provider before starting any exercise program, especially if you have had recent surgery.  This information is not intended to replace advice given to you by your health care provider. Make sure you discuss any questions you have with your health care provider.  Document Revised: 10/16/2019 Document Reviewed: 02/08/2018  Elsevier Patient Education © 2021 [employer]    You are due for Shingrix vaccination series ( the newest shingles  vaccine).  It is a two shot series spaced 2-6 months apart. Please get this vaccine series started at your earliest convenience at your local pharmacy to help avoid shingles outbreak. It is more effective than the old Zostavax vaccine and is recommended even if you have had the Zostavax vaccine in the past.  Once the Shingrix series is completed, it does not need to be repeated.   For more information, please look at the website below:  [facility] [place] Shingrix Vaccine Information      Medicare Wellness  Personal Prevention Plan of Service     Date of Office Visit:    Encounter Provider:  Jo Ann Erickson MD  Place of Service:  [facility]  Patient Name: Aydee Moore  :  1950    As part of the Medicare Wellness portion of your visit today, we are providing you with this personalized preventive plan of services (PPPS). This plan is based upon recommendations of the [place] Preventive Services Task Force (USPSTF) and the Advisory Committee on Immunization Practices (ACIP).    This lists the preventive care services that should be considered, and provides dates of when you are due. Items listed as completed are up-to-date and do not require any further intervention.    Health Maintenance   Topic Date Due   • ZOSTER VACCINE (2 of 2) 2019   • DXA SCAN  2021   • MAMMOGRAM  2022   • INFLUENZA VACCINE  10/01/2022   • LIPID PANEL  2023   • ANNUAL WELLNESS VISIT  2023   • TDAP/TD VACCINES (2 - Td or Tdap) 2029   • COLORECTAL CANCER SCREENING  2031   • HEPATITIS C SCREENING  Completed   • COVID-19 Vaccine  Completed   • Pneumococcal Vaccine 65+  Completed   • PAP SMEAR  Discontinued       Orders Placed This Encounter   Procedures   • Mammo Screening, Bilateral with CAD (Annually)     Use HCPCS/CPT Code 83154     Standing Status:   Future     Standing Expiration Date:   2023     Order Specific Question:   Reason for Exam:     Answer:   screen for  breast cancer   • DEXA Bone Density, Axial (Hospital)     Standing Status:   Future     Standing Expiration Date:   8/22/2023     Order Specific Question:   Is patient taking or have taken long term Glucocorticoid (steroids)?     Answer:   No     Order Specific Question:   Does the patient have rheumatoid arthritis?     Answer:   No     Order Specific Question:   Does the patient have secondary osteoporosis?     Answer:   No     Order Specific Question:   Reason for Exam:     Answer:   post menopausal     Order Specific Question:   Release to patient     Answer:   Routine Release       No follow-ups on file.

## 2022-08-22 NOTE — ASSESSMENT & PLAN NOTE
R hip erythematous papule  Present for last week   Advise to use hydrocortisone cream for next few weeks.

## 2022-08-22 NOTE — ASSESSMENT & PLAN NOTE
Chronic   Seen orthopedic did not follow up does not think surgery indicated  Taking tylenol as needed   PT ordered today   monitor follow up in a few months  Advise to follow back up with orthopedic also.

## 2022-08-22 NOTE — PROGRESS NOTES
The ABCs of the Annual Wellness Visit  Subsequent Medicare Wellness Visit    Chief Complaint   Patient presents with   • Medicare Wellness-subsequent      Subjective    History of Present Illness:  Aydee Moore is a 72 y.o. female who presents for a Subsequent Medicare Wellness Visit. She is havig increase depression, not sleeping well due to  alarm. She is having pain in L hip.  Seen by Dr. Sher earlier this year, advise to see another specialist.      The following portions of the patient's history were reviewed and   updated as appropriate: allergies, current medications, past family history, past medical history, past social history, past surgical history and problem list    Compared to one year ago, the patient feels her physical   health is the same. Feel the hip pain is worse      Compared to one year ago, the patient feels her mental   health is worse. Increase depression recently     Recent Hospitalizations:  She was not admitted to the hospital during the last year.       Current Medical Providers:  Patient Care Team:  Jo Ann Erickson MD as PCP - General (Family Medicine)    Outpatient Medications Prior to Visit   Medication Sig Dispense Refill   • amLODIPine (NORVASC) 5 MG tablet Take 1 tablet by mouth Daily. 90 tablet 3   • Bromelain 100 MG tablet Take  by mouth As Needed.     • docusate sodium (Colace) 100 MG capsule Take 1 capsule by mouth Daily. 90 capsule 3   • FIBER SELECT GUMMIES PO Take  by mouth.     • fluticasone (FLONASE) 50 MCG/ACT nasal spray 2 sprays into the nostril(s) as directed by provider Daily. Administer 2 sprays in each nostril for each dose. 16 g 3   • levothyroxine (Synthroid) 50 MCG tablet Take 1 tablet by mouth Daily. 90 tablet 3   • Magnesium 400 MG capsule Take  by mouth.     • nystatin (MYCOSTATIN) 097038 UNIT/GM ointment Apply 1 application topically to the appropriate area as directed 2 (Two) Times a Day. 30 g 0   • rosuvastatin (CRESTOR) 5 MG tablet Take 1  "tablet by mouth Daily. 90 tablet 3   • triamcinolone (KENALOG) 0.1 % cream Apply  topically to the appropriate area as directed 2 (Two) Times a Day. 45 g 0     No facility-administered medications prior to visit.       No opioid medication identified on active medication list. I have reviewed chart for other potential  high risk medication/s and harmful drug interactions in the elderly.          Aspirin is not on active medication list.  Aspirin use is not indicated based on review of current medical condition/s. Risk of harm outweighs potential benefits.  .    Patient Active Problem List   Diagnosis   • Hyperlipidemia   • Elevated fasting glucose   • Anxiety and depression   • Hypothyroidism, adult   • Wasp sting   • Essential hypertension   • Acute cystitis without hematuria   • Rash   • Environmental and seasonal allergies   • Hip pain     Advance Care Planning  Advance Directive is not on file.  ACP discussion was held with the patient during this visit. Patient has an advance directive (not in EMR), copy requested.    Review of Systems   Constitutional: Negative for chills and fever.   HENT: Positive for hearing loss, rhinorrhea and sneezing. Negative for congestion.    Eyes: Negative for visual disturbance.   Respiratory: Negative for shortness of breath.    Cardiovascular: Negative for chest pain.   Gastrointestinal: Positive for constipation. Negative for abdominal pain, diarrhea, nausea and vomiting.   Genitourinary: Negative for difficulty urinating, vaginal bleeding and vaginal discharge.   Musculoskeletal: Positive for arthralgias.        Hip pain   Neurological: Negative for dizziness.   Psychiatric/Behavioral: Positive for dysphoric mood and sleep disturbance.        Objective    Vitals:    08/22/22 1036   BP: 132/70   Pulse: 78   Temp: 98.7 °F (37.1 °C)   SpO2: 98%   Weight: 80.7 kg (178 lb)   Height: 165.1 cm (65\")   PainSc: 4  Comment: hip pain     Estimated body mass index is 29.62 kg/m² as " "calculated from the following:    Height as of this encounter: 165.1 cm (65\").    Weight as of this encounter: 80.7 kg (178 lb).    BMI is >= 25 and <30. (Overweight) The following options were offered after discussion;: monitor diet, low fat high fiber, exercise 30 min a day      Does the patient have evidence of cognitive impairment? No    Physical Exam  Constitutional:       General: She is not in acute distress.     Appearance: She is not ill-appearing.   HENT:      Head: Normocephalic.      Right Ear: Tympanic membrane normal.      Left Ear: Tympanic membrane normal.      Mouth/Throat:      Mouth: Mucous membranes are moist.      Pharynx: No oropharyngeal exudate or posterior oropharyngeal erythema.   Eyes:      Conjunctiva/sclera: Conjunctivae normal.   Cardiovascular:      Rate and Rhythm: Regular rhythm.      Heart sounds: Normal heart sounds.   Pulmonary:      Effort: No respiratory distress.      Breath sounds: Normal breath sounds. No wheezing.   Abdominal:      General: There is no distension.      Palpations: Abdomen is soft.      Tenderness: There is no abdominal tenderness.   Musculoskeletal:      Right lower leg: No edema.      Left lower leg: No edema.   Neurological:      Mental Status: She is alert and oriented to person, place, and time.      Motor: No weakness.                 HEALTH RISK ASSESSMENT    Smoking Status:  Social History     Tobacco Use   Smoking Status Never Smoker   Smokeless Tobacco Never Used     Alcohol Consumption:  Social History     Substance and Sexual Activity   Alcohol Use Yes    Comment: social drinker no more than 1 drink/day if that     Fall Risk Screen:    MARIZA Fall Risk Assessment was completed, and patient is at MODERATE risk for falls. Assessment completed on:8/22/2022    Depression Screening:  PHQ-2/PHQ-9 Depression Screening 8/22/2022   Retired PHQ-9 Total Score -   Retired Total Score -   Little Interest or Pleasure in Doing Things 1-->several days   Feeling " Down, Depressed or Hopeless 1-->several days   Trouble Falling or Staying Asleep, or Sleeping Too Much 1-->several days   Feeling Tired or Having Little Energy 0-->not at all   Poor Appetite or Overeating 1-->several days   Feeling Bad about Yourself - or that You are a Failure or Have Let Yourself or Your Family Down 0-->not at all   Trouble Concentrating on Things, Such as Reading the Newspaper or Watching Television 1-->several days   Moving or Speaking So Slowly that Other People Could Have Noticed? Or the Opposite - Being So Fidgety 0-->not at all   Thoughts that You Would be Better Off Dead or of Hurting Yourself in Some Way 1-->several days   PHQ-9: Brief Depression Severity Measure Score 6   If You Checked Off Any Problems, How Difficult Have These Problems Made It For You to Do Your Work, Take Care of Things at Home, or Get Along with Other People? somewhat difficult       Health Habits and Functional and Cognitive Screening:  Functional & Cognitive Status 8/19/2021   Do you have difficulty preparing food and eating? No   Do you have difficulty bathing yourself, getting dressed or grooming yourself? No   Do you have difficulty using the toilet? No   Do you have difficulty moving around from place to place? No   Do you have trouble with steps or getting out of a bed or a chair? No   Current Diet Well Balanced Diet   Dental Exam Up to date   Eye Exam Up to date   Exercise (times per week) 7 times per week   Current Exercises Include Hiking;Walking   Current Exercise Activities Include -   Do you need help using the phone?  No   Are you deaf or do you have serious difficulty hearing?  No   Do you need help with transportation? No   Do you need help shopping? No   Do you need help preparing meals?  No   Do you need help with housework?  No   Do you need help with laundry? No   Do you need help taking your medications? No   Do you need help managing money? No   Do you ever drive or ride in a car without wearing  a seat belt? No   Have you felt unusual stress, anger or loneliness in the last month? Yes   Who do you live with? Spouse   If you need help, do you have trouble finding someone available to you? No   Have you been bothered in the last four weeks by sexual problems? No   Do you have difficulty concentrating, remembering or making decisions? No       Age-appropriate Screening Schedule:  Refer to the list below for future screening recommendations based on patient's age, sex and/or medical conditions. Orders for these recommended tests are listed in the plan section. The patient has been provided with a written plan.    Health Maintenance   Topic Date Due   • ZOSTER VACCINE (2 of 2) 11/18/2019   • DXA SCAN  08/30/2021   • MAMMOGRAM  01/07/2022   • INFLUENZA VACCINE  10/01/2022   • LIPID PANEL  02/21/2023   • TDAP/TD VACCINES (2 - Td or Tdap) 09/23/2029   • PAP SMEAR  Discontinued              Assessment & Plan   CMS Preventative Services Quick Reference  Risk Factors Identified During Encounter  Immunizations Discussed/Encouraged (specific Immunizations; Shingrix  The above risks/problems have been discussed with the patient.  Follow up actions/plans if indicated are seen below in the Assessment/Plan Section.  Pertinent information has been shared with the patient in the After Visit Summary.    Diagnoses and all orders for this visit:    1. Medicare annual wellness visit, subsequent (Primary)    2. Post-menopausal  -     DEXA Bone Density, Axial (Hospital); Future    3. Encounter for screening mammogram for breast cancer  -     Mammo Screening, Bilateral with CAD (Annually); Future    4. At moderate risk for fall    5. Rash  Assessment & Plan:  R hip erythematous papule  Present for last week   Advise to use hydrocortisone cream for next few weeks.       6. Environmental and seasonal allergies  Assessment & Plan:  Symptoms worsen recently   Advise to use flonase 2 spray each nostril.       7. Anxiety and  depression  Assessment & Plan:  Patient's depression is chronic, stable for years   Recently begin to feel depressed  Used prozac in the past  Advise try duloxetine as alternative help with pain also     Orders:  -     DULoxetine (CYMBALTA) 20 MG capsule; Take 1 capsule by mouth Daily.  Dispense: 30 capsule; Refill: 3    8. Hyperlipidemia, unspecified hyperlipidemia type  Assessment & Plan:  Lipid abnormalities are chronic, stable.  continue monitor diet, low fat, high fiber return when fasting for lipid panel  Lipids will be reassessed in 6 months.    Orders:  -     Lipid panel; Future    9. Essential hypertension  Assessment & Plan:  Hypertension is chronic stable.  Continue current treatment regimen.  Dietary sodium restriction.  Continue current medications.  Ambulatory blood pressure monitoring.  Blood pressure will be reassessed at the next regular appointment.    Orders:  -     Comprehensive metabolic panel; Future    10. Hypothyroidism, adult  Assessment & Plan:  Chronic, stable   Monitor TSH  Continue current medication adjust as needed based upon lab report.     Orders:  -     TSH; Future  -     T4, free; Future    11. Hip pain  Assessment & Plan:  Chronic   Seen orthopedic did not follow up does not think surgery indicated  Taking tylenol as needed   PT ordered today   monitor follow up in a few months  Advise to follow back up with orthopedic also.     Orders:  -     Ambulatory Referral to Physical Therapy Evaluate and treat      Follow Up:   Return in about 3 months (around 11/22/2022) for Recheck hip pain and depression.     An After Visit Summary and PPPS were made available to the patient.

## 2022-08-24 ENCOUNTER — TRANSCRIBE ORDERS (OUTPATIENT)
Dept: ADMINISTRATIVE | Facility: HOSPITAL | Age: 72
End: 2022-08-24

## 2022-08-24 DIAGNOSIS — Z12.31 SCREENING MAMMOGRAM FOR BREAST CANCER: Primary | ICD-10-CM

## 2022-10-13 ENCOUNTER — HOSPITAL ENCOUNTER (OUTPATIENT)
Dept: GENERAL RADIOLOGY | Facility: HOSPITAL | Age: 72
Discharge: HOME | End: 2022-10-13
Admitting: FAMILY MEDICINE

## 2022-10-13 ENCOUNTER — OFFICE VISIT (OUTPATIENT)
Dept: INTERNAL MEDICINE | Facility: CLINIC | Age: 72
End: 2022-10-13

## 2022-10-13 VITALS
WEIGHT: 176 LBS | BODY MASS INDEX: 29.32 KG/M2 | SYSTOLIC BLOOD PRESSURE: 142 MMHG | TEMPERATURE: 97.9 F | OXYGEN SATURATION: 96 % | HEIGHT: 65 IN | DIASTOLIC BLOOD PRESSURE: 90 MMHG | HEART RATE: 76 BPM

## 2022-10-13 DIAGNOSIS — M79.644 FINGER PAIN, RIGHT: ICD-10-CM

## 2022-10-13 DIAGNOSIS — M79.644 FINGER PAIN, RIGHT: Primary | ICD-10-CM

## 2022-10-13 DIAGNOSIS — Z23 NEED FOR INFLUENZA VACCINATION: ICD-10-CM

## 2022-10-13 PROCEDURE — 99213 OFFICE O/P EST LOW 20 MIN: CPT | Performed by: FAMILY MEDICINE

## 2022-10-13 PROCEDURE — G0008 ADMIN INFLUENZA VIRUS VAC: HCPCS | Performed by: FAMILY MEDICINE

## 2022-10-13 PROCEDURE — 90662 IIV NO PRSV INCREASED AG IM: CPT | Performed by: FAMILY MEDICINE

## 2022-10-13 PROCEDURE — 73130 X-RAY EXAM OF HAND: CPT
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
## 2022-10-14 ENCOUNTER — TELEPHONE (OUTPATIENT)
Dept: INTERNAL MEDICINE | Facility: CLINIC | Age: 72
End: 2022-10-14

## 2022-10-14 DIAGNOSIS — S62.632A CLOSED DISPLACED FRACTURE OF DISTAL PHALANX OF RIGHT MIDDLE FINGER, INITIAL ENCOUNTER: Primary | ICD-10-CM

## 2022-10-14 DIAGNOSIS — S62.662A CLOSED NONDISPLACED FRACTURE OF DISTAL PHALANX OF RIGHT MIDDLE FINGER, INITIAL ENCOUNTER: Primary | ICD-10-CM

## 2022-10-14 RX ORDER — SULFAMETHOXAZOLE AND TRIMETHOPRIM 800; 160 MG/1; MG/1
1 TABLET ORAL 2 TIMES DAILY
Qty: 10 TABLET | Refills: 0 | Status: SHIPPED | OUTPATIENT
Start: 2022-10-14 | End: 2023-01-04

## 2022-10-14 NOTE — TELEPHONE ENCOUNTER
PATIENT CALLED AND STATES SHE WAS SEEN YESTERDAY AND HAD XRAYS DONE OF RIGHT MIDDLE FINGER AND RING FINGER. SHE STATES THEY ARE WORSE TODAY AND MAY BE INFECTED. FINGERS ARE RED AND SWELLING AND EXPERIENCING NAUSEA    PLEASE CALL [phone]    [facility] [id number] - [place], [place] - [address] [facility] - [phone]  - [phone]   [phone]

## 2022-10-14 NOTE — TELEPHONE ENCOUNTER
I spoke to patient advise order placed to see hand specialist for further evaluation.  Advise to take the extra strength tylenol every 8 hours, ice, limit use hand. Please call advise of appointment details. Advise patient it is a Friday I am unsure when she will see the hand specialist I will send antibiotic to take until further evaluation.     no

## 2022-10-17 ENCOUNTER — TREATMENT (OUTPATIENT)
Dept: PHYSICAL THERAPY | Facility: CLINIC | Age: 72
End: 2022-10-17

## 2022-10-17 DIAGNOSIS — M25.559 PAIN, HIP: Primary | ICD-10-CM

## 2022-10-17 DIAGNOSIS — M25.552 LEFT HIP PAIN: ICD-10-CM

## 2022-10-17 PROCEDURE — 97110 THERAPEUTIC EXERCISES: CPT | Performed by: PHYSICAL THERAPIST

## 2022-10-17 PROCEDURE — 97161 PT EVAL LOW COMPLEX 20 MIN: CPT | Performed by: PHYSICAL THERAPIST

## 2022-10-17 NOTE — PROGRESS NOTES
Physical Therapy Initial Evaluation and Plan of Care  [address], [address]  [place] [address]    Patient: Aydee Moore   : 1950  Diagnosis/ICD-10 Code:  Pain, hip [M25.559]  Referring practitioner: Jo Ann Erickson MD  Date of Initial Visit: 10/17/2022  Today's Date: 10/17/2022  Patient seen for 1 session         Visit Diagnoses:    ICD-10-CM ICD-9-CM   1. Pain, hip  M25.559 719.45         Subjective Questionnaire: LEFS: 16      Subjective Evaluation    History of Present Illness  Mechanism of injury: Patent reports that the hip has bothered her for about a year.  Patient had X-rays in late late December and was scheduled for an appt with an ortho MD for ALMA.  Patient saw another ortho MD in the early part of this year and he did not recommend a ALMA.  No prior PT for the left hip.    Right knee meniscus repair.  History of PT for balance issues.      Patient Occupation: Retired Pain  Current pain ratin  Pain scale at highest: gets so bad it wakes her up at night.  Location: left lateral hip extending down the left LE to the toes  Quality: unable to describe it.  Alleviating factors: a long walk.  Exacerbated by: sitting at night, increase pain at night, walking up to an hour but after the hour it gets better.  Symptom course: up and down.             Objective          Postural Observations    Additional Postural Observation Details  Normal sit to stand.  Patient ambulates with a min to no antalgic gait pattern without the use of an AD.    Palpation     Additional Palpation Details  No TTP to the lumbar spine.  TTP to the left greater trochanter and left lateral thigh.    Active Range of Motion     Lumbar   Flexion: Active lumbar flexion: WNL.   Extension: Active lumbar extension: WNL.   Left lateral flexion: Active left lumbar lateral flexion: WNL.   Right lateral flexion: Active right lumbar lateral flexion: WNL.   Left Hip   Flexion: Left hip active flexion: WNL.   Abduction:  Active abduction with hip flexed: WNL.     Strength/Myotome Testing     Left Hip   Planes of Motion   Flexion: 4+  Abduction: 4+  Adduction: 4+    Right Hip   Planes of Motion   Flexion: 4+  Abduction: 4+  Adduction: 4+    Left Ankle/Foot   Dorsiflexion: 5    Right Ankle/Foot   Dorsiflexion: 5    Tests     Additional Tests Details  + SOFIYA on the left.          Assessment & Plan     Assessment  Impairments: abnormal gait, activity intolerance, lacks appropriate home exercise program and pain with function    Assessment details: Patient presents with c/o pain, TTP and positive special testing which is limiting her ability to perform ADL'S.    Barriers to therapy: none  Prognosis: good  Prognosis details: STG's to be met by 2 weeks  1)  Independent with HEP  2)  Decrease pain by 50% or more  3)  Min to no TTP present    LTG's to be met by 4 weeks  1)  Independent with HEP progression  2)  Decrease pain by 75% or more  3)  Negative special testing  4)  No TTP present  5)  Patient to ambulate with a normal gait pattern  6)  Patient to perform ADL'S without limitations     Plan  Therapy options: will be seen for skilled therapy services  Planned therapy interventions: strengthening, stretching, therapeutic activities, home exercise program and gait training  Frequency: 2x week  Duration in weeks: 4  Treatment plan discussed with: patient            Timed:         Manual Therapy:    0     mins  78596;     Therapeutic Exercise:    23     mins  29186;     Neuromuscular Enedelia:    0    mins  67588;    Therapeutic Activity:     0     mins  23305;     Gait Trainin     mins  03971;     Ultrasound:     0     mins  85197;          Un-Timed:  Electrical Stimulation:    0     mins  99896 ( );    Low Eval     15     Mins  04944  Mod Eval     0     Mins  25303  High Eval                       0     Mins  80355        Timed Treatment:   23   mins   Total Treatment:     38   mins          PT: Jerson Soliman, PT      [place] License [id number]  Electronically signed by Jerson Soliman, PT, 10/17/22, 10:00 AM EDT    Certification Period: 10/17/2022 thru 1/14/2023  I certify that the therapy services are furnished while this patient is under my care.  The services outlined above are required by this patient, and will be reviewed every 90 days.    Jo Ann Erickson Md  [address] [place] [address]   NPI: [id number]      Jerson Soliman, PT   License number: [id number]        Physician Signature:__________________________________________________    PHYSICIAN: Jo Ann Erickson MD      DATE:     Please sign and return via fax to [email or web address] . Thank you, [facility].

## 2022-11-08 ENCOUNTER — APPOINTMENT (OUTPATIENT)
Dept: MAMMOGRAPHY | Facility: HOSPITAL | Age: 72
End: 2022-11-08

## 2022-11-16 ENCOUNTER — DOCUMENTATION (OUTPATIENT)
Dept: PHYSICAL THERAPY | Facility: CLINIC | Age: 72
End: 2022-11-16

## 2022-11-16 NOTE — PROGRESS NOTES
Discharge Summary  Discharge Summary from Physical Therapy Report  [address], [address]  [place] [address]    Patient Information  Aydee Moore  1950    Dates  PT visit: 10/17/22  Number of Visits: 1     Discharge Status of Patient: See eval    Goals: Not Met    Visit Diagnoses:  No diagnosis found.    Discharge Plan: Continue with current home exercise program as instructed    Comments see above.    Date of Discharge 11/16/22        Jerson Soliman, PT  Physical Therapist  [place] License [id number]

## 2022-11-23 ENCOUNTER — TELEPHONE (OUTPATIENT)
Dept: INTERNAL MEDICINE | Facility: CLINIC | Age: 72
End: 2022-11-23

## 2022-11-23 NOTE — TELEPHONE ENCOUNTER
Caller: Aydee Moore    Relationship to patient: Self    Best call back number: [phone]    Patient is needing: RETURNED MISSED CALL   PLEASE CALL

## 2022-11-30 NOTE — TELEPHONE ENCOUNTER
Patient returned call that was in regards to bone density. She states that she is not going to get it done at this time

## 2022-12-01 ENCOUNTER — OFFICE VISIT (OUTPATIENT)
Dept: FAMILY MEDICINE CLINIC | Facility: CLINIC | Age: 72
End: 2022-12-01

## 2022-12-01 VITALS
HEART RATE: 61 BPM | HEIGHT: 65 IN | TEMPERATURE: 98 F | RESPIRATION RATE: 18 BRPM | DIASTOLIC BLOOD PRESSURE: 82 MMHG | BODY MASS INDEX: 30.66 KG/M2 | SYSTOLIC BLOOD PRESSURE: 132 MMHG | WEIGHT: 184 LBS | OXYGEN SATURATION: 98 %

## 2022-12-01 DIAGNOSIS — F32.A ANXIETY AND DEPRESSION: ICD-10-CM

## 2022-12-01 DIAGNOSIS — R15.9 INCONTINENCE OF FECES, UNSPECIFIED FECAL INCONTINENCE TYPE: ICD-10-CM

## 2022-12-01 DIAGNOSIS — E03.9 HYPOTHYROIDISM, ADULT: Chronic | ICD-10-CM

## 2022-12-01 DIAGNOSIS — L60.0 INGROWN TOENAIL: ICD-10-CM

## 2022-12-01 DIAGNOSIS — F41.9 ANXIETY AND DEPRESSION: ICD-10-CM

## 2022-12-01 DIAGNOSIS — I10 ESSENTIAL HYPERTENSION: Primary | Chronic | ICD-10-CM

## 2022-12-01 PROCEDURE — 99214 OFFICE O/P EST MOD 30 MIN: CPT | Performed by: FAMILY MEDICINE

## 2022-12-01 NOTE — ASSESSMENT & PLAN NOTE
Patient's depression has improved now that stressors have moved out of her home. She never started medication advise to not take at this time.

## 2022-12-01 NOTE — PROGRESS NOTES
"    Chief Complaint  nosebleeds, check finger (Seen you in October has since had surgery Dr Berhane gaston), yeast infection rectal area (Back in March went to GI and has used nystatin and added fiber still having proiblems), and discuss meds (Hasnt started Cymbalta because she drinks some and was afraid to take it)    Subjective    History of Present Illness {CC  Problem List  Visit  Diagnosis   Encounters  Notes  Medications  Labs  Result Review Imaging  Media :23}     Aydee Moore presents to [facility] for   History of Present Illness     73 yo female present for follow up visit. She states she has not felt like the issues has improved.States has unexpected poop or feeling like coming out sometimes.  She is eating high fiber  And colace as recommended by the specialist she seen for the rectal issues.  She is having accident multiple times a day.    Drinking daily, a glass of wine a day.      She is now doing well with anxiety and depression. Family has moved out of the house and things have improved.  Stress is improving.      Still has issues with ingrown toenail. It is not getting better believes she needs to see specialist at this time.      Social History     Socioeconomic History   • Marital status:    Tobacco Use   • Smoking status: Never   • Smokeless tobacco: Never   Vaping Use   • Vaping Use: Never used   Substance and Sexual Activity   • Alcohol use: Yes     Comment: social drinker no more than 1 drink/day if that   • Drug use: No   • Sexual activity: Yes     Partners: Male     Birth control/protection: Hysterectomy     Comment:  only partner      Objective     Vital Signs:   /82 (BP Location: Left arm, Patient Position: Sitting, Cuff Size: Large Adult)   Pulse 61   Temp 98 °F (36.7 °C) (Infrared)   Resp 18   Ht 165.1 cm (65\")   Wt 83.5 kg (184 lb)   SpO2 98%   BMI 30.62 kg/m²   Physical Exam  Constitutional:       General: She is " not in acute distress.     Appearance: She is not ill-appearing.   HENT:      Head: Normocephalic.   Eyes:      Conjunctiva/sclera: Conjunctivae normal.   Cardiovascular:      Rate and Rhythm: Regular rhythm.      Heart sounds: Normal heart sounds.   Pulmonary:      Effort: No respiratory distress.      Breath sounds: Normal breath sounds. No wheezing.   Musculoskeletal:      Right lower leg: No edema.      Left lower leg: No edema.   Skin:     General: Skin is dry.      Findings: No bruising, erythema or lesion.      Comments: Pin in place ring finger l hand     Neurological:      Mental Status: She is alert.   Psychiatric:         Mood and Affect: Mood normal.        Result Review  Data Reviewed:{ Labs  Result Review  Imaging  Med Tab  Media :23}   The following data was reviewed by: Jo Ann Erickson MD on 12/01/2022  Lab Results - Last 18 Months   Lab Units 02/21/22  0000 08/19/21  0922 06/16/21  1113   BUN mg/dL 21 26* 25*   CREATININE mg/dL 1.02* 0.97 1.05*   EGFR IF NONAFRICN AM mL/min/1.73 56* 57* 52*   EGFR IF AFRICN AM mL/min/1.73 65 69 63   SODIUM mmol/L 139 138 144   POTASSIUM mmol/L 4.3 4.6 4.7   CHLORIDE mmol/L 101 101 104   CALCIUM mg/dL 10.1 10.0 10.2   ALBUMIN g/dL 4.8 4.60 4.80   BILIRUBIN mg/dL 0.6 0.4 0.4   ALK PHOS IU/L 96 100 96   AST (SGOT) IU/L 23 29 16   ALT (SGPT) IU/L 23 19 12   CHOLESTEROL mg/dL 170 170  --    TRIGLYCERIDES mg/dL 78 75  --    HDL CHOL mg/dL 76 63*  --    VLDL CHOLESTEROL SUSANNA mg/dL 15 14  --    LDL CHOL mg/dL 79 93  --    HEMOGLOBIN A1C %  --  4.90  --    WBC 10*3/mm3  --   --  5.55   RBC 10*6/mm3  --   --  4.64   HEMATOCRIT %  --   --  42.4   MCV fL  --   --  91.4   MCH pg  --   --  30.0   TSH uIU/mL 3.720 2.560  --               Current Outpatient Medications:   •  amLODIPine (NORVASC) 5 MG tablet, Take 1 tablet by mouth Daily., Disp: 90 tablet, Rfl: 3  •  docusate sodium (Colace) 100 MG capsule, Take 1 capsule by mouth Daily., Disp: 90 capsule, Rfl: 3  •  FIBER  SELECT GUMMIES PO, Take  by mouth., Disp: , Rfl:   •  fluticasone (FLONASE) 50 MCG/ACT nasal spray, 2 sprays into the nostril(s) as directed by provider Daily. Administer 2 sprays in each nostril for each dose., Disp: 16 g, Rfl: 3  •  levothyroxine (Synthroid) 50 MCG tablet, Take 1 tablet by mouth Daily., Disp: 90 tablet, Rfl: 3  •  Magnesium 400 MG capsule, Take  by mouth., Disp: , Rfl:   •  rosuvastatin (CRESTOR) 5 MG tablet, Take 1 tablet by mouth Daily., Disp: 90 tablet, Rfl: 3  •  triamcinolone (KENALOG) 0.1 % cream, Apply  topically to the appropriate area as directed 2 (Two) Times a Day., Disp: 45 g, Rfl: 0  •  Bromelain 100 MG tablet, Take  by mouth As Needed., Disp: , Rfl:   •  nystatin (MYCOSTATIN) 691209 UNIT/GM ointment, Apply 1 application topically to the appropriate area as directed 2 (Two) Times a Day., Disp: 30 g, Rfl: 0  •  sulfamethoxazole-trimethoprim (Bactrim DS) 800-160 MG per tablet, Take 1 tablet by mouth 2 (Two) Times a Day., Disp: 10 tablet, Rfl: 0      Assessment and Plan {CC Problem List  Visit Diagnosis  ROS  Review (Popup)  Health Maintenance  Quality  BestPractice  Medications  SmartSets  SnapShot Encounters  Media :23}   Problem List Items Addressed This Visit        Cardiac and Vasculature    Essential hypertension - Primary (Chronic)    Current Assessment & Plan     Hypertension is chronic, stable on medication.  Continue current treatment regimen.  Dietary sodium restriction.  Regular aerobic exercise.  Continue current medications.  Ambulatory blood pressure monitoring.  Blood pressure will be reassessed at the next regular appointment.         Relevant Orders    Comprehensive metabolic panel       Endocrine and Metabolic    Hypothyroidism, adult (Chronic)    Current Assessment & Plan     Chronic  Stable on medication   Continue current dosage of medication.   Continue to monitor TSH adjust medication as indicated based upon lab report.          Relevant Orders    TSH  Rfx On Abnormal To Free T4       Gastrointestinal Abdominal     Fecal incontinence    Current Assessment & Plan     Has seen GI specialist.   She still has accidents daily   Advise to take the stool softner every other day instead of daily until she has follow up appointment with Dr. Pang.   Continue high fiber diet, fiber supplement.             Mental Health    Anxiety and depression    Current Assessment & Plan     Patient's depression has improved now that stressors have moved out of her home. She never started medication advise to not take at this time.         Other Visit Diagnoses     Ingrown toenail        Relevant Orders    Ambulatory Referral to Podiatry          Follow Up   Return in about 6 months (around 6/1/2023).  Patient was given instructions and counseling regarding her condition or for health maintenance advice. Please see specific information pulled into the AVS if appropriate.    EpicAct:[email or web address],RunParams:STARTUPTYPE=FOLLOW    [email or web address]

## 2022-12-01 NOTE — ASSESSMENT & PLAN NOTE
Hypertension is chronic, stable on medication.  Continue current treatment regimen.  Dietary sodium restriction.  Regular aerobic exercise.  Continue current medications.  Ambulatory blood pressure monitoring.  Blood pressure will be reassessed at the next regular appointment.

## 2022-12-01 NOTE — ASSESSMENT & PLAN NOTE
Has seen GI specialist.   She still has accidents daily   Advise to take the stool softner every other day instead of daily until she has follow up appointment with Dr. Pang.   Continue high fiber diet, fiber supplement.

## 2022-12-01 NOTE — ASSESSMENT & PLAN NOTE
Chronic  Stable on medication   Continue current dosage of medication.   Continue to monitor TSH adjust medication as indicated based upon lab report.

## 2022-12-02 LAB
ALBUMIN SERPL-MCNC: 4.7 G/DL (ref 3.8–4.8)
ALBUMIN/GLOB SERPL: 2 {RATIO} (ref 1.2–2.2)
ALP SERPL-CCNC: 96 IU/L (ref 44–121)
ALT SERPL-CCNC: 11 IU/L (ref 0–32)
AST SERPL-CCNC: 15 IU/L (ref 0–40)
BILIRUB SERPL-MCNC: 0.3 MG/DL (ref 0–1.2)
BUN SERPL-MCNC: 28 MG/DL (ref 8–27)
BUN/CREAT SERPL: 30 (ref 12–28)
CALCIUM SERPL-MCNC: 9.8 MG/DL (ref 8.7–10.3)
CHLORIDE SERPL-SCNC: 100 MMOL/L (ref 96–106)
CO2 SERPL-SCNC: 24 MMOL/L (ref 20–29)
CREAT SERPL-MCNC: 0.94 MG/DL (ref 0.57–1)
EGFRCR SERPLBLD CKD-EPI 2021: 65 ML/MIN/1.73
GLOBULIN SER CALC-MCNC: 2.4 G/DL (ref 1.5–4.5)
GLUCOSE SERPL-MCNC: 94 MG/DL (ref 70–99)
POTASSIUM SERPL-SCNC: 4.3 MMOL/L (ref 3.5–5.2)
PROT SERPL-MCNC: 7.1 G/DL (ref 6–8.5)
SODIUM SERPL-SCNC: 139 MMOL/L (ref 134–144)
TSH SERPL DL<=0.005 MIU/L-ACNC: 1.96 UIU/ML (ref 0.45–4.5)

## 2022-12-06 ENCOUNTER — HOSPITAL ENCOUNTER (OUTPATIENT)
Dept: MAMMOGRAPHY | Facility: HOSPITAL | Age: 72
Discharge: HOME | End: 2022-12-06
Admitting: FAMILY MEDICINE

## 2022-12-06 DIAGNOSIS — Z12.31 SCREENING MAMMOGRAM FOR BREAST CANCER: ICD-10-CM

## 2022-12-06 PROCEDURE — 77067 SCR MAMMO BI INCL CAD: CPT

## 2022-12-06 PROCEDURE — 77063 BREAST TOMOSYNTHESIS BI: CPT

## 2022-12-13 DIAGNOSIS — R92.8 ABNORMAL MAMMOGRAM OF RIGHT BREAST: Primary | ICD-10-CM

## 2022-12-27 DIAGNOSIS — M79.641 HAND PAIN, RIGHT: Primary | ICD-10-CM

## 2022-12-28 ENCOUNTER — HOSPITAL ENCOUNTER (OUTPATIENT)
Dept: GENERAL RADIOLOGY | Facility: HOSPITAL | Age: 72
Discharge: HOME | End: 2022-12-28
Admitting: INTERNAL MEDICINE

## 2022-12-28 DIAGNOSIS — M79.641 HAND PAIN, RIGHT: ICD-10-CM

## 2022-12-28 PROCEDURE — 73130 X-RAY EXAM OF HAND: CPT

## 2023-01-04 ENCOUNTER — OFFICE VISIT (OUTPATIENT)
Dept: GASTROENTEROLOGY | Facility: CLINIC | Age: 73
End: 2023-01-04
Payer: MEDICARE

## 2023-01-04 VITALS
OXYGEN SATURATION: 97 % | BODY MASS INDEX: 31.39 KG/M2 | HEIGHT: 65 IN | DIASTOLIC BLOOD PRESSURE: 79 MMHG | HEART RATE: 63 BPM | SYSTOLIC BLOOD PRESSURE: 125 MMHG | WEIGHT: 188.4 LBS | TEMPERATURE: 97.1 F

## 2023-01-04 DIAGNOSIS — B37.2 YEAST DERMATITIS: ICD-10-CM

## 2023-01-04 DIAGNOSIS — K59.04 CHRONIC IDIOPATHIC CONSTIPATION: Primary | ICD-10-CM

## 2023-01-04 PROCEDURE — 99213 OFFICE O/P EST LOW 20 MIN: CPT | Performed by: INTERNAL MEDICINE

## 2023-01-04 RX ORDER — DOCUSATE SODIUM 100 MG/1
100 CAPSULE, LIQUID FILLED ORAL DAILY
Qty: 90 CAPSULE | Refills: 3 | Status: SHIPPED | OUTPATIENT
Start: 2023-01-04

## 2023-01-04 RX ORDER — NYSTATIN 100000 U/G
1 OINTMENT TOPICAL 2 TIMES DAILY
Qty: 30 G | Refills: 1 | Status: SHIPPED | OUTPATIENT
Start: 2023-01-04

## 2023-01-04 RX ORDER — MELATONIN
1000 DAILY
COMMUNITY

## 2023-01-04 RX ORDER — PHENYLEPHRINE HCL 10 MG
TABLET ORAL
COMMUNITY
Start: 2023-01-01

## 2023-01-04 NOTE — PROGRESS NOTES
Subjective   Chief Complaint   Patient presents with   • Chronic idiopathic constipation   • Fecal incontinece       Aydee Moore is a  72 y.o. female here for a follow up visit for chronic idiopathic constipation and fecal incontinence.     She takes a stool softener daily and a packet of benefiber daily.      She is walking 70 minutes a day.  About an hour in she has a sense of fullness in her rectum.  It is uncomfortable.  She is no longer having incontinence.  She has a sense of moisture in her perineum at times which feels like a yeast infection and response to nystatin cream.  She uses washrag's to clean and she takes care to dry before putting on close.    Last c/s 6/21 - one adenoma    HPI  Past Medical History:   Diagnosis Date   • Allergic     Penicillins, tetracyclines, surgical tape; latex sensitivity   • Anxiety and depression    • Arthritis    • Cataract     rt eye surgery 10/24/2018   • Colon polyp Dr Melissa   • Elevated fasting glucose    • GERD (gastroesophageal reflux disease) Dr Melissa   • Headache    • Hyperlipidemia    • Hypertension    • Hypothyroidism    • 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   • Obesity    • Urinary tract infection often michelle when away from home     Past Surgical History:   Procedure Laterality Date   • BREAST SURGERY  1986    biopsy   • CATARACT EXTRACTION Right 10/24/2018   • COLONOSCOPY     • FRACTURE SURGERY  Oct 2022    rt ring finger   • HYSTERECTOMY     • KNEE SURGERY Right 08/08/2017   • TONSILLECTOMY         Current Outpatient Medications:   •  amLODIPine (NORVASC) 5 MG tablet, Take 1 tablet by mouth Daily., Disp: 90 tablet, Rfl: 3  •  B Complex-C-Folic Acid (B-Complex/Vitamin C) tablet, , Disp: , Rfl:   •  Bromelain 100 MG tablet, Take  by mouth As Needed., Disp: , Rfl:   •  cholecalciferol (VITAMIN D3) 25 MCG (1000 UT) tablet, Take 1,000 Units by mouth Daily., Disp: , Rfl:   •  docusate sodium (Colace) 100 MG capsule, Take 1 capsule by mouth Daily., Disp: 90 capsule, Rfl:  3  •  fluticasone (FLONASE) 50 MCG/ACT nasal spray, 2 sprays into the nostril(s) as directed by provider Daily. Administer 2 sprays in each nostril for each dose., Disp: 16 g, Rfl: 3  •  levothyroxine (Synthroid) 50 MCG tablet, Take 1 tablet by mouth Daily., Disp: 90 tablet, Rfl: 3  •  Magnesium 400 MG capsule, Take  by mouth., Disp: , Rfl:   •  nystatin (MYCOSTATIN) 343032 UNIT/GM ointment, Apply 1 application topically to the appropriate area as directed 2 (Two) Times a Day., Disp: 30 g, Rfl: 1  •  rosuvastatin (CRESTOR) 5 MG tablet, Take 1 tablet by mouth Daily., Disp: 90 tablet, Rfl: 3  •  triamcinolone (KENALOG) 0.1 % cream, Apply  topically to the appropriate area as directed 2 (Two) Times a Day., Disp: 45 g, Rfl: 0  PRN Meds:.  Allergies   Allergen Reactions   • Tetracyclines & Related Rash     Rash and drowsiness   • Penicillins Rash     Social History     Socioeconomic History   • Marital status:    Tobacco Use   • Smoking status: Never   • Smokeless tobacco: Never   Vaping Use   • Vaping Use: Never used   Substance and Sexual Activity   • Alcohol use: Yes     Comment: social drinker no more than 1 drink/day if that   • Drug use: No   • Sexual activity: Yes     Partners: Male     Birth control/protection: Hysterectomy     Comment:  only partner     Family History   Problem Relation Age of Onset   • Breast cancer Mother    • Colon cancer Mother    • Thyroid disease Mother    • Vision loss Mother    • Cancer Father         mesothelioma   • Diabetes Sister         twin   • Uterine cancer Sister    • Thyroid disease Sister    • Uterine cancer Sister         suicide 2016   • Early death Sister    • No Known Problems Brother      Review of Systems   Constitutional: Negative for appetite change and unexpected weight change.   Gastrointestinal: Positive for constipation. Negative for abdominal pain and blood in stool.     Vitals:    01/04/23 1547   BP: 125/79   Pulse: 63   Temp: 97.1 °F (36.2 °C)    SpO2: 97%         01/04/23  1547   Weight: 85.5 kg (188 lb 6.4 oz)       Objective   Physical Exam  Constitutional:       Appearance: Normal appearance. She is well-developed.   HENT:      Head: Normocephalic and atraumatic.   Eyes:      General: No scleral icterus.     Conjunctiva/sclera: Conjunctivae normal.   Pulmonary:      Effort: Pulmonary effort is normal.   Abdominal:      General: There is no distension.      Palpations: Abdomen is soft.   Musculoskeletal:      Cervical back: Normal range of motion and neck supple.   Skin:     General: Skin is warm and dry.   Neurological:      Mental Status: She is alert.   Psychiatric:         Mood and Affect: Mood normal.         Behavior: Behavior normal.       No radiology results for the last 7 days    Assessment & Plan   Diagnoses and all orders for this visit:    Chronic idiopathic constipation    Yeast dermatitis  -     nystatin (MYCOSTATIN) 490741 UNIT/GM ointment; Apply 1 application topically to the appropriate area as directed 2 (Two) Times a Day.    Other orders  -     B Complex-C-Folic Acid (B-Complex/Vitamin C) tablet  -     cholecalciferol (VITAMIN D3) 25 MCG (1000 UT) tablet; Take 1,000 Units by mouth Daily.  -     docusate sodium (Colace) 100 MG capsule; Take 1 capsule by mouth Daily.      Plan:  · Symptomatic improvement but still with some room for further improvement.  Continue docusate.  Increase fiber to 2 packets daily.  Continue adequate fluid consumption regular physical activity.  If she persist with incomplete evacuation, will add MiraLAX instead of docusate.  She will call with a progress report in 1 month

## 2023-01-13 ENCOUNTER — HOSPITAL ENCOUNTER (OUTPATIENT)
Dept: MAMMOGRAPHY | Facility: HOSPITAL | Age: 73
Discharge: HOME | End: 2023-01-13
Admitting: FAMILY MEDICINE
Payer: MEDICARE

## 2023-01-13 ENCOUNTER — APPOINTMENT (OUTPATIENT)
Dept: ULTRASOUND IMAGING | Facility: HOSPITAL | Age: 73
End: 2023-01-13
Payer: MEDICARE

## 2023-01-13 DIAGNOSIS — R92.8 ABNORMAL MAMMOGRAM OF RIGHT BREAST: ICD-10-CM

## 2023-01-13 PROCEDURE — 77065 DX MAMMO INCL CAD UNI: CPT

## 2023-01-13 PROCEDURE — G0279 TOMOSYNTHESIS, MAMMO: HCPCS

## 2023-03-16 DIAGNOSIS — I10 ESSENTIAL HYPERTENSION: Chronic | ICD-10-CM

## 2023-03-17 RX ORDER — ROSUVASTATIN CALCIUM 5 MG/1
TABLET, COATED ORAL
Qty: 90 TABLET | Refills: 3 | OUTPATIENT
Start: 2023-03-17

## 2023-03-17 RX ORDER — LEVOTHYROXINE SODIUM 50 MCG
TABLET ORAL
Qty: 90 TABLET | Refills: 3 | OUTPATIENT
Start: 2023-03-17

## 2023-03-17 RX ORDER — AMLODIPINE BESYLATE 5 MG/1
TABLET ORAL
Qty: 90 TABLET | Refills: 3 | OUTPATIENT
Start: 2023-03-17

## 2023-04-10 RX ORDER — LEVOTHYROXINE SODIUM 50 MCG
TABLET ORAL
Qty: 90 TABLET | Refills: 3 | OUTPATIENT
Start: 2023-04-10

## 2023-04-11 ENCOUNTER — OFFICE VISIT (OUTPATIENT)
Dept: FAMILY MEDICINE CLINIC | Facility: CLINIC | Age: 73
End: 2023-04-11
Payer: MEDICARE

## 2023-04-11 VITALS
WEIGHT: 174 LBS | RESPIRATION RATE: 18 BRPM | SYSTOLIC BLOOD PRESSURE: 148 MMHG | HEIGHT: 65 IN | OXYGEN SATURATION: 98 % | HEART RATE: 78 BPM | BODY MASS INDEX: 28.99 KG/M2 | DIASTOLIC BLOOD PRESSURE: 80 MMHG | TEMPERATURE: 97.8 F

## 2023-04-11 DIAGNOSIS — E03.9 HYPOTHYROIDISM, ADULT: Chronic | ICD-10-CM

## 2023-04-11 DIAGNOSIS — R10.9 FLANK PAIN: ICD-10-CM

## 2023-04-11 DIAGNOSIS — I10 ESSENTIAL HYPERTENSION: Chronic | ICD-10-CM

## 2023-04-11 DIAGNOSIS — R35.0 FREQUENT URINATION: Primary | ICD-10-CM

## 2023-04-11 DIAGNOSIS — E78.5 HYPERLIPIDEMIA, UNSPECIFIED HYPERLIPIDEMIA TYPE: Chronic | ICD-10-CM

## 2023-04-11 LAB
ALBUMIN SERPL-MCNC: 4.5 G/DL (ref 3.5–5.2)
ALBUMIN/GLOB SERPL: 2.3 G/DL
ALP SERPL-CCNC: 80 U/L (ref 39–117)
ALT SERPL-CCNC: 14 U/L (ref 1–33)
AST SERPL-CCNC: 19 U/L (ref 1–32)
BILIRUB BLD-MCNC: NEGATIVE MG/DL
BILIRUB SERPL-MCNC: 0.5 MG/DL (ref 0–1.2)
BUN SERPL-MCNC: 17 MG/DL (ref 8–23)
BUN/CREAT SERPL: 15.6 (ref 7–25)
CALCIUM SERPL-MCNC: 10.3 MG/DL (ref 8.6–10.5)
CHLORIDE SERPL-SCNC: 104 MMOL/L (ref 98–107)
CHOLEST SERPL-MCNC: 205 MG/DL (ref 0–200)
CLARITY, POC: CLEAR
CO2 SERPL-SCNC: 27.4 MMOL/L (ref 22–29)
COLOR UR: YELLOW
CREAT SERPL-MCNC: 1.09 MG/DL (ref 0.57–1)
EGFRCR SERPLBLD CKD-EPI 2021: 54.8 ML/MIN/1.73
ERYTHROCYTE [DISTWIDTH] IN BLOOD BY AUTOMATED COUNT: 12.2 % (ref 12.3–15.4)
EXPIRATION DATE: NORMAL
GLOBULIN SER CALC-MCNC: 2 GM/DL
GLUCOSE SERPL-MCNC: 101 MG/DL (ref 65–99)
GLUCOSE UR STRIP-MCNC: NEGATIVE MG/DL
HCT VFR BLD AUTO: 42.9 % (ref 34–46.6)
HDLC SERPL-MCNC: 64 MG/DL (ref 40–60)
HGB BLD-MCNC: 13.4 G/DL (ref 12–15.9)
KETONES UR QL: NEGATIVE
LDLC SERPL CALC-MCNC: 117 MG/DL (ref 0–100)
LEUKOCYTE EST, POC: NEGATIVE
Lab: NORMAL
MCH RBC QN AUTO: 28.9 PG (ref 26.6–33)
MCHC RBC AUTO-ENTMCNC: 31.2 G/DL (ref 31.5–35.7)
MCV RBC AUTO: 92.5 FL (ref 79–97)
NITRITE UR-MCNC: NEGATIVE MG/ML
PH UR: 6 [PH] (ref 5–8)
PLATELET # BLD AUTO: 225 10*3/MM3 (ref 140–450)
POTASSIUM SERPL-SCNC: 4.6 MMOL/L (ref 3.5–5.2)
PROT SERPL-MCNC: 6.5 G/DL (ref 6–8.5)
PROT UR STRIP-MCNC: NEGATIVE MG/DL
RBC # BLD AUTO: 4.64 10*6/MM3 (ref 3.77–5.28)
RBC # UR STRIP: NEGATIVE /UL
SODIUM SERPL-SCNC: 140 MMOL/L (ref 136–145)
SP GR UR: 1.02 (ref 1–1.03)
TRIGL SERPL-MCNC: 137 MG/DL (ref 0–150)
TSH SERPL DL<=0.005 MIU/L-ACNC: 2.41 UIU/ML (ref 0.27–4.2)
UROBILINOGEN UR QL: NORMAL
VLDLC SERPL CALC-MCNC: 24 MG/DL (ref 5–40)
WBC # BLD AUTO: 5.29 10*3/MM3 (ref 3.4–10.8)

## 2023-04-11 PROCEDURE — 81003 URINALYSIS AUTO W/O SCOPE: CPT | Performed by: FAMILY MEDICINE

## 2023-04-11 PROCEDURE — 1160F RVW MEDS BY RX/DR IN RCRD: CPT | Performed by: FAMILY MEDICINE

## 2023-04-11 PROCEDURE — 99214 OFFICE O/P EST MOD 30 MIN: CPT | Performed by: FAMILY MEDICINE

## 2023-04-11 PROCEDURE — 3077F SYST BP >= 140 MM HG: CPT | Performed by: FAMILY MEDICINE

## 2023-04-11 PROCEDURE — 3079F DIAST BP 80-89 MM HG: CPT | Performed by: FAMILY MEDICINE

## 2023-04-11 PROCEDURE — 1159F MED LIST DOCD IN RCRD: CPT | Performed by: FAMILY MEDICINE

## 2023-04-11 RX ORDER — BACLOFEN 10 MG/1
10 TABLET ORAL 3 TIMES DAILY PRN
Qty: 30 TABLET | Refills: 1 | Status: SHIPPED | OUTPATIENT
Start: 2023-04-11

## 2023-04-11 RX ORDER — IBUPROFEN 600 MG/1
600 TABLET ORAL EVERY 6 HOURS PRN
Qty: 30 TABLET | Refills: 0 | Status: CANCELLED | OUTPATIENT
Start: 2023-04-11

## 2023-04-11 RX ORDER — AMLODIPINE BESYLATE 5 MG/1
5 TABLET ORAL DAILY
Qty: 90 TABLET | Refills: 3 | Status: SHIPPED | OUTPATIENT
Start: 2023-04-11

## 2023-04-11 RX ORDER — ACETAMINOPHEN 500 MG
500 TABLET ORAL EVERY 6 HOURS PRN
Qty: 90 TABLET | Refills: 0 | Status: SHIPPED | OUTPATIENT
Start: 2023-04-11

## 2023-04-11 RX ORDER — LEVOTHYROXINE SODIUM 0.05 MG/1
50 TABLET ORAL DAILY
Qty: 90 TABLET | Refills: 3 | Status: SHIPPED | OUTPATIENT
Start: 2023-04-11

## 2023-04-11 NOTE — ASSESSMENT & PLAN NOTE
Lipid abnormalities are chronic, pt fasting today.  contiue monitor diet, low fat, high fiber   Lipids will be reassessed in 6 months.

## 2023-04-11 NOTE — ASSESSMENT & PLAN NOTE
Hypertension is chronic, stable .  Continue current treatment regimen.  Dietary sodium restriction.  Regular aerobic exercise.  Continue current medications.  Ambulatory blood pressure monitoring.  Blood pressure will be reassessed at the next regular appointment.

## 2023-04-11 NOTE — PROGRESS NOTES
"    Chief Complaint  Flank Pain (Right side x 3 months / worse when lays or sits/feels like when had a blockage in past//last colonoscoppy 6/21 Dr Garces)    Subjective    History of Present Illness {CC  Problem List  Visit  Diagnosis   Encounters  Notes  Medications  Labs  Result Review Imaging  Media :23}     Aydee Moore presents to [facility] for   History of Present Illness     March of last year seen for same issues, told had issues with constipation.    Pain begin again this march.   States she has to lean forward to getting urination started.  States she does not feel like she has to urinate but then wets herself.   No pain or burning with urination   Feels like she does not empty.      Worse when sitting  Sometimes does not let her sleep.   Has not taken anything for pain.     No hard stools, taking stool softner and fiber daily. Going 2-3 times a day.         Social History     Socioeconomic History   • Marital status:    Tobacco Use   • Smoking status: Never   • Smokeless tobacco: Never   Vaping Use   • Vaping Use: Never used   Substance and Sexual Activity   • Alcohol use: Yes     Comment: social drinker no more than 1 drink/day if that   • Drug use: No   • Sexual activity: Yes     Partners: Male     Birth control/protection: Hysterectomy     Comment:  only partner      Objective     Vital Signs:   /80 (BP Location: Left arm, Patient Position: Sitting, Cuff Size: Adult)   Pulse 78   Temp 97.8 °F (36.6 °C) (Infrared)   Resp 18   Ht 165.1 cm (65\")   Wt 78.9 kg (174 lb)   SpO2 98%   BMI 28.96 kg/m²   Physical Exam  Constitutional:       General: She is not in acute distress.     Appearance: She is not ill-appearing.   HENT:      Head: Normocephalic.   Cardiovascular:      Rate and Rhythm: Regular rhythm.      Heart sounds: Normal heart sounds.   Pulmonary:      Effort: No respiratory distress.      Breath sounds: Normal breath sounds. "   Abdominal:      Comments: R flank tenderness   Musculoskeletal:      Right lower leg: No edema.      Left lower leg: No edema.   Neurological:      Mental Status: She is alert.        Result Review  Data Reviewed:{ Labs  Result Review  Imaging  Med Tab  Media :23}   The following data was reviewed by: Jo Ann Erickson MD on 04/11/2023  Lab Results - Last 18 Months   Lab Units 12/01/22  1457 02/21/22  0000   BUN mg/dL 28* 21   CREATININE mg/dL 0.94 1.02*   EGFR IF NONAFRICN AM mL/min/1.73  --  56*   EGFR IF AFRICN AM mL/min/1.73  --  65   SODIUM mmol/L 139 139   POTASSIUM mmol/L 4.3 4.3   CHLORIDE mmol/L 100 101   CALCIUM mg/dL 9.8 10.1   ALBUMIN g/dL 4.7 4.8   BILIRUBIN mg/dL 0.3 0.6   ALK PHOS IU/L 96 96   AST (SGOT) IU/L 15 23   ALT (SGPT) IU/L 11 23   CHOLESTEROL mg/dL  --  170   TRIGLYCERIDES mg/dL  --  78   HDL CHOL mg/dL  --  76   VLDL CHOLESTEROL SUSANNA mg/dL  --  15   LDL CHOL mg/dL  --  79   TSH uIU/mL 1.960 3.720              Current Outpatient Medications:   •  amLODIPine (NORVASC) 5 MG tablet, Take 1 tablet by mouth Daily., Disp: 90 tablet, Rfl: 3  •  Bromelain 100 MG tablet, Take  by mouth As Needed., Disp: , Rfl:   •  cholecalciferol (VITAMIN D3) 25 MCG (1000 UT) tablet, Take 1 tablet by mouth Daily., Disp: , Rfl:   •  docusate sodium (Colace) 100 MG capsule, Take 1 capsule by mouth Daily., Disp: 90 capsule, Rfl: 3  •  fluticasone (FLONASE) 50 MCG/ACT nasal spray, 2 sprays into the nostril(s) as directed by provider Daily. Administer 2 sprays in each nostril for each dose., Disp: 16 g, Rfl: 3  •  levothyroxine (Synthroid) 50 MCG tablet, Take 1 tablet by mouth Daily., Disp: 90 tablet, Rfl: 3  •  Magnesium 400 MG capsule, Take  by mouth. Prn, Disp: , Rfl:   •  rosuvastatin (CRESTOR) 5 MG tablet, Take 1 tablet by mouth Daily., Disp: 90 tablet, Rfl: 3  •  B Complex-C-Folic Acid (B-Complex/Vitamin C) tablet, , Disp: , Rfl:   •  nystatin (MYCOSTATIN) 146614 UNIT/GM ointment, Apply 1 application topically  to the appropriate area as directed 2 (Two) Times a Day. (Patient not taking: Reported on 4/11/2023), Disp: 30 g, Rfl: 1  •  triamcinolone (KENALOG) 0.1 % cream, Apply  topically to the appropriate area as directed 2 (Two) Times a Day. (Patient not taking: Reported on 4/11/2023), Disp: 45 g, Rfl: 0      Assessment and Plan {CC Problem List  Visit Diagnosis  ROS  Review (Popup)  Health Maintenance  Quality  BestPractice  Medications  SmartSets  SnapShot Encounters  Media :23}   Problem List Items Addressed This Visit        Cardiac and Vasculature    Hyperlipidemia (Chronic)    Current Assessment & Plan     Lipid abnormalities are chronic, pt fasting today.  contiue monitor diet, low fat, high fiber   Lipids will be reassessed in 6 months.         Relevant Orders    Lipid panel    Essential hypertension (Chronic)    Current Assessment & Plan     Hypertension is chronic, stable .  Continue current treatment regimen.  Dietary sodium restriction.  Regular aerobic exercise.  Continue current medications.  Ambulatory blood pressure monitoring.  Blood pressure will be reassessed at the next regular appointment.         Relevant Medications    amLODIPine (NORVASC) 5 MG tablet    Other Relevant Orders    Comprehensive metabolic panel       Endocrine and Metabolic    Hypothyroidism, adult (Chronic)    Relevant Medications    levothyroxine (Synthroid) 50 MCG tablet    Other Relevant Orders    TSH Rfx On Abnormal To Free T4   Other Visit Diagnoses     Frequent urination    -  Primary    Relevant Orders    POC Urinalysis Dipstick, Automated (Completed)    Flank pain        Relevant Orders    US Renal Bilateral    Comprehensive metabolic panel    CBC No Differential          US to evaluate kidney further rule out kidney stone   Advise drink plenty of water  Muscle relaxer as pain may be due to muscle spasm  Tylenol as needed for pain.   Seek medical attention if symptoms worsen or do not improve.       Follow Up    Return if symptoms worsen or fail to improve.  Patient was given instructions and counseling regarding her condition or for health maintenance advice. Please see specific information pulled into the AVS if appropriate.    EpicAct:[email or web address]_ORDERS,RunParams:STARTUPTYPE=FOLLOW    [email or web address]_DISCHARGE

## 2023-04-21 ENCOUNTER — HOSPITAL ENCOUNTER (OUTPATIENT)
Dept: ULTRASOUND IMAGING | Facility: HOSPITAL | Age: 73
Discharge: HOME | End: 2023-04-21
Payer: MEDICARE

## 2023-04-21 DIAGNOSIS — R10.9 FLANK PAIN: ICD-10-CM

## 2023-04-21 PROCEDURE — 76775 US EXAM ABDO BACK WALL LIM: CPT

## 2023-06-05 ENCOUNTER — OFFICE VISIT (OUTPATIENT)
Dept: FAMILY MEDICINE CLINIC | Facility: CLINIC | Age: 73
End: 2023-06-05
Payer: MEDICARE

## 2023-06-05 VITALS
OXYGEN SATURATION: 98 % | SYSTOLIC BLOOD PRESSURE: 134 MMHG | WEIGHT: 174 LBS | DIASTOLIC BLOOD PRESSURE: 78 MMHG | HEIGHT: 65 IN | HEART RATE: 59 BPM | TEMPERATURE: 98.6 F | BODY MASS INDEX: 28.99 KG/M2 | RESPIRATION RATE: 18 BRPM

## 2023-06-05 DIAGNOSIS — R34 URINATION DECREASE: Primary | ICD-10-CM

## 2023-06-05 DIAGNOSIS — R31.9 HEMATURIA, UNSPECIFIED TYPE: ICD-10-CM

## 2023-06-05 LAB
BILIRUB BLD-MCNC: NEGATIVE MG/DL
CLARITY, POC: CLEAR
COLOR UR: YELLOW
EXPIRATION DATE: ABNORMAL
GLUCOSE UR STRIP-MCNC: NEGATIVE MG/DL
KETONES UR QL: NEGATIVE
LEUKOCYTE EST, POC: NEGATIVE
Lab: ABNORMAL
NITRITE UR-MCNC: NEGATIVE MG/ML
PH UR: 5.5 [PH] (ref 5–8)
PROT UR STRIP-MCNC: NEGATIVE MG/DL
RBC # UR STRIP: ABNORMAL /UL
SP GR UR: 1.03 (ref 1–1.03)
UROBILINOGEN UR QL: ABNORMAL

## 2023-06-05 PROCEDURE — 3078F DIAST BP <80 MM HG: CPT | Performed by: FAMILY MEDICINE

## 2023-06-05 PROCEDURE — 81003 URINALYSIS AUTO W/O SCOPE: CPT | Performed by: FAMILY MEDICINE

## 2023-06-05 PROCEDURE — 99213 OFFICE O/P EST LOW 20 MIN: CPT | Performed by: FAMILY MEDICINE

## 2023-06-05 PROCEDURE — 3075F SYST BP GE 130 - 139MM HG: CPT | Performed by: FAMILY MEDICINE

## 2023-06-05 NOTE — PROGRESS NOTES
"    Chief Complaint  discuss recent us of kidney, Cough (Productive for last week, no fever or chills or other sx), trigger finger (Right hand), and Knee Pain (While on trip ship MD told strained ligament, wore brace on trip)    Subjective    History of Present Illness {CC  Problem List  Visit  Diagnosis   Encounters  Notes  Medications  Labs  Result Review Imaging  Media :23}     Aydee Moore presents to [facility] for   History of Present Illness   72 yo female present for follow up visit. She is currently having some productive cough which started while on vacation. Taking otc coricidin hbp for cough, symptoms are improving.     States she has to lean forward to even start to urinate. She state does not go to bathroom often compared to  which goes to bathroom multiple times a day and night.      Social History     Socioeconomic History    Marital status:    Tobacco Use    Smoking status: Never    Smokeless tobacco: Never   Vaping Use    Vaping Use: Never used   Substance and Sexual Activity    Alcohol use: Yes     Comment: social drinker no more than 1 drink/day if that    Drug use: No    Sexual activity: Yes     Partners: Male     Birth control/protection: Hysterectomy     Comment:  only partner      Objective     Vital Signs:   /78 (BP Location: Left arm, Patient Position: Sitting, Cuff Size: Adult)   Pulse 59   Temp 98.6 °F (37 °C) (Infrared)   Resp 18   Ht 165.1 cm (65\")   Wt 78.9 kg (174 lb)   SpO2 98%   BMI 28.96 kg/m²   Physical Exam  Constitutional:       General: She is not in acute distress.     Appearance: She is not ill-appearing.   HENT:      Head: Normocephalic.   Cardiovascular:      Rate and Rhythm: Regular rhythm.      Heart sounds: Normal heart sounds.   Pulmonary:      Effort: No respiratory distress.      Breath sounds: Normal breath sounds. No wheezing.   Musculoskeletal:      Right lower leg: No edema.      Left " lower leg: No edema.   Neurological:      Mental Status: She is alert.      Result Review  Data Reviewed:{ Labs  Result Review  Imaging  Med Tab  Media :23}   The following data was reviewed by: Jo Ann Erickson MD on 06/05/2023  Lab Results - Last 18 Months   Lab Units 04/11/23  0908 12/01/22  1457 02/21/22  0000   BUN mg/dL 17 28* 21   CREATININE mg/dL 1.09* 0.94 1.02*   EGFR IF NONAFRICN AM mL/min/1.73  --   --  56*   EGFR IF AFRICN AM mL/min/1.73  --   --  65   SODIUM mmol/L 140 139 139   POTASSIUM mmol/L 4.6 4.3 4.3   CHLORIDE mmol/L 104 100 101   CALCIUM mg/dL 10.3 9.8 10.1   ALBUMIN g/dL 4.5 4.7 4.8   BILIRUBIN mg/dL 0.5 0.3 0.6   ALK PHOS U/L 80 96 96   AST (SGOT) U/L 19 15 23   ALT (SGPT) U/L 14 11 23   CHOLESTEROL mg/dL 205*  --  170   TRIGLYCERIDES mg/dL 137  --  78   HDL CHOL mg/dL 64*  --  76   VLDL CHOLESTEROL SUSNANA mg/dL 24  --  15   LDL CHOL mg/dL 117*  --  79   WBC 10*3/mm3 5.29  --   --    RBC 10*6/mm3 4.64  --   --    HEMATOCRIT % 42.9  --   --    MCV fL 92.5  --   --    MCH pg 28.9  --   --    TSH uIU/mL 2.410 1.960 3.720              Current Outpatient Medications:     acetaminophen (TYLENOL) 500 MG tablet, Take 1 tablet by mouth Every 6 (Six) Hours As Needed for Mild Pain., Disp: 90 tablet, Rfl: 0    amLODIPine (NORVASC) 5 MG tablet, Take 1 tablet by mouth Daily., Disp: 90 tablet, Rfl: 3    docusate sodium (Colace) 100 MG capsule, Take 1 capsule by mouth Daily., Disp: 90 capsule, Rfl: 3    levothyroxine (Synthroid) 50 MCG tablet, Take 1 tablet by mouth Daily., Disp: 90 tablet, Rfl: 3    rosuvastatin (CRESTOR) 5 MG tablet, Take 1 tablet by mouth Daily., Disp: 90 tablet, Rfl: 3    B Complex-C-Folic Acid (B-Complex/Vitamin C) tablet, , Disp: , Rfl:     baclofen (LIORESAL) 10 MG tablet, Take 1 tablet by mouth 3 (Three) Times a Day As Needed for Muscle Spasms. (Patient not taking: Reported on 6/5/2023), Disp: 30 tablet, Rfl: 1    Bromelain 100 MG tablet, Take  by mouth As Needed. (Patient not  taking: Reported on 6/5/2023), Disp: , Rfl:     cholecalciferol (VITAMIN D3) 25 MCG (1000 UT) tablet, Take 1 tablet by mouth Daily. (Patient not taking: Reported on 6/5/2023), Disp: , Rfl:     fluticasone (FLONASE) 50 MCG/ACT nasal spray, 2 sprays into the nostril(s) as directed by provider Daily. Administer 2 sprays in each nostril for each dose. (Patient not taking: Reported on 6/5/2023), Disp: 16 g, Rfl: 3    Magnesium 400 MG capsule, Take  by mouth. Prn (Patient not taking: Reported on 6/5/2023), Disp: , Rfl:     nystatin (MYCOSTATIN) 906632 UNIT/GM ointment, Apply 1 application topically to the appropriate area as directed 2 (Two) Times a Day. (Patient not taking: Reported on 4/11/2023), Disp: 30 g, Rfl: 1    triamcinolone (KENALOG) 0.1 % cream, Apply  topically to the appropriate area as directed 2 (Two) Times a Day. (Patient not taking: Reported on 4/11/2023), Disp: 45 g, Rfl: 0      Assessment and Plan {CC Problem List  Visit Diagnosis  ROS  Review (Popup)  Health Maintenance  Quality  BestPractice  Medications  SmartSets  SnapShot Encounters  Media :23}   Problem List Items Addressed This Visit    None  Visit Diagnoses       Urination decrease    -  Primary    Relevant Orders    POC Urinalysis Dipstick, Automated (Completed)    Ambulatory Referral to Urology    Hematuria, unspecified type        Relevant Orders    Ambulatory Referral to Urology          Discussed previous Renal US and CT scan. Pt states issues with urination and blood seen on urine dip. Referral sent to urology for further evaluation   Continue to monitor kidney function   Drink plenty of water.   Continue coricidin as needed for cough which is improving.      Tape fingers together for trigger finger, if no improvement will need to send to hand specialist for further evaluation.         Follow Up   Return in about 4 months (around 10/5/2023) for Recheck hypertension.  Patient was given instructions and counseling regarding her  condition or for health maintenance advice. Please see specific information pulled into the AVS if appropriate.    EpicAct:[email or web address],RunParams:STARTUPTYPE=FOLLOW    [email or web address]

## 2023-08-02 DIAGNOSIS — B37.2 YEAST DERMATITIS: ICD-10-CM

## 2023-08-02 RX ORDER — ROSUVASTATIN CALCIUM 5 MG/1
5 TABLET, COATED ORAL DAILY
Qty: 90 TABLET | Refills: 1 | Status: SHIPPED | OUTPATIENT
Start: 2023-08-02

## 2023-08-02 RX ORDER — ROSUVASTATIN CALCIUM 5 MG/1
TABLET, COATED ORAL
Qty: 90 TABLET | Refills: 3 | OUTPATIENT
Start: 2023-08-02

## 2023-08-03 RX ORDER — NYSTATIN 100000 U/G
1 OINTMENT TOPICAL 2 TIMES DAILY
Qty: 30 G | Refills: 1 | Status: SHIPPED | OUTPATIENT
Start: 2023-08-03

## 2023-10-12 ENCOUNTER — OFFICE VISIT (OUTPATIENT)
Dept: FAMILY MEDICINE CLINIC | Facility: CLINIC | Age: 73
End: 2023-10-12
Payer: MEDICARE

## 2023-10-12 VITALS
OXYGEN SATURATION: 97 % | WEIGHT: 181.2 LBS | BODY MASS INDEX: 30.19 KG/M2 | SYSTOLIC BLOOD PRESSURE: 132 MMHG | DIASTOLIC BLOOD PRESSURE: 74 MMHG | TEMPERATURE: 97.8 F | HEIGHT: 65 IN | HEART RATE: 60 BPM

## 2023-10-12 DIAGNOSIS — R45.4 FEELING ANGRY: ICD-10-CM

## 2023-10-12 DIAGNOSIS — I10 ESSENTIAL HYPERTENSION: Chronic | ICD-10-CM

## 2023-10-12 DIAGNOSIS — E78.5 HYPERLIPIDEMIA, UNSPECIFIED HYPERLIPIDEMIA TYPE: Primary | Chronic | ICD-10-CM

## 2023-10-12 DIAGNOSIS — R45.86 MOOD CHANGES: ICD-10-CM

## 2023-10-12 DIAGNOSIS — E03.9 HYPOTHYROIDISM, ADULT: Chronic | ICD-10-CM

## 2023-10-12 DIAGNOSIS — T78.40XD ALLERGIC REACTION, SUBSEQUENT ENCOUNTER: ICD-10-CM

## 2023-10-12 LAB
ALBUMIN SERPL-MCNC: 4.7 G/DL (ref 3.5–5.2)
ALBUMIN/GLOB SERPL: 2 G/DL
ALP SERPL-CCNC: 88 U/L (ref 39–117)
ALT SERPL-CCNC: 12 U/L (ref 1–33)
AST SERPL-CCNC: 17 U/L (ref 1–32)
BILIRUB SERPL-MCNC: 0.5 MG/DL (ref 0–1.2)
BUN SERPL-MCNC: 17 MG/DL (ref 8–23)
BUN/CREAT SERPL: 18.7 (ref 7–25)
CALCIUM SERPL-MCNC: 10.2 MG/DL (ref 8.6–10.5)
CHLORIDE SERPL-SCNC: 102 MMOL/L (ref 98–107)
CHOLEST SERPL-MCNC: 227 MG/DL (ref 0–200)
CO2 SERPL-SCNC: 27.4 MMOL/L (ref 22–29)
CREAT SERPL-MCNC: 0.91 MG/DL (ref 0.57–1)
EGFRCR SERPLBLD CKD-EPI 2021: 67.6 ML/MIN/1.73
GLOBULIN SER CALC-MCNC: 2.4 GM/DL
GLUCOSE SERPL-MCNC: 100 MG/DL (ref 65–99)
HDLC SERPL-MCNC: 79 MG/DL (ref 40–60)
LDLC SERPL CALC-MCNC: 133 MG/DL (ref 0–100)
POTASSIUM SERPL-SCNC: 4.6 MMOL/L (ref 3.5–5.2)
PROT SERPL-MCNC: 7.1 G/DL (ref 6–8.5)
SODIUM SERPL-SCNC: 138 MMOL/L (ref 136–145)
TRIGL SERPL-MCNC: 86 MG/DL (ref 0–150)
TSH SERPL DL<=0.005 MIU/L-ACNC: 3.07 UIU/ML (ref 0.27–4.2)
VLDLC SERPL CALC-MCNC: 15 MG/DL (ref 5–40)

## 2023-10-12 NOTE — PROGRESS NOTES
"    Chief Complaint  Hypertension and hypothyroid needs b/w    Subjective    History of Present Illness {CC  Problem List  Visit  Diagnosis   Encounters  Notes  Medications  Labs  Result Review Imaging  Media :23}     Aydee Moore presents to [facility] for   History of Present Illness   72 yo female present for follow up visit.  She states she has been feeling angry more frequently. Son loss job, sister in law is sick.     She is taking BP medication daily, no issues at this time.     Not walking as much due to knee pain, seen by ortho previously has not started PT which was recommended.        Pt states she has leg cramps at night which she gets up to walk and cramps improve.     She had an allergic reaction while on a trip several months ago. She unsure what she ate during the episode.  Has seen allergist when she was younger.  No issues since coming home.     Social History     Socioeconomic History    Marital status:    Tobacco Use    Smoking status: Never    Smokeless tobacco: Never   Vaping Use    Vaping Use: Never used   Substance and Sexual Activity    Alcohol use: Yes     Comment: social drinker no more than 1 drink/day if that    Drug use: No    Sexual activity: Yes     Partners: Male     Birth control/protection: Hysterectomy     Comment:  only partner      Objective     Vital Signs:   /74   Pulse 60   Temp 97.8 øF (36.6 øC)   Ht 165.1 cm (65\")   Wt 82.2 kg (181 lb 3.2 oz)   SpO2 97%   BMI 30.15 kg/mý   Physical Exam  Constitutional:       General: She is not in acute distress.     Appearance: She is not ill-appearing.   HENT:      Head: Normocephalic.   Cardiovascular:      Rate and Rhythm: Regular rhythm.      Heart sounds: Normal heart sounds.   Pulmonary:      Breath sounds: Normal breath sounds. No wheezing.   Musculoskeletal:      Right lower leg: No edema.      Left lower leg: No edema.   Neurological:      Mental Status: She " is alert.   Psychiatric:         Mood and Affect: Mood normal.        Result Review  Data Reviewed:{ Labs  Result Review  Imaging  Med Tab  Media :23}   The following data was reviewed by: Jo Ann Erickson MD on 10/12/2023  Lab Results - Last 18 Months   Lab Units 04/11/23  0908 12/01/22  1457   BUN mg/dL 17 28*   CREATININE mg/dL 1.09* 0.94   SODIUM mmol/L 140 139   POTASSIUM mmol/L 4.6 4.3   CHLORIDE mmol/L 104 100   CALCIUM mg/dL 10.3 9.8   ALBUMIN g/dL 4.5 4.7   BILIRUBIN mg/dL 0.5 0.3   ALK PHOS U/L 80 96   AST (SGOT) U/L 19 15   ALT (SGPT) U/L 14 11   CHOLESTEROL mg/dL 205*  --    TRIGLYCERIDES mg/dL 137  --    HDL CHOL mg/dL 64*  --    VLDL CHOLESTEROL SUSANNA mg/dL 24  --    LDL CHOL mg/dL 117*  --    WBC 10*3/mm3 5.29  --    RBC 10*6/mm3 4.64  --    HEMATOCRIT % 42.9  --    MCV fL 92.5  --    MCH pg 28.9  --    TSH uIU/mL 2.410 1.960              Current Outpatient Medications:     acetaminophen (TYLENOL) 500 MG tablet, Take 1 tablet by mouth Every 6 (Six) Hours As Needed for Mild Pain., Disp: 90 tablet, Rfl: 0    amLODIPine (NORVASC) 5 MG tablet, Take 1 tablet by mouth Daily., Disp: 90 tablet, Rfl: 3    docusate sodium (Colace) 100 MG capsule, Take 1 capsule by mouth Daily., Disp: 90 capsule, Rfl: 3    fluticasone (FLONASE) 50 MCG/ACT nasal spray, 2 sprays into the nostril(s) as directed by provider Daily. Administer 2 sprays in each nostril for each dose., Disp: 16 g, Rfl: 3    Magnesium 400 MG capsule, Take  by mouth. Prn, Disp: , Rfl:     nystatin (MYCOSTATIN) 807482 UNIT/GM ointment, Apply 1 application  topically to the appropriate area as directed 2 (Two) Times a Day., Disp: 30 g, Rfl: 1    rosuvastatin (CRESTOR) 5 MG tablet, Take 1 tablet by mouth Daily., Disp: 90 tablet, Rfl: 1    Synthroid 50 MCG tablet, Take 1 tablet by mouth Daily., Disp: 90 tablet, Rfl: 3    B Complex-C-Folic Acid (B-Complex/Vitamin C) tablet, , Disp: , Rfl:     baclofen (LIORESAL) 10 MG tablet, Take 1 tablet by mouth 3 (Three)  Times a Day As Needed for Muscle Spasms. (Patient not taking: Reported on 6/5/2023), Disp: 30 tablet, Rfl: 1    Bromelain 100 MG tablet, Take  by mouth As Needed. (Patient not taking: Reported on 6/5/2023), Disp: , Rfl:     cholecalciferol (VITAMIN D3) 25 MCG (1000 UT) tablet, Take 1 tablet by mouth Daily. (Patient not taking: Reported on 6/5/2023), Disp: , Rfl:     triamcinolone (KENALOG) 0.1 % cream, Apply  topically to the appropriate area as directed 2 (Two) Times a Day. (Patient not taking: Reported on 4/11/2023), Disp: 45 g, Rfl: 0      Assessment and Plan {CC Problem List  Visit Diagnosis  ROS  Review (Popup)  Health Maintenance  Quality  BestPractice  Medications  SmartSets  SnapShot Encounters  Media :23}   Problem List Items Addressed This Visit       Hyperlipidemia - Primary (Chronic)    Current Assessment & Plan     Lipid abnormalities are  chronic .  Monitor lipid panel, check today   Pt unsure if taking medication  Lipids will be reassessed in 3 months.         Relevant Orders    Lipid Panel    Hypothyroidism, adult (Chronic)    Relevant Orders    TSH Rfx On Abnormal To Free T4    Essential hypertension (Chronic)    Current Assessment & Plan     Hypertension is  chronic stable  .  Continue current treatment regimen.  Dietary sodium restriction.  Regular aerobic exercise.  Continue current medications.  Ambulatory blood pressure monitoring.  Blood pressure will be reassessed at the next regular appointment.         Relevant Orders    Comprehensive metabolic panel     Other Visit Diagnoses       Mood changes        Relevant Orders    Ambulatory Referral to Psychiatry    Feeling angry        Relevant Orders    Ambulatory Referral to Psychiatry    Allergic reaction, subsequent encounter        Relevant Orders    Ambulatory Referral to Allergy                  Follow Up   Return in about 3 months (around 1/12/2024) for with new provider, follow up on chronic conditions. .  Patient was given  instructions and counseling regarding her condition or for health maintenance advice. Please see specific information pulled into the AVS if appropriate.    EpicAct:[email or web address]_ORDERS,RunParams:STARTUPTYPE=FOLLOW    [email or web address]

## 2023-10-30 ENCOUNTER — OFFICE VISIT (OUTPATIENT)
Dept: FAMILY MEDICINE CLINIC | Facility: CLINIC | Age: 73
End: 2023-10-30
Payer: MEDICARE

## 2023-10-30 VITALS
HEART RATE: 66 BPM | HEIGHT: 65 IN | OXYGEN SATURATION: 99 % | DIASTOLIC BLOOD PRESSURE: 76 MMHG | SYSTOLIC BLOOD PRESSURE: 140 MMHG | BODY MASS INDEX: 30.69 KG/M2 | TEMPERATURE: 98.6 F | WEIGHT: 184.2 LBS

## 2023-10-30 DIAGNOSIS — R07.9 CHEST PAIN, UNSPECIFIED TYPE: ICD-10-CM

## 2023-10-30 DIAGNOSIS — R05.9 COUGH, UNSPECIFIED TYPE: Primary | ICD-10-CM

## 2023-10-30 DIAGNOSIS — J30.89 ENVIRONMENTAL AND SEASONAL ALLERGIES: ICD-10-CM

## 2023-10-30 LAB
EXPIRATION DATE: NORMAL
FLUAV AG UPPER RESP QL IA.RAPID: NOT DETECTED
FLUBV AG UPPER RESP QL IA.RAPID: NOT DETECTED
INTERNAL CONTROL: NORMAL
Lab: NORMAL
SARS-COV-2 AG UPPER RESP QL IA.RAPID: NOT DETECTED

## 2023-10-30 PROCEDURE — 1159F MED LIST DOCD IN RCRD: CPT | Performed by: FAMILY MEDICINE

## 2023-10-30 PROCEDURE — 3077F SYST BP >= 140 MM HG: CPT | Performed by: FAMILY MEDICINE

## 2023-10-30 PROCEDURE — 3078F DIAST BP <80 MM HG: CPT | Performed by: FAMILY MEDICINE

## 2023-10-30 PROCEDURE — 1160F RVW MEDS BY RX/DR IN RCRD: CPT | Performed by: FAMILY MEDICINE

## 2023-10-30 PROCEDURE — 87428 SARSCOV & INF VIR A&B AG IA: CPT | Performed by: FAMILY MEDICINE

## 2023-10-30 PROCEDURE — 93000 ELECTROCARDIOGRAM COMPLETE: CPT | Performed by: FAMILY MEDICINE

## 2023-10-30 PROCEDURE — 99214 OFFICE O/P EST MOD 30 MIN: CPT | Performed by: FAMILY MEDICINE

## 2023-10-30 RX ORDER — BENZONATATE 100 MG/1
100 CAPSULE ORAL 3 TIMES DAILY PRN
Qty: 30 CAPSULE | Refills: 1 | Status: SHIPPED | OUTPATIENT
Start: 2023-10-30

## 2023-10-30 NOTE — PROGRESS NOTES
"    Chief Complaint  sore throat,earache rt,cough    Subjective    History of Present Illness {CC  Problem List  Visit  Diagnosis   Encounters  Notes  Medications  Labs  Result Review Imaging  Media :23}     Aydee Moore presents to [facility] for   History of Present Illness   74 yo female present for acute visit.   Symptoms started yesterday. Cough dry. No production   No fevers or chills.   States sore throat which hurts to swallow.    Some drainage  Granddaughter a week ago diagnosed strept. She was with her two weeks ago.  Otherwise no other sick contacts.      States she sat up until 2:30 which sitting up made it go away.     Social History     Socioeconomic History    Marital status:    Tobacco Use    Smoking status: Never    Smokeless tobacco: Never   Vaping Use    Vaping Use: Never used   Substance and Sexual Activity    Alcohol use: Yes     Comment: social drinker no more than 1 drink/day if that    Drug use: No    Sexual activity: Yes     Partners: Male     Birth control/protection: Hysterectomy     Comment:  only partner      Objective     Vital Signs:   /76   Pulse 66   Temp 98.6 °F (37 °C)   Ht 165.1 cm (65\")   Wt 83.6 kg (184 lb 3.2 oz)   SpO2 99%   BMI 30.65 kg/m²   Physical Exam  Constitutional:       General: She is not in acute distress.     Appearance: She is not ill-appearing.   HENT:      Right Ear: Tympanic membrane normal.      Left Ear: Tympanic membrane normal.      Mouth/Throat:      Mouth: Mucous membranes are moist.      Pharynx: Oropharynx is clear. No oropharyngeal exudate or posterior oropharyngeal erythema.   Eyes:      Conjunctiva/sclera: Conjunctivae normal.   Cardiovascular:      Rate and Rhythm: Regular rhythm.      Heart sounds: Normal heart sounds.   Pulmonary:      Breath sounds: Normal breath sounds. No wheezing.   Lymphadenopathy:      Cervical: No cervical adenopathy.   Neurological:      Mental Status: " "She is alert.        Result Review  Data Reviewed:{ Labs  Result Review  Imaging  Med Tab  Media :23}   The following data was reviewed by: Jo Ann Erickson MD on 10/30/2023  INFLUENZA NASAL SWAB:No results for input(s): \"RAPFLUA\", \"RAPFLUB\" in the last 12817 hours.       ECG 12 Lead    Date/Time: 10/30/2023 2:14 PM  Performed by: Jo Ann Erickson MD    Authorized by: Jo Ann Erickson MD  Comparison: compared with previous ECG from 9/17/2017  Comparison to previous ECG: Sinus armando  Rhythm: sinus bradycardia  Rate: bradycardic  BPM: 56  Comments: Minimal ST deprssion  Sinus bradycardia                 Current Outpatient Medications:     acetaminophen (TYLENOL) 500 MG tablet, Take 1 tablet by mouth Every 6 (Six) Hours As Needed for Mild Pain., Disp: 90 tablet, Rfl: 0    amLODIPine (NORVASC) 5 MG tablet, Take 1 tablet by mouth Daily., Disp: 90 tablet, Rfl: 3    docusate sodium (Colace) 100 MG capsule, Take 1 capsule by mouth Daily., Disp: 90 capsule, Rfl: 3    fluticasone (FLONASE) 50 MCG/ACT nasal spray, 2 sprays into the nostril(s) as directed by provider Daily. Administer 2 sprays in each nostril for each dose., Disp: 16 g, Rfl: 3    Magnesium 400 MG capsule, Take  by mouth. Prn, Disp: , Rfl:     nystatin (MYCOSTATIN) 099211 UNIT/GM ointment, Apply 1 application  topically to the appropriate area as directed 2 (Two) Times a Day., Disp: 30 g, Rfl: 1    rosuvastatin (CRESTOR) 5 MG tablet, Take 1 tablet by mouth Daily., Disp: 90 tablet, Rfl: 1    Synthroid 50 MCG tablet, Take 1 tablet by mouth Daily., Disp: 90 tablet, Rfl: 3      Assessment and Plan {CC Problem List  Visit Diagnosis  ROS  Review (Popup)  Health Maintenance  Quality  BestPractice  Medications  SmartSets  SnapShot Encounters  Media :23}   Problem List Items Addressed This Visit       Environmental and seasonal allergies     Other Visit Diagnoses       Cough, unspecified type    -  Primary    Relevant Orders    POCT SARS-CoV-2 Antigen " CELIA + Flu (Completed)    Chest pain, unspecified type        Relevant Orders    ECG 12 Lead          Flu and covid negative  Advise to monitor symptoms as just started yesterday   Given medication to help with cough take as needed  Continue flonase daily   If symptoms worsen or no improvement seek medical attention immediately.       Follow Up   Return if symptoms worsen or fail to improve.  Patient was given instructions and counseling regarding her condition or for health maintenance advice. Please see specific information pulled into the AVS if appropriate.    EpicAct:[email or web address]_ORDERS,RunParams:STARTUPTYPE=FOLLOW    [email or web address]_DISCHARGE

## 2023-11-07 ENCOUNTER — OFFICE VISIT (OUTPATIENT)
Dept: FAMILY MEDICINE CLINIC | Facility: CLINIC | Age: 73
End: 2023-11-07
Payer: MEDICARE

## 2023-11-07 VITALS
SYSTOLIC BLOOD PRESSURE: 118 MMHG | HEART RATE: 71 BPM | DIASTOLIC BLOOD PRESSURE: 68 MMHG | BODY MASS INDEX: 30.66 KG/M2 | TEMPERATURE: 97.8 F | WEIGHT: 184 LBS | HEIGHT: 65 IN | OXYGEN SATURATION: 96 %

## 2023-11-07 DIAGNOSIS — J40 BRONCHITIS WITH WHEEZING: Primary | ICD-10-CM

## 2023-11-07 PROCEDURE — 1160F RVW MEDS BY RX/DR IN RCRD: CPT | Performed by: FAMILY MEDICINE

## 2023-11-07 PROCEDURE — 3078F DIAST BP <80 MM HG: CPT | Performed by: FAMILY MEDICINE

## 2023-11-07 PROCEDURE — 3074F SYST BP LT 130 MM HG: CPT | Performed by: FAMILY MEDICINE

## 2023-11-07 PROCEDURE — 1159F MED LIST DOCD IN RCRD: CPT | Performed by: FAMILY MEDICINE

## 2023-11-07 PROCEDURE — 99213 OFFICE O/P EST LOW 20 MIN: CPT | Performed by: FAMILY MEDICINE

## 2023-11-07 RX ORDER — ALBUTEROL SULFATE 90 UG/1
2 AEROSOL, METERED RESPIRATORY (INHALATION) EVERY 6 HOURS PRN
Qty: 8 G | Refills: 1 | Status: SHIPPED | OUTPATIENT
Start: 2023-11-07

## 2023-11-07 RX ORDER — METHYLPREDNISOLONE 4 MG/1
TABLET ORAL
Qty: 21 EACH | Refills: 0 | Status: SHIPPED | OUTPATIENT
Start: 2023-11-07 | End: 2023-11-12

## 2023-11-07 RX ORDER — AZITHROMYCIN 250 MG/1
TABLET, FILM COATED ORAL
Qty: 6 TABLET | Refills: 0 | Status: SHIPPED | OUTPATIENT
Start: 2023-11-07

## 2023-11-07 NOTE — PROGRESS NOTES
"    Chief Complaint  ear pain, cough ,sorethroat (Last  started went out of town  back and worse/Now has yellow gunk started yesterday)    Subjective    History of Present Illness {CC  Problem List  Visit  Diagnosis   Encounters  Notes  Medications  Labs  Result Review Imaging  Media :23}     Aydee Moore presents to [facility] for   History of Present Illness     72 yo female present for acute visit. Seen on 10/30/21 for cough however symptoms have worsen since that time.    States she is having a really bad time sleeping due to coughing so much.    She has been feeling bad. Some sore throat and ear pain.    No fever today   No appetite.   Tired  Just got yesterday   Using tesalon perals.     Social History     Socioeconomic History    Marital status:    Tobacco Use    Smoking status: Never    Smokeless tobacco: Never   Vaping Use    Vaping Use: Never used   Substance and Sexual Activity    Alcohol use: Yes     Comment: social drinker no more than 1 drink/day if that    Drug use: No    Sexual activity: Yes     Partners: Male     Birth control/protection: Hysterectomy     Comment:  only partner      Objective     Vital Signs:   /68   Pulse 71   Temp 97.8 °F (36.6 °C)   Ht 165.1 cm (65\")   Wt 83.5 kg (184 lb)   SpO2 96%   BMI 30.62 kg/m²   Physical Exam  Constitutional:       General: She is not in acute distress.     Appearance: She is not ill-appearing.   HENT:      Head: Normocephalic.   Cardiovascular:      Rate and Rhythm: Regular rhythm.      Heart sounds: Normal heart sounds.   Pulmonary:      Breath sounds: Wheezing present.      Comments: Coarse cough  Neurological:      Mental Status: She is alert.        Result Review  Data Reviewed:{ Labs  Result Review  Imaging  Med Tab  Media :23}   The following data was reviewed by: Jo Ann Erickson MD on 2023  Lab Results - Last 18 Months   Lab Units 10/12/23  0915 " 04/11/23  0908 12/01/22  1457   BUN mg/dL 17 17 28*   CREATININE mg/dL 0.91 1.09* 0.94   SODIUM mmol/L 138 140 139   POTASSIUM mmol/L 4.6 4.6 4.3   CHLORIDE mmol/L 102 104 100   CALCIUM mg/dL 10.2 10.3 9.8   ALBUMIN g/dL 4.7 4.5 4.7   BILIRUBIN mg/dL 0.5 0.5 0.3   ALK PHOS U/L 88 80 96   AST (SGOT) U/L 17 19 15   ALT (SGPT) U/L 12 14 11   CHOLESTEROL mg/dL 227* 205*  --    TRIGLYCERIDES mg/dL 86 137  --    HDL CHOL mg/dL 79* 64*  --    VLDL CHOLESTEROL SUSANNA mg/dL 15 24  --    LDL CHOL mg/dL 133* 117*  --    WBC 10*3/mm3  --  5.29  --    RBC 10*6/mm3  --  4.64  --    HEMATOCRIT %  --  42.9  --    MCV fL  --  92.5  --    MCH pg  --  28.9  --    TSH uIU/mL 3.070 2.410 1.960              Current Outpatient Medications:     acetaminophen (TYLENOL) 500 MG tablet, Take 1 tablet by mouth Every 6 (Six) Hours As Needed for Mild Pain., Disp: 90 tablet, Rfl: 0    amLODIPine (NORVASC) 5 MG tablet, Take 1 tablet by mouth Daily., Disp: 90 tablet, Rfl: 3    benzonatate (Tessalon Perles) 100 MG capsule, Take 1 capsule by mouth 3 (Three) Times a Day As Needed for Cough., Disp: 30 capsule, Rfl: 1    docusate sodium (Colace) 100 MG capsule, Take 1 capsule by mouth Daily., Disp: 90 capsule, Rfl: 3    fluticasone (FLONASE) 50 MCG/ACT nasal spray, 2 sprays into the nostril(s) as directed by provider Daily. Administer 2 sprays in each nostril for each dose., Disp: 16 g, Rfl: 3    Magnesium 400 MG capsule, Take  by mouth. Prn, Disp: , Rfl:     nystatin (MYCOSTATIN) 839485 UNIT/GM ointment, Apply 1 application  topically to the appropriate area as directed 2 (Two) Times a Day., Disp: 30 g, Rfl: 1    rosuvastatin (CRESTOR) 5 MG tablet, Take 1 tablet by mouth Daily., Disp: 90 tablet, Rfl: 1    Synthroid 50 MCG tablet, Take 1 tablet by mouth Daily., Disp: 90 tablet, Rfl: 3      Assessment and Plan {CC Problem List  Visit Diagnosis  ROS  Review (Popup)  Health Maintenance  Quality  BestPractice  Medications  SmartSets  SnapShot  Encounters  Media :23}   Problem List Items Addressed This Visit    None  Visit Diagnoses       Bronchitis with wheezing    -  Primary    Relevant Medications    albuterol sulfate  (90 Base) MCG/ACT inhaler        Symptoms worsening over the last week. Will cover with antibiotics given worsening symptoms productive cough and wheezing   Albuterol as needed for cough and wheeze every 8 hours.  Mucinex   Medrol dose pack  If worsening symptoms seek medical attention.       Follow Up   Return if symptoms worsen or fail to improve.  Patient was given instructions and counseling regarding her condition or for health maintenance advice. Please see specific information pulled into the AVS if appropriate.    EpicAct:[email or web address]_ORDERS,RunParams:STARTUPTYPE=FOLLOW    [email or web address]_DISCHARGE

## 2023-11-17 ENCOUNTER — OFFICE VISIT (OUTPATIENT)
Dept: FAMILY MEDICINE CLINIC | Facility: CLINIC | Age: 73
End: 2023-11-17
Payer: MEDICARE

## 2023-11-17 ENCOUNTER — HOSPITAL ENCOUNTER (OUTPATIENT)
Dept: GENERAL RADIOLOGY | Facility: HOSPITAL | Age: 73
Discharge: HOME | End: 2023-11-17
Payer: MEDICARE

## 2023-11-17 VITALS
OXYGEN SATURATION: 95 % | SYSTOLIC BLOOD PRESSURE: 158 MMHG | BODY MASS INDEX: 30.75 KG/M2 | TEMPERATURE: 98 F | WEIGHT: 184.6 LBS | HEART RATE: 69 BPM | DIASTOLIC BLOOD PRESSURE: 82 MMHG | HEIGHT: 65 IN

## 2023-11-17 DIAGNOSIS — R05.3 CHRONIC COUGH: ICD-10-CM

## 2023-11-17 DIAGNOSIS — Z20.828 EXPOSURE TO RESPIRATORY SYNCYTIAL VIRUS (RSV): ICD-10-CM

## 2023-11-17 DIAGNOSIS — R05.3 CHRONIC COUGH: Primary | ICD-10-CM

## 2023-11-17 PROCEDURE — 71046 X-RAY EXAM CHEST 2 VIEWS: CPT

## 2023-11-17 PROCEDURE — 3079F DIAST BP 80-89 MM HG: CPT

## 2023-11-17 PROCEDURE — 3077F SYST BP >= 140 MM HG: CPT

## 2023-11-17 PROCEDURE — 99213 OFFICE O/P EST LOW 20 MIN: CPT

## 2023-11-17 RX ORDER — AZITHROMYCIN 250 MG/1
TABLET, FILM COATED ORAL
Qty: 6 TABLET | Refills: 0 | Status: SHIPPED | OUTPATIENT
Start: 2023-11-17

## 2023-11-17 NOTE — PROGRESS NOTES
"Chief Complaint  Cough (Pt is here today presents cough for about a month, pt has seen  in regards, pt states she has not felt any better.)    Subjective        Aydee Moore presents to [facility]    History of Present Illness  73 year old female presents for persistent cough that began one month ago. She has been treated in the past with steroids, albuterol inhaler, and antibiotics. She reports exposure to RSV. Due to persistent symptoms, chest xray ordered. Pt states she would like another z-luisa. Sent to pharmacy. Has albuterol inhaler to use as needed. Respiratory panel ordered. Medicare waiver for testing signed by pt and uploaded to chart.       Objective   Vital Signs:  /82   Pulse 69   Temp 98 °F (36.7 °C)   Ht 165.1 cm (65\")   Wt 83.7 kg (184 lb 9.6 oz)   SpO2 95%   BMI 30.72 kg/m²   Estimated body mass index is 30.72 kg/m² as calculated from the following:    Height as of this encounter: 165.1 cm (65\").    Weight as of this encounter: 83.7 kg (184 lb 9.6 oz).       BMI is >= 30 and <35. (Class 1 Obesity). The following options were offered after discussion;: weight loss educational material (shared in after visit summary), exercise counseling/recommendations, and nutrition counseling/recommendations      Physical Exam  Constitutional:       Appearance: Normal appearance.   HENT:      Head: Normocephalic.   Eyes:      Conjunctiva/sclera: Conjunctivae normal.      Pupils: Pupils are equal, round, and reactive to light.   Cardiovascular:      Rate and Rhythm: Normal rate and regular rhythm.      Pulses: Normal pulses.      Heart sounds: Normal heart sounds.   Pulmonary:      Effort: Pulmonary effort is normal. No respiratory distress.      Breath sounds: Normal breath sounds. No wheezing.   Skin:     General: Skin is warm.   Neurological:      General: No focal deficit present.      Mental Status: She is alert and oriented to person, place, and time. "   Psychiatric:         Mood and Affect: Mood normal.         Behavior: Behavior normal.            Result Review :  The following data was reviewed by: ALVARO Bowie on 11/17/2023:  Common labs          12/1/2022    14:57 4/11/2023    09:08 10/12/2023    09:15   Common Labs   Glucose 94  101  100    BUN 28  17  17    Creatinine 0.94  1.09  0.91    Sodium 139  140  138    Potassium 4.3  4.6  4.6    Chloride 100  104  102    Calcium 9.8  10.3  10.2    Total Protein 7.1  6.5  7.1    Albumin 4.7  4.5  4.7    Total Bilirubin 0.3  0.5  0.5    Alkaline Phosphatase 96  80  88    AST (SGOT) 15  19  17    ALT (SGPT) 11  14  12    WBC  5.29     Hemoglobin  13.4     Hematocrit  42.9     Platelets  225     Total Cholesterol  205  227    Triglycerides  137  86    HDL Cholesterol  64  79    LDL Cholesterol   117  133      Data reviewed : previous clinic visits             Assessment and Plan   Diagnoses and all orders for this visit:    1. Chronic cough (Primary)  -     XR Chest PA & Lateral; Future  -     azithromycin (Zithromax Z-Stevenson) 250 MG tablet; Take 2 tablets by mouth on day 1, then 1 tablet daily on days 2-5  Dispense: 6 tablet; Refill: 0  -     Respiratory Panel PCR w/COVID-19(SARS-CoV-2) LUCIANA/RODNEY/AMANDA/PAD/COR/THADDEUS In-House, NP Swab in UTM/VTM, 2 HR TAT - Swab, Nasopharynx    2. Exposure to respiratory syncytial virus (RSV)  -     Respiratory Panel PCR w/COVID-19(SARS-CoV-2) LUCIANA/RODNEY/AMANDA/PAD/COR/THADDEUS In-House, NP Swab in UTM/VTM, 2 HR TAT - Swab, Nasopharynx             Follow Up   No follow-ups on file.  Patient was given instructions and counseling regarding her condition or for health maintenance advice. Please see specific information pulled into the AVS if appropriate.

## 2023-11-20 LAB
B PARAP IS1001 DNA NPH QL NAA+NON-PROBE: NOT DETECTED
B PERT.PT PRMT NPH QL NAA+NON-PROBE: NOT DETECTED
C PNEUM DNA NPH QL NAA+NON-PROBE: NOT DETECTED
FLUAV H1 2009 PAN RNA NPH NAA+NON-PROBE: NOT DETECTED
FLUAV H1 RNA NPH QL NAA+NON-PROBE: NOT DETECTED
FLUAV H3 RNA NPH QL NAA+NON-PROBE: NOT DETECTED
FLUAV RNA NPH QL NAA+NON-PROBE: NOT DETECTED
FLUBV RNA NPH QL NAA+NON-PROBE: NOT DETECTED
HADV DNA NPH QL NAA+NON-PROBE: NOT DETECTED
HCOV 229E RNA NPH QL NAA+NON-PROBE: NOT DETECTED
HCOV HKU1 RNA NPH QL NAA+NON-PROBE: NOT DETECTED
HCOV NL63 RNA NPH QL NAA+NON-PROBE: NOT DETECTED
HCOV OC43 RNA NPH QL NAA+NON-PROBE: NOT DETECTED
HMPV RNA NPH QL NAA+NON-PROBE: NOT DETECTED
HPIV1 RNA NPH QL NAA+NON-PROBE: NOT DETECTED
HPIV2 RNA NPH QL NAA+NON-PROBE: NOT DETECTED
HPIV3 RNA NPH QL NAA+NON-PROBE: NOT DETECTED
HPIV4 RNA NPH QL NAA+NON-PROBE: NOT DETECTED
M PNEUMO DNA NPH QL NAA+NON-PROBE: NOT DETECTED
RSV RNA NPH QL NAA+NON-PROBE: NOT DETECTED
RV+EV RNA NPH QL NAA+NON-PROBE: NOT DETECTED
SARS-COV-2 RNA NPH QL NAA+NON-PROBE: NOT DETECTED

## 2023-12-04 DIAGNOSIS — R05.3 CHRONIC COUGH: ICD-10-CM

## 2023-12-04 NOTE — TELEPHONE ENCOUNTER
Caller: Aydee Moore    Relationship: Self    Best call back number: [phone]     Requested Prescriptions:   Requested Prescriptions     Pending Prescriptions Disp Refills    azithromycin (Zithromax Z-Stevenson) 250 MG tablet 6 tablet 0     Sig: Take 2 tablets by mouth on day 1, then 1 tablet daily on days 2-5        Pharmacy where request should be sent: [facility] [id number] - [phone]  - [phone] FX     Last office visit with prescribing clinician: 11/7/2023   Last telemedicine visit with prescribing clinician: Visit date not found   Next office visit with prescribing clinician: Visit date not found     Additional details provided by patient: PATIENT IS STILL HAVING A COUGH WITH YELLOW PHLEGM. PLEASE ADVISE.     Does the patient have less than a 3 day supply:  [x] Yes  [] No    Would you like a call back once the refill request has been completed: [] Yes [] No    If the office needs to give you a call back, can they leave a voicemail: [] Yes [x] No    Genna Gorman Rep   12/04/23 11:12 EST

## 2023-12-12 DIAGNOSIS — R05.3 CHRONIC COUGH: ICD-10-CM

## 2023-12-12 DIAGNOSIS — R93.89 ABNORMAL CHEST X-RAY: ICD-10-CM

## 2023-12-12 DIAGNOSIS — J98.11 DISCOID ATELECTASIS: Primary | ICD-10-CM

## 2023-12-20 ENCOUNTER — TELEPHONE (OUTPATIENT)
Dept: FAMILY MEDICINE CLINIC | Facility: CLINIC | Age: 73
End: 2023-12-20
Payer: MEDICARE

## 2023-12-21 RX ORDER — AZITHROMYCIN 250 MG/1
TABLET, FILM COATED ORAL
Qty: 6 TABLET | Refills: 0 | OUTPATIENT
Start: 2023-12-21

## 2024-01-12 ENCOUNTER — OFFICE VISIT (OUTPATIENT)
Dept: FAMILY MEDICINE CLINIC | Facility: CLINIC | Age: 74
End: 2024-01-12
Payer: MEDICARE

## 2024-01-12 VITALS
SYSTOLIC BLOOD PRESSURE: 130 MMHG | HEIGHT: 65 IN | HEART RATE: 60 BPM | OXYGEN SATURATION: 98 % | TEMPERATURE: 97.7 F | WEIGHT: 187.8 LBS | DIASTOLIC BLOOD PRESSURE: 78 MMHG | BODY MASS INDEX: 31.29 KG/M2

## 2024-01-12 DIAGNOSIS — I10 ESSENTIAL HYPERTENSION: Primary | Chronic | ICD-10-CM

## 2024-01-12 DIAGNOSIS — E03.9 HYPOTHYROIDISM, ADULT: Chronic | ICD-10-CM

## 2024-01-12 DIAGNOSIS — E78.5 HYPERLIPIDEMIA, UNSPECIFIED HYPERLIPIDEMIA TYPE: Chronic | ICD-10-CM

## 2024-01-12 DIAGNOSIS — M25.561 PAIN IN BOTH KNEES, UNSPECIFIED CHRONICITY: ICD-10-CM

## 2024-01-12 DIAGNOSIS — R21 RASH: ICD-10-CM

## 2024-01-12 DIAGNOSIS — M25.562 PAIN IN BOTH KNEES, UNSPECIFIED CHRONICITY: ICD-10-CM

## 2024-01-12 PROCEDURE — 3075F SYST BP GE 130 - 139MM HG: CPT

## 2024-01-12 PROCEDURE — 3078F DIAST BP <80 MM HG: CPT

## 2024-01-12 PROCEDURE — 99214 OFFICE O/P EST MOD 30 MIN: CPT

## 2024-01-12 RX ORDER — TRIAMCINOLONE ACETONIDE 1 MG/G
1 OINTMENT TOPICAL 2 TIMES DAILY
Qty: 30 G | Refills: 0 | Status: SHIPPED | OUTPATIENT
Start: 2024-01-12

## 2024-01-12 RX ORDER — MULTIPLE VITAMINS W/ MINERALS TAB 9MG-400MCG
1 TAB ORAL DAILY
COMMUNITY

## 2024-01-12 NOTE — PROGRESS NOTES
"Chief Complaint  Cough (Follow up/Productive/FASTING-would like labs), scab (Back of neck-ongoing/Would like refill kenalog cream (last fill 2022)/), and Knee Pain (Bilateral/)    Subjective        Aydee Moore presents to [facility]    History of Present Illness  73 year old female presents for f/u for persistent cough. She was seen by pulmonology on 12/26/23 where PFTs were performed. She has plans for sleep study at the end of month. She was given Trelegy to use from pulmonology but admits she has not used due to side effects she read. She has hypertension that is stable today at 130/78. CBC and CMP ordered. Lipid panel ordered for hyperlipidemia. TSH ordered for hypothyroidism. She needs refill of kenalog cream due to rash, refilled. She has bilateral knee pain, takes tylenol as needed. Encouraged knee sleeve and nsaids. Xrays ordered.       Objective   Vital Signs:  /78   Pulse 60   Temp 97.7 °F (36.5 °C)   Ht 165.1 cm (65\")   Wt 85.2 kg (187 lb 12.8 oz)   SpO2 98%   BMI 31.25 kg/m²   Estimated body mass index is 31.25 kg/m² as calculated from the following:    Height as of this encounter: 165.1 cm (65\").    Weight as of this encounter: 85.2 kg (187 lb 12.8 oz).          Physical Exam  Constitutional:       Appearance: Normal appearance.   HENT:      Head: Normocephalic.   Eyes:      Conjunctiva/sclera: Conjunctivae normal.      Pupils: Pupils are equal, round, and reactive to light.   Cardiovascular:      Rate and Rhythm: Normal rate and regular rhythm.      Pulses: Normal pulses.      Heart sounds: Normal heart sounds.   Pulmonary:      Effort: Pulmonary effort is normal.      Breath sounds: Normal breath sounds.   Skin:     General: Skin is warm.   Neurological:      General: No focal deficit present.      Mental Status: She is alert and oriented to person, place, and time.   Psychiatric:         Mood and Affect: Mood normal.         Behavior: Behavior normal. "            Result Review :  The following data was reviewed by: ALVARO Bowie on 01/12/2024:  Common labs          4/11/2023    09:08 10/12/2023    09:15   Common Labs   Glucose 101  100    BUN 17  17    Creatinine 1.09  0.91    Sodium 140  138    Potassium 4.6  4.6    Chloride 104  102    Calcium 10.3  10.2    Total Protein 6.5  7.1    Albumin 4.5  4.7    Total Bilirubin 0.5  0.5    Alkaline Phosphatase 80  88    AST (SGOT) 19  17    ALT (SGPT) 14  12    WBC 5.29     Hemoglobin 13.4     Hematocrit 42.9     Platelets 225     Total Cholesterol 205  227    Triglycerides 137  86    HDL Cholesterol 64  79    LDL Cholesterol  117  133      Data reviewed : labs              Assessment and Plan   Diagnoses and all orders for this visit:    1. Essential hypertension (Primary)  Assessment & Plan:  Hypertension is  stable .  Continue current treatment regimen.  Blood pressure will be reassessed at the next regular appointment.    Orders:  -     CBC w AUTO Differential  -     Comprehensive metabolic panel    2. Hyperlipidemia, unspecified hyperlipidemia type  -     CBC w AUTO Differential  -     Comprehensive metabolic panel  -     Lipid panel    3. Hypothyroidism, adult  -     TSH Rfx On Abnormal To Free T4    4. Rash  -     triamcinolone (KENALOG) 0.1 % ointment; Apply 1 application  topically to the appropriate area as directed 2 (Two) Times a Day.  Dispense: 30 g; Refill: 0    5. Pain in both knees, unspecified chronicity  -     XR knee 3 vw bilateral; Future             Follow Up   No follow-ups on file.  Patient was given instructions and counseling regarding her condition or for health maintenance advice. Please see specific information pulled into the AVS if appropriate.

## 2024-01-13 LAB
ALBUMIN SERPL-MCNC: 4.5 G/DL (ref 3.8–4.8)
ALBUMIN/GLOB SERPL: 2 {RATIO} (ref 1.2–2.2)
ALP SERPL-CCNC: 93 IU/L (ref 44–121)
ALT SERPL-CCNC: 12 IU/L (ref 0–32)
AST SERPL-CCNC: 21 IU/L (ref 0–40)
BASOPHILS # BLD AUTO: 0.1 X10E3/UL (ref 0–0.2)
BASOPHILS NFR BLD AUTO: 1 %
BILIRUB SERPL-MCNC: 0.4 MG/DL (ref 0–1.2)
BUN SERPL-MCNC: 15 MG/DL (ref 8–27)
BUN/CREAT SERPL: 14 (ref 12–28)
CALCIUM SERPL-MCNC: 9.7 MG/DL (ref 8.7–10.3)
CHLORIDE SERPL-SCNC: 102 MMOL/L (ref 96–106)
CHOLEST SERPL-MCNC: 173 MG/DL (ref 100–199)
CO2 SERPL-SCNC: 21 MMOL/L (ref 20–29)
CREAT SERPL-MCNC: 1.09 MG/DL (ref 0.57–1)
EGFRCR SERPLBLD CKD-EPI 2021: 54 ML/MIN/1.73
EOSINOPHIL # BLD AUTO: 0.2 X10E3/UL (ref 0–0.4)
EOSINOPHIL NFR BLD AUTO: 3 %
ERYTHROCYTE [DISTWIDTH] IN BLOOD BY AUTOMATED COUNT: 12.1 % (ref 11.7–15.4)
GLOBULIN SER CALC-MCNC: 2.2 G/DL (ref 1.5–4.5)
GLUCOSE SERPL-MCNC: 95 MG/DL (ref 70–99)
HCT VFR BLD AUTO: 41.7 % (ref 34–46.6)
HDLC SERPL-MCNC: 84 MG/DL
HGB BLD-MCNC: 13.8 G/DL (ref 11.1–15.9)
IMM GRANULOCYTES # BLD AUTO: 0 X10E3/UL (ref 0–0.1)
IMM GRANULOCYTES NFR BLD AUTO: 0 %
LDLC SERPL CALC-MCNC: 75 MG/DL (ref 0–99)
LYMPHOCYTES # BLD AUTO: 2.1 X10E3/UL (ref 0.7–3.1)
LYMPHOCYTES NFR BLD AUTO: 30 %
MCH RBC QN AUTO: 30.2 PG (ref 26.6–33)
MCHC RBC AUTO-ENTMCNC: 33.1 G/DL (ref 31.5–35.7)
MCV RBC AUTO: 91 FL (ref 79–97)
MONOCYTES # BLD AUTO: 0.4 X10E3/UL (ref 0.1–0.9)
MONOCYTES NFR BLD AUTO: 6 %
NEUTROPHILS # BLD AUTO: 4.2 X10E3/UL (ref 1.4–7)
NEUTROPHILS NFR BLD AUTO: 60 %
PLATELET # BLD AUTO: 236 X10E3/UL (ref 150–450)
POTASSIUM SERPL-SCNC: 4.5 MMOL/L (ref 3.5–5.2)
PROT SERPL-MCNC: 6.7 G/DL (ref 6–8.5)
RBC # BLD AUTO: 4.57 X10E6/UL (ref 3.77–5.28)
SODIUM SERPL-SCNC: 140 MMOL/L (ref 134–144)
TRIGL SERPL-MCNC: 72 MG/DL (ref 0–149)
TSH SERPL DL<=0.005 MIU/L-ACNC: 2.83 UIU/ML (ref 0.45–4.5)
VLDLC SERPL CALC-MCNC: 14 MG/DL (ref 5–40)
WBC # BLD AUTO: 6.9 X10E3/UL (ref 3.4–10.8)

## 2024-01-31 ENCOUNTER — OFFICE VISIT (OUTPATIENT)
Dept: FAMILY MEDICINE CLINIC | Facility: CLINIC | Age: 74
End: 2024-01-31
Payer: MEDICARE

## 2024-01-31 VITALS
WEIGHT: 191.8 LBS | OXYGEN SATURATION: 98 % | DIASTOLIC BLOOD PRESSURE: 68 MMHG | SYSTOLIC BLOOD PRESSURE: 118 MMHG | HEART RATE: 67 BPM | BODY MASS INDEX: 31.96 KG/M2 | HEIGHT: 65 IN | TEMPERATURE: 98.6 F

## 2024-01-31 DIAGNOSIS — Z12.31 BREAST CANCER SCREENING BY MAMMOGRAM: ICD-10-CM

## 2024-01-31 DIAGNOSIS — S61.238A PUNCTURE WOUND OF INDEX FINGER, INITIAL ENCOUNTER: Primary | ICD-10-CM

## 2024-01-31 DIAGNOSIS — G89.29 CHRONIC PAIN OF LEFT KNEE: Chronic | ICD-10-CM

## 2024-01-31 DIAGNOSIS — Z78.0 POST-MENOPAUSAL: ICD-10-CM

## 2024-01-31 DIAGNOSIS — M25.561 CHRONIC PAIN OF RIGHT KNEE: Chronic | ICD-10-CM

## 2024-01-31 DIAGNOSIS — K52.9 CHRONIC DIARRHEA: Chronic | ICD-10-CM

## 2024-01-31 DIAGNOSIS — J30.89 ENVIRONMENTAL AND SEASONAL ALLERGIES: ICD-10-CM

## 2024-01-31 DIAGNOSIS — M25.562 CHRONIC PAIN OF LEFT KNEE: Chronic | ICD-10-CM

## 2024-01-31 DIAGNOSIS — G89.29 CHRONIC PAIN OF RIGHT KNEE: Chronic | ICD-10-CM

## 2024-01-31 DIAGNOSIS — Z83.3 FAMILY HISTORY OF DIABETES MELLITUS: ICD-10-CM

## 2024-01-31 DIAGNOSIS — K29.50 CHRONIC GASTRITIS WITHOUT BLEEDING, UNSPECIFIED GASTRITIS TYPE: Chronic | ICD-10-CM

## 2024-01-31 RX ORDER — CETIRIZINE HYDROCHLORIDE 10 MG/1
10 TABLET ORAL NIGHTLY
Qty: 30 TABLET | Refills: 5 | Status: SHIPPED | OUTPATIENT
Start: 2024-01-31

## 2024-01-31 RX ORDER — FLUTICASONE PROPIONATE 50 MCG
2 SPRAY, SUSPENSION (ML) NASAL DAILY
Qty: 16 G | Refills: 11 | Status: SHIPPED | OUTPATIENT
Start: 2024-01-31

## 2024-01-31 RX ORDER — ADHESIVE BANDAGE 1 3/4"X4"
1 BANDAGE TOPICAL DAILY
Qty: 30 EACH | Refills: 1 | Status: SHIPPED | OUTPATIENT
Start: 2024-01-31

## 2024-01-31 RX ORDER — SULFAMETHOXAZOLE AND TRIMETHOPRIM 800; 160 MG/1; MG/1
1 TABLET ORAL 2 TIMES DAILY
Qty: 14 TABLET | Refills: 0 | Status: SHIPPED | OUTPATIENT
Start: 2024-01-31

## 2024-01-31 NOTE — PROGRESS NOTES
Chief Complaint  left finger stuck yesterday (Last tetanus) and wants order mammo  (Does not want to do wellness today)    Subjective        Aydee Moore presents to [facility]  History of Present Illness  73-year-old female who usually follows Nurse Practitioner Sekou Cullen is here for acute complaint of trauma to her finger that happened yesterday while pt was using a old sowing machine which was new to her.    Patient reports it was not the needle but another part punctured her index finger however patient denied anything being lodged in her finger.  Patient states she poured peroxide to clean her finger.  Counseled patient to avoid peroxide use rather use iodine externally once a day to keep the wound clean.  Also counseled patient to keep the wound covered not to soak it in water for prolonged periods of time.  Patient unsure when she has received a tetanus shot last but desires repeat tetanus shot today as she states states this was an old sewing machine which was sitting in someone's closet.    Patient also brought a letter from [facility] reminding patient of the mammogram reviewed the letter and ordered mammogram and also bone scan that is due, patient voiced understanding.  Patient complaining of bilateral knee pain which is chronic in nature and not causing gait imbalance.  Reminded patient to complete x-rays that were ordered on last visit for knee pain.  Also discussed orthopedic referral for chronic bilateral knee pain, patient voiced understanding.  Patient reports she has seen Dr. Case in orthopedic surgery and would like to return to him for knee pain.     Patient also reports occasional cough discussed the common etiologies of cough including seasonal allergies and started patient on Zyrtec and Flonase.    Patient reports history of chronic diarrhea going on for at least 2 years now and states she already had 3 bowel movements today prior to noon which were not  "watery but somewhat pasty and patient desires further evaluation for chronic diarrhea.  Patient states she has seen Dr. Melissa at [facility] and desires to return to him.    Discussed most recent lab results with patient including improving cholesterol.  Also discussed adding celiac disease panel to patient's lab today for history of chronic diarrhea along with referral to gastroenterology.  Patient also reports family history of diabetes and desires an A1c check.      Objective   Vital Signs:  /68   Pulse 67   Temp 98.6 °F (37 °C)   Ht 165.1 cm (65\")   Wt 87 kg (191 lb 12.8 oz)   SpO2 98%   BMI 31.92 kg/m²   Estimated body mass index is 31.92 kg/m² as calculated from the following:    Height as of this encounter: 165.1 cm (65\").    Weight as of this encounter: 87 kg (191 lb 12.8 oz).               Physical Exam  Constitutional:       Appearance: Normal appearance.   HENT:      Head: Normocephalic and atraumatic.   Eyes:      Conjunctiva/sclera: Conjunctivae normal.   Cardiovascular:      Rate and Rhythm: Normal rate and regular rhythm.      Heart sounds: Normal heart sounds.   Pulmonary:      Effort: Pulmonary effort is normal.      Breath sounds: Normal breath sounds.   Abdominal:      General: Bowel sounds are normal.      Palpations: Abdomen is soft.      Comments: Non-tender   Skin:     General: Skin is warm.      Comments: Minimal small superficial puncture wound on left distil index finger.  The actual puncture is less than half a centimeter in length.  C/D/I.  No drainage noted upon squeezing the left index finger.  No bleeding.   Neurological:      General: No focal deficit present.      Mental Status: She is alert and oriented to person, place, and time.   Psychiatric:         Mood and Affect: Mood normal.         Behavior: Behavior normal.        Result Review :    The following data was reviewed by: ALVARO Alarcon on 01/31/2024:  CHET          4/11/2023    09:08 " 10/12/2023    09:15 1/12/2024    09:15   CMP   Glucose 101  100  95    BUN 17  17  15    Creatinine 1.09  0.91  1.09    Sodium 140  138  140    Potassium 4.6  4.6  4.5    Chloride 104  102  102    Calcium 10.3  10.2  9.7    Total Protein 6.5  7.1  6.7    Albumin 4.5  4.7  4.5    Globulin 2.0  2.4  2.2    Total Bilirubin 0.5  0.5  0.4    Alkaline Phosphatase 80  88  93    AST (SGOT) 19  17  21    ALT (SGPT) 14  12  12    BUN/Creatinine Ratio 15.6  18.7  14      CBC          4/11/2023    09:08 1/12/2024    09:15   CBC   WBC 5.29  6.9    RBC 4.64  4.57    Hemoglobin 13.4  13.8    Hematocrit 42.9  41.7    MCV 92.5  91    MCH 28.9  30.2    MCHC 31.2  33.1    RDW 12.2  12.1    Platelets 225  236      Lipid Panel          4/11/2023    09:08 10/12/2023    09:15 1/12/2024    09:15   Lipid Panel   Total Cholesterol 205  227  173    Triglycerides 137  86  72    HDL Cholesterol 64  79  84    VLDL Cholesterol 24  15  14    LDL Cholesterol  117  133  75      TSH          4/11/2023    09:08 10/12/2023    09:15 1/12/2024    09:15   TSH   TSH 2.410  3.070  2.830                       Assessment and Plan     Diagnoses and all orders for this visit:    1. Puncture wound of index finger, initial encounter (Primary)  Comments:  left  Assessment & Plan:  Educated and counseled patient on monitoring the wound.  Instructed patient to monitor for signs of increasing erythema, pain, discharge, pus, and fever.  Instructed patient to immediately follow-up with clinic or go to ER for any of the signs and symptoms.  Counseled patient to keep the wound clean and avoid excessive exposure to water.  Tetanus shot administered to patient at the clinic today.    Orders:  -     sulfamethoxazole-trimethoprim (BACTRIM DS,SEPTRA DS) 800-160 MG per tablet; Take 1 tablet by mouth 2 (Two) Times a Day.  Dispense: 14 tablet; Refill: 0  -     Adhesive Bandages (Band-Aid Flexible) misc; Use 1 each Daily.  Dispense: 30 each; Refill: 1  -     Td Vaccine => 8yo PF  (TDVAX) 2-2    2. Chronic gastritis without bleeding, unspecified gastritis type  Assessment & Plan:  GERD precautions: Avoid fatty, greasy, spicy food, if current tobacco use stop smoking, stay upright for one hour before lying down. Also avoid Tobacco, caffeine, soda, mint, and garlic.      Orders:  -     Ambulatory Referral to Gastroenterology    3. Chronic pain of left knee  -     Ambulatory Referral to Orthopedic Surgery  -     Ambulatory Referral to Physical Therapy Evaluate and treat    4. Chronic diarrhea  -     Ambulatory Referral to Gastroenterology  -     Celiac Disease Panel    5. Chronic pain of right knee  -     Ambulatory Referral to Orthopedic Surgery  -     Ambulatory Referral to Physical Therapy Evaluate and treat    6. Breast cancer screening by mammogram  -     Mammo Screening Digital Tomosynthesis Bilateral With CAD; Future    7. Post-menopausal  -     DEXA Bone Density Axial; Future    8. Environmental and seasonal allergies  Assessment & Plan:  Started Zyrtec and Flonase today.  Will reevaluate cough on follow-up visit.    Orders:  -     cetirizine (zyrTEC) 10 MG tablet; Take 1 tablet by mouth Every Night.  Dispense: 30 tablet; Refill: 5  -     fluticasone (FLONASE) 50 MCG/ACT nasal spray; 2 sprays into the nostril(s) as directed by provider Daily.  Dispense: 16 g; Refill: 11    9. Family history of diabetes mellitus  -     ORDER: Hemoglobin A1c      All chronic conditions have been addressed and treated by the practice or other specialists. Medications have been reconciled and refilled as appropriate. Reiterated compliance and timely follow up appointments. Side effects of all new and old medications reviewed with the patient and patient willing to accept all risks involved. Advised RTO if no improvement or worsening of symptoms or if any new complaints arise. Patient advised to follow up with clinic or call after diagnostic tests, if patient does not hear from office 3 days after the test  completion.            Follow Up     Return in about 3 months (around 4/30/2024) for Annual physical, Next scheduled follow up.  Patient was given instructions and counseling regarding her condition or for health maintenance advice. Please see specific information pulled into the AVS if appropriate.

## 2024-01-31 NOTE — ASSESSMENT & PLAN NOTE
Educated and counseled patient on monitoring the wound.  Instructed patient to monitor for signs of increasing erythema, pain, discharge, pus, and fever.  Instructed patient to immediately follow-up with clinic or go to ER for any of the signs and symptoms.  Counseled patient to keep the wound clean and avoid excessive exposure to water.  Tetanus shot administered to patient at the clinic today.

## 2024-02-01 ENCOUNTER — HOSPITAL ENCOUNTER (OUTPATIENT)
Dept: GENERAL RADIOLOGY | Facility: HOSPITAL | Age: 74
Discharge: HOME | End: 2024-02-01
Payer: MEDICARE

## 2024-02-01 DIAGNOSIS — M25.562 PAIN IN BOTH KNEES, UNSPECIFIED CHRONICITY: ICD-10-CM

## 2024-02-01 DIAGNOSIS — M25.561 PAIN IN BOTH KNEES, UNSPECIFIED CHRONICITY: ICD-10-CM

## 2024-02-01 LAB
ENDOMYSIUM IGA SER QL: NEGATIVE
HBA1C MFR BLD: 5.5 % (ref 4.8–5.6)
IGA SERPL-MCNC: 221 MG/DL (ref 64–422)
TTG IGA SER-ACNC: <2 U/ML (ref 0–3)

## 2024-02-01 PROCEDURE — 73562 X-RAY EXAM OF KNEE 3: CPT

## 2024-02-07 ENCOUNTER — TREATMENT (OUTPATIENT)
Dept: PHYSICAL THERAPY | Facility: CLINIC | Age: 74
End: 2024-02-07
Payer: MEDICARE

## 2024-02-07 DIAGNOSIS — R26.2 DIFFICULTY WALKING: ICD-10-CM

## 2024-02-07 DIAGNOSIS — M25.561 CHRONIC PAIN OF BOTH KNEES: Primary | ICD-10-CM

## 2024-02-07 DIAGNOSIS — M17.0 PRIMARY OSTEOARTHRITIS OF BOTH KNEES: ICD-10-CM

## 2024-02-07 DIAGNOSIS — M25.562 CHRONIC PAIN OF BOTH KNEES: Primary | ICD-10-CM

## 2024-02-07 DIAGNOSIS — G89.29 CHRONIC PAIN OF BOTH KNEES: Primary | ICD-10-CM

## 2024-02-07 PROCEDURE — 97161 PT EVAL LOW COMPLEX 20 MIN: CPT | Performed by: PHYSICAL THERAPIST

## 2024-02-07 PROCEDURE — 97110 THERAPEUTIC EXERCISES: CPT | Performed by: PHYSICAL THERAPIST

## 2024-02-07 PROCEDURE — 97530 THERAPEUTIC ACTIVITIES: CPT | Performed by: PHYSICAL THERAPIST

## 2024-02-07 NOTE — PROGRESS NOTES
"Physical Therapy Initial Evaluation and Plan of Care  [address], [address], [place] [address]    Patient: Aydee Moore   : 1950  Diagnosis/ICD-10 Code:  Chronic pain of both knees [M25.561, M25.562, G89.29]  Referring practitioner: Jordan Reeder*    Subjective Evaluation    History of Present Illness  Date of onset: 2023  Mechanism of injury: Patient reports 2023 she was hiking at [facility] and was trying to \"do a scissor kick\" over a fallen tree on the trail, injuring her (L) knee.  She eventually underwent an MRI and was determined to have a (L) ACL rupture.  She was given a leg brace from Dr. Garcia's office.  She thoroughly enjoys hiking and has been limited many times in her hiking ability with going downhill being more challenging than walking uphill.  She has had one fall while hiking due to her knee giving way and multiple other episodes of her (L) knee giving way without falling. She has also been referred to Dr. Mckeon (orthopedic) but is still awaiting scheduling of this appointment.     She is fearful of steps within her home.  She uses (B) handrails ascending steps reciprocally.  Descending steps she does backwards. She uses trekking poles when hiking and sometimes uses a hurry cane for ambulating on level ground.  She describes her (L) knee as feeling unstable like it may buckle at any time.    She also reports (R) knee pain and difficulty sleeping due to (B) knee pain.  She has trouble standing for long periods such as during the prayers at [employer], so she sits down during these.  She has difficulty getting in/out of the car, and twisting movements of (L) knee.  She also reports frequent cramping of (B) LEs and even UEs at times which she has experienced for years.     PMH (R) knee medial meniscectomy 09/15/2017 with extensive PT following and good outcome with return to hiking; HTN, anxiety, depression, GERD.      Patient Occupation: " "N/A Quality of life: good    Pain  Current pain ratin (\"a little discomfort\")  At best pain ratin  At worst pain rating: 3  Location: (B) medial joint lines  Quality: dull ache  Relieving factors: medications (Tylenol prn)  Aggravating factors: standing, ambulation and sleeping  Progression: no change    Social Support  Lives with: spouse    Diagnostic Tests  X-ray: abnormal ((R) > (L) knee medial compartment narrowing)    Patient Goals  Patient goals for therapy: decreased pain and increased strength  Patient goal: ease the pain and get stronger to return to hiking safely, strengthen knee as much as possible since surgery is not recommended for ACL rupture         Subjective Questionnaire: LEFS: 40/80    Objective          Tenderness   Left Knee   Tenderness in the medial joint line and pes anserinus.     Right Knee   Tenderness in the medial joint line and pes anserinus.     Active Range of Motion   Left Knee   Flexion: 119 degrees   Extension: 10 degrees   Extensor lag: 10 degrees     Right Knee   Flexion: 123 degrees   Extension: 0 degrees   Extensor la degrees     Additional Active Range of Motion Details  (R) lateral hip pain with (R) knee AROM  (L) lateral hip and thigh pain with (L) knee AROM    Strength/Myotome Testing     Left Hip   Planes of Motion   Flexion: 4-  Abduction: 3+  Adduction: 3+    Right Hip   Planes of Motion   Flexion: 4-  Abduction: 3+  Adduction: 3+    Left Knee   Flexion: 3+  Extension: 4- (within limited ROM)  Quadriceps contraction: fair    Right Knee   Flexion: 4-  Extension: 4  Quadriceps contraction: fair    Swelling     Left Knee Girth Measurement (cm)   Joint line: 44.2.    Right Knee Girth Measurement (cm)   Joint line: 44.0.    Ambulation     Observational Gait   Gait: antalgic   Decreased walking speed.     Additional Observational Gait Details  Mild varus position of (B) knee joints in standing and walking with increased lateral trunk sway during advancement of " LEs    Functional Assessment     Comments  5 x sit to stand = 15 sec without UE assist          Assessment & Plan       Assessment  Impairments: abnormal muscle firing, abnormal or restricted ROM, activity intolerance, impaired balance, impaired physical strength, lacks appropriate home exercise program, pain with function and safety issue   Functional limitations: sleeping, walking, uncomfortable because of pain, standing and stooping (Stair negotiation)  Assessment details: Patient is a 73 y.o female who reports chronic (L) > (R) knee pain following a fall in July 2023 during which she ruptured her (L) ACL.  She was issued a (L) knee brace but is unsure whether she is wearing it correctly.  There is also some medial compartment narrowing (L) > (R) knee. She reports perceived instability of her (L) knee and it frequently gives way, one time which did cause her to fall.  She reports (B) knee symptoms 0-3/10, worst with standing, walking, negotiating steps, transferring in/out of the car, twisting movements of the (L) knee, and hiking/walking on uneven terrain. She at times uses a STC while ambulating.  She exhibits localized medial joint line tenderness (B), decreased knee AROM, decreased proximal > distal LE strength, and antalgic, compensated gait.  Her LEFS score is 40/80 indicating a significant perceived level of functional limitation.  She will benefit from skilled PT services to address these deficits and assist patient in achieving optimal strength for improved knee symptoms and tolerance to ambulation including hiking and stair negotiation.  Prognosis: good    Goals  Plan Goals: STGs: to be met in 6 weeks  1. Patient will be independent with initial HEP  2. Patient will report improved (B) knee symptoms 0-2/10 for improved activity and functional mobility tolerance  3. Patient will achieve improved (L) knee AROM extension to 5 degrees for improved stability during stance phase of gait  4. Patient will  perform SLR each LE 3 x 10 with good concentric and eccentric ability to demonstrate improved neuromuscular control of each LE    LTGs: to be met in 12 weeks  1. Patient will be independent with progressed HEP  2. Patient will demonstrate improved quadriceps activation in closed chain to allow 50% decreased frequency of episodes of (L) knee giving way  3. Patient will demonstrate ability to ascend and descend 1 flight of steps reciprocally with single handrail and improved confidence  4. Patient will tolerate 1-2 mile hikes on moderately uneven terrain with (B) trekking poles with >/= 50% improved confidence  5. Patient will have improved LEFS score >/= 50/80 for subjective evidence of functional improvement    Plan  Therapy options: will be seen for skilled therapy services  Planned modality interventions: cryotherapy, thermotherapy (hydrocollator packs) and electrical stimulation/Russian stimulation  Planned therapy interventions: ADL retraining, balance/weight-bearing training, flexibility, functional ROM exercises, gait training, home exercise program, joint mobilization, manual therapy, neuromuscular re-education, soft tissue mobilization, strengthening, stretching and therapeutic activities  Frequency: 2x week  Duration in weeks: 12  Treatment plan discussed with: patient        Manual Therapy:         mins  11996;  Therapeutic Exercise:    20     mins  10635;     Neuromuscular Enedelia:        mins  42965;    Therapeutic Activity:     13     mins  84181;     Gait Training:           mins  27659;     Ultrasound:          mins  07775;    Electrical Stimulation:         mins  45378 ( );  Dry Needling          mins self-pay    Timed Treatment:   33   mins   Total Treatment:     54   mins    PT SIGNATURE: Grace Blancas PT, DPT, OCS  Electronically signed by: Grace Blancas PT, 02/07/24, 9:48 AM EST  [place] License #[id number]     DATE TREATMENT INITIATED: 2/7/2024    Medicare Initial Certification  Certification Period:  2/7/2024 thru 5/6/2024  I certify that the therapy services are furnished while this patient is under my care.  The services outlined above are required by this patient, and will be reviewed every 90 days.     PHYSICIAN: Jordan Reeder, ALVARO  [id number]                                          DATE:     Please sign and return via fax to [phone]. Thank you, [facility].

## 2024-02-12 ENCOUNTER — TREATMENT (OUTPATIENT)
Dept: PHYSICAL THERAPY | Facility: CLINIC | Age: 74
End: 2024-02-12
Payer: MEDICARE

## 2024-02-12 DIAGNOSIS — M25.562 CHRONIC PAIN OF BOTH KNEES: Primary | ICD-10-CM

## 2024-02-12 DIAGNOSIS — R26.2 DIFFICULTY WALKING: ICD-10-CM

## 2024-02-12 DIAGNOSIS — M25.561 CHRONIC PAIN OF BOTH KNEES: Primary | ICD-10-CM

## 2024-02-12 DIAGNOSIS — G89.29 CHRONIC PAIN OF BOTH KNEES: Primary | ICD-10-CM

## 2024-02-12 DIAGNOSIS — M17.0 PRIMARY OSTEOARTHRITIS OF BOTH KNEES: ICD-10-CM

## 2024-02-12 PROCEDURE — 97110 THERAPEUTIC EXERCISES: CPT | Performed by: PHYSICAL THERAPIST

## 2024-02-12 PROCEDURE — 97530 THERAPEUTIC ACTIVITIES: CPT | Performed by: PHYSICAL THERAPIST

## 2024-02-15 ENCOUNTER — HOSPITAL ENCOUNTER (OUTPATIENT)
Dept: MAMMOGRAPHY | Facility: HOSPITAL | Age: 74
Discharge: HOME | End: 2024-02-15
Admitting: STUDENT IN AN ORGANIZED HEALTH CARE EDUCATION/TRAINING PROGRAM
Payer: MEDICARE

## 2024-02-15 DIAGNOSIS — Z12.31 BREAST CANCER SCREENING BY MAMMOGRAM: ICD-10-CM

## 2024-02-15 PROCEDURE — 77063 BREAST TOMOSYNTHESIS BI: CPT

## 2024-02-15 PROCEDURE — 77067 SCR MAMMO BI INCL CAD: CPT

## 2024-02-16 ENCOUNTER — TREATMENT (OUTPATIENT)
Dept: PHYSICAL THERAPY | Facility: CLINIC | Age: 74
End: 2024-02-16
Payer: MEDICARE

## 2024-02-16 DIAGNOSIS — G89.29 CHRONIC PAIN OF BOTH KNEES: Primary | ICD-10-CM

## 2024-02-16 DIAGNOSIS — R26.2 DIFFICULTY WALKING: ICD-10-CM

## 2024-02-16 DIAGNOSIS — M17.0 PRIMARY OSTEOARTHRITIS OF BOTH KNEES: ICD-10-CM

## 2024-02-16 DIAGNOSIS — M25.562 CHRONIC PAIN OF BOTH KNEES: Primary | ICD-10-CM

## 2024-02-16 DIAGNOSIS — M25.561 CHRONIC PAIN OF BOTH KNEES: Primary | ICD-10-CM

## 2024-02-16 PROCEDURE — 97530 THERAPEUTIC ACTIVITIES: CPT | Performed by: PHYSICAL THERAPIST

## 2024-02-16 PROCEDURE — 97110 THERAPEUTIC EXERCISES: CPT | Performed by: PHYSICAL THERAPIST

## 2024-02-19 ENCOUNTER — TREATMENT (OUTPATIENT)
Dept: PHYSICAL THERAPY | Facility: CLINIC | Age: 74
End: 2024-02-19
Payer: MEDICARE

## 2024-02-19 DIAGNOSIS — M17.0 PRIMARY OSTEOARTHRITIS OF BOTH KNEES: ICD-10-CM

## 2024-02-19 DIAGNOSIS — M25.562 CHRONIC PAIN OF BOTH KNEES: Primary | ICD-10-CM

## 2024-02-19 DIAGNOSIS — R26.2 DIFFICULTY WALKING: ICD-10-CM

## 2024-02-19 DIAGNOSIS — G89.29 CHRONIC PAIN OF BOTH KNEES: Primary | ICD-10-CM

## 2024-02-19 DIAGNOSIS — M25.561 CHRONIC PAIN OF BOTH KNEES: Primary | ICD-10-CM

## 2024-02-19 PROCEDURE — 97530 THERAPEUTIC ACTIVITIES: CPT | Performed by: PHYSICAL THERAPIST

## 2024-02-19 PROCEDURE — 97110 THERAPEUTIC EXERCISES: CPT | Performed by: PHYSICAL THERAPIST

## 2024-02-19 NOTE — PROGRESS NOTES
"Physical Therapy Daily Treatment Note               [address]                                                                                                                                             [place] [address]    Patient: Aydee Moore   : 1950  Referring practitioner: Jordan Reeder*  Date of Initial Visit: Type: THERAPY  Noted: 2024  Today's Date: 2024  Patient seen for 4 sessions       Visit Diagnoses:    ICD-10-CM ICD-9-CM   1. Chronic pain of both knees  M25.561 719.46    M25.562 338.29    G89.29    2. Primary osteoarthritis of both knees  M17.0 715.16   3. Difficulty walking  R26.2 719.7       Subjective Evaluation    History of Present Illness    Subjective comment: Pt reports that her knees are \"doing ok\" today.       Objective   See Exercise, Manual, and Modality Logs for complete treatment.       Assessment & Plan       Assessment  Assessment details: Pt completed treatment with minimal c/o pain in (B) knees.  Pt benefits from vc to slow her reps down and hold for the set time. Had pt perform her standing hip abd on a 4' step to continue compensation of leaning to contralateral side.  Added heel taps today, but advised pt to hold off performing as part of her HEP until nxt treatment.  Will evaluate her response to them nxt treatment.    Continue to progress per POC.          Timed:         Manual Therapy:    0     mins  19756;     Therapeutic Exercise:    27     mins  19800;     Neuromuscular Enedelia:    0    mins  89542;    Therapeutic Activity:     12     mins  88481;     Gait Trainin     mins  66568;     Ultrasound:     0     mins  24666;    E Stim                            0    mins   45547( g0283)  Work Cheek/Cond      0    mins   06107        Timed Treatment:   39   mins   Total Treatment:     39   mins    Jared Neely PTA  [place] License: [id number]  "

## 2024-02-21 ENCOUNTER — OFFICE (AMBULATORY)
Dept: URBAN - METROPOLITAN AREA CLINIC 76 | Facility: CLINIC | Age: 74
End: 2024-02-21

## 2024-02-21 VITALS
SYSTOLIC BLOOD PRESSURE: 140 MMHG | OXYGEN SATURATION: 97 % | WEIGHT: 195 LBS | HEART RATE: 86 BPM | HEIGHT: 65 IN | DIASTOLIC BLOOD PRESSURE: 90 MMHG

## 2024-02-21 DIAGNOSIS — K59.00 CONSTIPATION, UNSPECIFIED: ICD-10-CM

## 2024-02-21 DIAGNOSIS — R15.9 FULL INCONTINENCE OF FECES: ICD-10-CM

## 2024-02-21 DIAGNOSIS — K21.9 GASTRO-ESOPHAGEAL REFLUX DISEASE WITHOUT ESOPHAGITIS: ICD-10-CM

## 2024-02-21 PROCEDURE — 99214 OFFICE O/P EST MOD 30 MIN: CPT

## 2024-02-23 ENCOUNTER — TREATMENT (OUTPATIENT)
Dept: PHYSICAL THERAPY | Facility: CLINIC | Age: 74
End: 2024-02-23
Payer: MEDICARE

## 2024-02-23 DIAGNOSIS — M25.561 CHRONIC PAIN OF BOTH KNEES: Primary | ICD-10-CM

## 2024-02-23 DIAGNOSIS — R26.2 DIFFICULTY WALKING: ICD-10-CM

## 2024-02-23 DIAGNOSIS — M17.0 PRIMARY OSTEOARTHRITIS OF BOTH KNEES: ICD-10-CM

## 2024-02-23 DIAGNOSIS — G89.29 CHRONIC PAIN OF BOTH KNEES: Primary | ICD-10-CM

## 2024-02-23 DIAGNOSIS — M25.562 CHRONIC PAIN OF BOTH KNEES: Primary | ICD-10-CM

## 2024-02-23 PROCEDURE — 97530 THERAPEUTIC ACTIVITIES: CPT | Performed by: PHYSICAL THERAPIST

## 2024-02-23 PROCEDURE — 97110 THERAPEUTIC EXERCISES: CPT | Performed by: PHYSICAL THERAPIST

## 2024-02-23 NOTE — PROGRESS NOTES
"Physical Therapy Daily Treatment Note               [address]                                                                                                                                             [place] [address]    Patient: Aydee Moore   : 1950  Referring practitioner: Jordan Reeder*  Date of Initial Visit: Type: THERAPY  Noted: 2024  Today's Date: 2024  Patient seen for 5 sessions       Visit Diagnoses:    ICD-10-CM ICD-9-CM   1. Chronic pain of both knees  M25.561 719.46    M25.562 338.29    G89.29    2. Primary osteoarthritis of both knees  M17.0 715.16   3. Difficulty walking  R26.2 719.7       Subjective Evaluation    History of Present Illness    Subjective comment: Pt reports that her (L) ankle\"gave way \" when performing her exercises this week. She modifitied the placement of the TB when doing HS curl which seemed to help.       Objective   See Exercise, Manual, and Modality Logs for complete treatment.       Assessment & Plan       Assessment  Assessment details: Pt completed treatment with minimal c/o pain in (B) knees.  She tolerated the addition of nu step for increased LE strength.  Pt is displaying improved eccentric strength in all exercises.  Plan to continue to progress functional strengthening per POC.          Timed:         Manual Therapy:    0     mins  28300;     Therapeutic Exercise:    25     mins  16487;     Neuromuscular Enedelia:    0    mins  72997;    Therapeutic Activity:     10     mins  58104;     Gait Trainin     mins  29924;     Ultrasound:     0     mins  87486;    E Stim                            0    mins   92265( g0283)  Work Cheek/Cond      0    mins   70212        Timed Treatment:   35   mins   Total Treatment:     35   mins    Jared Neely PTA  [place] License: [id number]  "

## 2024-02-26 ENCOUNTER — TREATMENT (OUTPATIENT)
Dept: PHYSICAL THERAPY | Facility: CLINIC | Age: 74
End: 2024-02-26
Payer: MEDICARE

## 2024-02-26 DIAGNOSIS — R26.2 DIFFICULTY WALKING: ICD-10-CM

## 2024-02-26 DIAGNOSIS — G89.29 CHRONIC PAIN OF BOTH KNEES: Primary | ICD-10-CM

## 2024-02-26 DIAGNOSIS — M25.562 CHRONIC PAIN OF BOTH KNEES: Primary | ICD-10-CM

## 2024-02-26 DIAGNOSIS — M25.561 CHRONIC PAIN OF BOTH KNEES: Primary | ICD-10-CM

## 2024-02-26 DIAGNOSIS — M17.0 PRIMARY OSTEOARTHRITIS OF BOTH KNEES: ICD-10-CM

## 2024-02-26 PROCEDURE — 97110 THERAPEUTIC EXERCISES: CPT | Performed by: PHYSICAL THERAPIST

## 2024-02-26 PROCEDURE — 97530 THERAPEUTIC ACTIVITIES: CPT | Performed by: PHYSICAL THERAPIST

## 2024-02-26 NOTE — PROGRESS NOTES
"   Physical Therapy Daily Treatment Note      [address], [address], [place] [address]    Patient: Aydee Moore   : 1950  Referring practitioner: Jordan Reeder*  Date of Initial Visit: Type: THERAPY  Noted: 2024  Today's Date: 2024  Patient seen for 6 sessions         Visit Diagnoses:     ICD-10-CM ICD-9-CM   1. Chronic pain of both knees  M25.561 719.46    M25.562 338.29    G89.29    2. Primary osteoarthritis of both knees  M17.0 715.16   3. Difficulty walking  R26.2 719.7         Subjective Evaluation    History of Present Illness    Subjective comment: We hiked about an hour at [employer] [place] this weekend, and I was terrified of the downhills.  I either held onto [my 's] arm, used my hiking poles, or went down backwards.  Even my  can tell my (L) leg is so much weaker than my (R).       Objective   See Exercise, Manual, and Modality Logs for complete treatment.   *Initiated FW stepdown from 2\" step    Assessment & Plan       Assessment  Assessment details: Descending inclines remains patient's most challenging and limiting activity at present.  She expresses fearfulness and a perceived instability within her (L) knee to adequately support her and control lowering her body weight down the incline.  Progressed closed chain quadriceps and hip strengthening activities this date to include forward stepdown at 2\" height for (L) LE which is challenging. She is able to perform the activity with fair control (L) LE at 2\" height and fair control (R) LE at 4\" height.  She will benefit from progression of closed chain strength and stability activities as well as eventual inclusion of uneven surfaces.           Progress strengthening /stabilization /functional activity         Timed:  Manual Therapy:         mins  90140;  Therapeutic Exercise:    35     mins  54224;     Neuromuscular Enedelia:        mins  33496;    Therapeutic Activity:     14     mins  68018;   "   Gait Training:           mins  65844;     Ultrasound:          mins  26090;    Untimed:  Electrical Stimulation:         mins  56599 ( );  Mechanical Traction:         mins  53462;   Dry Needling              ___  mins   20561    Timed Treatment:   49   mins   Total Treatment:     49   mins    Grace Blancas PT, DPT, OCS  Physical Therapist  [place] License #[id number]  Electronically signed by: Grace Blancas PT, 02/26/24, 9:50 AM EST

## 2024-03-01 ENCOUNTER — TREATMENT (OUTPATIENT)
Dept: PHYSICAL THERAPY | Facility: CLINIC | Age: 74
End: 2024-03-01
Payer: MEDICARE

## 2024-03-01 DIAGNOSIS — M25.562 CHRONIC PAIN OF BOTH KNEES: Primary | ICD-10-CM

## 2024-03-01 DIAGNOSIS — M25.561 CHRONIC PAIN OF BOTH KNEES: Primary | ICD-10-CM

## 2024-03-01 DIAGNOSIS — G89.29 CHRONIC PAIN OF BOTH KNEES: Primary | ICD-10-CM

## 2024-03-01 DIAGNOSIS — M17.0 PRIMARY OSTEOARTHRITIS OF BOTH KNEES: ICD-10-CM

## 2024-03-01 DIAGNOSIS — R26.2 DIFFICULTY WALKING: ICD-10-CM

## 2024-03-01 PROCEDURE — 97110 THERAPEUTIC EXERCISES: CPT | Performed by: PHYSICAL THERAPIST

## 2024-03-01 PROCEDURE — 97530 THERAPEUTIC ACTIVITIES: CPT | Performed by: PHYSICAL THERAPIST

## 2024-03-01 NOTE — PROGRESS NOTES
Physical Therapy Daily Treatment Note               [address]                                                                                                                                             [place] [address]    Patient: Aydee Moore   : 1950  Referring practitioner: Jordan Reeder*  Date of Initial Visit: Type: THERAPY  Noted: 2024  Today's Date: 3/1/2024  Patient seen for 7 sessions       Visit Diagnoses:    ICD-10-CM ICD-9-CM   1. Chronic pain of both knees  M25.561 719.46    M25.562 338.29    G89.29    2. Primary osteoarthritis of both knees  M17.0 715.16   3. Difficulty walking  R26.2 719.7       Subjective Evaluation    History of Present Illness    Subjective comment: Pt denies any current knee pain.Pain  Current pain ratin  At best pain ratin  At worst pain ratin         Objective          Active Range of Motion   Left Knee   Flexion: 118 degrees     Right Knee   Flexion: 122 degrees     Strength/Myotome Testing     Left Hip   Planes of Motion   Flexion: 4  Extension: 4  Abduction: 4  Adduction: 4    Right Hip   Planes of Motion   Flexion: 4  Extension: 4  Abduction: 4  Adduction: 4    Left Knee   Flexion: 4  Extension: 4    Right Knee   Flexion: 4  Extension: 4     General Comments     Hip Comments   5x sit to stand = 11 sec without UE assist     See Exercise, Manual, and Modality Logs for complete treatment.       Assessment & Plan       Assessment  Assessment details: Pt completed treatment with minimal c/o increased pain in (B) knee today.  Pt tolerated addition of weighted resistance for both open chain exercises and sit to stands.  She continues to make progress in strengthening her (B) knees althoulgh TKE strength lags.    Pt has been seen for 7 sessions.  Pain levels have improved.  (B) hip and knee strength show slight improvements.  5x sit to stand has improved from 15 sec to 11.        Timed:         Manual Therapy:    0      mins  23459;     Therapeutic Exercise:    15(30)     mins  47759;     Neuromuscular Enedelia:    0    mins  70801;    Therapeutic Activity:     30     mins  71833;     Gait Trainin     mins  61044;     Ultrasound:     0     mins  51799;    E Stim                            0    mins   87198( g0283)  Work Cheek/Cond      0    mins   63889        Timed Treatment:   45   mins   Total Treatment:     60   mins    Jared Neely PTA  [place] License: [id number]

## 2024-03-04 ENCOUNTER — TREATMENT (OUTPATIENT)
Dept: PHYSICAL THERAPY | Facility: CLINIC | Age: 74
End: 2024-03-04
Payer: MEDICARE

## 2024-03-04 DIAGNOSIS — M17.0 PRIMARY OSTEOARTHRITIS OF BOTH KNEES: ICD-10-CM

## 2024-03-04 DIAGNOSIS — R26.2 DIFFICULTY WALKING: ICD-10-CM

## 2024-03-04 DIAGNOSIS — M25.562 CHRONIC PAIN OF BOTH KNEES: Primary | ICD-10-CM

## 2024-03-04 DIAGNOSIS — M25.561 CHRONIC PAIN OF BOTH KNEES: Primary | ICD-10-CM

## 2024-03-04 DIAGNOSIS — G89.29 CHRONIC PAIN OF BOTH KNEES: Primary | ICD-10-CM

## 2024-03-04 PROCEDURE — 97110 THERAPEUTIC EXERCISES: CPT | Performed by: PHYSICAL THERAPIST

## 2024-03-04 PROCEDURE — 97530 THERAPEUTIC ACTIVITIES: CPT | Performed by: PHYSICAL THERAPIST

## 2024-03-04 NOTE — PROGRESS NOTES
Physical Therapy Daily Treatment Note               [address]                                                                                                                                             [place] [address]    Patient: Aydee Moore   : 1950  Referring practitioner: No ref. provider found  Date of Initial Visit: Type: THERAPY  Noted: 2024  Today's Date: 3/4/2024  Patient seen for 8 sessions       Visit Diagnoses:    ICD-10-CM ICD-9-CM   1. Chronic pain of both knees  M25.561 719.46    M25.562 338.29    G89.29    2. Primary osteoarthritis of both knees  M17.0 715.16   3. Difficulty walking  R26.2 719.7       Subjective Evaluation    History of Present Illness    Subjective comment: Pt reports that her (R) knee is aching today.       Objective   See Exercise, Manual, and Modality Logs for complete treatment.       Assessment & Plan       Assessment  Assessment details: Pt completed treatment with only c/o soreness in (B) medial quads s/p treatment.  Pt tolerated the addition of Red t band as resistance for her (B) hip exercises. Pt is displaying better form and eccentric control on all her exercises.  She has to break heal taps in 5 rep sets due to early fatigue.  Continue to progress per POC.          Timed:         Manual Therapy:    0     mins  50532;     Therapeutic Exercise:    20     mins  24994;     Neuromuscular Enedelia:    0    mins  54018;    Therapeutic Activity:     30     mins  67374;     Gait Trainin     mins  61664;     Ultrasound:     0     mins  89908;    E Stim                            0    mins   67041( g0283)  Work Cheek/Cond      0    mins   46565        Timed Treatment:   50   mins   Total Treatment:     50   mins    Jared Neely PTA  [place] License: [id number]

## 2024-03-08 ENCOUNTER — TREATMENT (OUTPATIENT)
Dept: PHYSICAL THERAPY | Facility: CLINIC | Age: 74
End: 2024-03-08
Payer: MEDICARE

## 2024-03-08 DIAGNOSIS — G89.29 CHRONIC PAIN OF BOTH KNEES: Primary | ICD-10-CM

## 2024-03-08 DIAGNOSIS — M17.0 PRIMARY OSTEOARTHRITIS OF BOTH KNEES: ICD-10-CM

## 2024-03-08 DIAGNOSIS — M25.561 CHRONIC PAIN OF BOTH KNEES: Primary | ICD-10-CM

## 2024-03-08 DIAGNOSIS — R26.2 DIFFICULTY WALKING: ICD-10-CM

## 2024-03-08 DIAGNOSIS — M25.562 CHRONIC PAIN OF BOTH KNEES: Primary | ICD-10-CM

## 2024-03-11 ENCOUNTER — TREATMENT (OUTPATIENT)
Dept: PHYSICAL THERAPY | Facility: CLINIC | Age: 74
End: 2024-03-11
Payer: MEDICARE

## 2024-03-11 DIAGNOSIS — R26.2 DIFFICULTY WALKING: ICD-10-CM

## 2024-03-11 DIAGNOSIS — M25.562 CHRONIC PAIN OF BOTH KNEES: Primary | ICD-10-CM

## 2024-03-11 DIAGNOSIS — G89.29 CHRONIC PAIN OF BOTH KNEES: Primary | ICD-10-CM

## 2024-03-11 DIAGNOSIS — M25.561 CHRONIC PAIN OF BOTH KNEES: Primary | ICD-10-CM

## 2024-03-11 DIAGNOSIS — M17.0 PRIMARY OSTEOARTHRITIS OF BOTH KNEES: ICD-10-CM

## 2024-03-11 PROCEDURE — 97530 THERAPEUTIC ACTIVITIES: CPT | Performed by: PHYSICAL THERAPIST

## 2024-03-11 PROCEDURE — 97110 THERAPEUTIC EXERCISES: CPT | Performed by: PHYSICAL THERAPIST

## 2024-03-13 ENCOUNTER — TREATMENT (OUTPATIENT)
Dept: PHYSICAL THERAPY | Facility: CLINIC | Age: 74
End: 2024-03-13
Payer: MEDICARE

## 2024-03-13 DIAGNOSIS — M25.561 CHRONIC PAIN OF BOTH KNEES: Primary | ICD-10-CM

## 2024-03-13 DIAGNOSIS — G89.29 CHRONIC PAIN OF BOTH KNEES: Primary | ICD-10-CM

## 2024-03-13 DIAGNOSIS — M17.0 PRIMARY OSTEOARTHRITIS OF BOTH KNEES: ICD-10-CM

## 2024-03-13 DIAGNOSIS — R26.2 DIFFICULTY WALKING: ICD-10-CM

## 2024-03-13 DIAGNOSIS — M25.562 CHRONIC PAIN OF BOTH KNEES: Primary | ICD-10-CM

## 2024-03-13 NOTE — PROGRESS NOTES
Physical Therapy Daily Treatment Note               [address] [facility]                                                                                                                                             [place] [address]    Patient: Aydee Moore   : 1950  Referring practitioner: No ref. provider found  Date of Initial Visit: Type: THERAPY  Noted: 2024  Today's Date: 3/13/2024  Patient seen for 10 sessions       Visit Diagnoses:    ICD-10-CM ICD-9-CM   1. Chronic pain of both knees  M25.561 719.46    M25.562 338.29    G89.29    2. Primary osteoarthritis of both knees  M17.0 715.16   3. Difficulty walking  R26.2 719.7       Subjective Evaluation    History of Present Illness    Subjective comment: Pt reports that her (R) knee is starting to hurt again.       Objective   See Exercise, Manual, and Modality Logs for complete treatment.   Added step matrix, and sqauts    Assessment & Plan       Assessment  Assessment details: Pt completed treatment with no c/o increased pain in (B) knee.  Pt tolerated progressions of reps in most exercises.  She had no issues with the  addition of step matrix and squats for improved functional strength.  Continue to progress per POC.          Timed:         Manual Therapy:    0     mins  62711;     Therapeutic Exercise:    11(18)     mins  84783;     Neuromuscular Enedelia:    0    mins  08311;    Therapeutic Activity:     30     mins  54178;     Gait Trainin     mins  51101;     Ultrasound:     0     mins  60841;    E Stim                            0    mins   99257( g0283)  Work Cheek/Cond      0    mins   73200        Timed Treatment:   41   mins   Total Treatment:     48   mins    Jared Neely PTA  [place] License: [id number]

## 2024-03-15 ENCOUNTER — TREATMENT (OUTPATIENT)
Dept: PHYSICAL THERAPY | Facility: CLINIC | Age: 74
End: 2024-03-15
Payer: MEDICARE

## 2024-03-15 DIAGNOSIS — M25.562 CHRONIC PAIN OF BOTH KNEES: Primary | ICD-10-CM

## 2024-03-15 DIAGNOSIS — G89.29 CHRONIC PAIN OF BOTH KNEES: Primary | ICD-10-CM

## 2024-03-15 DIAGNOSIS — R26.2 DIFFICULTY WALKING: ICD-10-CM

## 2024-03-15 DIAGNOSIS — M17.0 PRIMARY OSTEOARTHRITIS OF BOTH KNEES: ICD-10-CM

## 2024-03-15 DIAGNOSIS — M25.561 CHRONIC PAIN OF BOTH KNEES: Primary | ICD-10-CM

## 2024-03-15 PROCEDURE — 97110 THERAPEUTIC EXERCISES: CPT | Performed by: PHYSICAL THERAPIST

## 2024-03-15 PROCEDURE — 97530 THERAPEUTIC ACTIVITIES: CPT | Performed by: PHYSICAL THERAPIST

## 2024-03-18 ENCOUNTER — TELEPHONE (OUTPATIENT)
Dept: PHYSICAL THERAPY | Facility: CLINIC | Age: 74
End: 2024-03-18

## 2024-03-20 ENCOUNTER — TREATMENT (OUTPATIENT)
Dept: PHYSICAL THERAPY | Facility: CLINIC | Age: 74
End: 2024-03-20
Payer: MEDICARE

## 2024-03-20 DIAGNOSIS — M17.0 PRIMARY OSTEOARTHRITIS OF BOTH KNEES: ICD-10-CM

## 2024-03-20 DIAGNOSIS — R26.2 DIFFICULTY WALKING: ICD-10-CM

## 2024-03-20 DIAGNOSIS — G89.29 CHRONIC PAIN OF BOTH KNEES: Primary | ICD-10-CM

## 2024-03-20 DIAGNOSIS — M25.561 CHRONIC PAIN OF BOTH KNEES: Primary | ICD-10-CM

## 2024-03-20 DIAGNOSIS — M25.562 CHRONIC PAIN OF BOTH KNEES: Primary | ICD-10-CM

## 2024-03-20 PROCEDURE — 97530 THERAPEUTIC ACTIVITIES: CPT | Performed by: PHYSICAL THERAPIST

## 2024-03-20 PROCEDURE — 97110 THERAPEUTIC EXERCISES: CPT | Performed by: PHYSICAL THERAPIST

## 2024-03-20 PROCEDURE — 97112 NEUROMUSCULAR REEDUCATION: CPT | Performed by: PHYSICAL THERAPIST

## 2024-03-22 ENCOUNTER — TREATMENT (OUTPATIENT)
Dept: PHYSICAL THERAPY | Facility: CLINIC | Age: 74
End: 2024-03-22
Payer: MEDICARE

## 2024-03-22 DIAGNOSIS — M25.561 CHRONIC PAIN OF BOTH KNEES: Primary | ICD-10-CM

## 2024-03-22 DIAGNOSIS — R26.2 DIFFICULTY WALKING: ICD-10-CM

## 2024-03-22 DIAGNOSIS — M25.562 CHRONIC PAIN OF BOTH KNEES: Primary | ICD-10-CM

## 2024-03-22 DIAGNOSIS — G89.29 CHRONIC PAIN OF BOTH KNEES: Primary | ICD-10-CM

## 2024-03-22 DIAGNOSIS — M17.0 PRIMARY OSTEOARTHRITIS OF BOTH KNEES: ICD-10-CM

## 2024-03-22 PROCEDURE — 97110 THERAPEUTIC EXERCISES: CPT | Performed by: PHYSICAL THERAPIST

## 2024-03-22 PROCEDURE — 97530 THERAPEUTIC ACTIVITIES: CPT | Performed by: PHYSICAL THERAPIST

## 2024-03-22 PROCEDURE — 97112 NEUROMUSCULAR REEDUCATION: CPT | Performed by: PHYSICAL THERAPIST

## 2024-03-22 NOTE — PROGRESS NOTES
"Physical Therapy Daily Treatment Note               [address] [facility] [address]                                                                                                                                             [place] [address]    Patient: Aydee Moore   : 1950  Referring practitioner: Jordan Reeder*  Date of Initial Visit: Type: THERAPY  Noted: 2024  Today's Date: 3/22/2024  Patient seen for 13 sessions       Visit Diagnoses:    ICD-10-CM ICD-9-CM   1. Chronic pain of both knees  M25.561 719.46    M25.562 338.29    G89.29    2. Primary osteoarthritis of both knees  M17.0 715.16   3. Difficulty walking  R26.2 719.7       Subjective Evaluation    History of Present Illness    Subjective comment: Pt reports \"I'm not having any pain.\"       Objective   See Exercise, Manual, and Modality Logs for complete treatment.   Added Tandem walks, Standing Toe raise  Increased weight on sit to stand, resisted retro walking.    Assessment & Plan       Assessment  Assessment details: Pt completed treatment with no c/o pain in (B) knees.  Pt benefits from vc to slow down and use deliberate movement when performing tandem walk to keep from losing balance. She tolerated increased resistance on sts and retro walking with no issues.  Continue to progress functional LE strengthening as tolerated.          Timed:         Manual Therapy:    0     mins  82287;     Therapeutic Exercise:    15     mins  86794;     Neuromuscular Enedelia:    15    mins  32787;    Therapeutic Activity:     33     mins  07393;     Gait Trainin     mins  44719;     Ultrasound:     0     mins  51883;    E Stim                            0    mins   23747( g0283)  Work Cheek/Cond      0    mins   17532        Timed Treatment:   63   mins   Total Treatment:     63   mins    Jared Neely PTA  [place] License: [id number]  "

## 2024-03-25 ENCOUNTER — TREATMENT (OUTPATIENT)
Dept: PHYSICAL THERAPY | Facility: CLINIC | Age: 74
End: 2024-03-25
Payer: MEDICARE

## 2024-03-25 DIAGNOSIS — M25.561 CHRONIC PAIN OF BOTH KNEES: Primary | ICD-10-CM

## 2024-03-25 DIAGNOSIS — M25.562 CHRONIC PAIN OF BOTH KNEES: Primary | ICD-10-CM

## 2024-03-25 DIAGNOSIS — G89.29 CHRONIC PAIN OF BOTH KNEES: Primary | ICD-10-CM

## 2024-03-25 DIAGNOSIS — M17.0 PRIMARY OSTEOARTHRITIS OF BOTH KNEES: ICD-10-CM

## 2024-03-25 DIAGNOSIS — R26.2 DIFFICULTY WALKING: ICD-10-CM

## 2024-03-25 PROCEDURE — 97110 THERAPEUTIC EXERCISES: CPT | Performed by: PHYSICAL THERAPIST

## 2024-03-25 PROCEDURE — 97530 THERAPEUTIC ACTIVITIES: CPT | Performed by: PHYSICAL THERAPIST

## 2024-03-25 NOTE — PROGRESS NOTES
Physical Therapy Daily Treatment Note      [address] [facility] [facility], [address], [place] [address]    Patient: Aydee Moore   : 1950  Referring practitioner: Jordan Reeder*  Date of Initial Visit: Type: THERAPY  Noted: 2024  Today's Date: 3/25/2024  Patient seen for 14 sessions         Visit Diagnoses:     ICD-10-CM ICD-9-CM   1. Chronic pain of both knees  M25.561 719.46    M25.562 338.29    G89.29    2. Primary osteoarthritis of both knees  M17.0 715.16   3. Difficulty walking  R26.2 719.7         Subjective Evaluation    History of Present Illness    Subjective comment: I have done a lot of downhill walking/hiking over the weekend and it is mainly right here [left hamstrings mm] that got sore doing it.  I was bit by a dog on my (L) foot/ankle Saturday morning and I had to go to urgent care for it.       Objective   See Exercise, Manual, and Modality Logs for complete treatment.       Assessment & Plan       Assessment  Assessment details: Patient demonstrates slightly improved stability during resisted retro walking this date compared to last week but benefits from cueing to improve eccentric (forward) component of activity.  Instructed patient to move from ball to heel of foot and incorporative active TKE during retro resisted walking.  She is able to increase repetitions of multidirectional step matrix but squatting component remains shallow and patient continues to require cueing for foot placement/position to affect muscles engaged during this activity. She notes the ability to walk down the hill in her back yard for the first time in a long while but does require external support of the fence while doing so.          Progress strengthening /stabilization /functional activity         Timed:  Manual Therapy:         mins  94094;  Therapeutic Exercise:    26     mins  36057;     Neuromuscular Enedelia:        mins  08981;    Therapeutic Activity:   13       mins  91093;     Gait Training:            mins  13309;     Ultrasound:          mins  01562;    Untimed:  Electrical Stimulation:         mins  63413 ( );  Mechanical Traction:         mins  34974;   Dry Needling              ___  mins   20561    Timed Treatment:   39   mins   Total Treatment:     39   mins    Grace Blancas PT, DPT, OCS  Physical Therapist  [place] License #[id number]  Electronically signed by: Grace Blancas PT, 03/25/24, 10:01 AM EDT

## 2024-03-27 ENCOUNTER — TREATMENT (OUTPATIENT)
Dept: PHYSICAL THERAPY | Facility: CLINIC | Age: 74
End: 2024-03-27
Payer: MEDICARE

## 2024-03-27 ENCOUNTER — NURSE TRIAGE (OUTPATIENT)
Dept: CALL CENTER | Facility: HOSPITAL | Age: 74
End: 2024-03-27
Payer: MEDICARE

## 2024-03-27 ENCOUNTER — HOSPITAL ENCOUNTER (EMERGENCY)
Facility: HOSPITAL | Age: 74
Discharge: HOME | End: 2024-03-27
Attending: STUDENT IN AN ORGANIZED HEALTH CARE EDUCATION/TRAINING PROGRAM
Payer: MEDICARE

## 2024-03-27 VITALS
TEMPERATURE: 98.6 F | DIASTOLIC BLOOD PRESSURE: 88 MMHG | BODY MASS INDEX: 31.65 KG/M2 | HEART RATE: 114 BPM | HEIGHT: 65 IN | RESPIRATION RATE: 20 BRPM | SYSTOLIC BLOOD PRESSURE: 178 MMHG | OXYGEN SATURATION: 94 % | WEIGHT: 190 LBS

## 2024-03-27 DIAGNOSIS — R26.2 DIFFICULTY WALKING: ICD-10-CM

## 2024-03-27 DIAGNOSIS — M25.562 CHRONIC PAIN OF BOTH KNEES: Primary | ICD-10-CM

## 2024-03-27 DIAGNOSIS — M17.0 PRIMARY OSTEOARTHRITIS OF BOTH KNEES: ICD-10-CM

## 2024-03-27 DIAGNOSIS — Z20.3 RABIES EXPOSURE: ICD-10-CM

## 2024-03-27 DIAGNOSIS — S91.352A DOG BITE OF LEFT FOOT, INITIAL ENCOUNTER: ICD-10-CM

## 2024-03-27 DIAGNOSIS — M79.10 MYALGIA: Primary | ICD-10-CM

## 2024-03-27 DIAGNOSIS — M25.561 CHRONIC PAIN OF BOTH KNEES: Primary | ICD-10-CM

## 2024-03-27 DIAGNOSIS — G89.29 CHRONIC PAIN OF BOTH KNEES: Primary | ICD-10-CM
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
## 2024-03-27 LAB
ALBUMIN SERPL-MCNC: 4.3 G/DL (ref 3.5–5.2)
ALBUMIN/GLOB SERPL: 1.9 G/DL
ALP SERPL-CCNC: 82 U/L (ref 39–117)
ALT SERPL W P-5'-P-CCNC: 16 U/L (ref 1–33)
ANION GAP SERPL CALCULATED.3IONS-SCNC: 9.7 MMOL/L (ref 5–15)
AST SERPL-CCNC: 20 U/L (ref 1–32)
BASOPHILS # BLD AUTO: 0.04 10*3/MM3 (ref 0–0.2)
BASOPHILS NFR BLD AUTO: 0.6 % (ref 0–1.5)
BILIRUB SERPL-MCNC: <0.2 MG/DL (ref 0–1.2)
BUN SERPL-MCNC: 24 MG/DL (ref 8–23)
BUN/CREAT SERPL: 20.7 (ref 7–25)
CALCIUM SPEC-SCNC: 9.3 MG/DL (ref 8.6–10.5)
CHLORIDE SERPL-SCNC: 105 MMOL/L (ref 98–107)
CK SERPL-CCNC: 100 U/L (ref 20–180)
CO2 SERPL-SCNC: 22.3 MMOL/L (ref 22–29)
CREAT SERPL-MCNC: 1.16 MG/DL (ref 0.57–1)
DEPRECATED RDW RBC AUTO: 42.5 FL (ref 37–54)
EGFRCR SERPLBLD CKD-EPI 2021: 50.5 ML/MIN/1.73
EOSINOPHIL # BLD AUTO: 0.18 10*3/MM3 (ref 0–0.4)
EOSINOPHIL NFR BLD AUTO: 2.7 % (ref 0.3–6.2)
ERYTHROCYTE [DISTWIDTH] IN BLOOD BY AUTOMATED COUNT: 13 % (ref 12.3–15.4)
GLOBULIN UR ELPH-MCNC: 2.3 GM/DL
GLUCOSE SERPL-MCNC: 105 MG/DL (ref 65–99)
HCT VFR BLD AUTO: 39.8 % (ref 34–46.6)
HGB BLD-MCNC: 12.8 G/DL (ref 12–15.9)
IMM GRANULOCYTES # BLD AUTO: 0.02 10*3/MM3 (ref 0–0.05)
IMM GRANULOCYTES NFR BLD AUTO: 0.3 % (ref 0–0.5)
LYMPHOCYTES # BLD AUTO: 1.9 10*3/MM3 (ref 0.7–3.1)
LYMPHOCYTES NFR BLD AUTO: 28.7 % (ref 19.6–45.3)
MCH RBC QN AUTO: 29.1 PG (ref 26.6–33)
MCHC RBC AUTO-ENTMCNC: 32.2 G/DL (ref 31.5–35.7)
MCV RBC AUTO: 90.5 FL (ref 79–97)
MONOCYTES # BLD AUTO: 0.39 10*3/MM3 (ref 0.1–0.9)
MONOCYTES NFR BLD AUTO: 5.9 % (ref 5–12)
NEUTROPHILS NFR BLD AUTO: 4.09 10*3/MM3 (ref 1.7–7)
NEUTROPHILS NFR BLD AUTO: 61.8 % (ref 42.7–76)
NRBC BLD AUTO-RTO: 0 /100 WBC (ref 0–0.2)
PLATELET # BLD AUTO: 223 10*3/MM3 (ref 140–450)
PMV BLD AUTO: 10.1 FL (ref 6–12)
POTASSIUM SERPL-SCNC: 4.1 MMOL/L (ref 3.5–5.2)
PROT SERPL-MCNC: 6.6 G/DL (ref 6–8.5)
RBC # BLD AUTO: 4.4 10*6/MM3 (ref 3.77–5.28)
SODIUM SERPL-SCNC: 137 MMOL/L (ref 136–145)
WBC NRBC COR # BLD AUTO: 6.62 10*3/MM3 (ref 3.4–10.8)

## 2024-03-27 PROCEDURE — 25010000002 RABIES VACCINE PER 1 ML: Performed by: STUDENT IN AN ORGANIZED HEALTH CARE EDUCATION/TRAINING PROGRAM

## 2024-03-27 PROCEDURE — 82550 ASSAY OF CK (CPK): CPT | Performed by: STUDENT IN AN ORGANIZED HEALTH CARE EDUCATION/TRAINING PROGRAM

## 2024-03-27 PROCEDURE — 80053 COMPREHEN METABOLIC PANEL: CPT | Performed by: STUDENT IN AN ORGANIZED HEALTH CARE EDUCATION/TRAINING PROGRAM

## 2024-03-27 PROCEDURE — 97110 THERAPEUTIC EXERCISES: CPT | Performed by: PHYSICAL THERAPIST

## 2024-03-27 PROCEDURE — 99283 EMERGENCY DEPT VISIT LOW MDM: CPT

## 2024-03-27 PROCEDURE — 90675 RABIES VACCINE IM: CPT | Performed by: STUDENT IN AN ORGANIZED HEALTH CARE EDUCATION/TRAINING PROGRAM

## 2024-03-27 PROCEDURE — 85025 COMPLETE CBC W/AUTO DIFF WBC: CPT | Performed by: STUDENT IN AN ORGANIZED HEALTH CARE EDUCATION/TRAINING PROGRAM

## 2024-03-27 PROCEDURE — 90471 IMMUNIZATION ADMIN: CPT

## 2024-03-27 PROCEDURE — 90375 RABIES IG IM/SC: CPT | Performed by: STUDENT IN AN ORGANIZED HEALTH CARE EDUCATION/TRAINING PROGRAM

## 2024-03-27 PROCEDURE — 97530 THERAPEUTIC ACTIVITIES: CPT | Performed by: PHYSICAL THERAPIST

## 2024-03-27 PROCEDURE — 25010000002 RABIES IMMUNE GLOBULIN PER VIAL: Performed by: STUDENT IN AN ORGANIZED HEALTH CARE EDUCATION/TRAINING PROGRAM

## 2024-03-27 PROCEDURE — 96372 THER/PROPH/DIAG INJ SC/IM: CPT

## 2024-03-27 PROCEDURE — 36415 COLL VENOUS BLD VENIPUNCTURE: CPT

## 2024-03-27 RX ADMIN — RABIES VACCINE 2.5 UNITS: KIT at 06:09

## 2024-03-27 RX ADMIN — RABIES IMMUNE GLOBULIN (HUMAN) 1650 UNITS: 300 INJECTION, SOLUTION INFILTRATION; INTRAMUSCULAR at 06:12

## 2024-03-27 NOTE — ED PROVIDER NOTES
EMERGENCY DEPARTMENT ENCOUNTER    Room Number:  11/11  PCP: Jordan Reeder APRN  History obtained from: Patient      HPI:  Chief Complaint: Right shoulder pain  A complete HPI/ROS/PMH/PSH/SH/FH are unobtainable due to:   Context: Aydee Moore is a 73 y.o. female who presents to the ED c/o right shoulder and right neck pain.  Also with pain around her right shoulder blade.  No other recent illness, fever, chills.  Pain radiates down the right arm.  No known injury.  Got a dog bite on Saturday and is felt fatigued since then.  Currently on Bactrim.              PAST MEDICAL HISTORY  Active Ambulatory Problems     Diagnosis Date Noted    Hyperlipidemia     Elevated fasting glucose     Anxiety and depression     Hypothyroidism, adult 11/23/2016    Wasp sting 07/28/2017    Essential hypertension 08/27/2019    Acute cystitis without hematuria 03/16/2022    Rash 08/22/2022    Environmental and seasonal allergies 08/22/2022    Hip pain 08/22/2022    Fecal incontinence 12/01/2022    Puncture wound of index finger 01/31/2024    Breast cancer screening by mammogram 01/31/2024    Post-menopausal 01/31/2024    Chronic gastritis without bleeding 01/31/2024    Chronic diarrhea 01/31/2024    Chronic pain of left knee 01/31/2024    Chronic pain of right knee 01/31/2024    Family history of diabetes mellitus 01/31/2024     Resolved Ambulatory Problems     Diagnosis Date Noted    Morbidly obese 01/03/2020     Past Medical History:   Diagnosis Date    Allergic     Arthritis     Cataract     Colon polyp Dr Melissa    GERD (gastroesophageal reflux disease) Dr Melissa    Headache     Hypertension     Hypothyroidism     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    Obesity     Urinary tract infection often michelle when away from home         PAST SURGICAL HISTORY  Past Surgical History:   Procedure Laterality Date    BREAST SURGERY  1986    biopsy    CATARACT EXTRACTION Right 10/24/2018    COLONOSCOPY      FRACTURE SURGERY  Oct 2022    rt ring  finger    HYSTERECTOMY      KNEE SURGERY Right 08/08/2017    TONSILLECTOMY           FAMILY HISTORY  Family History   Problem Relation Age of Onset    Breast cancer Mother     Colon cancer Mother     Thyroid disease Mother     Vision loss Mother     Cancer Father         mesothelioma    Diabetes Sister         twin    Uterine cancer Sister     Thyroid disease Sister     Uterine cancer Sister         suicide 2016    Early death Sister     No Known Problems Brother          SOCIAL HISTORY  Social History     Socioeconomic History    Marital status:    Tobacco Use    Smoking status: Never    Smokeless tobacco: Never   Vaping Use    Vaping status: Never Used   Substance and Sexual Activity    Alcohol use: Yes     Comment: social drinker no more than 1 drink/day if that    Drug use: No    Sexual activity: Yes     Partners: Male     Birth control/protection: Hysterectomy     Comment:  only partner         ALLERGIES  Tetracyclines & related and Penicillins        REVIEW OF SYSTEMS    As per HPI      PHYSICAL EXAM  ED Triage Vitals   Temp Heart Rate Resp BP SpO2   03/27/24 0356 03/27/24 0359 03/27/24 0359 03/27/24 0403 03/27/24 0359   98.6 °F (37 °C) 114 20 178/88 94 %      Temp src Heart Rate Source Patient Position BP Location FiO2 (%)   -- -- -- -- --              Physical Exam  Constitutional:       General: She is not in acute distress.  HENT:      Head: Normocephalic and atraumatic.   Cardiovascular:      Rate and Rhythm: Normal rate and regular rhythm.   Pulmonary:      Effort: Pulmonary effort is normal. No respiratory distress.   Abdominal:      General: There is no distension.      Palpations: Abdomen is soft.      Tenderness: There is no abdominal tenderness.   Musculoskeletal:         General: No swelling or deformity.      Comments: Mild tenderness palpation over the right trapezius muscles and the right triceps without step-off or deformity, patient has good range of motion.  Small erythematous  area over the left dorsal foot without bleeding or spreading erythema/induration   Skin:     General: Skin is warm and dry.   Neurological:      Mental Status: She is alert. Mental status is at baseline.           Vital signs and nursing notes reviewed.          LAB RESULTS  Recent Results (from the past 24 hour(s))   CK    Collection Time: 03/27/24  4:51 AM    Specimen: Blood   Result Value Ref Range    Creatine Kinase 100 20 - 180 U/L   Comprehensive Metabolic Panel    Collection Time: 03/27/24  4:51 AM    Specimen: Blood   Result Value Ref Range    Glucose 105 (H) 65 - 99 mg/dL    BUN 24 (H) 8 - 23 mg/dL    Creatinine 1.16 (H) 0.57 - 1.00 mg/dL    Sodium 137 136 - 145 mmol/L    Potassium 4.1 3.5 - 5.2 mmol/L    Chloride 105 98 - 107 mmol/L    CO2 22.3 22.0 - 29.0 mmol/L    Calcium 9.3 8.6 - 10.5 mg/dL    Total Protein 6.6 6.0 - 8.5 g/dL    Albumin 4.3 3.5 - 5.2 g/dL    ALT (SGPT) 16 1 - 33 U/L    AST (SGOT) 20 1 - 32 U/L    Alkaline Phosphatase 82 39 - 117 U/L    Total Bilirubin <0.2 0.0 - 1.2 mg/dL    Globulin 2.3 gm/dL    A/G Ratio 1.9 g/dL    BUN/Creatinine Ratio 20.7 7.0 - 25.0    Anion Gap 9.7 5.0 - 15.0 mmol/L    eGFR 50.5 (L) >60.0 mL/min/1.73   CBC Auto Differential    Collection Time: 03/27/24  4:51 AM    Specimen: Blood   Result Value Ref Range    WBC 6.62 3.40 - 10.80 10*3/mm3    RBC 4.40 3.77 - 5.28 10*6/mm3    Hemoglobin 12.8 12.0 - 15.9 g/dL    Hematocrit 39.8 34.0 - 46.6 %    MCV 90.5 79.0 - 97.0 fL    MCH 29.1 26.6 - 33.0 pg    MCHC 32.2 31.5 - 35.7 g/dL    RDW 13.0 12.3 - 15.4 %    RDW-SD 42.5 37.0 - 54.0 fl    MPV 10.1 6.0 - 12.0 fL    Platelets 223 140 - 450 10*3/mm3    Neutrophil % 61.8 42.7 - 76.0 %    Lymphocyte % 28.7 19.6 - 45.3 %    Monocyte % 5.9 5.0 - 12.0 %    Eosinophil % 2.7 0.3 - 6.2 %    Basophil % 0.6 0.0 - 1.5 %    Immature Grans % 0.3 0.0 - 0.5 %    Neutrophils, Absolute 4.09 1.70 - 7.00 10*3/mm3    Lymphocytes, Absolute 1.90 0.70 - 3.10 10*3/mm3    Monocytes, Absolute 0.39 0.10  - 0.90 10*3/mm3    Eosinophils, Absolute 0.18 0.00 - 0.40 10*3/mm3    Basophils, Absolute 0.04 0.00 - 0.20 10*3/mm3    Immature Grans, Absolute 0.02 0.00 - 0.05 10*3/mm3    nRBC 0.0 0.0 - 0.2 /100 WBC       Ordered the above labs and reviewed the results.        RADIOLOGY  No Radiology Exams Resulted Within Past 24 Hours    Ordered the above noted radiological studies. Reviewed by me in PACS.              MEDICATIONS GIVEN IN ER  Medications   rabies vaccine, PCEC (RABAVERT) injection 2.5 Units (2.5 Units Intramuscular Given 3/27/24 0609)   rabies Immune Globulin (HYPERRAB) 1,650 Units 5.5 mL injection (1,650 Units Intramuscular Given 3/27/24 0612)               MEDICAL DECISION MAKING, PROGRESS, and CONSULTS    MDM: Patient presented emergency department with muscle tenderness status post recent dog bite and Bactrim use.  Otherwise well-appearing, vitals otherwise stable.  Labs otherwise reassuring.  Creatinine slightly elevated from baseline, likely side effect from Bactrim.  Patient concerned about rabies, will start rabies series.  Given return precautions and discharged home.    All labs have been independently reviewed by me.  All radiology studies have been reviewed by me and I have also reviewed the radiology report.   EKG's independently viewed and interpreted by me.  Discussion below represents my analysis of pertinent findings related to patient's condition, differential diagnosis, treatment plan and final disposition.      Additional sources:  - Discussed/ obtained information from independent historians:      - External (non-ED) record review:     - Chronic or social conditions impacting care:     - Shared decision making: Discussed plan for discharge with close follow-up, patient agrees      Orders placed during this visit:  Orders Placed This Encounter   Procedures    CK    Comprehensive Metabolic Panel    CBC Auto Differential    CBC & Differential         Additional orders considered but not  ordered:  Considered CT or other imaging of the neck/right shoulder however no indication at this time, no evidence of bony injury.        Differential diagnosis includes but is not limited to:    Medication side effect, electrolyte abnormality, myositis, arthralgias, cervical radiculopathy, septic arthritis      Independent interpretation of labs, radiology studies, and discussions with consultants:           DIAGNOSIS  Final diagnoses:   Myalgia   Dog bite of left foot, initial encounter         DISPOSITION  Discharged home        Latest Documented Vital Signs:  As of 07:10 EDT  BP- 178/88 HR- 114 Temp- 98.6 °F (37 °C) O2 sat- 94%              --    Please note that portions of this were completed with a voice recognition program.       Note Disclaimer: At [facility], we believe that sharing information builds trust and better relationships. You are receiving this note because you are receiving care at [facility] or recently visited. It is possible you will see health information before a provider has talked with you about it. This kind of information can be easy to misunderstand. To help you fully understand what it means for your health, we urge you to discuss this note with your provider.             Tonio Russell MD  03/27/24 0713

## 2024-03-27 NOTE — TELEPHONE ENCOUNTER
Patient states she was bitten by a neighbors dog on 03/23/24.  She states she has a dime size bite on right foot near ankle and redness size of a quarter.  She states she was seen at an .  She states since the dog bite she has been feeling unwell  She has not spoke to neighbors and is unknown if dog has had rabies shot.  She feels unsafe to report to local police that her neighbors are hostile.  Instruction provided per protocol.

## 2024-03-27 NOTE — ED TRIAGE NOTES
"Pt ambulatory to triage via pv  Pt reports she was bit by a dog 3/23 on her left foot. Pt states \"I think I have rabies I am agitated\". Pt states the redness is worse. Pt unsure if dog is utd on vaccines.   "

## 2024-03-27 NOTE — TELEPHONE ENCOUNTER
"Reason for Disposition   [1] Any break in skin from BITE (e.g., cut, puncture or scratch) AND[2] PET animal (e.g., dog, cat, or ferret) at risk for RABIES (e.g., sick, stray, unprovoked bite, developing country)    Additional Information   Negative: [1] Major bleeding (e.g., actively dripping or spurting) AND [2] can't be stopped   Negative: Sounds like a life-threatening emergency to the triager   Negative: Snake bite   Negative: Bite, wound, or sting from fish   Negative: [1] Any break in skin from BITE (e.g., cut, puncture or scratch) AND[2] WILD animal at risk for RABIES (e.g., bat, raccoon, grant, skunk, coyote, other carnivores; see Background for list.)    Answer Assessment - Initial Assessment Questions  1. ANIMAL: \"What type of animal caused the bite?\" \"Is the injury from a bite or a claw?\" If the animal is a dog or a cat, ask: \"Was it a pet or a stray?\" \"Was it acting ill or behaving strangely?\"      Dog bite  2. LOCATION: \"Where is the bite located?\"       Left foot near ankle  3. SIZE: \"How big is the bite?\" \"What does it look like?\"       Size of dime  4. ONSET: \"When did the bite happen?\" (Minutes or hours ago)       03/23/24  5. CIRCUMSTANCES: \"Tell me how this happened.\"       Bite patient threw a fence  6. TETANUS: \"When was your last tetanus booster?\"      02/2024  7. RABIES VACCINE: For dog or cat bites, ask: \"Do you know if the pet is vaccinated against rabies?\"  (e.g., yes, no, overdue for rabies shot, unknown)      unknown    Protocols used: Animal Bite-ADULT-AH    "

## 2024-03-27 NOTE — PROGRESS NOTES
Physical Therapy Daily Treatment Note               [address]                                                                                                                                             [place] [address]    Patient: Aydee Moore   : 1950  Referring practitioner: Jordan Reeder*  Date of Initial Visit: Type: THERAPY  Noted: 2024  Today's Date: 3/27/2024  Patient seen for 15 sessions       Visit Diagnoses:    ICD-10-CM ICD-9-CM   1. Chronic pain of both knees  M25.561 719.46    M25.562 338.29    G89.29    2. Primary osteoarthritis of both knees  M17.0 715.16   3. Difficulty walking  R26.2 719.7       Subjective Evaluation    History of Present Illness    Subjective comment: Pt denies any pain in (B) knees.       Objective   See Exercise, Manual, and Modality Logs for complete treatment.   Increased nu step load to 6    Assessment & Plan       Assessment  Assessment details: Pt completed treatment with no c/o pain in (B) knees. Pt continues to require vc to slow and control her movements.  Pt tolerated increased resistance(load) on Nu step and addition of sled push/drag for increased LE strength.  Pt noted that she no longer has to descend slopes backward when hiking.  This indicates improved eccentric control of her LE.  Continue to progress per POC.          Timed:         Manual Therapy:    0     mins  76158;     Therapeutic Exercise:    25     mins  77789;     Neuromuscular Enedelia:    0    mins  41287;    Therapeutic Activity:     16     mins  33910;     Gait Trainin     mins  80200;     Ultrasound:     0     mins  53107;    E Stim                            0    mins   99630( g0283)  Work Cheek/Cond      0    mins   89761        Timed Treatment:   41   mins   Total Treatment:     41   mins    Jared Neely PTA  [place] License: [id number]

## 2024-03-29 ENCOUNTER — TREATMENT (OUTPATIENT)
Dept: PHYSICAL THERAPY | Facility: CLINIC | Age: 74
End: 2024-03-29
Payer: MEDICARE

## 2024-03-29 DIAGNOSIS — M25.561 CHRONIC PAIN OF BOTH KNEES: Primary | ICD-10-CM

## 2024-03-29 DIAGNOSIS — R26.2 DIFFICULTY WALKING: ICD-10-CM

## 2024-03-29 DIAGNOSIS — M25.562 CHRONIC PAIN OF BOTH KNEES: Primary | ICD-10-CM

## 2024-03-29 DIAGNOSIS — M17.0 PRIMARY OSTEOARTHRITIS OF BOTH KNEES: ICD-10-CM

## 2024-03-29 DIAGNOSIS — G89.29 CHRONIC PAIN OF BOTH KNEES: Primary | ICD-10-CM

## 2024-03-29 PROCEDURE — 97110 THERAPEUTIC EXERCISES: CPT | Performed by: PHYSICAL THERAPIST

## 2024-03-29 PROCEDURE — 97530 THERAPEUTIC ACTIVITIES: CPT | Performed by: PHYSICAL THERAPIST

## 2024-03-29 NOTE — PROGRESS NOTES
Physical Therapy Daily Treatment Note               [address] [facility] [address]                                                                                                                                             [place] [address]    Patient: Aydee Moore   : 1950  Referring practitioner: Jordan Reeder*  Date of Initial Visit: Type: THERAPY  Noted: 2024  Today's Date: 3/29/2024  Patient seen for 16 sessions       Visit Diagnoses:    ICD-10-CM ICD-9-CM   1. Chronic pain of both knees  M25.561 719.46    M25.562 338.29    G89.29    2. Primary osteoarthritis of both knees  M17.0 715.16   3. Difficulty walking  R26.2 719.7       Subjective Evaluation    History of Present Illness    Subjective comment: Pt reports that she injured her (L) knee by twisting to her to (L) on Wednesday.  Was able to walk three miles on .  (pt presents with a (L) knee brace today)Pain  Current pain ratin           Objective   See Exercise, Manual, and Modality Logs for complete treatment.       Assessment & Plan       Assessment  Assessment details: Pt completed treatment with minimal c/o pain in (B) knees.  Decreased resistance and increase time of Nu step to fully warm up pt's (L) knee. Pt had c/o minor pain in medial (L) knee as she fatigued with each LE exercise.  Added single leg stand for improved balance in walking/hiking.  Continue to progress per POC.          Timed:         Manual Therapy:    0     mins  39786;     Therapeutic Exercise:    30     mins  22617;     Neuromuscular Enedelia:    0    mins  06179;    Therapeutic Activity:     20     mins  01057;     Gait Trainin     mins  08597;     Ultrasound:     0     mins  66185;    E Stim                            0    mins   24672( g0283)  Work Cheek/Cond      0    mins   68287        Timed Treatment:   50   mins   Total Treatment:     50   mins    Jared Neely PTA  [place] License: [id number]

## 2024-03-30 ENCOUNTER — HOSPITAL ENCOUNTER (OUTPATIENT)
Dept: INFUSION THERAPY | Facility: HOSPITAL | Age: 74
Discharge: HOME | End: 2024-03-30
Payer: MEDICARE

## 2024-03-30 VITALS
DIASTOLIC BLOOD PRESSURE: 76 MMHG | HEART RATE: 63 BPM | HEIGHT: 65 IN | SYSTOLIC BLOOD PRESSURE: 120 MMHG | BODY MASS INDEX: 31.65 KG/M2 | TEMPERATURE: 98.2 F | WEIGHT: 190 LBS

## 2024-03-30 DIAGNOSIS — Z20.3 RABIES EXPOSURE: Primary | ICD-10-CM

## 2024-03-30 PROCEDURE — 96372 THER/PROPH/DIAG INJ SC/IM: CPT

## 2024-03-30 PROCEDURE — 25010000002 RABIES VACCINE PER 1 ML: Performed by: STUDENT IN AN ORGANIZED HEALTH CARE EDUCATION/TRAINING PROGRAM

## 2024-03-30 PROCEDURE — 90675 RABIES VACCINE IM: CPT | Performed by: STUDENT IN AN ORGANIZED HEALTH CARE EDUCATION/TRAINING PROGRAM

## 2024-03-30 PROCEDURE — 90471 IMMUNIZATION ADMIN: CPT

## 2024-03-30 RX ORDER — METHYLPREDNISOLONE 4 MG/1
TABLET ORAL
COMMUNITY
Start: 2024-03-07

## 2024-03-30 RX ORDER — SODIUM PHOSPHATE,MONO-DIBASIC 19G-7G/118
ENEMA (ML) RECTAL
COMMUNITY

## 2024-03-30 RX ADMIN — Medication 2.5 UNITS: at 08:21

## 2024-03-30 NOTE — NURSING NOTE
Pt arrived for 2nd Dose Rabies vaccination. Right Deltoid. IM. Tolerated well. Confirmed next appointment in [facility].

## 2024-04-05 ENCOUNTER — TELEPHONE (OUTPATIENT)
Dept: FAMILY MEDICINE CLINIC | Facility: CLINIC | Age: 74
End: 2024-04-05
Payer: MEDICARE

## 2024-04-05 DIAGNOSIS — I10 ESSENTIAL HYPERTENSION: Chronic | ICD-10-CM

## 2024-04-05 RX ORDER — AMLODIPINE BESYLATE 5 MG/1
5 TABLET ORAL DAILY
Qty: 30 TABLET | Refills: 0 | Status: SHIPPED | OUTPATIENT
Start: 2024-04-05

## 2024-04-05 NOTE — TELEPHONE ENCOUNTER
Caller: Aydee Moore    Relationship: Self    Best call back number: [phone]     What medication are you requesting:   amLODIPine (NORVASC) 5 MG tablet     If a prescription is needed, what is your preferred pharmacy and phone number: [id number] - [place] - [address] AT [facility] [address] - [phone]  - [phone]      Additional notes: PATIENT STATES THAT SHE IS TRAVELING AND HAS LOST HER MEDICATION. REQUESTS TO HAVE PRESCRIPTION SENT TO NEARBY PHARMACY FOR REFILL.

## 2024-04-05 NOTE — TELEPHONE ENCOUNTER
Caller: Aydee Moore    Relationship to patient: Self    Best call back number: [phone]    Patient is needing: SHE CALLED BACK TO CHECK STATUS

## 2024-04-10 ENCOUNTER — HOSPITAL ENCOUNTER (OUTPATIENT)
Dept: INFUSION THERAPY | Facility: HOSPITAL | Age: 74
Discharge: HOME | End: 2024-04-10
Admitting: STUDENT IN AN ORGANIZED HEALTH CARE EDUCATION/TRAINING PROGRAM
Payer: MEDICARE

## 2024-04-10 VITALS
OXYGEN SATURATION: 100 % | TEMPERATURE: 96.9 F | DIASTOLIC BLOOD PRESSURE: 63 MMHG | SYSTOLIC BLOOD PRESSURE: 133 MMHG | HEART RATE: 80 BPM | RESPIRATION RATE: 20 BRPM

## 2024-04-10 DIAGNOSIS — Z20.3 RABIES EXPOSURE: Primary | ICD-10-CM

## 2024-04-10 PROCEDURE — 90471 IMMUNIZATION ADMIN: CPT

## 2024-04-10 PROCEDURE — 25010000002 RABIES VACCINE PER 1 ML: Performed by: STUDENT IN AN ORGANIZED HEALTH CARE EDUCATION/TRAINING PROGRAM

## 2024-04-10 PROCEDURE — 96372 THER/PROPH/DIAG INJ SC/IM: CPT

## 2024-04-10 PROCEDURE — 90675 RABIES VACCINE IM: CPT | Performed by: STUDENT IN AN ORGANIZED HEALTH CARE EDUCATION/TRAINING PROGRAM

## 2024-04-10 RX ADMIN — Medication 2.5 UNITS: at 09:01

## 2024-04-12 ENCOUNTER — TREATMENT (OUTPATIENT)
Dept: PHYSICAL THERAPY | Facility: CLINIC | Age: 74
End: 2024-04-12
Payer: MEDICARE

## 2024-04-12 DIAGNOSIS — M17.0 PRIMARY OSTEOARTHRITIS OF BOTH KNEES: ICD-10-CM

## 2024-04-12 DIAGNOSIS — M25.561 CHRONIC PAIN OF BOTH KNEES: Primary | ICD-10-CM

## 2024-04-12 DIAGNOSIS — M25.562 CHRONIC PAIN OF BOTH KNEES: Primary | ICD-10-CM

## 2024-04-12 DIAGNOSIS — R26.2 DIFFICULTY WALKING: ICD-10-CM

## 2024-04-12 DIAGNOSIS — G89.29 CHRONIC PAIN OF BOTH KNEES: Primary | ICD-10-CM

## 2024-04-12 PROCEDURE — 97530 THERAPEUTIC ACTIVITIES: CPT | Performed by: PHYSICAL THERAPIST

## 2024-04-12 PROCEDURE — 97110 THERAPEUTIC EXERCISES: CPT | Performed by: PHYSICAL THERAPIST

## 2024-04-12 NOTE — PROGRESS NOTES
"Physical Therapy Daily Treatment Note               [address] [facility] [address]                                                                                                                                             [place] [address]    Patient: Aydee Moore   : 1950  Referring practitioner: Jordan Reeder*  Date of Initial Visit: Type: THERAPY  Noted: 2024  Today's Date: 2024  Patient seen for 17 sessions       Visit Diagnoses:    ICD-10-CM ICD-9-CM   1. Chronic pain of both knees  M25.561 719.46    M25.562 338.29    G89.29    2. Primary osteoarthritis of both knees  M17.0 715.16   3. Difficulty walking  R26.2 719.7       Subjective Evaluation    History of Present Illness    Subjective comment: Pt reports that while on her trip they went to a park.  She was able to \"walk all day\". with no knee trouble.       Objective   See Exercise, Manual, and Modality Logs for complete treatment.       Assessment & Plan       Assessment  Assessment details: Pt completed treatment with no c/o pain in (B) knee.  No progressions today due to pt traveling the last 2 weeks.  Pt was able to ascend and descend 1 flight of steps reciprocally today.  She notes that she has hiked 1-2 miles several time with (B) trekking poles with no issue.  (LTG 3 & 4).  Continue to progress functional strengthening per POC.    Goals  Plan Goals: STGs: to be met in 4 weeks  1. Patient will be independent with initial HEP - MET  2. Patient will report improved (B) knee symptoms 0-2/10 for improved activity and functional mobility tolerance - MET  3. Patient will achieve improved (L) knee AROM extension to 5 degrees for improved stability during stance phase of gait - MET  4. Patient will perform SLR each LE 3 x 10 with good concentric and eccentric ability to demonstrate improved neuromuscular control of each LE - MET     LTGs: to be met in 8 weeks  1. Patient will be independent with progressed HEP - PARTIALLY " MET  2. Patient will demonstrate improved quadriceps activation in closed chain to allow 50% decreased frequency of episodes of (L) knee giving way - PARTIALLY MET  3. Patient will demonstrate ability to ascend and descend 1 flight of steps reciprocally with single handrail and improved confidence - MET  4. Patient will tolerate 1-2 mile hikes on moderately uneven terrain with (B) trekking poles with >/= 50% improved confidence - MET  5. Patient will have improved LEFS score >/= 50/80 for subjective evidence of functional improvement - MET            Timed:         Manual Therapy:    0     mins  12569;     Therapeutic Exercise:    20     mins  09299;     Neuromuscular Enedelia:    0    mins  98714;    Therapeutic Activity:     25     mins  38156;     Gait Trainin     mins  23158;     Ultrasound:     0     mins  65021;    E Stim                            0    mins   12984( g0283)  Work Cheek/Cond      0    mins   86294        Timed Treatment:   45   mins   Total Treatment:     45   mins    Jared Neely PTA  [place] License: [id number]

## 2024-04-15 ENCOUNTER — TREATMENT (OUTPATIENT)
Dept: PHYSICAL THERAPY | Facility: CLINIC | Age: 74
End: 2024-04-15
Payer: MEDICARE

## 2024-04-15 DIAGNOSIS — M17.0 PRIMARY OSTEOARTHRITIS OF BOTH KNEES: ICD-10-CM

## 2024-04-15 DIAGNOSIS — M25.562 CHRONIC PAIN OF BOTH KNEES: Primary | ICD-10-CM

## 2024-04-15 DIAGNOSIS — M25.561 CHRONIC PAIN OF BOTH KNEES: Primary | ICD-10-CM

## 2024-04-15 DIAGNOSIS — R26.2 DIFFICULTY WALKING: ICD-10-CM

## 2024-04-15 DIAGNOSIS — G89.29 CHRONIC PAIN OF BOTH KNEES: Primary | ICD-10-CM

## 2024-04-15 PROCEDURE — 97530 THERAPEUTIC ACTIVITIES: CPT | Performed by: PHYSICAL THERAPIST

## 2024-04-15 PROCEDURE — 97110 THERAPEUTIC EXERCISES: CPT | Performed by: PHYSICAL THERAPIST

## 2024-04-15 NOTE — PROGRESS NOTES
Physical Therapy Daily Treatment Note      [address], [address], [place] [address]    Patient: Aydee Moore   : 1950  Referring practitioner: Jordan Reeder*  Date of Initial Visit: Type: THERAPY  Noted: 2024  Today's Date: 4/15/2024  Patient seen for 18 sessions         Visit Diagnoses:     ICD-10-CM ICD-9-CM   1. Chronic pain of both knees  M25.561 719.46    M25.562 338.29    G89.29    2. Primary osteoarthritis of both knees  M17.0 715.16   3. Difficulty walking  R26.2 719.7         Subjective Evaluation    History of Present Illness    Subjective comment: My knee is doing so much better.  I'm back to being able to hike without problem.  I actually hiked the [facility] trail (2.5 miles) without a knee brace and did fine.  I do use trekking poles.  I have a lot more confidence walking, including even on hills/unven ground.  I'm comfortable carrying things when I walk again.  I would say I've seen at least 75% improvement since I first started PT.       Objective          Active Range of Motion   Left Knee   Flexion: 127 degrees   Extension: 0 degrees   Extensor la degrees     Strength/Myotome Testing     Left Knee   Flexion: 4+  Extension: 5  Quadriceps contraction: good      See Exercise, Manual, and Modality Logs for complete treatment.       Assessment & Plan       Assessment  Assessment details: Patient has remained motivated and compliant with PT interventions.  She has demonstrated great progress with PT interventions and in fact reports an approximate 75% improvement in her functional ability.  She demonstrates improved and now WFL (L) knee AROM and strength.  She has improved greatly with regard to her ability and confidence with descending inclines.  She has been able to improve her hiking tolerance such that she hiked a 2.5 mile distance without an external knee support.  At this time she has met most functional PT goals and demonstrates readiness to  transition to independence with progressed HEP.  Patient is agreeable to this and all questions have been answered to her satisfaction.           Other - D/C skilled PT services.  Patient to f/u with Dr. Garcia this week.         Timed:  Manual Therapy:         mins  90583;  Therapeutic Exercise:    16     mins  92917;     Neuromuscular Enedelia:        mins  30440;    Therapeutic Activity:     32     mins  46833;     Gait Training:           mins  41439;     Ultrasound:          mins  24526;    Untimed:  Electrical Stimulation:         mins  60974 ( );  Mechanical Traction:         mins  18365;   Dry Needling              ___  mins   06650    Timed Treatment:   48   mins   Total Treatment:     48   mins    Grace Blancas PT, DPT, OCS  Physical Therapist  [place] License #[id number]  Electronically signed by: Grace Blancas PT, 04/15/24, 10:01 AM EDT

## 2024-04-15 NOTE — LETTER
Physical Therapy Daily Treatment Note      [address] [facility], [address], [place] [address]    Patient: Aydee Moore   : 1950  Referring practitioner: Jordan Reeder*  Date of Initial Visit: Type: THERAPY  Noted: 2024  Today's Date: 4/15/2024  Patient seen for 18 sessions         Visit Diagnoses:     ICD-10-CM ICD-9-CM   1. Chronic pain of both knees  M25.561 719.46    M25.562 338.29    G89.29    2. Primary osteoarthritis of both knees  M17.0 715.16   3. Difficulty walking  R26.2 719.7         Subjective Evaluation    History of Present Illness    Subjective comment: My knee is doing so much better.  I'm back to being able to hike without problem.  I actually hiked the [facility] trail (2.5 miles) without a knee brace and did fine.  I do use trekking poles.  I have a lot more confidence walking, including even on hills/unven ground.  I'm comfortable carrying things when I walk again.  I would say I've seen at least 75% improvement since I first started PT.       Objective          Active Range of Motion   Left Knee   Flexion: 127 degrees   Extension: 0 degrees   Extensor la degrees     Strength/Myotome Testing     Left Knee   Flexion: 4+  Extension: 5  Quadriceps contraction: good      See Exercise, Manual, and Modality Logs for complete treatment.       Assessment & Plan       Assessment  Assessment details: Patient has remained motivated and compliant with PT interventions.  She has demonstrated great progress with PT interventions and in fact reports an approximate 75% improvement in her functional ability.  She demonstrates improved and now WFL (L) knee AROM and strength.  She has improved greatly with regard to her ability and confidence with descending inclines.  She has been able to improve her hiking tolerance such that she hiked a 2.5 mile distance without an external knee support.  At this time she has met most functional PT goals and demonstrates readiness to  transition to independence with progressed HEP.  Patient is agreeable to this and all questions have been answered to her satisfaction.           Other - D/C skilled PT services.  Patient to f/u with Dr. Garcia this week.        Graec Blancas PT, DPT, OCS  [place] Lic #[id number]

## 2024-04-16 ENCOUNTER — OFFICE VISIT (OUTPATIENT)
Dept: FAMILY MEDICINE CLINIC | Facility: CLINIC | Age: 74
End: 2024-04-16
Payer: MEDICARE

## 2024-04-16 VITALS
HEART RATE: 58 BPM | WEIGHT: 194.2 LBS | DIASTOLIC BLOOD PRESSURE: 70 MMHG | HEIGHT: 65 IN | RESPIRATION RATE: 18 BRPM | OXYGEN SATURATION: 96 % | BODY MASS INDEX: 32.36 KG/M2 | SYSTOLIC BLOOD PRESSURE: 140 MMHG | TEMPERATURE: 98 F

## 2024-04-16 DIAGNOSIS — K29.70 GASTRITIS WITHOUT BLEEDING, UNSPECIFIED CHRONICITY, UNSPECIFIED GASTRITIS TYPE: ICD-10-CM

## 2024-04-16 DIAGNOSIS — Z88.0 PENICILLIN ALLERGY: ICD-10-CM

## 2024-04-16 DIAGNOSIS — R07.89 INTERMITTENT LEFT-SIDED CHEST PAIN: ICD-10-CM

## 2024-04-16 DIAGNOSIS — J30.89 ENVIRONMENTAL AND SEASONAL ALLERGIES: ICD-10-CM

## 2024-04-16 DIAGNOSIS — I10 ESSENTIAL HYPERTENSION: Chronic | ICD-10-CM

## 2024-04-16 DIAGNOSIS — E03.9 HYPOTHYROIDISM, ADULT: Chronic | ICD-10-CM

## 2024-04-16 DIAGNOSIS — Z00.00 MEDICARE ANNUAL WELLNESS VISIT, SUBSEQUENT: Primary | ICD-10-CM

## 2024-04-16 DIAGNOSIS — F41.9 ANXIETY AND DEPRESSION: Chronic | ICD-10-CM

## 2024-04-16 DIAGNOSIS — K21.00 GASTROESOPHAGEAL REFLUX DISEASE WITH ESOPHAGITIS WITHOUT HEMORRHAGE: ICD-10-CM

## 2024-04-16 DIAGNOSIS — E78.2 MIXED HYPERLIPIDEMIA: Chronic | ICD-10-CM

## 2024-04-16 DIAGNOSIS — F32.A ANXIETY AND DEPRESSION: Chronic | ICD-10-CM

## 2024-04-16 PROBLEM — Z12.31 BREAST CANCER SCREENING BY MAMMOGRAM: Status: RESOLVED | Noted: 2024-01-31 | Resolved: 2024-04-16

## 2024-04-16 PROBLEM — N30.00 ACUTE CYSTITIS WITHOUT HEMATURIA: Status: RESOLVED | Noted: 2022-03-16 | Resolved: 2024-04-16

## 2024-04-16 PROCEDURE — 1160F RVW MEDS BY RX/DR IN RCRD: CPT | Performed by: STUDENT IN AN ORGANIZED HEALTH CARE EDUCATION/TRAINING PROGRAM

## 2024-04-16 PROCEDURE — 93000 ELECTROCARDIOGRAM COMPLETE: CPT | Performed by: STUDENT IN AN ORGANIZED HEALTH CARE EDUCATION/TRAINING PROGRAM

## 2024-04-16 PROCEDURE — 1159F MED LIST DOCD IN RCRD: CPT | Performed by: STUDENT IN AN ORGANIZED HEALTH CARE EDUCATION/TRAINING PROGRAM

## 2024-04-16 PROCEDURE — 99214 OFFICE O/P EST MOD 30 MIN: CPT | Performed by: STUDENT IN AN ORGANIZED HEALTH CARE EDUCATION/TRAINING PROGRAM

## 2024-04-16 PROCEDURE — 3078F DIAST BP <80 MM HG: CPT | Performed by: STUDENT IN AN ORGANIZED HEALTH CARE EDUCATION/TRAINING PROGRAM

## 2024-04-16 PROCEDURE — 3077F SYST BP >= 140 MM HG: CPT | Performed by: STUDENT IN AN ORGANIZED HEALTH CARE EDUCATION/TRAINING PROGRAM

## 2024-04-16 PROCEDURE — G0439 PPPS, SUBSEQ VISIT: HCPCS | Performed by: STUDENT IN AN ORGANIZED HEALTH CARE EDUCATION/TRAINING PROGRAM

## 2024-04-16 RX ORDER — LEVOTHYROXINE SODIUM 50 MCG
50 TABLET ORAL DAILY
Qty: 90 TABLET | Refills: 3 | Status: SHIPPED | OUTPATIENT
Start: 2024-04-16

## 2024-04-16 RX ORDER — CETIRIZINE HYDROCHLORIDE 10 MG/1
5 TABLET ORAL NIGHTLY
Qty: 90 TABLET | Refills: 1 | Status: SHIPPED | OUTPATIENT
Start: 2024-04-16

## 2024-04-16 RX ORDER — AMLODIPINE BESYLATE 5 MG/1
5 TABLET ORAL DAILY
Qty: 90 TABLET | Refills: 1 | Status: SHIPPED | OUTPATIENT
Start: 2024-04-16

## 2024-04-16 RX ORDER — FLUTICASONE PROPIONATE 50 MCG
2 SPRAY, SUSPENSION (ML) NASAL DAILY
Qty: 16 G | Refills: 11 | Status: SHIPPED | OUTPATIENT
Start: 2024-04-16

## 2024-04-16 RX ORDER — ALBUTEROL SULFATE 90 UG/1
2 AEROSOL, METERED RESPIRATORY (INHALATION) EVERY 6 HOURS PRN
Qty: 8 G | Refills: 5 | Status: SHIPPED | OUTPATIENT
Start: 2024-04-16

## 2024-04-16 RX ORDER — ROSUVASTATIN CALCIUM 5 MG/1
5 TABLET, COATED ORAL DAILY
Qty: 90 TABLET | Refills: 1 | Status: SHIPPED | OUTPATIENT
Start: 2024-04-16

## 2024-04-16 RX ORDER — MULTIPLE VITAMINS W/ MINERALS TAB 9MG-400MCG
1 TAB ORAL DAILY
Qty: 90 TABLET | Refills: 3 | Status: SHIPPED | OUTPATIENT
Start: 2024-04-16

## 2024-04-16 RX ORDER — PANTOPRAZOLE SODIUM 20 MG/1
20 TABLET, DELAYED RELEASE ORAL DAILY
Qty: 90 TABLET | Refills: 0 | Status: SHIPPED | OUTPATIENT
Start: 2024-04-16

## 2024-04-16 RX ORDER — ASPIRIN 81 MG/1
81 TABLET ORAL DAILY
Qty: 90 TABLET | Refills: 1 | Status: SHIPPED | OUTPATIENT
Start: 2024-04-16

## 2024-04-16 NOTE — ASSESSMENT & PLAN NOTE
Lipid abnormalities are stable    Plan:  Continue same medication/s without change.      Discussed medication dosage, use, side effects, and goals of treatment in detail.    Counseled patient on lifestyle modifications to help control hyperlipidemia.     Patient Treatment Goals:   LDL goal is under 100    Followup in 6 months.  Discussed the importance of healthy diet, nutrition, and lifestyle. Recommend low salt, fat/cholesterol diet and avoid concentrated sweets. Encouraged DASH diet along with fresh fruits & vegetables and low fat dairy products. Counseled patient to exercise/walk as tolerated. Avoid tobacco and alcohol use.

## 2024-04-16 NOTE — PROGRESS NOTES
The ABCs of the Annual Wellness Visit  Subsequent Medicare Wellness Visit    Subjective    Aydee Moore is a 73 y.o. female who presents for a Subsequent Medicare Wellness Visit.    The following portions of the patient's history were reviewed and   updated as appropriate: allergies, current medications, past family history, past medical history, past social history, past surgical history, and problem list.    Compared to one year ago, the patient feels her physical   health is the same.    Compared to one year ago, the patient feels her mental   health is worse.    Recent Hospitalizations:  She was not admitted to the hospital during the last year.       Current Medical Providers:  Patient Care Team:  Jordan Reeder APRN as PCP - General (Nurse Practitioner)    Outpatient Medications Prior to Visit   Medication Sig Dispense Refill    acetaminophen (TYLENOL) 500 MG tablet Take 1 tablet by mouth Every 6 (Six) Hours As Needed for Mild Pain. 90 tablet 0    BIOTIN PO Take  by mouth.      Diclofenac Sodium (VOLTAREN) 1 % gel gel Apply 4 g topically to the appropriate area as directed 4 (Four) Times a Day As Needed (pain). 350 g 0    docusate sodium (Colace) 100 MG capsule Take 1 capsule by mouth Daily. 90 capsule 3    glucosamine-chondroitin 500-400 MG capsule capsule Take  by mouth 3 (Three) Times a Day With Meals.      Magnesium 400 MG capsule Take  by mouth. Prn      nystatin (MYCOSTATIN) 866852 UNIT/GM ointment Apply 1 application  topically to the appropriate area as directed 2 (Two) Times a Day. 30 g 1    triamcinolone (KENALOG) 0.1 % ointment Apply 1 application  topically to the appropriate area as directed 2 (Two) Times a Day. 30 g 0    albuterol sulfate  (90 Base) MCG/ACT inhaler Inhale 2 puffs Every 6 (Six) Hours As Needed for Wheezing (cough). 8 g 1    amLODIPine (NORVASC) 5 MG tablet Take 1 tablet by mouth Daily. 30 tablet 0    fluticasone (FLONASE) 50 MCG/ACT nasal spray 2 sprays into  the nostril(s) as directed by provider Daily. 16 g 11    methylPREDNISolone (MEDROL) 4 MG dose pack       multivitamin with minerals tablet tablet Take 1 tablet by mouth Daily.      rosuvastatin (CRESTOR) 5 MG tablet Take 1 tablet by mouth Daily. 90 tablet 1    Synthroid 50 MCG tablet Take 1 tablet by mouth Daily. 90 tablet 3    cetirizine (zyrTEC) 10 MG tablet Take 1 tablet by mouth Every Night. (Patient not taking: Reported on 4/16/2024) 30 tablet 5     No facility-administered medications prior to visit.       No opioid medication identified on active medication list. I have reviewed chart for other potential  high risk medication/s and harmful drug interactions in the elderly.        Aspirin is not on active medication list.  Aspirin use is indicated based on review of current medical condition/s. Pros and cons of this therapy have been discussed with this patient. Benefits of this medication outweigh potential harm.  Patient has been instructed to start taking this medication..    Patient Active Problem List   Diagnosis    Hyperlipidemia    Elevated fasting glucose    Anxiety and depression    Hypothyroidism, adult    Wasp sting    Essential hypertension    Rash    Environmental and seasonal allergies    Hip pain    Fecal incontinence    Puncture wound of index finger    Post-menopausal    Chronic gastritis without bleeding    Chronic diarrhea    Chronic pain of left knee    Chronic pain of right knee    Family history of diabetes mellitus    Rabies exposure    Penicillin allergy    Medicare annual wellness visit, subsequent    Intermittent left-sided chest pain    Gastroesophageal reflux disease with esophagitis without hemorrhage    Gastritis without bleeding     Advance Care Planning   Advance Care Planning     Advance Directive is not on file.  ACP discussion was held with the patient during this visit. Patient has an advance directive (not in EMR), copy requested.     Objective    Vitals:    04/16/24 1302  "  BP: 140/70   BP Location: Left arm   Patient Position: Sitting   Cuff Size: Adult   Pulse: 58   Resp: 18   Temp: 98 °F (36.7 °C)   TempSrc: Temporal   SpO2: 96%   Weight: 88.1 kg (194 lb 3.2 oz)   Height: 165.1 cm (65\")   PainSc: 0-No pain     Estimated body mass index is 32.32 kg/m² as calculated from the following:    Height as of this encounter: 165.1 cm (65\").    Weight as of this encounter: 88.1 kg (194 lb 3.2 oz).           Does the patient have evidence of cognitive impairment? No    Lab Results   Component Value Date    HGBA1C 5.5 2024        HEALTH RISK ASSESSMENT    Smoking Status:  Social History     Tobacco Use   Smoking Status Never   Smokeless Tobacco Never     Alcohol Consumption:  Social History     Substance and Sexual Activity   Alcohol Use Yes    Comment: social drinker no more than 1 drink/day if that     Fall Risk Screen:    MARIZA Fall Risk Assessment was completed, and patient is at LOW risk for falls.Assessment completed on:2024    Depression Screenin/31/2024    11:19 AM   PHQ-2/PHQ-9 Depression Screening   Little Interest or Pleasure in Doing Things 0-->not at all   Feeling Down, Depressed or Hopeless 0-->not at all   PHQ-9: Brief Depression Severity Measure Score 0       Health Habits and Functional and Cognitive Screenin/19/2021     8:00 AM   Functional & Cognitive Status   Do you have difficulty preparing food and eating? No   Do you have difficulty bathing yourself, getting dressed or grooming yourself? No   Do you have difficulty using the toilet? No   Do you have difficulty moving around from place to place? No   Do you have trouble with steps or getting out of a bed or a chair? No   Current Diet Well Balanced Diet   Dental Exam Up to date   Eye Exam Up to date   Exercise (times per week) 7 times per week   Current Exercises Include Hiking;Walking   Do you need help using the phone?  No   Are you deaf or do you have serious difficulty hearing?  No   Do " you need help to go to places out of walking distance? No   Do you need help shopping? No   Do you need help preparing meals?  No   Do you need help with housework?  No   Do you need help with laundry? No   Do you need help taking your medications? No   Do you need help managing money? No   Do you ever drive or ride in a car without wearing a seat belt? No   Have you felt unusual stress, anger or loneliness in the last month? Yes   Who do you live with? Spouse   If you need help, do you have trouble finding someone available to you? No   Have you been bothered in the last four weeks by sexual problems? No   Do you have difficulty concentrating, remembering or making decisions? No       Age-appropriate Screening Schedule:  Refer to the list below for future screening recommendations based on patient's age, sex and/or medical conditions. Orders for these recommended tests are listed in the plan section. The patient has been provided with a written plan.    Health Maintenance   Topic Date Due    DXA SCAN  08/30/2021    ANNUAL WELLNESS VISIT  08/22/2023    INFLUENZA VACCINE  08/01/2024    LIPID PANEL  01/12/2025    BMI FOLLOWUP  01/31/2025    MAMMOGRAM  02/15/2026    COLORECTAL CANCER SCREENING  06/09/2031    TDAP/TD VACCINES (3 - Td or Tdap) 01/31/2034    HEPATITIS C SCREENING  Completed    COVID-19 Vaccine  Completed    RSV Vaccine - Adults  Completed    Pneumococcal Vaccine 65+  Completed    ZOSTER VACCINE  Completed    PAP SMEAR  Discontinued                  CMS Preventative Services Quick Reference  Risk Factors Identified During Encounter  Immunizations Discussed/Encouraged: Influenza  Dental Screening Recommended  Vision Screening Recommended  The above risks/problems have been discussed with the patient.  Pertinent information has been shared with the patient in the After Visit Summary.  An After Visit Summary and PPPS were made available to the patient.    Follow Up:   Next Medicare Wellness visit to be  "scheduled in 1 year.       Additional E&M Note during same encounter follows:  Patient has multiple medical problems which are significant and separately identifiable that require additional work above and beyond the Medicare Wellness Visit.      Chief Complaint  Animal Bite    Subjective        HPI  Aydee Moore is also being seen today for HTN, HLD, Hypothyroid and CP, intermittent.         Objective   Vital Signs:  /70 (BP Location: Left arm, Patient Position: Sitting, Cuff Size: Adult)   Pulse 58   Temp 98 °F (36.7 °C) (Temporal)   Resp 18   Ht 165.1 cm (65\")   Wt 88.1 kg (194 lb 3.2 oz)   SpO2 96%   BMI 32.32 kg/m²     Physical Exam     The following data was reviewed by: ALVARO Alarcon on 04/16/2024:  CMP          10/12/2023    09:15 1/12/2024    09:15 3/27/2024    04:51   CMP   Glucose 100  95  105    BUN 17  15  24    Creatinine 0.91  1.09  1.16    EGFR   50.5    Sodium 138  140  137    Potassium 4.6  4.5  4.1    Chloride 102  102  105    Calcium 10.2  9.7  9.3    Total Protein 7.1  6.7     Total Protein   6.6    Albumin 4.7  4.5  4.3    Globulin 2.4  2.2     Globulin   2.3    Total Bilirubin 0.5  0.4  <0.2    Alkaline Phosphatase 88  93  82    AST (SGOT) 17  21  20    ALT (SGPT) 12  12  16    Albumin/Globulin Ratio   1.9    BUN/Creatinine Ratio 18.7  14  20.7    Anion Gap   9.7      CBC          1/12/2024    09:15 3/27/2024    04:51   CBC   WBC 6.9  6.62    RBC 4.57  4.40    Hemoglobin 13.8  12.8    Hematocrit 41.7  39.8    MCV 91  90.5    MCH 30.2  29.1    MCHC 33.1  32.2    RDW 12.1  13.0    Platelets 236  223      Lipid Panel          10/12/2023    09:15 1/12/2024    09:15   Lipid Panel   Total Cholesterol 227  173    Triglycerides 86  72    HDL Cholesterol 79  84    VLDL Cholesterol 15  14    LDL Cholesterol  133  75      TSH          10/12/2023    09:15 1/12/2024    09:15   TSH   TSH 3.070  2.830          ECG 12 Lead    Date/Time: 4/16/2024 4:39 PM  Performed by: " Jordan Reeder APRN    Authorized by: Jordan Reeder APRN  Comparison: compared with previous ECG from 10/30/2023  Comparison to previous ECG: Previous sinus bradycardia has not resolved  Rhythm: sinus rhythm  Rate: normal  QRS axis: normal    Clinical impression: normal ECG  Comments: Patient referred to cardiology for stable angina.              Assessment and Plan   Diagnoses and all orders for this visit:    1. Penicillin allergy (Primary)  -     Ambulatory Referral to Allergy    2. Environmental and seasonal allergies  -     Ambulatory Referral to Allergy  -     fluticasone (FLONASE) 50 MCG/ACT nasal spray; 2 sprays into the nostril(s) as directed by provider Daily.  Dispense: 16 g; Refill: 11  -     cetirizine (zyrTEC) 10 MG tablet; Take 0.5 tablets by mouth Every Night.  Dispense: 90 tablet; Refill: 1    3. Medicare annual wellness visit, subsequent    4. Hypothyroidism, adult  -     TSH  -     T4, free    5. Essential hypertension  -     amLODIPine (NORVASC) 5 MG tablet; Take 1 tablet by mouth Daily.  Dispense: 90 tablet; Refill: 1    6. Anxiety and depression  -     Ambulatory Referral to Psychology    7. Intermittent left-sided chest pain  -     Ambulatory Referral to Cardiology  -     Troponin T  -     ECG 12 Lead    8. Gastroesophageal reflux disease with esophagitis without hemorrhage  -     pantoprazole (Protonix) 20 MG EC tablet; Take 1 tablet by mouth Daily.  Dispense: 90 tablet; Refill: 0    9. Gastritis without bleeding, unspecified chronicity, unspecified gastritis type  -     H. Pylori Breath Test - Breath, Lung; Future  -     pantoprazole (Protonix) 20 MG EC tablet; Take 1 tablet by mouth Daily.  Dispense: 90 tablet; Refill: 0    Other orders  -     rosuvastatin (CRESTOR) 5 MG tablet; Take 1 tablet by mouth Daily.  Dispense: 90 tablet; Refill: 1  -     Synthroid 50 MCG tablet; Take 1 tablet by mouth Daily.  Dispense: 90 tablet; Refill: 3  -     multivitamin with minerals  tablet tablet; Take 1 tablet by mouth Daily.  Dispense: 90 tablet; Refill: 3  -     albuterol sulfate  (90 Base) MCG/ACT inhaler; Inhale 2 puffs Every 6 (Six) Hours As Needed for Wheezing (cough).  Dispense: 8 g; Refill: 5  -     aspirin 81 MG EC tablet; Take 1 tablet by mouth Daily.  Dispense: 90 tablet; Refill: 1             Follow Up   Return in about 2 weeks (around 4/30/2024) for Next scheduled follow up.  Patient was given instructions and counseling regarding her condition or for health maintenance advice. Please see specific information pulled into the AVS if appropriate.

## 2024-04-16 NOTE — PROGRESS NOTES
Chief Complaint  Animal Bite    Subjective        Aydee Moore presents to [facility] PRIMARY CARE  History of Present Illness  73-year-old white female with a history of hypertension, hypothyroidism, hyperlipidemia here for chronic disease management follow-up visit and also Medicare annual wellness visit.  Pt reports she was bit by her neighbor's dog on 3/23/34 and s/p ER Visit and now getting the rabies vaccination series and Rx bactrim.  Patient reports the ER doctor instructed patient to follow-up with an allergist for evaluation for questionable penicillin allergy and patient desires allergist referral today.    Pt reports s/p PT for left ACL injury and now informed she can walk fine but should not pivot.    Pt c/o ALVAREZ that started after she cut shrub branches for about several minutes. Pt reports she has been having intermittent CP left sided since then, w/o any radiation.  Patient denies any chest pain today at the clinic.  Discussed possibility of stable angina and cardiology referral.  Differential also includes gastritis.      Discussed most recent labs from March 2024 which included renal insufficiency counseled patient to drink adequate amount of water about 2 L/day.  Other labs which include blood counts and cholesterol levels improved.    Patient reports she has seen [place] pulmonologist and status post spirometry which was normal.  Patient also status post a special test at [place] per lung doctor which noted on chart review revealed esophagitis.  Discussed H. pylori testing for possible esophagitis and gastritis, patient voiced understanding.    Patient concerned regarding her blood pressure instructed patient to check her blood pressure at home and if it consistently stays above 140/90 start taking 2 tablets of amlodipine 5 mg daily and return to clinic in 2 weeks with a home blood pressure log, patient was understanding.          Objective   Vital Signs:  /70 (BP  "Location: Left arm, Patient Position: Sitting, Cuff Size: Adult)   Pulse 58   Temp 98 °F (36.7 °C) (Temporal)   Resp 18   Ht 165.1 cm (65\")   Wt 88.1 kg (194 lb 3.2 oz)   SpO2 96%   BMI 32.32 kg/m²   Estimated body mass index is 32.32 kg/m² as calculated from the following:    Height as of this encounter: 165.1 cm (65\").    Weight as of this encounter: 88.1 kg (194 lb 3.2 oz).               Physical Exam  Constitutional:       Appearance: Normal appearance.   HENT:      Head: Normocephalic and atraumatic.   Eyes:      Conjunctiva/sclera: Conjunctivae normal.   Cardiovascular:      Rate and Rhythm: Normal rate and regular rhythm.      Heart sounds: Normal heart sounds.   Pulmonary:      Effort: Pulmonary effort is normal.      Breath sounds: Normal breath sounds.   Abdominal:      General: Bowel sounds are normal.      Palpations: Abdomen is soft.      Comments: Non-tender   Skin:     General: Skin is warm.      Comments: Healed left foot dog bite spot about size of quarter.   Neurological:      General: No focal deficit present.      Mental Status: She is alert and oriented to person, place, and time.   Psychiatric:         Mood and Affect: Mood normal.         Behavior: Behavior normal.        Result Review :    The following data was reviewed by: ALVARO Alarcon on 04/16/2024:  CMP          10/12/2023    09:15 1/12/2024    09:15 3/27/2024    04:51   CMP   Glucose 100  95  105    BUN 17  15  24    Creatinine 0.91  1.09  1.16    EGFR   50.5    Sodium 138  140  137    Potassium 4.6  4.5  4.1    Chloride 102  102  105    Calcium 10.2  9.7  9.3    Total Protein 7.1  6.7     Total Protein   6.6    Albumin 4.7  4.5  4.3    Globulin 2.4  2.2     Globulin   2.3    Total Bilirubin 0.5  0.4  <0.2    Alkaline Phosphatase 88  93  82    AST (SGOT) 17  21  20    ALT (SGPT) 12  12  16    Albumin/Globulin Ratio   1.9    BUN/Creatinine Ratio 18.7  14  20.7    Anion Gap   9.7      CBC          1/12/2024    09:15 " 3/27/2024    04:51   CBC   WBC 6.9  6.62    RBC 4.57  4.40    Hemoglobin 13.8  12.8    Hematocrit 41.7  39.8    MCV 91  90.5    MCH 30.2  29.1    MCHC 33.1  32.2    RDW 12.1  13.0    Platelets 236  223      Lipid Panel          10/12/2023    09:15 1/12/2024    09:15   Lipid Panel   Total Cholesterol 227  173    Triglycerides 86  72    HDL Cholesterol 79  84    VLDL Cholesterol 15  14    LDL Cholesterol  133  75      TSH          10/12/2023    09:15 1/12/2024    09:15   TSH   TSH 3.070  2.830        HgB          1/12/2024    09:15 3/27/2024    04:51   HGB   Hemoglobin 13.8  12.8      Data reviewed : Recent hospitalization notes reviewed ER note from March 27, 2024 where patient was seen for shoulder pain and also dog bite.  Also reviewed [place] esophagus test done at [facility] on April 10, 2024 which revealed esophagitis and hiatal hernia.  Discussed results with patient.           Assessment and Plan     Diagnoses and all orders for this visit:    1. Medicare annual wellness visit, subsequent (Primary)  Assessment & Plan:  Counseling was provided on nutrition, physical activity, development, and injury prevention, dental health, and safe sex practices patient verbalizes understanding no additional questions were asked.           2. Intermittent left-sided chest pain  Assessment & Plan:  Patient had normal EKG today with normal sinus rhythm without any ischemic changes.    Orders:  -     Ambulatory Referral to Cardiology  -     Troponin T  -     ECG 12 Lead    3. Penicillin allergy  Assessment & Plan:  Allergist referral.    Orders:  -     Ambulatory Referral to Allergy    4. Gastritis without bleeding, unspecified chronicity, unspecified gastritis type  -     Cancel: H. Pylori Breath Test - Breath, Lung; Future  -     pantoprazole (Protonix) 20 MG EC tablet; Take 1 tablet by mouth Daily.  Dispense: 90 tablet; Refill: 0  -     H. Pylori Breath Test - Breath, Lung    5. Environmental and seasonal allergies  Assessment &  Plan:  Stable    Orders:  -     Ambulatory Referral to Allergy  -     fluticasone (FLONASE) 50 MCG/ACT nasal spray; 2 sprays into the nostril(s) as directed by provider Daily.  Dispense: 16 g; Refill: 11  -     cetirizine (zyrTEC) 10 MG tablet; Take 0.5 tablets by mouth Every Night.  Dispense: 90 tablet; Refill: 1    6. Hypothyroidism, adult  Assessment & Plan:  Stable    Orders:  -     Synthroid 50 MCG tablet; Take 1 tablet by mouth Daily.  Dispense: 90 tablet; Refill: 3  -     TSH  -     T4, free    7. Essential hypertension  Assessment & Plan:  Hypertension is stable and controlled  Continue current treatment regimen.  Dietary sodium restriction.  Weight loss.  Ambulatory blood pressure monitoring.  Blood pressure will be reassessed  2 weeks .    Instructed patient to take 2 pills of amlodipine 5 mg daily if her blood pressure continues to stay above 140/90 and return to clinic in 2 weeks with a home blood pressure log, patient was understanding.    Discussed the importance of healthy diet, nutrition, and lifestyle. Recommend low salt, fat/cholesterol diet and avoid concentrated sweets. Encouraged DASH diet along with fresh fruits & vegetables and low fat dairy products. Counseled patient to exercise/walk as tolerated. Avoid tobacco and alcohol use.      Orders:  -     amLODIPine (NORVASC) 5 MG tablet; Take 1 tablet by mouth Daily.  Dispense: 90 tablet; Refill: 1    8. Anxiety and depression  Assessment & Plan:  Patient reports increasing anxiety related to her 's health conditions.  Patient agreeable to [facility] psychology referral.  Patient denied any suicidal or homicidal ideation today.    Orders:  -     Ambulatory Referral to Psychology    9. Gastroesophageal reflux disease with esophagitis without hemorrhage  Assessment & Plan:  GERD precautions: Avoid fatty, greasy, spicy food, if current tobacco use stop smoking, stay upright for one hour before lying down. Also avoid Tobacco,  caffeine, soda, mint, and garlic.      Orders:  -     pantoprazole (Protonix) 20 MG EC tablet; Take 1 tablet by mouth Daily.  Dispense: 90 tablet; Refill: 0  -     H. Pylori Breath Test - Breath, Lung    10. Mixed hyperlipidemia  Assessment & Plan:   Lipid abnormalities are stable    Plan:  Continue same medication/s without change.      Discussed medication dosage, use, side effects, and goals of treatment in detail.    Counseled patient on lifestyle modifications to help control hyperlipidemia.     Patient Treatment Goals:   LDL goal is under 100    Followup in 6 months.  Discussed the importance of healthy diet, nutrition, and lifestyle. Recommend low salt, fat/cholesterol diet and avoid concentrated sweets. Encouraged DASH diet along with fresh fruits & vegetables and low fat dairy products. Counseled patient to exercise/walk as tolerated. Avoid tobacco and alcohol use.      Orders:  -     rosuvastatin (CRESTOR) 5 MG tablet; Take 1 tablet by mouth Daily.  Dispense: 90 tablet; Refill: 1    Other orders  -     multivitamin with minerals tablet tablet; Take 1 tablet by mouth Daily.  Dispense: 90 tablet; Refill: 3  -     albuterol sulfate  (90 Base) MCG/ACT inhaler; Inhale 2 puffs Every 6 (Six) Hours As Needed for Wheezing (cough).  Dispense: 8 g; Refill: 5  -     aspirin 81 MG EC tablet; Take 1 tablet by mouth Daily.  Dispense: 90 tablet; Refill: 1        All chronic conditions have been addressed and treated by the practice or other specialists. Medications have been reconciled and refilled as appropriate. Reiterated compliance and timely follow up appointments. Side effects of all new and old medications reviewed with the patient and patient willing to accept all risks involved. Advised RTO if no improvement or worsening of symptoms or if any new complaints arise. Patient advised to follow up with clinic or call after diagnostic tests, if patient does not hear from office 3 days after the test completion.           Follow Up     Return in about 2 weeks (around 4/30/2024) for Next scheduled follow up.  Patient was given instructions and counseling regarding her condition or for health maintenance advice. Please see specific information pulled into the AVS if appropriate.

## 2024-04-16 NOTE — ASSESSMENT & PLAN NOTE
Patient reports increasing anxiety related to her 's health conditions.  Patient agreeable to [facility] telehealth psychology referral.  Patient denied any suicidal or homicidal ideation today.

## 2024-04-16 NOTE — ASSESSMENT & PLAN NOTE
Hypertension is stable and controlled  Continue current treatment regimen.  Dietary sodium restriction.  Weight loss.  Ambulatory blood pressure monitoring.  Blood pressure will be reassessed  2 weeks .    Instructed patient to take 2 pills of amlodipine 5 mg daily if her blood pressure continues to stay above 140/90 and return to clinic in 2 weeks with a home blood pressure log, patient was understanding.    Discussed the importance of healthy diet, nutrition, and lifestyle. Recommend low salt, fat/cholesterol diet and avoid concentrated sweets. Encouraged DASH diet along with fresh fruits & vegetables and low fat dairy products. Counseled patient to exercise/walk as tolerated. Avoid tobacco and alcohol use.

## 2024-04-17 LAB
T4 FREE SERPL-MCNC: 1.34 NG/DL (ref 0.82–1.77)
TROPONIN T SERPL HS-MCNC: 8 NG/L (ref 0–14)
TSH SERPL DL<=0.005 MIU/L-ACNC: 2.39 UIU/ML (ref 0.45–4.5)
UREA BREATH TEST QL: NEGATIVE

## 2024-04-30 ENCOUNTER — OFFICE VISIT (OUTPATIENT)
Dept: FAMILY MEDICINE CLINIC | Facility: CLINIC | Age: 74
End: 2024-04-30
Payer: MEDICARE

## 2024-04-30 VITALS
BODY MASS INDEX: 31.89 KG/M2 | WEIGHT: 191.4 LBS | HEART RATE: 80 BPM | TEMPERATURE: 98.7 F | OXYGEN SATURATION: 100 % | HEIGHT: 65 IN | SYSTOLIC BLOOD PRESSURE: 130 MMHG | DIASTOLIC BLOOD PRESSURE: 70 MMHG

## 2024-04-30 DIAGNOSIS — F32.A ANXIETY AND DEPRESSION: ICD-10-CM

## 2024-04-30 DIAGNOSIS — E03.9 HYPOTHYROIDISM, ADULT: Chronic | ICD-10-CM

## 2024-04-30 DIAGNOSIS — E78.2 MIXED HYPERLIPIDEMIA: Chronic | ICD-10-CM

## 2024-04-30 DIAGNOSIS — Z09 HOSPITAL DISCHARGE FOLLOW-UP: Primary | ICD-10-CM

## 2024-04-30 DIAGNOSIS — K21.00 GASTROESOPHAGEAL REFLUX DISEASE WITH ESOPHAGITIS WITHOUT HEMORRHAGE: ICD-10-CM

## 2024-04-30 DIAGNOSIS — J30.89 ENVIRONMENTAL AND SEASONAL ALLERGIES: ICD-10-CM

## 2024-04-30 DIAGNOSIS — I10 ESSENTIAL HYPERTENSION: Chronic | ICD-10-CM

## 2024-04-30 DIAGNOSIS — F41.9 ANXIETY DISORDER, UNSPECIFIED TYPE: ICD-10-CM

## 2024-04-30 DIAGNOSIS — F41.9 ANXIETY AND DEPRESSION: ICD-10-CM

## 2024-04-30 PROBLEM — R21 RASH: Status: RESOLVED | Noted: 2022-08-22 | Resolved: 2024-04-30

## 2024-04-30 PROCEDURE — 3078F DIAST BP <80 MM HG: CPT | Performed by: STUDENT IN AN ORGANIZED HEALTH CARE EDUCATION/TRAINING PROGRAM

## 2024-04-30 PROCEDURE — 1160F RVW MEDS BY RX/DR IN RCRD: CPT | Performed by: STUDENT IN AN ORGANIZED HEALTH CARE EDUCATION/TRAINING PROGRAM

## 2024-04-30 PROCEDURE — 99214 OFFICE O/P EST MOD 30 MIN: CPT | Performed by: STUDENT IN AN ORGANIZED HEALTH CARE EDUCATION/TRAINING PROGRAM

## 2024-04-30 PROCEDURE — 3075F SYST BP GE 130 - 139MM HG: CPT | Performed by: STUDENT IN AN ORGANIZED HEALTH CARE EDUCATION/TRAINING PROGRAM

## 2024-04-30 PROCEDURE — 1159F MED LIST DOCD IN RCRD: CPT | Performed by: STUDENT IN AN ORGANIZED HEALTH CARE EDUCATION/TRAINING PROGRAM

## 2024-04-30 RX ORDER — AMLODIPINE BESYLATE 5 MG/1
5 TABLET ORAL DAILY
Qty: 90 TABLET | Refills: 1 | Status: SHIPPED | OUTPATIENT
Start: 2024-04-30

## 2024-04-30 RX ORDER — BUSPIRONE HYDROCHLORIDE 5 MG/1
5 TABLET ORAL 3 TIMES DAILY
Qty: 90 TABLET | Refills: 3 | Status: SHIPPED | OUTPATIENT
Start: 2024-04-30 | End: 2024-05-03

## 2024-04-30 RX ORDER — PANTOPRAZOLE SODIUM 20 MG/1
20 TABLET, DELAYED RELEASE ORAL DAILY
Qty: 90 TABLET | Refills: 1 | Status: SHIPPED | OUTPATIENT
Start: 2024-04-30

## 2024-04-30 RX ORDER — ROSUVASTATIN CALCIUM 5 MG/1
5 TABLET, COATED ORAL DAILY
Qty: 90 TABLET | Refills: 1 | Status: SHIPPED | OUTPATIENT
Start: 2024-04-30

## 2024-04-30 RX ORDER — CETIRIZINE HYDROCHLORIDE 5 MG/1
5 TABLET ORAL NIGHTLY
Qty: 90 TABLET | Refills: 3 | Status: SHIPPED | OUTPATIENT
Start: 2024-04-30

## 2024-04-30 RX ORDER — LEVOTHYROXINE SODIUM 50 MCG
50 TABLET ORAL DAILY
Qty: 90 TABLET | Refills: 3 | Status: SHIPPED | OUTPATIENT
Start: 2024-04-30

## 2024-04-30 RX ORDER — HYDROXYZINE HYDROCHLORIDE 25 MG/1
50 TABLET, FILM COATED ORAL NIGHTLY PRN
Qty: 60 TABLET | Refills: 1 | Status: SHIPPED | OUTPATIENT
Start: 2024-04-30 | End: 2024-06-29

## 2024-04-30 NOTE — ASSESSMENT & PLAN NOTE
Hypertension is stable and controlled  Continue current treatment regimen.  Dietary sodium restriction.  Weight loss.  Ambulatory blood pressure monitoring.  Blood pressure will be reassessed  2 months .  Home blood pressure log revealed all normal blood pressure readings except for 3 readings that were out of range due to anxiety and panic from psychosocial stress.  Counseled patient on better anxiety management and patient has also been referred to psychology.

## 2024-04-30 NOTE — PROGRESS NOTES
Chief Complaint  Follow-up (Hospital follow up, high blood pressure )    Subjective        Aydee Moore presents to [facility]  History of Present Illness  74-year-old white female with a history of hypertension, hyperlipidemia here for posthospital discharge follow-up visit.  Patient went to the emergency room last night to [facility] for anxiety and elevation of blood pressure.  Patient reports she woke up in the middle of the night at 2 AM due to leg cramps.  Patient then started walking which usually alleviates her leg cramps however it did not do at this time.  Patient states she has been anxious about her 's diagnosis of prostate cancer that was found at [facility] recently.  Patient also relates that her  has missed his echo appointment and patient got very anxious.  Patient subsequently felt in her chest beating and went to the emergency room.  According to the [facility] ER report patient had atypical chest pain and anxiety.    Discussed anxiety disorder along with possible panic and need to follow-up with [facility] psychology.  Patient reports she has an appointment with [facility] psychology during the second week of May.  Counseled patient to go to the emergency room either [facility] or [facility] ER for worsening of psych symptoms again.  Discussed hydroxyzine and instructed patient to take it at bedtime for insomnia and anxiety and also can be taken 3 times a day but to avoid operating heavy machinery or driving after taking the medication.  Also discussed buspirone 5 mg p.o. twice daily for 1 week then increase the dose to 3 times daily for anxiety.  Patient does report of some headache today but states she has not been sleeping well since she has been asleep by going to the ER.    Patient denies any suicidal or homicidal ideation.    Reviewed patient's home blood pressure log which essentially had all  "normal readings except when she went to the ER when the blood pressure shot up with systolic around 200.  Counseled patient it is critical she controls her anxiety to prevent exacerbation of her blood pressure and he needs to talk to psychology as she is overwhelmed with her 's diagnosis of prostate cancer at this time.  Hypertension  This is a chronic problem. The current episode started more than 1 year ago. The problem has been worse since onset. The problem is resistant. Associated symptoms include headaches. Pertinent negatives include no anxiety, blurred vision, chest pain, malaise/fatigue, orthopnea, peripheral edema or shortness of breath. Agents associated with hypertension include thyroid hormones. Risk factors for coronary artery disease include dyslipidemia, family history, obesity, post-menopausal state and stress. There are no compliance problems.        Objective   Vital Signs:  /70 (BP Location: Left arm, Patient Position: Sitting, Cuff Size: Adult)   Pulse 80   Temp 98.7 °F (37.1 °C) (Temporal)   Ht 165.1 cm (65\")   Wt 86.8 kg (191 lb 6.4 oz)   SpO2 100%   BMI 31.85 kg/m²   Estimated body mass index is 31.85 kg/m² as calculated from the following:    Height as of this encounter: 165.1 cm (65\").    Weight as of this encounter: 86.8 kg (191 lb 6.4 oz).               Physical Exam  Constitutional:       Appearance: Normal appearance.   HENT:      Head: Normocephalic and atraumatic.   Eyes:      Conjunctiva/sclera: Conjunctivae normal.   Cardiovascular:      Rate and Rhythm: Normal rate and regular rhythm.      Heart sounds: Normal heart sounds.   Pulmonary:      Effort: Pulmonary effort is normal.      Breath sounds: Normal breath sounds.   Abdominal:      General: Bowel sounds are normal.      Palpations: Abdomen is soft.      Comments: Non-tender   Skin:     General: Skin is warm.   Neurological:      General: No focal deficit present.      Mental Status: She is alert and oriented " to person, place, and time.   Psychiatric:         Mood and Affect: Mood normal.         Behavior: Behavior normal.        Result Review :    The following data was reviewed by: ALVARO Alarcon on 04/30/2024:  CMP          10/12/2023    09:15 1/12/2024    09:15 3/27/2024    04:51   CMP   Glucose 100  95  105    BUN 17  15  24    Creatinine 0.91  1.09  1.16    EGFR   50.5    Sodium 138  140  137    Potassium 4.6  4.5  4.1    Chloride 102  102  105    Calcium 10.2  9.7  9.3    Total Protein 7.1  6.7     Total Protein   6.6    Albumin 4.7  4.5  4.3    Globulin 2.4  2.2     Globulin   2.3    Total Bilirubin 0.5  0.4  <0.2    Alkaline Phosphatase 88  93  82    AST (SGOT) 17  21  20    ALT (SGPT) 12  12  16    Albumin/Globulin Ratio   1.9    BUN/Creatinine Ratio 18.7  14  20.7    Anion Gap   9.7      CBC          1/12/2024    09:15 3/27/2024    04:51   CBC   WBC 6.9  6.62    RBC 4.57  4.40    Hemoglobin 13.8  12.8    Hematocrit 41.7  39.8    MCV 91  90.5    MCH 30.2  29.1    MCHC 33.1  32.2    RDW 12.1  13.0    Platelets 236  223      Lipid Panel          10/12/2023    09:15 1/12/2024    09:15   Lipid Panel   Total Cholesterol 227  173    Triglycerides 86  72    HDL Cholesterol 79  84    VLDL Cholesterol 15  14    LDL Cholesterol  133  75      TSH          10/12/2023    09:15 1/12/2024    09:15 4/16/2024    15:08   TSH   TSH 3.070  2.830  2.390      A1C Last 3 Results          1/31/2024    13:39   HGBA1C Last 3 Results   Hemoglobin A1C 5.5        Data reviewed : Recent hospitalization notes discussed [facility] ER discharge summary from April 29, 2024 and upon patient's request discussed all the labs and results including negative chest x-ray with patient today.             Assessment and Plan     Diagnoses and all orders for this visit:    1. Hospital discharge follow-up (Primary)    2. Anxiety and depression  Assessment & Plan:  Referred  to psychology.    Orders:  -     hydrOXYzine (ATARAX) 25  MG tablet; Take 2 tablets by mouth At Night As Needed for Itching, Allergies or Anxiety for up to 60 days.  Dispense: 60 tablet; Refill: 1    3. Anxiety disorder, unspecified type  Assessment & Plan:  Psychological condition is worsening.  Medication changes per orders.  Referral to psychological counseling.  Psychological condition  will be reassessed  2 months .  Instructed patient to return to clinic in a month however patient states she is planning on going on a vacation to [place] and will be back in latter part of June.    Instructed patient to go to the ER for worsening of her psych symptoms.  Started BuSpar and hydroxyzine after discussion with patient.    Orders:  -     hydrOXYzine (ATARAX) 25 MG tablet; Take 2 tablets by mouth At Night As Needed for Itching, Allergies or Anxiety for up to 60 days.  Dispense: 60 tablet; Refill: 1  -     busPIRone (BUSPAR) 5 MG tablet; Take 1 tablet by mouth 3 (Three) Times a Day. Start 1 TAB PO BID x 1 week, then 1 TAB PO TID, thereafter  Dispense: 90 tablet; Refill: 3    4. Mixed hyperlipidemia  Assessment & Plan:  Discussed the importance of healthy diet, nutrition, and lifestyle. Recommend low salt, fat/cholesterol diet and avoid concentrated sweets. Encouraged DASH diet along with fresh fruits & vegetables and low fat dairy products. Counseled patient to exercise/walk as tolerated. Avoid tobacco and alcohol use.      Orders:  -     rosuvastatin (CRESTOR) 5 MG tablet; Take 1 tablet by mouth Daily.  Dispense: 90 tablet; Refill: 1    5. Hypothyroidism, adult  -     Synthroid 50 MCG tablet; Take 1 tablet by mouth Daily.  Dispense: 90 tablet; Refill: 3    6. Essential hypertension  Assessment & Plan:  Hypertension is stable and controlled  Continue current treatment regimen.  Dietary sodium restriction.  Weight loss.  Ambulatory blood pressure monitoring.  Blood pressure will be reassessed  2 months .  Home blood pressure log revealed all normal blood pressure readings except  for 3 readings that were out of range due to anxiety and panic from psychosocial stress.  Counseled patient on better anxiety management and patient has also been referred to psychology.    Orders:  -     amLODIPine (NORVASC) 5 MG tablet; Take 1 tablet by mouth Daily.  Dispense: 90 tablet; Refill: 1    7. Environmental and seasonal allergies  Assessment & Plan:  Stable    Orders:  -     cetirizine (zyrTEC) 5 MG tablet; Take 1 tablet by mouth Every Night.  Dispense: 90 tablet; Refill: 3    8. Gastroesophageal reflux disease with esophagitis without hemorrhage  -     pantoprazole (Protonix) 20 MG EC tablet; Take 1 tablet by mouth Daily.  Dispense: 90 tablet; Refill: 1        All chronic conditions have been addressed and treated by the practice or other specialists. Medications have been reconciled and refilled as appropriate. Reiterated compliance and timely follow up appointments. Side effects of all new and old medications reviewed with the patient and patient willing to accept all risks involved. Advised RTO if no improvement or worsening of symptoms or if any new complaints arise. Patient advised to follow up with clinic or call after diagnostic tests, if patient does not hear from office 3 days after the test completion.          Follow Up     Return in about 2 months (around 6/30/2024) for Next scheduled follow up.  Patient was given instructions and counseling regarding her condition or for health maintenance advice. Please see specific information pulled into the AVS if appropriate.         Answers submitted by the patient for this visit:  Primary Reason for Visit (Submitted on 4/26/2024)  What is the primary reason for your visit?: High Blood Pressure

## 2024-04-30 NOTE — ASSESSMENT & PLAN NOTE
Psychological condition is worsening.  Medication changes per orders.  Referral to psychological counseling.  Psychological condition  will be reassessed  2 months .  Instructed patient to return to clinic in a month however patient states she is planning on going on a vacation to [place] and will be back in latter part of June.    Instructed patient to go to the ER for worsening of her psych symptoms.  Started BuSpar and hydroxyzine after discussion with patient.

## 2024-05-03 ENCOUNTER — OFFICE VISIT (OUTPATIENT)
Dept: CARDIOLOGY | Facility: CLINIC | Age: 74
End: 2024-05-03
Payer: MEDICARE

## 2024-05-03 VITALS
OXYGEN SATURATION: 96 % | DIASTOLIC BLOOD PRESSURE: 76 MMHG | BODY MASS INDEX: 32.52 KG/M2 | WEIGHT: 195.2 LBS | HEIGHT: 65 IN | SYSTOLIC BLOOD PRESSURE: 142 MMHG | HEART RATE: 69 BPM

## 2024-05-03 DIAGNOSIS — I10 ESSENTIAL HYPERTENSION: Chronic | ICD-10-CM

## 2024-05-03 DIAGNOSIS — R06.00 DYSPNEA, UNSPECIFIED TYPE: ICD-10-CM

## 2024-05-03 DIAGNOSIS — R00.2 PALPITATIONS: Primary | ICD-10-CM

## 2024-05-03 PROCEDURE — 1160F RVW MEDS BY RX/DR IN RCRD: CPT | Performed by: INTERNAL MEDICINE

## 2024-05-03 PROCEDURE — 3078F DIAST BP <80 MM HG: CPT | Performed by: INTERNAL MEDICINE

## 2024-05-03 PROCEDURE — 1159F MED LIST DOCD IN RCRD: CPT | Performed by: INTERNAL MEDICINE

## 2024-05-03 PROCEDURE — 93000 ELECTROCARDIOGRAM COMPLETE: CPT | Performed by: INTERNAL MEDICINE

## 2024-05-03 PROCEDURE — 3077F SYST BP >= 140 MM HG: CPT | Performed by: INTERNAL MEDICINE

## 2024-05-03 PROCEDURE — 99204 OFFICE O/P NEW MOD 45 MIN: CPT | Performed by: INTERNAL MEDICINE

## 2024-05-03 RX ORDER — FLUOXETINE 10 MG/1
10 CAPSULE ORAL DAILY
Qty: 90 CAPSULE | Refills: 3 | Status: SHIPPED | OUTPATIENT
Start: 2024-05-03

## 2024-05-03 NOTE — PROGRESS NOTES
"      CARDIOLOGY    Jessie Pedro MD    ENCOUNTER DATE:  05/03/2024    Aydee Moore / 74 y.o. / female        CHIEF COMPLAINT / REASON FOR OFFICE VISIT     Chest Pain      HISTORY OF PRESENT ILLNESS       HPI    Aydee Moore is a 74 y.o. 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 Unfortunately, she has been under a lot of stress as her  is dealing with prostate cancer and being treated at the [facility]. She has hypertension and hyperlipidemia.     She said a few weeks ago she was cutting some branches above her head and she started to feel like her heart was racing. She had to go sit down and it took about 20 minutes before it subsided. On Monday she was having a lot of anxiety and her blood pressure was elevated and she was taken to [facility] where she had a negative chest x-ray, normal troponin, normal D-dimer, and unremarkable CBC and CMP. She saw her PCP the next day and he felt like her anxiety needed to be treated.     She and her  will be leaving for [place] for 7 weeks and plan on doing a lot of hiking.         REVIEW OF SYSTEMS     ROS      VITAL SIGNS     Visit Vitals  /76   Pulse 69   Ht 165.1 cm (65\")   Wt 88.5 kg (195 lb 3.2 oz)   SpO2 96%   BMI 32.48 kg/m²         Wt Readings from Last 3 Encounters:   05/03/24 88.5 kg (195 lb 3.2 oz)   04/30/24 86.8 kg (191 lb 6.4 oz)   04/16/24 88.1 kg (194 lb 3.2 oz)     Body mass index is 32.48 kg/m².      PHYSICAL EXAMINATION     Constitutional:       General: Not in acute distress.  Neck:      Vascular: No carotid bruit or JVD.   Pulmonary:      Effort: Pulmonary effort is normal.      Breath sounds: Normal breath sounds.   Cardiovascular:      Normal rate. Regular rhythm.      Murmurs: There is no murmur.   Psychiatric:         Mood and Affect: Mood and affect normal.           REVIEWED DATA       ECG 12 Lead    Date/Time: 5/3/2024 " 9:35 AM  Performed by: Jessie Pedro MD    Authorized by: Jessie Pedro MD  Comparison: compared with previous ECG from 10/30/2023  Similar to previous ECG  Rhythm: sinus rhythm  BPM: 69  Conduction: conduction normal  ST Segments: ST segments normal  T Waves: T waves normal    Clinical impression: normal ECG            Lipid Panel          10/12/2023    09:15 1/12/2024    09:15   Lipid Panel   Total Cholesterol 227  173    Triglycerides 86  72    HDL Cholesterol 79  84    VLDL Cholesterol 15  14    LDL Cholesterol  133  75        Lab Results   Component Value Date    GLUCOSE 105 (H) 03/27/2024    BUN 24 (H) 03/27/2024    CREATININE 1.16 (H) 03/27/2024    EGFRRESULT 54 (L) 01/12/2024    EGFR 50.5 (L) 03/27/2024    BCR 20.7 03/27/2024    K 4.1 03/27/2024    CO2 22.3 03/27/2024    CALCIUM 9.3 03/27/2024    PROTENTOTREF 6.7 01/12/2024    ALBUMIN 4.3 03/27/2024    BILITOT <0.2 03/27/2024    AST 20 03/27/2024    ALT 16 03/27/2024       ASSESSMENT & PLAN      Diagnosis Plan   1. Palpitations  Adult Stress Echo W/ Cont or Stress Agent if Necessary Per Protocol      2. Essential hypertension  Adult Stress Echo W/ Cont or Stress Agent if Necessary Per Protocol      3. Dyspnea, unspecified type  Adult Stress Echo W/ Cont or Stress Agent if Necessary Per Protocol          Palpitations with shortness of breath associated with exertion. I recommend a stress echo for further evaluation. I am going to get this scheduled for next week since she will be leaving town soon.   Hypertension. I do think a lot of this is being driven by anxiety. Continue amlodipine. Monitor blood pressure at home.   Hyperlipidemia. I reviewed her lipid panel as above. Her cholesterol is much improved with rosuvastatin and I recommend continuing it.   Obstructive sleep apnea. She was treated for this in the past and has a sleep study coming up.   Anxiety. She is concerned about the BuSpar because she read that she cannot drink alcohol and she plans  on having some alcoholic beverages while she is in [place]. She did well on Prozac for many years while her daughters were growing up, so I am going to start her on a low dose of that and let her follow it up with her primary care provider.     One of my nurses or I will go over the results of the stress echo with her when it becomes available and maybe schedule a followup depending upon the results in the fall after she gets back home from [place].             Orders Placed This Encounter   Procedures    ECG 12 Lead     This order was created via procedure documentation     Order Specific Question:   Release to patient     Answer:   Routine Release [[id number]]    Adult Stress Echo W/ Cont or Stress Agent if Necessary Per Protocol     Standing Status:   Future     Standing Expiration Date:   5/3/2025     Order Specific Question:   What stress agent will be used?     Answer:   Exercise with [employer]     Order Specific Question:   Reason for exam?     Answer:   Dyspnea     Order Specific Question:   Release to patient     Answer:   Routine Release [[id number]]           MEDICATIONS         Discharge Medications            Accurate as of May 3, 2024  9:37 AM. If you have any questions, ask your nurse or doctor.                New Medications        Instructions Start Date   FLUoxetine 10 MG capsule  Commonly known as: PROzac  Started by: Jessie Pedro MD   10 mg, Oral, Daily             Changes to Medications        Instructions Start Date   hydrOXYzine 25 MG tablet  Commonly known as: ATARAX  What changed: additional instructions   50 mg, Oral, Nightly PRN             Continue These Medications        Instructions Start Date   acetaminophen 500 MG tablet  Commonly known as: TYLENOL   500 mg, Oral, Every 6 Hours PRN      albuterol sulfate  (90 Base) MCG/ACT inhaler  Commonly known as: PROVENTIL HFA;VENTOLIN HFA;PROAIR HFA   2 puffs, Inhalation, Every 6 Hours PRN      amLODIPine 5 MG  tablet  Commonly known as: NORVASC   5 mg, Oral, Daily      aspirin 81 MG EC tablet   81 mg, Oral, Daily      BENEFIBER DRINK MIX PO   Oral      BIOTIN PO   Oral      cetirizine 5 MG tablet  Commonly known as: zyrTEC   5 mg, Oral, Nightly      Diclofenac Sodium 1 % gel gel  Commonly known as: VOLTAREN   4 g, Topical, 4 Times Daily PRN      docusate sodium 100 MG capsule  Commonly known as: Colace   100 mg, Oral, Daily      fluticasone 50 MCG/ACT nasal spray  Commonly known as: FLONASE   2 sprays, Nasal, Daily      glucosamine-chondroitin 500-400 MG capsule capsule   Oral, 3 Times Daily With Meals      Magnesium 400 MG capsule   Oral, Prn      multivitamin with minerals tablet tablet   1 tablet, Oral, Daily      nystatin 228145 UNIT/GM ointment  Commonly known as: MYCOSTATIN   1 application , Topical, 2 Times Daily      pantoprazole 20 MG EC tablet  Commonly known as: Protonix   20 mg, Oral, Daily      rosuvastatin 5 MG tablet  Commonly known as: CRESTOR   5 mg, Oral, Daily      Synthroid 50 MCG tablet  Generic drug: levothyroxine   50 mcg, Oral, Daily      triamcinolone 0.1 % ointment  Commonly known as: KENALOG   1 application , Topical, 2 Times Daily             Stop These Medications      busPIRone 5 MG tablet  Commonly known as: BUSPAR  Stopped by: MD Jessie Hammond MD  05/03/24  09:37 EDT    Part of this note may be an electronic transcription/translation of spoken language to printed text using the Dragon dictation system.

## 2024-05-06 ENCOUNTER — TELEPHONE (OUTPATIENT)
Dept: CARDIOLOGY | Facility: CLINIC | Age: 74
End: 2024-05-06
Payer: MEDICARE

## 2024-05-06 ENCOUNTER — HOSPITAL ENCOUNTER (OUTPATIENT)
Dept: CARDIOLOGY | Facility: HOSPITAL | Age: 74
Discharge: HOME | End: 2024-05-06
Admitting: INTERNAL MEDICINE
Payer: MEDICARE

## 2024-05-06 VITALS
WEIGHT: 195 LBS | BODY MASS INDEX: 32.49 KG/M2 | SYSTOLIC BLOOD PRESSURE: 132 MMHG | HEIGHT: 65 IN | HEART RATE: 48 BPM | DIASTOLIC BLOOD PRESSURE: 68 MMHG

## 2024-05-06 DIAGNOSIS — I10 ESSENTIAL HYPERTENSION: Chronic | ICD-10-CM

## 2024-05-06 DIAGNOSIS — R06.00 DYSPNEA, UNSPECIFIED TYPE: ICD-10-CM

## 2024-05-06 DIAGNOSIS — R00.2 PALPITATIONS: ICD-10-CM

## 2024-05-06 LAB
AORTIC ARCH: 3 CM
AORTIC DIMENSIONLESS INDEX: 0.8 (DI)
ASCENDING AORTA: 2.7 CM
BH CV ECHO MEAS - ACS: 2.06 CM
BH CV ECHO MEAS - AO MAX PG: 9.9 MMHG
BH CV ECHO MEAS - AO MEAN PG: 4.9 MMHG
BH CV ECHO MEAS - AO ROOT DIAM: 3.1 CM
BH CV ECHO MEAS - AO V2 MAX: 157.5 CM/SEC
BH CV ECHO MEAS - AO V2 VTI: 35.8 CM
BH CV ECHO MEAS - AVA(I,D): 2.05 CM2
BH CV ECHO MEAS - EDV(CUBED): 118.9 ML
BH CV ECHO MEAS - EDV(MOD-SP2): 88 ML
BH CV ECHO MEAS - EDV(MOD-SP4): 96 ML
BH CV ECHO MEAS - EF(MOD-BP): 62.8 %
BH CV ECHO MEAS - EF(MOD-SP2): 61.4 %
BH CV ECHO MEAS - EF(MOD-SP4): 65.6 %
BH CV ECHO MEAS - ESV(CUBED): 25.6 ML
BH CV ECHO MEAS - ESV(MOD-SP2): 34 ML
BH CV ECHO MEAS - ESV(MOD-SP4): 33 ML
BH CV ECHO MEAS - FS: 40.1 %
BH CV ECHO MEAS - IVS/LVPW: 1 CM
BH CV ECHO MEAS - IVSD: 1.1 CM
BH CV ECHO MEAS - LAT PEAK E' VEL: 9.1 CM/SEC
BH CV ECHO MEAS - LV DIASTOLIC VOL/BSA (35-75): 49.1 CM2
BH CV ECHO MEAS - LV MASS(C)D: 202.8 GRAMS
BH CV ECHO MEAS - LV MAX PG: 5.2 MMHG
BH CV ECHO MEAS - LV MEAN PG: 2.8 MMHG
BH CV ECHO MEAS - LV SYSTOLIC VOL/BSA (12-30): 16.9 CM2
BH CV ECHO MEAS - LV V1 MAX: 114.2 CM/SEC
BH CV ECHO MEAS - LV V1 VTI: 27.1 CM
BH CV ECHO MEAS - LVIDD: 4.9 CM
BH CV ECHO MEAS - LVIDS: 2.9 CM
BH CV ECHO MEAS - LVOT AREA: 2.7 CM2
BH CV ECHO MEAS - LVOT DIAM: 1.86 CM
BH CV ECHO MEAS - LVPWD: 1.1 CM
BH CV ECHO MEAS - MED PEAK E' VEL: 7.4 CM/SEC
BH CV ECHO MEAS - MR MAX PG: 34.9 MMHG
BH CV ECHO MEAS - MR MAX VEL: 295.3 CM/SEC
BH CV ECHO MEAS - MV A DUR: 0.12 SEC
BH CV ECHO MEAS - MV A MAX VEL: 94.9 CM/SEC
BH CV ECHO MEAS - MV DEC SLOPE: 391.6 CM/SEC2
BH CV ECHO MEAS - MV DEC TIME: 0.22 SEC
BH CV ECHO MEAS - MV E MAX VEL: 93.6 CM/SEC
BH CV ECHO MEAS - MV E/A: 0.99
BH CV ECHO MEAS - MV MAX PG: 4.7 MMHG
BH CV ECHO MEAS - MV MEAN PG: 1.52 MMHG
BH CV ECHO MEAS - MV P1/2T: 80.2 MSEC
BH CV ECHO MEAS - MV V2 VTI: 41.8 CM
BH CV ECHO MEAS - MVA(P1/2T): 2.7 CM2
BH CV ECHO MEAS - MVA(VTI): 1.76 CM2
BH CV ECHO MEAS - PA ACC TIME: 0.16 SEC
BH CV ECHO MEAS - PA V2 MAX: 97.9 CM/SEC
BH CV ECHO MEAS - PULM A REVS DUR: 0.12 SEC
BH CV ECHO MEAS - PULM A REVS VEL: 29.8 CM/SEC
BH CV ECHO MEAS - PULM DIAS VEL: 43.7 CM/SEC
BH CV ECHO MEAS - PULM S/D: 1.81
BH CV ECHO MEAS - PULM SYS VEL: 79.2 CM/SEC
BH CV ECHO MEAS - RAP SYSTOLE: 3 MMHG
BH CV ECHO MEAS - RV MAX PG: 2.6 MMHG
BH CV ECHO MEAS - RV V1 MAX: 80.2 CM/SEC
BH CV ECHO MEAS - RV V1 VTI: 20.3 CM
BH CV ECHO MEAS - RVSP: 26.1 MMHG
BH CV ECHO MEAS - SUP REN AO DIAM: 2.2 CM
BH CV ECHO MEAS - SV(LVOT): 73.5 ML
BH CV ECHO MEAS - SV(MOD-SP2): 54 ML
BH CV ECHO MEAS - SV(MOD-SP4): 63 ML
BH CV ECHO MEAS - SVI(LVOT): 37.6 ML/M2
BH CV ECHO MEAS - SVI(MOD-SP2): 27.6 ML/M2
BH CV ECHO MEAS - SVI(MOD-SP4): 32.2 ML/M2
BH CV ECHO MEAS - TAPSE (>1.6): 1.63 CM
BH CV ECHO MEAS - TR MAX PG: 23.1 MMHG
BH CV ECHO MEAS - TR MAX VEL: 240.3 CM/SEC
BH CV ECHO MEASUREMENTS AVERAGE E/E' RATIO: 11.35
BH CV STRESS BP STAGE 1: NORMAL
BH CV STRESS BP STAGE 2: NORMAL
BH CV STRESS DURATION MIN STAGE 1: 3
BH CV STRESS DURATION MIN STAGE 2: 3
BH CV STRESS DURATION SEC STAGE 1: 0
BH CV STRESS DURATION SEC STAGE 2: 0
BH CV STRESS ECHO POST STRESS EJECTION FRACTION EF: 79 %
BH CV STRESS GRADE STAGE 1: 10
BH CV STRESS GRADE STAGE 2: 12
BH CV STRESS HR STAGE 1: 121
BH CV STRESS HR STAGE 2: 136
BH CV STRESS METS STAGE 1: 5
BH CV STRESS METS STAGE 2: 7.5
BH CV STRESS PROTOCOL 1: NORMAL
BH CV STRESS SPEED STAGE 1: 1.7
BH CV STRESS SPEED STAGE 2: 2.5
BH CV STRESS STAGE 1: 1
BH CV STRESS STAGE 2: 2
BH CV XLRA - RV BASE: 3.1 CM
BH CV XLRA - RV LENGTH: 6.5 CM
BH CV XLRA - RV MID: 1.75 CM
BH CV XLRA - TDI S': 12.1 CM/SEC
LEFT ATRIUM VOLUME INDEX: 23.4 ML/M2
MAXIMAL PREDICTED HEART RATE: 148 BPM
PERCENT MAX PREDICTED HR: 91.89 %
SINUS: 2.8 CM
STJ: 2.6 CM
STRESS BASELINE BP: NORMAL MMHG
STRESS BASELINE HR: 65 BPM
STRESS PERCENT HR: 108 %
STRESS POST ESTIMATED WORKLOAD: 7.5 METS
STRESS POST EXERCISE DUR MIN: 6 MIN
STRESS POST EXERCISE DUR SEC: 0 SEC
STRESS POST PEAK BP: NORMAL MMHG
STRESS POST PEAK HR: 136 BPM
STRESS TARGET HR: 126 BPM

## 2024-05-06 PROCEDURE — 93017 CV STRESS TEST TRACING ONLY: CPT

## 2024-05-06 PROCEDURE — 93320 DOPPLER ECHO COMPLETE: CPT

## 2024-05-06 PROCEDURE — 93325 DOPPLER ECHO COLOR FLOW MAPG: CPT

## 2024-05-06 PROCEDURE — 93350 STRESS TTE ONLY: CPT

## 2024-05-06 PROCEDURE — 25510000001 PERFLUTREN (DEFINITY) 8.476 MG IN SODIUM CHLORIDE (PF) 0.9 % 10 ML INJECTION: Performed by: INTERNAL MEDICINE

## 2024-05-06 RX ADMIN — PERFLUTREN 4 ML: 6.52 INJECTION, SUSPENSION INTRAVENOUS at 11:06

## 2024-07-01 ENCOUNTER — OFFICE VISIT (OUTPATIENT)
Dept: FAMILY MEDICINE CLINIC | Facility: CLINIC | Age: 74
End: 2024-07-01
Payer: MEDICARE

## 2024-07-01 VITALS
HEIGHT: 65 IN | SYSTOLIC BLOOD PRESSURE: 120 MMHG | DIASTOLIC BLOOD PRESSURE: 70 MMHG | BODY MASS INDEX: 32.09 KG/M2 | WEIGHT: 192.6 LBS | RESPIRATION RATE: 18 BRPM | TEMPERATURE: 97.7 F | OXYGEN SATURATION: 96 % | HEART RATE: 61 BPM

## 2024-07-01 DIAGNOSIS — F41.9 ANXIETY AND DEPRESSION: Chronic | ICD-10-CM

## 2024-07-01 DIAGNOSIS — M25.561 CHRONIC PAIN OF RIGHT KNEE: ICD-10-CM

## 2024-07-01 DIAGNOSIS — J30.89 ENVIRONMENTAL AND SEASONAL ALLERGIES: ICD-10-CM

## 2024-07-01 DIAGNOSIS — K59.04 CHRONIC IDIOPATHIC CONSTIPATION: Chronic | ICD-10-CM

## 2024-07-01 DIAGNOSIS — J01.90 ACUTE NON-RECURRENT SINUSITIS, UNSPECIFIED LOCATION: Primary | ICD-10-CM

## 2024-07-01 DIAGNOSIS — F32.A ANXIETY AND DEPRESSION: Chronic | ICD-10-CM

## 2024-07-01 DIAGNOSIS — I10 ESSENTIAL HYPERTENSION: Chronic | ICD-10-CM

## 2024-07-01 DIAGNOSIS — G89.29 CHRONIC PAIN OF RIGHT KNEE: ICD-10-CM

## 2024-07-01 DIAGNOSIS — E03.9 HYPOTHYROIDISM, ADULT: Chronic | ICD-10-CM

## 2024-07-01 DIAGNOSIS — K21.00 GASTROESOPHAGEAL REFLUX DISEASE WITH ESOPHAGITIS WITHOUT HEMORRHAGE: Chronic | ICD-10-CM

## 2024-07-01 DIAGNOSIS — E78.2 MIXED HYPERLIPIDEMIA: Chronic | ICD-10-CM

## 2024-07-01 PROBLEM — Z00.00 MEDICARE ANNUAL WELLNESS VISIT, SUBSEQUENT: Status: RESOLVED | Noted: 2024-04-16 | Resolved: 2024-07-01

## 2024-07-01 PROCEDURE — 1160F RVW MEDS BY RX/DR IN RCRD: CPT | Performed by: STUDENT IN AN ORGANIZED HEALTH CARE EDUCATION/TRAINING PROGRAM

## 2024-07-01 PROCEDURE — 3078F DIAST BP <80 MM HG: CPT | Performed by: STUDENT IN AN ORGANIZED HEALTH CARE EDUCATION/TRAINING PROGRAM

## 2024-07-01 PROCEDURE — 1125F AMNT PAIN NOTED PAIN PRSNT: CPT | Performed by: STUDENT IN AN ORGANIZED HEALTH CARE EDUCATION/TRAINING PROGRAM

## 2024-07-01 PROCEDURE — 1159F MED LIST DOCD IN RCRD: CPT | Performed by: STUDENT IN AN ORGANIZED HEALTH CARE EDUCATION/TRAINING PROGRAM

## 2024-07-01 PROCEDURE — 99214 OFFICE O/P EST MOD 30 MIN: CPT | Performed by: STUDENT IN AN ORGANIZED HEALTH CARE EDUCATION/TRAINING PROGRAM

## 2024-07-01 PROCEDURE — 3074F SYST BP LT 130 MM HG: CPT | Performed by: STUDENT IN AN ORGANIZED HEALTH CARE EDUCATION/TRAINING PROGRAM

## 2024-07-01 RX ORDER — AZITHROMYCIN 250 MG/1
TABLET, FILM COATED ORAL
Qty: 6 TABLET | Refills: 0 | Status: SHIPPED | OUTPATIENT
Start: 2024-07-01

## 2024-07-01 RX ORDER — MULTIPLE VITAMINS W/ MINERALS TAB 9MG-400MCG
1 TAB ORAL DAILY
Qty: 90 TABLET | Refills: 3 | Status: SHIPPED | OUTPATIENT
Start: 2024-07-01

## 2024-07-01 RX ORDER — FLUOXETINE 10 MG/1
10 CAPSULE ORAL DAILY
Qty: 90 CAPSULE | Refills: 3 | Status: SHIPPED | OUTPATIENT
Start: 2024-07-01

## 2024-07-01 RX ORDER — GUAIFENESIN 600 MG/1
1200 TABLET, EXTENDED RELEASE ORAL 2 TIMES DAILY
Qty: 20 TABLET | Refills: 0 | Status: SHIPPED | OUTPATIENT
Start: 2024-07-01 | End: 2024-07-11

## 2024-07-01 RX ORDER — FLUTICASONE PROPIONATE 50 MCG
2 SPRAY, SUSPENSION (ML) NASAL DAILY
Qty: 48 G | Refills: 3 | Status: SHIPPED | OUTPATIENT
Start: 2024-07-01

## 2024-07-01 RX ORDER — AMLODIPINE BESYLATE 5 MG/1
5 TABLET ORAL DAILY
Qty: 90 TABLET | Refills: 1 | Status: SHIPPED | OUTPATIENT
Start: 2024-07-01

## 2024-07-01 RX ORDER — ROSUVASTATIN CALCIUM 5 MG/1
5 TABLET, COATED ORAL DAILY
Qty: 90 TABLET | Refills: 1 | Status: SHIPPED | OUTPATIENT
Start: 2024-07-01

## 2024-07-01 RX ORDER — DOCUSATE SODIUM 100 MG/1
100 CAPSULE, LIQUID FILLED ORAL DAILY
Qty: 90 CAPSULE | Refills: 3 | Status: SHIPPED | OUTPATIENT
Start: 2024-07-01

## 2024-07-01 RX ORDER — WHEAT DEXTRIN/ASPARTAME 3 G/6 G
1 POWDER IN PACKET (EA) ORAL DAILY
Qty: 56 EACH | Refills: 3 | Status: SHIPPED | OUTPATIENT
Start: 2024-07-01

## 2024-07-01 RX ORDER — CETIRIZINE HYDROCHLORIDE 5 MG/1
5 TABLET ORAL NIGHTLY
Qty: 90 TABLET | Refills: 3 | Status: SHIPPED | OUTPATIENT
Start: 2024-07-01

## 2024-07-01 RX ORDER — PANTOPRAZOLE SODIUM 20 MG/1
20 TABLET, DELAYED RELEASE ORAL DAILY
Qty: 90 TABLET | Refills: 1 | Status: SHIPPED | OUTPATIENT
Start: 2024-07-01

## 2024-07-01 RX ORDER — LEVOTHYROXINE SODIUM 50 MCG
50 TABLET ORAL DAILY
Qty: 90 TABLET | Refills: 3 | Status: SHIPPED | OUTPATIENT
Start: 2024-07-01

## 2024-07-01 NOTE — ASSESSMENT & PLAN NOTE
Stable.  GERD precautions: Avoid fatty, greasy, spicy food, if current tobacco use stop smoking, stay upright for one hour before lying down. Also avoid Tobacco, caffeine, soda, mint, and garlic.    Counseled patient regarding the side effects of long term Proton Pump Inhibitor use not limited to renal and hepatic impairment and pancytopenia. Also counseled patient to gradually taper down the PPI dose and follow GERD precautions in order to control heartburn symptoms and discontinue PPI.

## 2024-07-01 NOTE — ASSESSMENT & PLAN NOTE
Instructed patient to use diclofenac gel on her posterior knee tendons for knee pain.  Monitor for any side effects.  Avoid NSAID use by mouth.

## 2024-07-01 NOTE — PROGRESS NOTES
"Chief Complaint  Follow-up    Subjective        Aydee Moore presents to [facility]  History of Present Illness  75-year-old white female with history of hypertension, hyperlipidemia here for follow-up visit and also acute complaint of cough.    Pt c/o bruising on aspirin on qday dosing. Rec aspirin every 3 days, Patient voiced understanding.  Pt reports anxiety and depression under control on prozac.  Patient denies any suicidal or homicidal ideation.    Pt c/o productive cough since 6/19/24 and s/p recent hiking trip. Pt c/o PND, rhinorrhea and congestion.  Denied HA, Fever/chills.      Instructed patient to get the adult preventive immunization that are due at  the pharmacy, including -COVID booster.  Patient desires 90-day refill prescriptions to be sent to her mail order [facility] pharmacy.      Patient complaining of some posterior knee pain that is radiating into her calf that started after she had used a knee brace during recent hiking trip.  Hypertension  This is a chronic problem. The problem is resistant. Associated symptoms include anxiety. There are no associated agents to hypertension. Risk factors for coronary artery disease include dyslipidemia, family history, obesity and stress. There are no compliance problems.    Additional comments: have a lot of bruises from the aspiring?      Objective   Vital Signs:  /70 (BP Location: Left arm, Patient Position: Sitting, Cuff Size: Adult)   Pulse 61   Temp 97.7 °F (36.5 °C) (Temporal)   Resp 18   Ht 165.1 cm (65\")   Wt 87.4 kg (192 lb 9.6 oz)   SpO2 96%   BMI 32.05 kg/m²   Estimated body mass index is 32.05 kg/m² as calculated from the following:    Height as of this encounter: 165.1 cm (65\").    Weight as of this encounter: 87.4 kg (192 lb 9.6 oz).               Physical Exam  Constitutional:       Appearance: Normal appearance.   HENT:      Head: Normocephalic and atraumatic.      Right Ear: Tympanic " membrane, ear canal and external ear normal.      Left Ear: Tympanic membrane, ear canal and external ear normal.      Mouth/Throat:      Pharynx: Oropharynx is clear. No oropharyngeal exudate or posterior oropharyngeal erythema.   Eyes:      Conjunctiva/sclera: Conjunctivae normal.   Cardiovascular:      Rate and Rhythm: Normal rate and regular rhythm.      Heart sounds: Normal heart sounds.   Pulmonary:      Effort: Pulmonary effort is normal. No respiratory distress.      Breath sounds: Normal breath sounds. No stridor. No wheezing, rhonchi or rales.   Chest:      Chest wall: No tenderness.   Abdominal:      General: Bowel sounds are normal.      Palpations: Abdomen is soft.      Comments: Non-tender   Lymphadenopathy:      Cervical: No cervical adenopathy.   Skin:     General: Skin is warm.   Neurological:      General: No focal deficit present.      Mental Status: She is alert and oriented to person, place, and time.   Psychiatric:         Mood and Affect: Mood normal.         Behavior: Behavior normal.        Result Review :    The following data was reviewed by: ALVARO Alarcon on 07/01/2024:  CMP          10/12/2023    09:15 1/12/2024    09:15 3/27/2024    04:51   CMP   Glucose 100  95  105    BUN 17  15  24    Creatinine 0.91  1.09  1.16    EGFR   50.5    Sodium 138  140  137    Potassium 4.6  4.5  4.1    Chloride 102  102  105    Calcium 10.2  9.7  9.3    Total Protein 7.1  6.7     Total Protein   6.6    Albumin 4.7  4.5  4.3    Globulin 2.4  2.2     Globulin   2.3    Total Bilirubin 0.5  0.4  <0.2    Alkaline Phosphatase 88  93  82    AST (SGOT) 17  21  20    ALT (SGPT) 12  12  16    Albumin/Globulin Ratio   1.9    BUN/Creatinine Ratio 18.7  14  20.7    Anion Gap   9.7      CBC          1/12/2024    09:15 3/27/2024    04:51   CBC   WBC 6.9  6.62    RBC 4.57  4.40    Hemoglobin 13.8  12.8    Hematocrit 41.7  39.8    MCV 91  90.5    MCH 30.2  29.1    MCHC 33.1  32.2    RDW 12.1  13.0     Platelets 236  223      Lipid Panel          10/12/2023    09:15 1/12/2024    09:15   Lipid Panel   Total Cholesterol 227  173    Triglycerides 86  72    HDL Cholesterol 79  84    VLDL Cholesterol 15  14    LDL Cholesterol  133  75      TSH          10/12/2023    09:15 1/12/2024    09:15 4/16/2024    15:08   TSH   TSH 3.070  2.830  2.390      A1C Last 3 Results          1/31/2024    13:39   HGBA1C Last 3 Results   Hemoglobin A1C 5.5                       Assessment and Plan     Diagnoses and all orders for this visit:    1. Acute non-recurrent sinusitis, unspecified location (Primary)  -     azithromycin (ZITHROMAX) 250 MG tablet; Take 2 tablets the first day, then 1 tablet daily for 4 days.  Dispense: 6 tablet; Refill: 0  -     guaiFENesin (Mucinex) 600 MG 12 hr tablet; Take 2 tablets by mouth 2 (Two) Times a Day for 10 days. Prn congestion.  Dispense: 20 tablet; Refill: 0    2. Essential hypertension  Assessment & Plan:  Hypertension is stable and controlled  Continue current treatment regimen.  Dietary sodium restriction.  Weight loss.  Regular aerobic exercise.  Ambulatory blood pressure monitoring.  Blood pressure will be reassessed in 3 months.  Discussed the importance of healthy diet, nutrition, and lifestyle. Recommend low salt, fat/cholesterol diet and avoid concentrated sweets. Encouraged DASH diet along with fresh fruits & vegetables and low fat dairy products. Counseled patient to exercise/walk as tolerated. Avoid tobacco and alcohol use.      Orders:  -     amLODIPine (NORVASC) 5 MG tablet; Take 1 tablet by mouth Daily.  Dispense: 90 tablet; Refill: 1    3. Environmental and seasonal allergies  Assessment & Plan:  Stable    Orders:  -     fluticasone (FLONASE) 50 MCG/ACT nasal spray; 2 sprays into the nostril(s) as directed by provider Daily.  Dispense: 48 g; Refill: 3  -     cetirizine (zyrTEC) 5 MG tablet; Take 1 tablet by mouth Every Night.  Dispense: 90 tablet; Refill: 3    4. Gastroesophageal  reflux disease with esophagitis without hemorrhage  Assessment & Plan:  Stable.  GERD precautions: Avoid fatty, greasy, spicy food, if current tobacco use stop smoking, stay upright for one hour before lying down. Also avoid Tobacco, caffeine, soda, mint, and garlic.    Counseled patient regarding the side effects of long term Proton Pump Inhibitor use not limited to renal and hepatic impairment and pancytopenia. Also counseled patient to gradually taper down the PPI dose and follow GERD precautions in order to control heartburn symptoms and discontinue PPI.      Orders:  -     pantoprazole (Protonix) 20 MG EC tablet; Take 1 tablet by mouth Daily.  Dispense: 90 tablet; Refill: 1    5. Mixed hyperlipidemia  Assessment & Plan:   Lipid abnormalities are stable    Plan:  Continue same medication/s without change.      Discussed medication dosage, use, side effects, and goals of treatment in detail.    Counseled patient on lifestyle modifications to help control hyperlipidemia.     Patient Treatment Goals:   LDL goal is less than 70    Followup in 6 months.  Discussed the importance of healthy diet, nutrition, and lifestyle. Recommend low salt, fat/cholesterol diet and avoid concentrated sweets. Encouraged DASH diet along with fresh fruits & vegetables and low fat dairy products. Counseled patient to exercise/walk as tolerated. Avoid tobacco and alcohol use.      Orders:  -     rosuvastatin (CRESTOR) 5 MG tablet; Take 1 tablet by mouth Daily.  Dispense: 90 tablet; Refill: 1    6. Hypothyroidism, adult  Assessment & Plan:  Stable    Orders:  -     Synthroid 50 MCG tablet; Take 1 tablet by mouth Daily.  Dispense: 90 tablet; Refill: 3    7. Chronic pain of right knee  Assessment & Plan:  Instructed patient to use diclofenac gel on her posterior knee tendons for knee pain.  Monitor for any side effects.  Avoid NSAID use by mouth.    Orders:  -     Diclofenac Sodium (VOLTAREN) 1 % gel gel; Apply 4 g topically to the  appropriate area as directed Daily As Needed (MSK pain) for up to 90 days.  Dispense: 100 g; Refill: 0    8. Chronic idiopathic constipation  -     docusate sodium (Colace) 100 MG capsule; Take 1 capsule by mouth Daily.  Dispense: 90 capsule; Refill: 3  -     Wheat Dextrin (Benefiber Drink Mix) pack; Take 1 each by mouth Daily.  Dispense: 56 each; Refill: 3    9. Anxiety and depression  Assessment & Plan:  Improved significantly on Prozac.  Patient denied any suicidal homicidal ideation.    Orders:  -     FLUoxetine (PROzac) 10 MG capsule; Take 1 capsule by mouth Daily.  Dispense: 90 capsule; Refill: 3    Other orders  -     multivitamin with minerals tablet tablet; Take 1 tablet by mouth Daily.  Dispense: 90 tablet; Refill: 3        All chronic conditions have been addressed and treated by the practice or other specialists. Medications have been reconciled and refilled as appropriate. Reiterated compliance and timely follow up appointments. Side effects of all new and old medications reviewed with the patient and patient willing to accept all risks involved. Advised RTO if no improvement or worsening of symptoms or if any new complaints arise. Patient advised to follow up with clinic or call after diagnostic tests, if patient does not hear from office 3 days after the test completion.          Follow Up     Return in about 3 months (around 10/1/2024) for Next scheduled follow up.  Patient was given instructions and counseling regarding her condition or for health maintenance advice. Please see specific information pulled into the AVS if appropriate.       Answers submitted by the patient for this visit:  Primary Reason for Visit (Submitted on 6/24/2024)  What is the primary reason for your visit?: High Blood Pressure

## 2024-07-01 NOTE — ASSESSMENT & PLAN NOTE
Lipid abnormalities are stable    Plan:  Continue same medication/s without change.      Discussed medication dosage, use, side effects, and goals of treatment in detail.    Counseled patient on lifestyle modifications to help control hyperlipidemia.     Patient Treatment Goals:   LDL goal is less than 70    Followup in 6 months.  Discussed the importance of healthy diet, nutrition, and lifestyle. Recommend low salt, fat/cholesterol diet and avoid concentrated sweets. Encouraged DASH diet along with fresh fruits & vegetables and low fat dairy products. Counseled patient to exercise/walk as tolerated. Avoid tobacco and alcohol use.

## 2024-07-01 NOTE — ASSESSMENT & PLAN NOTE
Hypertension is stable and controlled  Continue current treatment regimen.  Dietary sodium restriction.  Weight loss.  Regular aerobic exercise.  Ambulatory blood pressure monitoring.  Blood pressure will be reassessed in 3 months.  Discussed the importance of healthy diet, nutrition, and lifestyle. Recommend low salt, fat/cholesterol diet and avoid concentrated sweets. Encouraged DASH diet along with fresh fruits & vegetables and low fat dairy products. Counseled patient to exercise/walk as tolerated. Avoid tobacco and alcohol use.

## 2024-07-03 ENCOUNTER — OFFICE (AMBULATORY)
Dept: URBAN - METROPOLITAN AREA CLINIC 76 | Facility: CLINIC | Age: 74
End: 2024-07-03

## 2024-07-03 ENCOUNTER — HOSPITAL ENCOUNTER (OUTPATIENT)
Dept: BONE DENSITY | Facility: HOSPITAL | Age: 74
Discharge: HOME | End: 2024-07-03
Admitting: STUDENT IN AN ORGANIZED HEALTH CARE EDUCATION/TRAINING PROGRAM
Payer: MEDICARE

## 2024-07-03 VITALS
SYSTOLIC BLOOD PRESSURE: 118 MMHG | DIASTOLIC BLOOD PRESSURE: 76 MMHG | HEART RATE: 64 BPM | WEIGHT: 197 LBS | HEIGHT: 65 IN

## 2024-07-03 DIAGNOSIS — K59.00 CONSTIPATION, UNSPECIFIED: ICD-10-CM

## 2024-07-03 DIAGNOSIS — Z78.0 POST-MENOPAUSAL: ICD-10-CM

## 2024-07-03 DIAGNOSIS — R93.3 ABNORMAL FINDINGS ON DIAGNOSTIC IMAGING OF OTHER PARTS OF DI: ICD-10-CM

## 2024-07-03 DIAGNOSIS — K21.9 GASTRO-ESOPHAGEAL REFLUX DISEASE WITHOUT ESOPHAGITIS: ICD-10-CM

## 2024-07-03 PROCEDURE — 77080 DXA BONE DENSITY AXIAL: CPT

## 2024-07-03 PROCEDURE — 99214 OFFICE O/P EST MOD 30 MIN: CPT

## 2024-07-05 DIAGNOSIS — M85.80 OSTEOPENIA, UNSPECIFIED LOCATION: Primary | ICD-10-CM

## 2024-07-05 RX ORDER — CHOLECALCIFEROL (VITAMIN D3) 50 MCG
2000 TABLET ORAL DAILY
Qty: 90 TABLET | Refills: 3 | Status: SHIPPED | OUTPATIENT
Start: 2024-07-05 | End: 2025-07-05

## 2024-07-05 RX ORDER — ANTACID TABLETS 500 MG/1
1 TABLET, CHEWABLE ORAL 2 TIMES DAILY
Qty: 180 EACH | Refills: 3 | Status: SHIPPED | OUTPATIENT
Start: 2024-07-05

## 2024-07-11 ENCOUNTER — TELEPHONE (OUTPATIENT)
Dept: CARDIOLOGY | Facility: CLINIC | Age: 74
End: 2024-07-11
Payer: MEDICARE

## 2024-07-11 NOTE — TELEPHONE ENCOUNTER
According to current ACC/AHA perioperative risk management guidelines, endoscopies and procedures performed without general anesthesia are considered to be low risk procedures that do not require further cardiovascular risk stratification.       Electronically signed by Jessie Pedro MD, 07/11/24, 2:56 PM EDT.

## 2024-08-01 VITALS
DIASTOLIC BLOOD PRESSURE: 77 MMHG | HEART RATE: 54 BPM | DIASTOLIC BLOOD PRESSURE: 66 MMHG | WEIGHT: 195 LBS | OXYGEN SATURATION: 98 % | DIASTOLIC BLOOD PRESSURE: 61 MMHG | OXYGEN SATURATION: 93 % | HEART RATE: 68 BPM | HEART RATE: 71 BPM | DIASTOLIC BLOOD PRESSURE: 65 MMHG | HEART RATE: 58 BPM | RESPIRATION RATE: 21 BRPM | SYSTOLIC BLOOD PRESSURE: 122 MMHG | SYSTOLIC BLOOD PRESSURE: 161 MMHG | SYSTOLIC BLOOD PRESSURE: 162 MMHG | RESPIRATION RATE: 15 BRPM | HEART RATE: 60 BPM | DIASTOLIC BLOOD PRESSURE: 79 MMHG | TEMPERATURE: 96.8 F | DIASTOLIC BLOOD PRESSURE: 63 MMHG | SYSTOLIC BLOOD PRESSURE: 181 MMHG | DIASTOLIC BLOOD PRESSURE: 64 MMHG | HEART RATE: 63 BPM | SYSTOLIC BLOOD PRESSURE: 145 MMHG | RESPIRATION RATE: 12 BRPM | HEART RATE: 56 BPM | DIASTOLIC BLOOD PRESSURE: 69 MMHG | RESPIRATION RATE: 14 BRPM | SYSTOLIC BLOOD PRESSURE: 108 MMHG | DIASTOLIC BLOOD PRESSURE: 78 MMHG | TEMPERATURE: 97.3 F | SYSTOLIC BLOOD PRESSURE: 118 MMHG | RESPIRATION RATE: 11 BRPM | OXYGEN SATURATION: 96 % | OXYGEN SATURATION: 99 % | HEART RATE: 69 BPM | HEIGHT: 65 IN | HEART RATE: 53 BPM | RESPIRATION RATE: 16 BRPM | SYSTOLIC BLOOD PRESSURE: 120 MMHG | RESPIRATION RATE: 8 BRPM | DIASTOLIC BLOOD PRESSURE: 67 MMHG | DIASTOLIC BLOOD PRESSURE: 73 MMHG | RESPIRATION RATE: 17 BRPM | HEART RATE: 62 BPM | OXYGEN SATURATION: 100 % | HEART RATE: 61 BPM | SYSTOLIC BLOOD PRESSURE: 132 MMHG | DIASTOLIC BLOOD PRESSURE: 71 MMHG | SYSTOLIC BLOOD PRESSURE: 114 MMHG | SYSTOLIC BLOOD PRESSURE: 115 MMHG | HEART RATE: 59 BPM | SYSTOLIC BLOOD PRESSURE: 133 MMHG | RESPIRATION RATE: 20 BRPM | RESPIRATION RATE: 18 BRPM | SYSTOLIC BLOOD PRESSURE: 123 MMHG | RESPIRATION RATE: 13 BRPM | SYSTOLIC BLOOD PRESSURE: 124 MMHG | SYSTOLIC BLOOD PRESSURE: 112 MMHG | SYSTOLIC BLOOD PRESSURE: 127 MMHG | HEART RATE: 70 BPM | OXYGEN SATURATION: 95 % | DIASTOLIC BLOOD PRESSURE: 68 MMHG | HEART RATE: 57 BPM | OXYGEN SATURATION: 97 %

## 2024-08-06 ENCOUNTER — OFFICE (AMBULATORY)
Dept: URBAN - METROPOLITAN AREA PATHOLOGY 4 | Facility: PATHOLOGY | Age: 74
End: 2024-08-06
Payer: MEDICARE

## 2024-08-06 ENCOUNTER — AMBULATORY SURGICAL CENTER (AMBULATORY)
Dept: URBAN - METROPOLITAN AREA SURGERY 17 | Facility: SURGERY | Age: 74
End: 2024-08-06
Payer: MEDICARE

## 2024-08-06 DIAGNOSIS — D12.0 BENIGN NEOPLASM OF CECUM: ICD-10-CM

## 2024-08-06 DIAGNOSIS — K31.89 OTHER DISEASES OF STOMACH AND DUODENUM: ICD-10-CM

## 2024-08-06 DIAGNOSIS — R93.3 ABNORMAL FINDINGS ON DIAGNOSTIC IMAGING OF OTHER PARTS OF DI: ICD-10-CM

## 2024-08-06 DIAGNOSIS — Q40.2 OTHER SPECIFIED CONGENITAL MALFORMATIONS OF STOMACH: ICD-10-CM

## 2024-08-06 DIAGNOSIS — D12.3 BENIGN NEOPLASM OF TRANSVERSE COLON: ICD-10-CM

## 2024-08-06 DIAGNOSIS — Z09 ENCOUNTER FOR FOLLOW-UP EXAMINATION AFTER COMPLETED TREATMEN: ICD-10-CM

## 2024-08-06 DIAGNOSIS — K21.00 GASTRO-ESOPHAGEAL REFLUX DISEASE WITH ESOPHAGITIS, WITHOUT B: ICD-10-CM

## 2024-08-06 DIAGNOSIS — Z86.010 PERSONAL HISTORY OF COLONIC POLYPS: ICD-10-CM

## 2024-08-06 PROBLEM — D17.79 BENIGN LIPOMATOUS NEOPLASM OF OTHER SITES: Status: ACTIVE | Noted: 2024-08-06

## 2024-08-06 PROBLEM — K22.89 OTHER SPECIFIED DISEASE OF ESOPHAGUS: Status: ACTIVE | Noted: 2024-08-06

## 2024-08-06 LAB
GI HISTOLOGY: A. DUODENAL BULB: (no result)
GI HISTOLOGY: B. STOMACH ANTRUM: (no result)
GI HISTOLOGY: PDF REPORT: (no result)
Lab: (no result)

## 2024-08-06 PROCEDURE — 88342 IMHCHEM/IMCYTCHM 1ST ANTB: CPT | Performed by: PATHOLOGY

## 2024-08-06 PROCEDURE — 45385 COLONOSCOPY W/LESION REMOVAL: CPT | Mod: PT | Performed by: INTERNAL MEDICINE

## 2024-08-06 PROCEDURE — 88305 TISSUE EXAM BY PATHOLOGIST: CPT | Performed by: PATHOLOGY

## 2024-08-06 PROCEDURE — 43239 EGD BIOPSY SINGLE/MULTIPLE: CPT | Performed by: INTERNAL MEDICINE

## 2024-08-06 RX ADMIN — ONDANSETRON HYDROCHLORIDE 4 MG: 4 SOLUTION ORAL at 14:26

## 2024-08-06 NOTE — SERVICEHPINOTES
Pt is a 74 year old female here today in follow-up for constipation, GERD. Since her last OV she had an esophagogram revealing mild esophageal dysmotility, mucosal irregularity in mid esophagus, may represent esophagitis, small hiatal hernia. She also had negative stool studies. I ordered an EGD/CN but this was never scheduled. She is now taking Pantoprazole 20mg daily. She does feel like this help her reflux. She previously felt she was choking in the middle of the night. She denies solid food dysphagia, melena, unexplained weight loss, or early satiety. She can have no bowel movements or three bowel movements in a day. She is taking fiber supplement daily. She is taking Miralax and kiwis as needed. She denies blood in her stool. She denies further incontinence. She did see cardiology and a stress test was scheduled. I do not have these results for review.

## 2024-09-30 ENCOUNTER — TELEPHONE (OUTPATIENT)
Dept: FAMILY MEDICINE CLINIC | Facility: CLINIC | Age: 74
End: 2024-09-30

## 2024-09-30 NOTE — TELEPHONE ENCOUNTER
Called and spoke with pharmacist tech Giovanni) and verbal order for pt to receive branded name of medication.  She voiced understanding.

## 2024-09-30 NOTE — TELEPHONE ENCOUNTER
[facility] [address] AT [address] [facility] - [phone]  - [phone] FX    Relationship: Pharmacy    Best call back number: [phone]  OPT 2        Who are you requesting to speak with (clinical staff, provider,  specific staff member): CLINICAL STAFF    What was the call regarding: CALLING TO GET CLARIFICATION ON SYNTHROID PRESCRIPTION. REF # [id number].  PHARMACY IS PLACING THE ORDER ON HOLD UNTIL CLARIFICATION IS GIVEN

## 2024-10-01 ENCOUNTER — OFFICE VISIT (OUTPATIENT)
Dept: FAMILY MEDICINE CLINIC | Facility: CLINIC | Age: 74
End: 2024-10-01
Payer: MEDICARE

## 2024-10-01 VITALS
DIASTOLIC BLOOD PRESSURE: 84 MMHG | WEIGHT: 194 LBS | OXYGEN SATURATION: 97 % | HEIGHT: 65 IN | TEMPERATURE: 98.6 F | BODY MASS INDEX: 32.32 KG/M2 | RESPIRATION RATE: 16 BRPM | HEART RATE: 62 BPM | SYSTOLIC BLOOD PRESSURE: 140 MMHG

## 2024-10-01 DIAGNOSIS — E03.9 HYPOTHYROIDISM, ADULT: Primary | Chronic | ICD-10-CM

## 2024-10-01 DIAGNOSIS — I10 ESSENTIAL HYPERTENSION: Chronic | ICD-10-CM

## 2024-10-01 DIAGNOSIS — E78.2 MIXED HYPERLIPIDEMIA: Chronic | ICD-10-CM

## 2024-10-01 DIAGNOSIS — K44.9 HIATAL HERNIA: ICD-10-CM

## 2024-10-01 DIAGNOSIS — K21.00 GASTROESOPHAGEAL REFLUX DISEASE WITH ESOPHAGITIS WITHOUT HEMORRHAGE: Chronic | ICD-10-CM

## 2024-10-01 DIAGNOSIS — F32.A ANXIETY AND DEPRESSION: Chronic | ICD-10-CM

## 2024-10-01 DIAGNOSIS — F41.9 ANXIETY AND DEPRESSION: Chronic | ICD-10-CM

## 2024-10-01 LAB
ALBUMIN SERPL-MCNC: 4.5 G/DL (ref 3.5–5.2)
ALBUMIN/GLOB SERPL: 2 G/DL
ALP SERPL-CCNC: 96 U/L (ref 39–117)
ALT SERPL-CCNC: 15 U/L (ref 1–33)
APPEARANCE UR: CLEAR
AST SERPL-CCNC: 21 U/L (ref 1–32)
BACTERIA #/AREA URNS HPF: ABNORMAL /HPF
BASOPHILS # BLD AUTO: 0.04 10*3/MM3 (ref 0–0.2)
BASOPHILS NFR BLD AUTO: 0.5 % (ref 0–1.5)
BILIRUB SERPL-MCNC: 0.3 MG/DL (ref 0–1.2)
BILIRUB UR QL STRIP: NEGATIVE
BUN SERPL-MCNC: 25 MG/DL (ref 8–23)
BUN/CREAT SERPL: 23.6 (ref 7–25)
CALCIUM SERPL-MCNC: 9.7 MG/DL (ref 8.6–10.5)
CASTS URNS MICRO: ABNORMAL
CHLORIDE SERPL-SCNC: 101 MMOL/L (ref 98–107)
CHOLEST SERPL-MCNC: 188 MG/DL (ref 0–200)
CO2 SERPL-SCNC: 28.2 MMOL/L (ref 22–29)
COLOR UR: YELLOW
CREAT SERPL-MCNC: 1.06 MG/DL (ref 0.57–1)
EGFRCR SERPLBLD CKD-EPI 2021: 55.9 ML/MIN/1.73
EOSINOPHIL # BLD AUTO: 0.2 10*3/MM3 (ref 0–0.4)
EOSINOPHIL NFR BLD AUTO: 2.7 % (ref 0.3–6.2)
EPI CELLS #/AREA URNS HPF: ABNORMAL /HPF
ERYTHROCYTE [DISTWIDTH] IN BLOOD BY AUTOMATED COUNT: 12.3 % (ref 12.3–15.4)
GLOBULIN SER CALC-MCNC: 2.2 GM/DL
GLUCOSE SERPL-MCNC: 94 MG/DL (ref 65–99)
GLUCOSE UR QL STRIP: NEGATIVE
HCT VFR BLD AUTO: 43.2 % (ref 34–46.6)
HDLC SERPL-MCNC: 70 MG/DL (ref 40–60)
HGB BLD-MCNC: 14.3 G/DL (ref 12–15.9)
HGB UR QL STRIP: NEGATIVE
IMM GRANULOCYTES # BLD AUTO: 0.01 10*3/MM3 (ref 0–0.05)
IMM GRANULOCYTES NFR BLD AUTO: 0.1 % (ref 0–0.5)
KETONES UR QL STRIP: NEGATIVE
LDLC SERPL CALC-MCNC: 94 MG/DL (ref 0–100)
LEUKOCYTE ESTERASE UR QL STRIP: ABNORMAL
LYMPHOCYTES # BLD AUTO: 1.8 10*3/MM3 (ref 0.7–3.1)
LYMPHOCYTES NFR BLD AUTO: 24.6 % (ref 19.6–45.3)
MCH RBC QN AUTO: 31.4 PG (ref 26.6–33)
MCHC RBC AUTO-ENTMCNC: 33.1 G/DL (ref 31.5–35.7)
MCV RBC AUTO: 94.9 FL (ref 79–97)
MONOCYTES # BLD AUTO: 0.56 10*3/MM3 (ref 0.1–0.9)
MONOCYTES NFR BLD AUTO: 7.6 % (ref 5–12)
NEUTROPHILS # BLD AUTO: 4.72 10*3/MM3 (ref 1.7–7)
NEUTROPHILS NFR BLD AUTO: 64.5 % (ref 42.7–76)
NITRITE UR QL STRIP: NEGATIVE
NRBC BLD AUTO-RTO: 0 /100 WBC (ref 0–0.2)
PH UR STRIP: 7 [PH] (ref 5–8)
PLATELET # BLD AUTO: 240 10*3/MM3 (ref 140–450)
POTASSIUM SERPL-SCNC: 4.4 MMOL/L (ref 3.5–5.2)
PROT SERPL-MCNC: 6.7 G/DL (ref 6–8.5)
PROT UR QL STRIP: NEGATIVE
RBC # BLD AUTO: 4.55 10*6/MM3 (ref 3.77–5.28)
RBC #/AREA URNS HPF: ABNORMAL /HPF
SODIUM SERPL-SCNC: 138 MMOL/L (ref 136–145)
SP GR UR STRIP: 1.02 (ref 1–1.03)
T4 FREE SERPL-MCNC: 1.4 NG/DL (ref 0.92–1.68)
TRIGL SERPL-MCNC: 140 MG/DL (ref 0–150)
TSH SERPL DL<=0.005 MIU/L-ACNC: 2.48 UIU/ML (ref 0.27–4.2)
UROBILINOGEN UR STRIP-MCNC: ABNORMAL MG/DL
VLDLC SERPL CALC-MCNC: 24 MG/DL (ref 5–40)
WBC # BLD AUTO: 7.33 10*3/MM3 (ref 3.4–10.8)
WBC #/AREA URNS HPF: ABNORMAL /HPF

## 2024-10-01 PROCEDURE — 99214 OFFICE O/P EST MOD 30 MIN: CPT | Performed by: STUDENT IN AN ORGANIZED HEALTH CARE EDUCATION/TRAINING PROGRAM

## 2024-10-01 PROCEDURE — 1159F MED LIST DOCD IN RCRD: CPT | Performed by: STUDENT IN AN ORGANIZED HEALTH CARE EDUCATION/TRAINING PROGRAM

## 2024-10-01 PROCEDURE — 1160F RVW MEDS BY RX/DR IN RCRD: CPT | Performed by: STUDENT IN AN ORGANIZED HEALTH CARE EDUCATION/TRAINING PROGRAM

## 2024-10-01 PROCEDURE — 3079F DIAST BP 80-89 MM HG: CPT | Performed by: STUDENT IN AN ORGANIZED HEALTH CARE EDUCATION/TRAINING PROGRAM

## 2024-10-01 PROCEDURE — 3077F SYST BP >= 140 MM HG: CPT | Performed by: STUDENT IN AN ORGANIZED HEALTH CARE EDUCATION/TRAINING PROGRAM

## 2024-10-01 PROCEDURE — 1125F AMNT PAIN NOTED PAIN PRSNT: CPT | Performed by: STUDENT IN AN ORGANIZED HEALTH CARE EDUCATION/TRAINING PROGRAM

## 2024-10-01 RX ORDER — FLUOXETINE 10 MG/1
10 CAPSULE ORAL DAILY
Qty: 90 CAPSULE | Refills: 3 | Status: SHIPPED | OUTPATIENT
Start: 2024-10-01

## 2024-10-01 RX ORDER — AMLODIPINE BESYLATE 5 MG/1
5 TABLET ORAL DAILY
Qty: 90 TABLET | Refills: 1 | Status: SHIPPED | OUTPATIENT
Start: 2024-10-01

## 2024-10-01 RX ORDER — PANTOPRAZOLE SODIUM 20 MG/1
20 TABLET, DELAYED RELEASE ORAL DAILY
Qty: 90 TABLET | Refills: 1 | Status: SHIPPED | OUTPATIENT
Start: 2024-10-01

## 2024-10-01 RX ORDER — LEVOTHYROXINE SODIUM 50 MCG
50 TABLET ORAL DAILY
Qty: 90 TABLET | Refills: 3 | Status: SHIPPED | OUTPATIENT
Start: 2024-10-01 | End: 2024-10-01

## 2024-10-01 RX ORDER — LEVOTHYROXINE SODIUM 50 UG/1
50 TABLET ORAL DAILY
Qty: 90 TABLET | Refills: 3 | Status: SHIPPED | OUTPATIENT
Start: 2024-10-01

## 2024-10-01 RX ORDER — ROSUVASTATIN CALCIUM 5 MG/1
5 TABLET, COATED ORAL DAILY
Qty: 90 TABLET | Refills: 1 | Status: SHIPPED | OUTPATIENT
Start: 2024-10-01

## 2024-10-01 NOTE — ASSESSMENT & PLAN NOTE
Stable.  However patient somewhat getting anxious at times due to her 's fragile health status.  [facility]  Patient denied any suicidal homicidal ideation.    Instructed patient to Return to Clinic as needed for persistent or new symptoms and/or go to ER for worsening symptoms, patient voiced understanding.

## 2024-10-01 NOTE — ASSESSMENT & PLAN NOTE
Hypertension is stable and controlled  Continue current treatment regimen.  Dietary sodium restriction.  Weight loss.  Regular aerobic exercise.  Ambulatory blood pressure monitoring.  Blood pressure will be reassessed in 3 months.  Instructed patient to keep a log of her home blood pressure readings and return to clinic for elevated readings.  Discussed the importance of healthy diet, nutrition, and lifestyle. Recommend low salt, fat/cholesterol diet and avoid concentrated sweets. Encouraged DASH diet along with fresh fruits & vegetables and low fat dairy products. Counseled patient to exercise/walk as tolerated. Avoid tobacco and alcohol use.

## 2024-10-01 NOTE — ASSESSMENT & PLAN NOTE
GERD precautions: Avoid fatty, greasy, spicy food, if current tobacco use stop smoking, stay upright for one hour before lying down. Also avoid Tobacco, caffeine, soda, mint, and garlic.  Counseled patient regarding the side effects of long term Proton Pump Inhibitor use not limited to renal and hepatic impairment and pancytopenia. Also counseled patient to gradually taper down the PPI dose and follow GERD precautions in order to control heartburn symptoms and discontinue PPI.  Patient states she will try to take the PPI every other day in the upcoming weeks and try to see if she can taper herself off.  Also discussed Pepcid as needed once patient is off the PPI for heartburn flareups.

## 2024-10-01 NOTE — PROGRESS NOTES
Chief Complaint  Hypertension (3 month f/u), Hyperlipidemia (6 month f/u), Med Management (Discuss thyroid & pantoprazole), and leg cramping (Still present; more so at night.  Ongoing from last OV)    Subjective        Aydee Moore presents to [facility]  History of Present Illness  74-year-old white female here for follow-up visit.  Patient is concerned regarding her Synthroid medication co-pays and was informed by [facility] pharmacy regarding 90-day supply with a better co-pay.  As per patient request sent the Synthroid to patient's [facility] pharmacy.    Patient also discussed some of the other insurance issues in general, I discussed open enrollment as appropriate.    Patient's blood pressure slightly elevated but patient states she is concerned regarding her 's fragile health.  I offered patient [facility] psychology referral, patient agreeable to follow-up with [facility] psychology.    Patient denied any suicidal or homicidal ideation.  Discussed most recent lab results and the need for 6-month follow-up labs, patient voiced understanding.  I also discussed the long-term effects of proton pump inhibitors and the need to gradually taper off while practicing GERD precautions, patient voiced understanding.    Instructed patient to read the after visit summary that will be provided at checkout today, for educational information and recommendations for the medical conditions that patient had presented with today and also any chronic conditions.    Reviewed recent EGD report and possibly colonoscopy report per patient request done at [facility] however the pathology report was not available.  Patient complaining difficulty getting pathology report on her answers from the GI office.  Will try to obtain pathology report and also instructed patient to call the nurse at [facility] for the  "report.    Hypertension    Hyperlipidemia        Objective   Vital Signs:  /84   Pulse 62   Temp 98.6 °F (37 °C) (Temporal)   Resp 16   Ht 165.1 cm (65\")   Wt 88 kg (194 lb)   SpO2 97%   BMI 32.28 kg/m²   Estimated body mass index is 32.28 kg/m² as calculated from the following:    Height as of this encounter: 165.1 cm (65\").    Weight as of this encounter: 88 kg (194 lb).               Physical Exam  Constitutional:       Appearance: Normal appearance.   HENT:      Head: Normocephalic and atraumatic.   Eyes:      Conjunctiva/sclera: Conjunctivae normal.   Cardiovascular:      Rate and Rhythm: Normal rate and regular rhythm.      Heart sounds: Normal heart sounds.   Pulmonary:      Effort: Pulmonary effort is normal.      Breath sounds: Normal breath sounds.   Abdominal:      General: Bowel sounds are normal.      Palpations: Abdomen is soft.      Comments: Non-tender   Skin:     General: Skin is warm.   Neurological:      General: No focal deficit present.      Mental Status: She is alert and oriented to person, place, and time.   Psychiatric:         Mood and Affect: Mood normal.         Behavior: Behavior normal.        Result Review :    The following data was reviewed by: ALVARO Alarcon on 10/01/2024:  CMP          10/12/2023    09:15 1/12/2024    09:15 3/27/2024    04:51   CMP   Glucose 100  95  105    BUN 17  15  24    Creatinine 0.91  1.09  1.16    EGFR   50.5    Sodium 138  140  137    Potassium 4.6  4.5  4.1    Chloride 102  102  105    Calcium 10.2  9.7  9.3    Total Protein 7.1  6.7     Total Protein   6.6    Albumin 4.7  4.5  4.3    Globulin 2.4  2.2     Globulin   2.3    Total Bilirubin 0.5  0.4  <0.2    Alkaline Phosphatase 88  93  82    AST (SGOT) 17  21  20    ALT (SGPT) 12  12  16    Albumin/Globulin Ratio   1.9    BUN/Creatinine Ratio 18.7  14  20.7    Anion Gap   9.7      CBC          1/12/2024    09:15 3/27/2024    04:51   CBC   WBC 6.9  6.62    RBC 4.57  4.40  "   Hemoglobin 13.8  12.8    Hematocrit 41.7  39.8    MCV 91  90.5    MCH 30.2  29.1    MCHC 33.1  32.2    RDW 12.1  13.0    Platelets 236  223      Lipid Panel          10/12/2023    09:15 1/12/2024    09:15   Lipid Panel   Total Cholesterol 227  173    Triglycerides 86  72    HDL Cholesterol 79  84    VLDL Cholesterol 15  14    LDL Cholesterol  133  75      TSH          10/12/2023    09:15 1/12/2024    09:15 4/16/2024    15:08   TSH   TSH 3.070  2.830  2.390      A1C Last 3 Results          1/31/2024    13:39   HGBA1C Last 3 Results   Hemoglobin A1C 5.5          Data reviewed : GI studies reviewed recent endoscopy report from [facility] where patient had a EGD and had 3 biopsies taken.  Will try to obtain the pathology report.             Assessment and Plan     Diagnoses and all orders for this visit:    1. Hypothyroidism, adult (Primary)  Assessment & Plan:  Refilled Synthroid 90-day supply.  Will recheck TFTs today.    Orders:  -     Discontinue: Synthroid 50 MCG tablet; Take 1 tablet by mouth Daily.  Dispense: 90 tablet; Refill: 3  -     levothyroxine (Synthroid) 50 MCG tablet; Take 1 tablet by mouth Daily.  Dispense: 90 tablet; Refill: 3  -     TSH  -     T4, free    2. Gastroesophageal reflux disease with esophagitis without hemorrhage  Assessment & Plan:  GERD precautions: Avoid fatty, greasy, spicy food, if current tobacco use stop smoking, stay upright for one hour before lying down. Also avoid Tobacco, caffeine, soda, mint, and garlic.  Counseled patient regarding the side effects of long term Proton Pump Inhibitor use not limited to renal and hepatic impairment and pancytopenia. Also counseled patient to gradually taper down the PPI dose and follow GERD precautions in order to control heartburn symptoms and discontinue PPI.  Patient states she will try to take the PPI every other day in the upcoming weeks and try to see if she can taper herself off.  Also discussed Pepcid as needed once  patient is off the PPI for heartburn flareups.    Orders:  -     pantoprazole (Protonix) 20 MG EC tablet; Take 1 tablet by mouth Daily.  Dispense: 90 tablet; Refill: 1    3. Essential hypertension  Assessment & Plan:  Hypertension is stable and controlled  Continue current treatment regimen.  Dietary sodium restriction.  Weight loss.  Regular aerobic exercise.  Ambulatory blood pressure monitoring.  Blood pressure will be reassessed in 3 months.  Instructed patient to keep a log of her home blood pressure readings and return to clinic for elevated readings.  Discussed the importance of healthy diet, nutrition, and lifestyle. Recommend low salt, fat/cholesterol diet and avoid concentrated sweets. Encouraged DASH diet along with fresh fruits & vegetables and low fat dairy products. Counseled patient to exercise/walk as tolerated. Avoid tobacco and alcohol use.      Orders:  -     amLODIPine (NORVASC) 5 MG tablet; Take 1 tablet by mouth Daily.  Dispense: 90 tablet; Refill: 1  -     CBC & Differential  -     Lipid Panel  -     Comprehensive Metabolic Panel  -     Urinalysis With Microscopic - Urine, Clean Catch  -     TSH  -     T4, free    4. Anxiety and depression  Assessment & Plan:  Stable.  However patient somewhat getting anxious at times due to her 's fragile health status.  Patient agreeable to [facility] psychology referral.  Patient denied any suicidal homicidal ideation.    Instructed patient to Return to Clinic as needed for persistent or new symptoms and/or go to ER for worsening symptoms, patient voiced understanding.       Orders:  -     FLUoxetine (PROzac) 10 MG capsule; Take 1 capsule by mouth Daily.  Dispense: 90 capsule; Refill: 3  -     Ambulatory Referral to Psychology    5. Mixed hyperlipidemia  Assessment & Plan:   Lipid abnormalities are  we will recheck fasting lipid panel today    Plan:  Continue same medication/s without change.      Discussed medication dosage, use, side  effects, and goals of treatment in detail.    Counseled patient on lifestyle modifications to help control hyperlipidemia.     Patient Treatment Goals:   LDL goal is less than 70    Followup in 6 months.  Discussed the importance of healthy diet, nutrition, and lifestyle. Recommend low salt, fat/cholesterol diet and avoid concentrated sweets. Encouraged DASH diet along with fresh fruits & vegetables and low fat dairy products. Counseled patient to exercise/walk as tolerated. Avoid tobacco and alcohol use.      Orders:  -     rosuvastatin (CRESTOR) 5 MG tablet; Take 1 tablet by mouth Daily.  Dispense: 90 tablet; Refill: 1  -     Lipid Panel  -     Comprehensive Metabolic Panel    6. Hiatal hernia      All chronic conditions have been addressed and treated by the practice or other specialists. Medications have been reconciled and refilled as appropriate. Reiterated compliance and timely follow up appointments. Side effects of all new and old medications reviewed with the patient and patient willing to accept all risks involved. Advised RTO if no improvement or worsening of symptoms or if any new complaints arise. Patient advised to follow up with clinic or call after diagnostic tests, if patient does not hear from office 3 days after the test completion.            Follow Up     Return in about 3 months (around 1/1/2025) for Next scheduled follow up.  Patient was given instructions and counseling regarding her condition or for health maintenance advice. Please see specific information pulled into the AVS if appropriate.

## 2024-10-02 DIAGNOSIS — N30.00 ACUTE CYSTITIS WITHOUT HEMATURIA: Primary | ICD-10-CM

## 2024-10-02 RX ORDER — CEPHALEXIN 500 MG/1
500 CAPSULE ORAL 3 TIMES DAILY
Qty: 30 CAPSULE | Refills: 0 | Status: SHIPPED | OUTPATIENT
Start: 2024-10-02 | End: 2024-10-12

## 2024-10-10 RX ORDER — ASPIRIN 81 MG/1
81 TABLET, COATED ORAL DAILY
Qty: 90 TABLET | Refills: 1 | Status: SHIPPED | OUTPATIENT
Start: 2024-10-10

## 2024-11-01 ENCOUNTER — OFFICE (AMBULATORY)
Age: 74
End: 2024-11-01

## 2024-11-01 ENCOUNTER — TELEPHONE (OUTPATIENT)
Dept: FAMILY MEDICINE CLINIC | Facility: CLINIC | Age: 74
End: 2024-11-01
Payer: MEDICARE

## 2024-11-01 ENCOUNTER — OFFICE (AMBULATORY)
Dept: URBAN - METROPOLITAN AREA CLINIC 76 | Facility: CLINIC | Age: 74
End: 2024-11-01
Payer: MEDICARE

## 2024-11-01 VITALS
OXYGEN SATURATION: 92 % | WEIGHT: 197 LBS | WEIGHT: 197 LBS | DIASTOLIC BLOOD PRESSURE: 78 MMHG | OXYGEN SATURATION: 92 % | HEIGHT: 65 IN | DIASTOLIC BLOOD PRESSURE: 78 MMHG | SYSTOLIC BLOOD PRESSURE: 120 MMHG | HEART RATE: 71 BPM | SYSTOLIC BLOOD PRESSURE: 120 MMHG | HEIGHT: 65 IN | HEART RATE: 71 BPM

## 2024-11-01 DIAGNOSIS — K59.00 CONSTIPATION, UNSPECIFIED: ICD-10-CM

## 2024-11-01 DIAGNOSIS — N30.00 ACUTE CYSTITIS WITHOUT HEMATURIA: ICD-10-CM

## 2024-11-01 DIAGNOSIS — K21.9 GASTRO-ESOPHAGEAL REFLUX DISEASE WITHOUT ESOPHAGITIS: ICD-10-CM

## 2024-11-01 DIAGNOSIS — N39.0 URINARY TRACT INFECTION WITHOUT HEMATURIA, SITE UNSPECIFIED: Primary | ICD-10-CM

## 2024-11-01 PROCEDURE — 99214 OFFICE O/P EST MOD 30 MIN: CPT

## 2024-11-01 RX ORDER — CEPHALEXIN 500 MG/1
CAPSULE ORAL
Qty: 30 CAPSULE | Refills: 0 | OUTPATIENT
Start: 2024-11-01

## 2024-11-01 RX ORDER — CEPHALEXIN 500 MG/1
500 CAPSULE ORAL 3 TIMES DAILY
Qty: 30 CAPSULE | Refills: 0 | Status: SHIPPED | OUTPATIENT
Start: 2024-11-01 | End: 2024-11-11

## 2024-11-01 NOTE — TELEPHONE ENCOUNTER
Caller: Aydee Moore    Relationship: Self    Best call back number: [phone]     What medication are you requesting: ephalexin (KEFLEX) 500 MG capsule [[id number]]     What are your current symptoms: FREQUENT PAINFUL URINATION    How long have you been experiencing symptoms: 1 MONTH    Have you had these symptoms before:    [x] Yes  [] No    Have you been treated for these symptoms before:   [x] Yes  [] No    If a prescription is needed, what is your preferred pharmacy and phone number: [facility] [id number] - [facility] [address] [facility] [phone] [facility] [phone] FX

## 2024-11-01 NOTE — TELEPHONE ENCOUNTER
Name: Aydee Moore      Relationship: Self      Best Callback Number: [phone]       HUB PROVIDED THE RELAY MESSAGE FROM THE OFFICE      PATIENT: HAS FURTHER QUESTIONS AND WOULD LIKE A CALL BACK AT THE FOLLOWING PHONE [phone]    ADDITIONAL INFORMATION: STATES WAS SEEN ON 10/1/24 FOR ISSUES AND DOES NOT KNOW WHY SHE WOULD NEED AN APPOINTMENT NOW PLEASE CALL AND ADVISE

## 2024-11-01 NOTE — TELEPHONE ENCOUNTER
Called and LVM for pt to schedule an appt prior to any medication/antibiotic to be called in since provider has not recently seen pt for this issue.

## 2024-11-01 NOTE — TELEPHONE ENCOUNTER
Called and relayed to pt provider has sent in antibiotic refill and to RTC if symptoms don't improve after this round.  Pt voiced understanding.

## 2024-11-01 NOTE — TELEPHONE ENCOUNTER
"Relay     \"Please schedule an appt prior to any medication/antibiotic to be called in since provider has not recently seen you for this issue.\"                "

## 2024-11-21 ENCOUNTER — OFFICE VISIT (OUTPATIENT)
Age: 74
End: 2024-11-21
Payer: MEDICARE

## 2024-11-21 DIAGNOSIS — F43.23 ADJUSTMENT DISORDER WITH MIXED ANXIETY AND DEPRESSED MOOD: ICD-10-CM

## 2024-11-21 DIAGNOSIS — F33.1 MODERATE EPISODE OF RECURRENT MAJOR DEPRESSIVE DISORDER: Primary | ICD-10-CM
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
## 2024-11-21 NOTE — TREATMENT PLAN
Multi-Disciplinary Problems (from Behavioral Health Treatment Plan)      Active Problems       Problem: Adjustment  Start Date: 11/21/24      Problem Details: The patient self-scales this problem as a 10 with 10 being the worst.          Goal Priority Start Date Expected End Date End Date    Patient will implement healthy coping strategies. High 11/21/24 05/22/25 --    Goal Details: Progress toward goal:  Not appropriate to rate progress toward goal since this is the initial treatment plan.          Goal Intervention Frequency Start Date End Date    Assist patient to identify and develop healthy coping strategies Weekly 11/21/24 --    Intervention Details: Duration of treatment until remission of symptoms.                  Problem: Depression  Start Date: 11/21/24      Problem Details: The patient self-scales this problem as a 10 with 10 being the worst.          Goal Priority Start Date Expected End Date End Date    Patient will demonstrate the ability to initiate new constructive life skills outside of sessions on a consistent basis. Medium 11/21/24 05/22/25 --    Goal Details: Progress toward goal:  Not appropriate to rate progress toward goal since this is the initial treatment plan.          Goal Intervention Frequency Start Date End Date    Assist patient in setting attainable activities of daily living goals. PRN 11/21/24 --      Goal Intervention Frequency Start Date End Date    Provide education about depression Weekly 11/21/24 --    Intervention Details: Duration of treatment until remission of symptoms.          Goal Intervention Frequency Start Date End Date    Assist patient in developing healthy coping strategies. Weekly 11/21/24 --    Intervention Details: Duration of treatment until remission of symptoms.                          Reviewed By       Yesica Warren LCSW 11/21/24 1666                     I have discussed and reviewed this treatment plan with the patient.

## 2024-12-17 ENCOUNTER — OFFICE VISIT (OUTPATIENT)
Dept: FAMILY MEDICINE CLINIC | Facility: CLINIC | Age: 74
End: 2024-12-17
Payer: MEDICARE

## 2024-12-17 VITALS
BODY MASS INDEX: 32.02 KG/M2 | RESPIRATION RATE: 16 BRPM | HEIGHT: 65 IN | DIASTOLIC BLOOD PRESSURE: 80 MMHG | SYSTOLIC BLOOD PRESSURE: 140 MMHG | HEART RATE: 69 BPM | WEIGHT: 192.2 LBS | TEMPERATURE: 97.1 F | OXYGEN SATURATION: 96 %

## 2024-12-17 DIAGNOSIS — J20.9 ACUTE BRONCHITIS, UNSPECIFIED ORGANISM: ICD-10-CM

## 2024-12-17 DIAGNOSIS — J02.9 SORETHROAT: ICD-10-CM

## 2024-12-17 DIAGNOSIS — R05.1 ACUTE COUGH: Primary | ICD-10-CM

## 2024-12-17 DIAGNOSIS — J01.90 ACUTE NON-RECURRENT SINUSITIS, UNSPECIFIED LOCATION: ICD-10-CM

## 2024-12-17 PROBLEM — N39.0 URINARY TRACT INFECTION WITHOUT HEMATURIA: Status: RESOLVED | Noted: 2024-11-01 | Resolved: 2024-12-17

## 2024-12-17 LAB
EXPIRATION DATE: NORMAL
EXPIRATION DATE: NORMAL
FLUAV AG UPPER RESP QL IA.RAPID: NOT DETECTED
FLUBV AG UPPER RESP QL IA.RAPID: NOT DETECTED
INTERNAL CONTROL: NORMAL
INTERNAL CONTROL: NORMAL
Lab: NORMAL
Lab: NORMAL
S PYO AG THROAT QL: NEGATIVE
SARS-COV-2 AG UPPER RESP QL IA.RAPID: NOT DETECTED

## 2024-12-17 PROCEDURE — 1159F MED LIST DOCD IN RCRD: CPT | Performed by: STUDENT IN AN ORGANIZED HEALTH CARE EDUCATION/TRAINING PROGRAM

## 2024-12-17 PROCEDURE — 99213 OFFICE O/P EST LOW 20 MIN: CPT | Performed by: STUDENT IN AN ORGANIZED HEALTH CARE EDUCATION/TRAINING PROGRAM

## 2024-12-17 PROCEDURE — 87428 SARSCOV & INF VIR A&B AG IA: CPT | Performed by: STUDENT IN AN ORGANIZED HEALTH CARE EDUCATION/TRAINING PROGRAM

## 2024-12-17 PROCEDURE — 3077F SYST BP >= 140 MM HG: CPT | Performed by: STUDENT IN AN ORGANIZED HEALTH CARE EDUCATION/TRAINING PROGRAM

## 2024-12-17 PROCEDURE — 3079F DIAST BP 80-89 MM HG: CPT | Performed by: STUDENT IN AN ORGANIZED HEALTH CARE EDUCATION/TRAINING PROGRAM

## 2024-12-17 PROCEDURE — 1160F RVW MEDS BY RX/DR IN RCRD: CPT | Performed by: STUDENT IN AN ORGANIZED HEALTH CARE EDUCATION/TRAINING PROGRAM

## 2024-12-17 PROCEDURE — 1125F AMNT PAIN NOTED PAIN PRSNT: CPT | Performed by: STUDENT IN AN ORGANIZED HEALTH CARE EDUCATION/TRAINING PROGRAM

## 2024-12-17 PROCEDURE — 87880 STREP A ASSAY W/OPTIC: CPT | Performed by: STUDENT IN AN ORGANIZED HEALTH CARE EDUCATION/TRAINING PROGRAM

## 2024-12-17 RX ORDER — PREDNISONE 20 MG/1
20 TABLET ORAL DAILY
Qty: 5 TABLET | Refills: 0 | Status: SHIPPED | OUTPATIENT
Start: 2024-12-17 | End: 2024-12-22

## 2024-12-17 RX ORDER — GUAIFENESIN 600 MG/1
1200 TABLET, EXTENDED RELEASE ORAL 2 TIMES DAILY
Qty: 20 TABLET | Refills: 0 | Status: SHIPPED | OUTPATIENT
Start: 2024-12-17 | End: 2024-12-27

## 2024-12-17 RX ORDER — AZITHROMYCIN 250 MG/1
TABLET, FILM COATED ORAL
Qty: 6 TABLET | Refills: 0 | Status: SHIPPED | OUTPATIENT
Start: 2024-12-17

## 2024-12-17 RX ORDER — ALBUTEROL SULFATE 90 UG/1
2 INHALANT RESPIRATORY (INHALATION) EVERY 6 HOURS PRN
Qty: 8 G | Refills: 5 | Status: SHIPPED | OUTPATIENT
Start: 2024-12-17

## 2024-12-18 ENCOUNTER — TELEPHONE (OUTPATIENT)
Dept: FAMILY MEDICINE CLINIC | Facility: CLINIC | Age: 74
End: 2024-12-18
Payer: MEDICARE

## 2024-12-30 ENCOUNTER — HOSPITAL ENCOUNTER (EMERGENCY)
Facility: HOSPITAL | Age: 74
Discharge: HOME | End: 2024-12-30
Attending: EMERGENCY MEDICINE | Admitting: EMERGENCY MEDICINE
Payer: MEDICARE

## 2024-12-30 ENCOUNTER — APPOINTMENT (OUTPATIENT)
Dept: CARDIOLOGY | Facility: HOSPITAL | Age: 74
End: 2024-12-30
Payer: MEDICARE

## 2024-12-30 ENCOUNTER — APPOINTMENT (OUTPATIENT)
Dept: GENERAL RADIOLOGY | Facility: HOSPITAL | Age: 74
End: 2024-12-30
Payer: MEDICARE

## 2024-12-30 ENCOUNTER — TELEPHONE (OUTPATIENT)
Dept: FAMILY MEDICINE CLINIC | Facility: CLINIC | Age: 74
End: 2024-12-30

## 2024-12-30 VITALS
DIASTOLIC BLOOD PRESSURE: 98 MMHG | TEMPERATURE: 98.3 F | HEART RATE: 106 BPM | RESPIRATION RATE: 16 BRPM | OXYGEN SATURATION: 96 % | SYSTOLIC BLOOD PRESSURE: 161 MMHG

## 2024-12-30 DIAGNOSIS — M25.561 ACUTE PAIN OF RIGHT KNEE: Primary | ICD-10-CM

## 2024-12-30 DIAGNOSIS — M79.604 RIGHT LEG PAIN: ICD-10-CM

## 2024-12-30 LAB
ALBUMIN SERPL-MCNC: 4.2 G/DL (ref 3.5–5.2)
ALBUMIN/GLOB SERPL: 1.4 G/DL
ALP SERPL-CCNC: 92 U/L (ref 39–117)
ALT SERPL W P-5'-P-CCNC: 16 U/L (ref 1–33)
ANION GAP SERPL CALCULATED.3IONS-SCNC: 10 MMOL/L (ref 5–15)
AST SERPL-CCNC: 22 U/L (ref 1–32)
BASOPHILS # BLD AUTO: 0.03 10*3/MM3 (ref 0–0.2)
BASOPHILS NFR BLD AUTO: 0.5 % (ref 0–1.5)
BH CV LOWER VASCULAR LEFT COMMON FEMORAL AUGMENT: NORMAL
BH CV LOWER VASCULAR LEFT COMMON FEMORAL COMPETENT: NORMAL
BH CV LOWER VASCULAR LEFT COMMON FEMORAL COMPRESS: NORMAL
BH CV LOWER VASCULAR LEFT COMMON FEMORAL PHASIC: NORMAL
BH CV LOWER VASCULAR LEFT COMMON FEMORAL SPONT: NORMAL
BH CV LOWER VASCULAR RIGHT COMMON FEMORAL AUGMENT: NORMAL
BH CV LOWER VASCULAR RIGHT COMMON FEMORAL COMPETENT: NORMAL
BH CV LOWER VASCULAR RIGHT COMMON FEMORAL COMPRESS: NORMAL
BH CV LOWER VASCULAR RIGHT COMMON FEMORAL PHASIC: NORMAL
BH CV LOWER VASCULAR RIGHT COMMON FEMORAL SPONT: NORMAL
BH CV LOWER VASCULAR RIGHT DISTAL FEMORAL COMPRESS: NORMAL
BH CV LOWER VASCULAR RIGHT GASTRONEMIUS COMPRESS: NORMAL
BH CV LOWER VASCULAR RIGHT GREATER SAPH AK COMPRESS: NORMAL
BH CV LOWER VASCULAR RIGHT GREATER SAPH BK COMPRESS: NORMAL
BH CV LOWER VASCULAR RIGHT LESSER SAPH COMPRESS: NORMAL
BH CV LOWER VASCULAR RIGHT MID FEMORAL AUGMENT: NORMAL
BH CV LOWER VASCULAR RIGHT MID FEMORAL COMPETENT: NORMAL
BH CV LOWER VASCULAR RIGHT MID FEMORAL COMPRESS: NORMAL
BH CV LOWER VASCULAR RIGHT MID FEMORAL PHASIC: NORMAL
BH CV LOWER VASCULAR RIGHT MID FEMORAL SPONT: NORMAL
BH CV LOWER VASCULAR RIGHT PERONEAL COMPRESS: NORMAL
BH CV LOWER VASCULAR RIGHT POPLITEAL AUGMENT: NORMAL
BH CV LOWER VASCULAR RIGHT POPLITEAL COMPETENT: NORMAL
BH CV LOWER VASCULAR RIGHT POPLITEAL COMPRESS: NORMAL
BH CV LOWER VASCULAR RIGHT POPLITEAL PHASIC: NORMAL
BH CV LOWER VASCULAR RIGHT POPLITEAL SPONT: NORMAL
BH CV LOWER VASCULAR RIGHT POSTERIOR TIBIAL COMPRESS: NORMAL
BH CV LOWER VASCULAR RIGHT PROXIMAL FEMORAL COMPRESS: NORMAL
BH CV LOWER VASCULAR RIGHT SAPHENOFEMORAL JUNCTION COMPRESS: NORMAL
BH CV VAS PRELIMINARY FINDINGS SCRIPTING: 1
BILIRUB SERPL-MCNC: 0.5 MG/DL (ref 0–1.2)
BUN SERPL-MCNC: 17 MG/DL (ref 8–23)
BUN/CREAT SERPL: 17.7 (ref 7–25)
CALCIUM SPEC-SCNC: 9.8 MG/DL (ref 8.6–10.5)
CHLORIDE SERPL-SCNC: 106 MMOL/L (ref 98–107)
CO2 SERPL-SCNC: 26 MMOL/L (ref 22–29)
CREAT SERPL-MCNC: 0.96 MG/DL (ref 0.57–1)
DEPRECATED RDW RBC AUTO: 39.9 FL (ref 37–54)
EGFRCR SERPLBLD CKD-EPI 2021: 63 ML/MIN/1.73
EOSINOPHIL # BLD AUTO: 0.14 10*3/MM3 (ref 0–0.4)
EOSINOPHIL NFR BLD AUTO: 2.2 % (ref 0.3–6.2)
ERYTHROCYTE [DISTWIDTH] IN BLOOD BY AUTOMATED COUNT: 12 % (ref 12.3–15.4)
GLOBULIN UR ELPH-MCNC: 3 GM/DL
GLUCOSE SERPL-MCNC: 91 MG/DL (ref 65–99)
HCT VFR BLD AUTO: 40.7 % (ref 34–46.6)
HGB BLD-MCNC: 13.6 G/DL (ref 12–15.9)
HOLD SPECIMEN: NORMAL
HOLD SPECIMEN: NORMAL
IMM GRANULOCYTES # BLD AUTO: 0.01 10*3/MM3 (ref 0–0.05)
IMM GRANULOCYTES NFR BLD AUTO: 0.2 % (ref 0–0.5)
INR PPP: 0.98 (ref 0.9–1.1)
LYMPHOCYTES # BLD AUTO: 1.58 10*3/MM3 (ref 0.7–3.1)
LYMPHOCYTES NFR BLD AUTO: 25.2 % (ref 19.6–45.3)
MCH RBC QN AUTO: 30.4 PG (ref 26.6–33)
MCHC RBC AUTO-ENTMCNC: 33.4 G/DL (ref 31.5–35.7)
MCV RBC AUTO: 90.8 FL (ref 79–97)
MONOCYTES # BLD AUTO: 0.43 10*3/MM3 (ref 0.1–0.9)
MONOCYTES NFR BLD AUTO: 6.9 % (ref 5–12)
NEUTROPHILS NFR BLD AUTO: 4.08 10*3/MM3 (ref 1.7–7)
NEUTROPHILS NFR BLD AUTO: 65 % (ref 42.7–76)
NRBC BLD AUTO-RTO: 0 /100 WBC (ref 0–0.2)
PLATELET # BLD AUTO: 321 10*3/MM3 (ref 140–450)
PMV BLD AUTO: 10 FL (ref 6–12)
POTASSIUM SERPL-SCNC: 4.1 MMOL/L (ref 3.5–5.2)
PROT SERPL-MCNC: 7.2 G/DL (ref 6–8.5)
PROTHROMBIN TIME: 13.2 SECONDS (ref 11.7–14.2)
RBC # BLD AUTO: 4.48 10*6/MM3 (ref 3.77–5.28)
SODIUM SERPL-SCNC: 142 MMOL/L (ref 136–145)
WBC NRBC COR # BLD AUTO: 6.27 10*3/MM3 (ref 3.4–10.8)
WHOLE BLOOD HOLD COAG: NORMAL
WHOLE BLOOD HOLD SPECIMEN: NORMAL

## 2024-12-30 PROCEDURE — 73560 X-RAY EXAM OF KNEE 1 OR 2: CPT

## 2024-12-30 PROCEDURE — 93971 EXTREMITY STUDY: CPT

## 2024-12-30 PROCEDURE — 80053 COMPREHEN METABOLIC PANEL: CPT | Performed by: EMERGENCY MEDICINE

## 2024-12-30 PROCEDURE — 36415 COLL VENOUS BLD VENIPUNCTURE: CPT | Performed by: EMERGENCY MEDICINE

## 2024-12-30 PROCEDURE — 85610 PROTHROMBIN TIME: CPT | Performed by: EMERGENCY MEDICINE

## 2024-12-30 PROCEDURE — 99284 EMERGENCY DEPT VISIT MOD MDM: CPT

## 2024-12-30 PROCEDURE — 85025 COMPLETE CBC W/AUTO DIFF WBC: CPT | Performed by: EMERGENCY MEDICINE

## 2024-12-30 RX ORDER — HYDROCODONE BITARTRATE AND ACETAMINOPHEN 5; 325 MG/1; MG/1
1 TABLET ORAL ONCE
Status: COMPLETED | OUTPATIENT
Start: 2024-12-30 | End: 2024-12-30

## 2024-12-30 RX ORDER — DICLOFENAC SODIUM 75 MG/1
75 TABLET, DELAYED RELEASE ORAL 2 TIMES DAILY
Qty: 14 TABLET | Refills: 0 | Status: SHIPPED | OUTPATIENT
Start: 2024-12-30

## 2024-12-30 RX ADMIN — HYDROCODONE BITARTRATE AND ACETAMINOPHEN 1 TABLET: 5; 325 TABLET ORAL at 20:56

## 2024-12-30 NOTE — TELEPHONE ENCOUNTER
Called and spoke with pt.  Pt states she is currently at the urgent care due to the increased intensity.  Verified pt's upcoming appt with provider.  She voiced understanding.

## 2024-12-30 NOTE — TELEPHONE ENCOUNTER
Caller: Aydee Moore    Relationship: Self    Best call back number: [phone]     What was the call regarding: PATIENT STATED THAT THE BACK OF HER LEG HURTS SO BAD SHE WAS NOT ABLE TO STAND UP OR SIT DOWN ON THE TOILET. PATIENT STATED IT HAS KEPT HER UP ALL NIGHT. PLEASE ADVISE.

## 2024-12-31 ENCOUNTER — PATIENT OUTREACH (OUTPATIENT)
Dept: CASE MANAGEMENT | Facility: OTHER | Age: 74
End: 2024-12-31
Payer: MEDICARE

## 2024-12-31 NOTE — ED PROVIDER NOTES
EMERGENCY DEPARTMENT MD ATTESTATION NOTE    Room Number:  S04/04  PCP: Jordan Reeder APRN  Independent Historians: Patient    HPI:  A complete HPI/ROS/PMH/PSH/SH/FH are unobtainable due to: None    Chronic or social conditions impacting patient care (Social Determinants of Health): None      Context: Aydee Moore is a 74 y.o. female with a medical history of hyperlipidemia, chronic right knee pain who presents to the ED c/o acute right leg pain.  The patient reports that since yesterday she has had pain in her right thigh and right leg.  She reports she has pain when she tries to ambulate.  She reports it hurts to flex her thigh.  She reports pain in her posterior thigh and posterior knee.  She denies any fall or injury.  She denies any weakness or numbness.  She does report she has been bedridden for with bronchitis for the last 2 weeks.  She reports her family member had flu.        Review of prior external notes (non-ED) -and- Review of prior external test results outside of this encounter:  Laboratory evaluation 10/1/2024 shows normal CMP, normal CBC, normal TSH    Prescription drug monitoring program review:           PHYSICAL EXAM    I have reviewed the triage vital signs and nursing notes.    ED Triage Vitals   Temp Heart Rate Resp BP SpO2   12/30/24 1611 12/30/24 1611 12/30/24 1611 12/30/24 1625 12/30/24 1611   98.3 °F (36.8 °C) 106 16 161/98 96 %      Temp src Heart Rate Source Patient Position BP Location FiO2 (%)   12/30/24 1611 -- -- -- --   Tympanic           Physical Exam  GENERAL: Awake, alert, no acute distress  SKIN: Warm, dry  HENT: Normocephalic, atraumatic  EYES: no scleral icterus  CV: regular rhythm, regular rate  RESPIRATORY: normal effort, lungs clear  ABDOMEN: soft, nontender, nondistended  MUSCULOSKELETAL: no deformity, equal pedal pulses.  Normal lower extremity strength and sensation.  Mild tenderness in the posterior thigh, popliteal region  NEURO: alert, moves all  extremities, follows commands            MEDICATIONS GIVEN IN ER  Medications - No data to display      ORDERS PLACED DURING THIS VISIT:  Orders Placed This Encounter   Procedures    XR Knee 1 or 2 View Right    [place] Draw    Comprehensive Metabolic Panel    Protime-INR    CBC Auto Differential    NPO Diet NPO Type: Strict NPO    Undress & Gown    CBC & Differential    Green Top (Gel)    Lavender Top    Gold Top - SST    Light Blue Top         PROCEDURES  Procedures            PROGRESS, DATA ANALYSIS, CONSULTS, AND MEDICAL DECISION MAKING  All labs have been independently interpreted by me.  All radiology studies have been reviewed by me. All EKG's have been independently viewed and interpreted by me.  Discussion below represents my analysis of pertinent findings related to patient's condition, differential diagnosis, treatment plan and final disposition.    Differential diagnosis includes but is not limited to DVT, rhabdomyolysis, chronic knee pain, arterial ischemia.    Clinical Scores:                                     ED Course as of 12/30/24 2025   Mon Dec 30, 2024   1759 Preliminary report per ultrasound is patient is negative for DVT/SVT in right lower extremity. [JG]   2023 XR Knee 1 or 2 View Right  My independent interpretation of the imaging study is no dense consolidation [TR]      ED Course User Index  [JG] Gasper Tate MD  [TR] Jose A Mustafa MD       MDM: The patient appears well.  She has good pulses.  She has normal lower extremity strength and sensation.  She has normal proximal strength.  Plan x-ray of the right knee as well as Doppler of the lower extremity to rule out DVT.      COMPLEXITY OF CARE  Admission was considered but after careful review of the patient's presentation, physical examination, diagnostic results, and response to treatment the patient may be safely discharged with outpatient follow-up.    Please note that portions of this document were completed with a voice  recognition program.    Note Disclaimer: At [facility], we believe that sharing information builds trust and better relationships. You are receiving this note because you recently visited [facility]. It is possible you will see health information before a provider has talked with you about it. This kind of information can be easy to misunderstand. To help you fully understand what it means for your health, we urge you to discuss this note with your provider.         Jose A Mustafa MD  12/30/24 2027

## 2024-12-31 NOTE — DISCHARGE INSTRUCTIONS
Gentle activities as tolerated, ice and elevate as needed for pain.  Wear your brace as needed

## 2024-12-31 NOTE — OUTREACH NOTE
AMBULATORY CASE MANAGEMENT NOTE    Names and Relationships of Patient/Support Persons: Contact: Aydee Moore BOBBY; Relationship: Self -     Patient Outreach    Outgoing call to the patient for ED visit follow up.  Introduced self and explained role and purpose of outreach.  She denies questions or needs related to her visit. She prefers to call to make her appointment with Dr Guerin for follow up.  Discussed HRCM program and services.  She prefers to enroll.  She prefers an outreach in 7 -14 days for an outreach with plan to complete assessments and work on any health related goals at this time.  SDOH questionnaire was sent via [employer]. She verbalized understanding that there are no charges for HRCM program and services and that she may opt out at any time.       Di [facility]  Ambulatory Case Management    12/31/2024, 13:18 EST

## 2025-01-03 ENCOUNTER — TELEPHONE (OUTPATIENT)
Dept: CARDIOLOGY | Facility: CLINIC | Age: 75
End: 2025-01-03

## 2025-01-03 NOTE — TELEPHONE ENCOUNTER
Hub staff attempted to follow warm transfer process and was unsuccessful     Caller: Aydee Moore    Relationship to patient: Self    Best call back number: [phone]     Patient is needing: PT RETURNING CALL ABOUT MONDAY APPT - PT IS HESITANT TO COME DUE TO WEATHER AND WOULD LIKE TO KNOW RSD OPTIONS - UNABLE TO CONNECT WITH PBX -

## 2025-01-08 ENCOUNTER — OFFICE VISIT (OUTPATIENT)
Dept: FAMILY MEDICINE CLINIC | Facility: CLINIC | Age: 75
End: 2025-01-08
Payer: MEDICARE

## 2025-01-08 VITALS
OXYGEN SATURATION: 96 % | TEMPERATURE: 98.5 F | SYSTOLIC BLOOD PRESSURE: 138 MMHG | BODY MASS INDEX: 32.52 KG/M2 | RESPIRATION RATE: 16 BRPM | DIASTOLIC BLOOD PRESSURE: 82 MMHG | HEIGHT: 65 IN | WEIGHT: 195.2 LBS | HEART RATE: 68 BPM

## 2025-01-08 DIAGNOSIS — M25.511 CHRONIC RIGHT SHOULDER PAIN: ICD-10-CM

## 2025-01-08 DIAGNOSIS — G89.29 CHRONIC RIGHT SHOULDER PAIN: ICD-10-CM

## 2025-01-08 DIAGNOSIS — G89.29 CHRONIC PAIN OF RIGHT KNEE: Primary | Chronic | ICD-10-CM

## 2025-01-08 DIAGNOSIS — G89.29 CHRONIC PAIN OF LEFT KNEE: Chronic | ICD-10-CM

## 2025-01-08 DIAGNOSIS — M25.562 CHRONIC PAIN OF LEFT KNEE: Chronic | ICD-10-CM

## 2025-01-08 DIAGNOSIS — M25.561 CHRONIC PAIN OF RIGHT KNEE: Primary | Chronic | ICD-10-CM

## 2025-01-08 PROBLEM — J20.9 ACUTE BRONCHITIS: Status: RESOLVED | Noted: 2024-12-17 | Resolved: 2025-01-08

## 2025-01-08 PROBLEM — R05.1 ACUTE COUGH: Status: RESOLVED | Noted: 2024-12-17 | Resolved: 2025-01-08

## 2025-01-08 PROBLEM — J01.90 ACUTE NON-RECURRENT SINUSITIS: Status: RESOLVED | Noted: 2024-12-17 | Resolved: 2025-01-08

## 2025-01-08 PROBLEM — J02.9 SORETHROAT: Status: RESOLVED | Noted: 2024-12-17 | Resolved: 2025-01-08

## 2025-01-08 PROCEDURE — 1159F MED LIST DOCD IN RCRD: CPT | Performed by: STUDENT IN AN ORGANIZED HEALTH CARE EDUCATION/TRAINING PROGRAM

## 2025-01-08 PROCEDURE — 3079F DIAST BP 80-89 MM HG: CPT | Performed by: STUDENT IN AN ORGANIZED HEALTH CARE EDUCATION/TRAINING PROGRAM

## 2025-01-08 PROCEDURE — 99213 OFFICE O/P EST LOW 20 MIN: CPT | Performed by: STUDENT IN AN ORGANIZED HEALTH CARE EDUCATION/TRAINING PROGRAM

## 2025-01-08 PROCEDURE — 1160F RVW MEDS BY RX/DR IN RCRD: CPT | Performed by: STUDENT IN AN ORGANIZED HEALTH CARE EDUCATION/TRAINING PROGRAM

## 2025-01-08 PROCEDURE — 1125F AMNT PAIN NOTED PAIN PRSNT: CPT | Performed by: STUDENT IN AN ORGANIZED HEALTH CARE EDUCATION/TRAINING PROGRAM

## 2025-01-08 PROCEDURE — 3075F SYST BP GE 130 - 139MM HG: CPT | Performed by: STUDENT IN AN ORGANIZED HEALTH CARE EDUCATION/TRAINING PROGRAM

## 2025-01-08 RX ORDER — BACLOFEN 10 MG/1
10 TABLET ORAL NIGHTLY PRN
Qty: 30 TABLET | Refills: 1 | Status: SHIPPED | OUTPATIENT
Start: 2025-01-08 | End: 2025-04-08

## 2025-01-08 NOTE — ASSESSMENT & PLAN NOTE
Discussed physical therapy referral for right shoulder pain.  Also provided patient x-ray requisition sheet with instructions to complete shoulder x-ray for posterior shoulder pain.  Call clinic for orthopedic surgery referral if the right shoulder pain does not resolve after PT.  Apply diclofenac gel as needed to right shoulder.

## 2025-01-08 NOTE — ASSESSMENT & PLAN NOTE
Counseled patient to avoid heavy activity other than ADLs with bilateral knees and right shoulder until resolution of knee pain and clearance by orthopedic surgery.

## 2025-01-08 NOTE — PROGRESS NOTES
"Chief Complaint  Hypothyroidism (3 month f/u), Hypertension (3 month f/u), Follow-up (ER 12/30/24), and Muscle Pain (R arm; started shortly after receiving flu & COVID shot in September; progressively weaker (unable to go up/down stairs).  L thigh pain.)    Subjective        Aydee Moore presents to [facility]  History of Present Illness  74-year-old white female with a history of chronic bilateral knee pain here for evaluation of her knee pain.    Pt c/o abrupt onset of right knee pain during December last week and now in hard knee brace.  Pt reports hx of right knee meniscus tear in 2013 s/p surgery/repair s/p PT and had been following Dr Guerin since then.    Patient denied any trauma to her right knee or left knee recently.  Pt also reports s/p ACL rupture and repair in 2023.  Pt desires to f/u with [facility] for her knee pain going forwards.    Pt c/o right shoulder pain as she felt the flu shot was given too high in right arm and now unable to knit with right arm.    Pt reports per Dr Melissa [facility] office repeat cscope in 7 years.    Hypothyroidism    Hypertension    Muscle Pain        Objective   Vital Signs:  /82 (BP Location: Left arm, Patient Position: Sitting, Cuff Size: Adult)   Pulse 68   Temp 98.5 °F (36.9 °C) (Temporal)   Resp 16   Ht 165.1 cm (65\")   Wt 88.5 kg (195 lb 3.2 oz)   SpO2 96%   BMI 32.48 kg/m²   Estimated body mass index is 32.48 kg/m² as calculated from the following:    Height as of this encounter: 165.1 cm (65\").    Weight as of this encounter: 88.5 kg (195 lb 3.2 oz).               Physical Exam  Constitutional:       Appearance: Normal appearance.   HENT:      Head: Normocephalic and atraumatic.   Eyes:      Conjunctiva/sclera: Conjunctivae normal.   Cardiovascular:      Rate and Rhythm: Normal rate and regular rhythm.      Heart sounds: Normal heart sounds.   Pulmonary:      Effort: Pulmonary effort is normal.      Breath " sounds: Normal breath sounds.   Abdominal:      General: Bowel sounds are normal.      Palpations: Abdomen is soft.      Comments: Non-tender   Musculoskeletal:      Comments: Right knee in hard brace.    Right arm pain in the mid humerus region on palpation.  No signs of inflammation noted anywhere on the skin of the right humerus.  Positive rotator cuff test at the right shoulder with pain specially in the right mid arm region.   Skin:     General: Skin is warm.   Neurological:      General: No focal deficit present.      Mental Status: She is alert and oriented to person, place, and time.   Psychiatric:         Mood and Affect: Mood normal.         Behavior: Behavior normal.        Result Review :    The following data was reviewed by: ALVARO Alarcon on 01/08/2025:  CMP          3/27/2024    04:51 10/1/2024    09:58 12/30/2024    16:43   CMP   Glucose 105  94  91    BUN 24  25  17    Creatinine 1.16  1.06  0.96    EGFR 50.5   63.0    Sodium 137  138  142    Potassium 4.1  4.4  4.1    Chloride 105  101  106    Calcium 9.3  9.7  9.8    Total Protein  6.7     Total Protein 6.6   7.2    Albumin 4.3  4.5  4.2    Globulin  2.2     Globulin 2.3   3.0    Total Bilirubin <0.2  0.3  0.5    Alkaline Phosphatase 82  96  92    AST (SGOT) 20  21  22    ALT (SGPT) 16  15  16    Albumin/Globulin Ratio 1.9   1.4    BUN/Creatinine Ratio 20.7  23.6  17.7    Anion Gap 9.7   10.0      CBC          3/27/2024    04:51 10/1/2024    09:58 12/30/2024    16:43   CBC   WBC 6.62  7.33  6.27    RBC 4.40  4.55  4.48    Hemoglobin 12.8  14.3  13.6    Hematocrit 39.8  43.2  40.7    MCV 90.5  94.9  90.8    MCH 29.1  31.4  30.4    MCHC 32.2  33.1  33.4    RDW 13.0  12.3  12.0    Platelets 223  240  321      Lipid Panel          10/1/2024    09:58   Lipid Panel   Total Cholesterol 188    Triglycerides 140    HDL Cholesterol 70    VLDL Cholesterol 24    LDL Cholesterol  94      TSH          4/16/2024    15:08 10/1/2024    09:58   TSH    TSH 2.390  2.480      A1C Last 3 Results          1/31/2024    13:39   HGBA1C Last 3 Results   Hemoglobin A1C 5.5                       Assessment and Plan     Diagnoses and all orders for this visit:    1. Chronic pain of right knee (Primary)  Assessment & Plan:  Counseled patient to avoid heavy activity other than ADLs with bilateral knees and right shoulder until resolution of knee pain and clearance by orthopedic surgery.    Orders:  -     Ambulatory Referral to Orthopedic Surgery  -     baclofen (LIORESAL) 10 MG tablet; Take 1 tablet by mouth At Night As Needed for Muscle Spasms for up to 90 days.  Dispense: 30 tablet; Refill: 1    2. Chronic pain of left knee  -     Ambulatory Referral to Orthopedic Surgery  -     baclofen (LIORESAL) 10 MG tablet; Take 1 tablet by mouth At Night As Needed for Muscle Spasms for up to 90 days.  Dispense: 30 tablet; Refill: 1    3. Chronic right shoulder pain  Assessment & Plan:  Discussed physical therapy referral for right shoulder pain.  Also provided patient x-ray requisition sheet with instructions to complete shoulder x-ray for posterior shoulder pain.  Call clinic for orthopedic surgery referral if the right shoulder pain does not resolve after PT.  Apply diclofenac gel as needed to right shoulder.        Orders:  -     Ambulatory Referral to Physical Therapy for Evaluation & Treatment  -     baclofen (LIORESAL) 10 MG tablet; Take 1 tablet by mouth At Night As Needed for Muscle Spasms for up to 90 days.  Dispense: 30 tablet; Refill: 1  -     XR Shoulder 2+ View Right; Future    Encouraged patient to use a cane or walker for bilateral knee pain until clearance by the specialist and resolution of knee issue and avoid falls, patient was understanding.    Discussed the long-term side effects of NSAIDs not limited to hypertension, GI side effects, and kidney disease.  Counseled patient to use NSAIDs sparingly and for the shortest duration.      All chronic conditions have  been addressed and treated by the practice or other specialists. Medications have been reconciled and refilled as appropriate. Reiterated compliance and timely follow up appointments. Side effects of all new and old medications reviewed with the patient and patient willing to accept all risks involved. Advised RTO if no improvement or worsening of symptoms or if any new complaints arise. Patient advised to follow up with clinic or call after diagnostic tests, if patient does not hear from office 3 days after the test completion.            Follow Up     Return in about 3 months (around 4/8/2025) for Next scheduled follow up.  Patient was given instructions and counseling regarding her condition or for health maintenance advice. Please see specific information pulled into the AVS if appropriate.

## 2025-01-13 ENCOUNTER — HOSPITAL ENCOUNTER (OUTPATIENT)
Dept: GENERAL RADIOLOGY | Facility: HOSPITAL | Age: 75
Discharge: HOME | End: 2025-01-13
Admitting: STUDENT IN AN ORGANIZED HEALTH CARE EDUCATION/TRAINING PROGRAM
Payer: MEDICARE

## 2025-01-13 ENCOUNTER — OFFICE VISIT (OUTPATIENT)
Age: 75
End: 2025-01-13
Payer: MEDICARE

## 2025-01-13 DIAGNOSIS — M25.511 CHRONIC RIGHT SHOULDER PAIN: ICD-10-CM

## 2025-01-13 DIAGNOSIS — G89.29 CHRONIC RIGHT SHOULDER PAIN: ICD-10-CM

## 2025-01-13 DIAGNOSIS — F33.1 MODERATE EPISODE OF RECURRENT MAJOR DEPRESSIVE DISORDER: Primary | ICD-10-CM

## 2025-01-13 PROCEDURE — 73030 X-RAY EXAM OF SHOULDER: CPT

## 2025-01-13 PROCEDURE — 90837 PSYTX W PT 60 MINUTES: CPT | Performed by: SOCIAL WORKER

## 2025-01-13 NOTE — PROGRESS NOTES
"Date: January 13, 2025  Time In: 10 am  Time Out: 11 am      Aydee Moore is a 74 y.o. female who presents today for individual therapy session at [facility] with DAVID Warren LCSW.     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  Cl continues to struggle with moderate depression and anxiety.  Cl's marriage is not equal \"I'm his servant.\"  Reported multiple examples of how much cl is her 's servant, both of cl's son's have suggested  him \"I'm  for life.\"    Clinical Maneuvering/Intervention: ACT, solution focused and Rogerian tenets were used to engage cl and continue the rapport building and hx gathering process.  Began exploring cl being a better advocate for herself in her marriage.  Cl reported that  suggested that cl shoot herself when she complained about her knee hurting (finally went to the hospital, is being  treated for the swelling).          Assisted patient in processing above session content; acknowledged and normalized patient’s thoughts, feelings, and concerns. Discussed triggers associated with patient's mental health symptoms. Also discussed coping skills for patient to implement.    Allowed patient to freely discuss issues without interruption or judgment. Provided safe, confidential environment to facilitate the development of positive therapeutic relationship and encourage open, honest communication. Assisted patient in identifying risk factors which would indicate the need for higher level of care including thoughts to harm self or others and/or self-harming behavior and encouraged patient to contact this office, call 911, or present to the nearest emergency room should any of these events occur. Discussed crisis intervention services and means to access.     Assessment   Patient appears to maintain relative stability as compared to their baseline. However, " patient continues to struggle with mental health symptoms which continues to cause impairment in important areas of functioning. A result, they can be reasonably expected to continue to benefit from treatment and would likely be at increased risk for decompensation otherwise.    MENTAL STATUS EXAM   General Appearance:  Cleanly groomed and dressed  Eye Contact:  Good eye contact  Attitude:  Cooperative  Motor Activity:  Normal gait, posture  Muscle Strength:  Normal  Speech:  Normal rate, tone, volume  Language:  Spontaneous  Mood and affect:  Normal, pleasant, anxious and depressed  Hopelessness:  Denies  Loneliness: Denies  Thought Process:  Logical  Associations/ Thought Content:  No delusions  Hallucinations:  None  Suicidal Ideations:  Not present  Homicidal Ideation:  Not present  Sensorium:  Alert  Orientation:  Person, place, time and situation  Immediate Recall, Recent, and Remote Memory:  Intact  Attention Span/ Concentration:  Good  Fund of Knowledge:  Appropriate for age and educational level  Intellectual Functioning:  Average range  Insight:  Good  Judgement:  Good  Reliability:  Good  Impulse Control:  Good          Patient's Support Network Includes:  sister    Progress toward goal: Not at goal           Plan     Recommendations: 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    Patient will continue in individual outpatient therapy with focus on improved functioning and coping skills, maintaining stability, and avoiding decompensation and the need for higher level of care.    Patient will adhere to any medication regimens as prescribed and report any side effects. Patient will contact this office, call 911 or present to the nearest emergency room should suicidal or homicidal ideations occur.              VISIT DIAGNOSIS:     ICD-10-CM ICD-9-CM   1. Moderate episode of recurrent major depressive disorder  F33.1 296.32            This document has been electronically signed by DAVID Warren LCSW    January 13, 2025  11:02 EST      Part of this note may be an electronic transcription/translation of spoken language to printed text using the Dragon Dictation System.

## 2025-01-13 NOTE — TREATMENT PLAN
Multi-Disciplinary Problems (from Behavioral Health Treatment Plan)      Active Problems       Problem: Depression  Start Date: 01/13/25      Problem Details: The patient self-scales this problem as a 8 with 10 being the worst.          Goal Priority Start Date Expected End Date End Date    Patient will demonstrate the ability to initiate new constructive life skills outside of sessions on a consistent basis. Medium 01/13/25 07/14/25 --    Goal Details: Progress toward goal:  Not appropriate to rate progress toward goal since this is the initial treatment plan.          Goal Intervention Frequency Start Date End Date    Assist patient in setting attainable activities of daily living goals. PRN 01/13/25 --      Goal Intervention Frequency Start Date End Date    Provide education about depression Weekly 01/13/25 --    Intervention Details: Duration of treatment until remission of symptoms.          Goal Intervention Frequency Start Date End Date    Assist patient in developing healthy coping strategies. Weekly 01/13/25 --    Intervention Details: Duration of treatment until remission of symptoms.                          Reviewed By       Yesica Warren LCSW 01/13/25 [id number]                     I have discussed and reviewed this treatment plan with the patient.

## 2025-01-14 ENCOUNTER — PATIENT OUTREACH (OUTPATIENT)
Dept: CASE MANAGEMENT | Facility: OTHER | Age: 75
End: 2025-01-14
Payer: MEDICARE

## 2025-01-14 NOTE — OUTREACH NOTE
AMBULATORY CASE MANAGEMENT NOTE    Names and Relationships of Patient/Support Persons: Contact: Aydee Moore; Relationship: Self -     Adult Patient Profile  Questions/Answers      Flowsheet Row Most Recent Value   Symptoms/Conditions Managed at Home musculoskeletal   Musculoskeletal Symptoms/Conditions joint pain, mobility limited  [right shoulder]   Musculoskeletal Management Strategies other (see comments)  [has orders for outpatient PT and referral to ortho for evaluation of right shoulder and knee pain]   Musculoskeletal Self-Management Outcome unsure   Barriers to Taking Medication as Prescribed none          Patient Outreach    Outgoing call to the patient for follow up.  Pain goal/ care plan initiated.  She will be starting PT for shoulder pain and has referral to ortho for evaluation.  She prefers an outreach in about 4 weeks and this is scheduled.  She will outreach with any any needs.     Di [facility]  Ambulatory Case Management    1/14/2025, 16:45 EST

## 2025-01-14 NOTE — PLAN OF CARE
Problem: Chronic Pain  Goal: Manage Pain  Outcome: Progressing  Intervention: My Pain Management To Do List  Description:   Why is this important?  Day-to-day life can be hard when you have chronic pain.  Pain medicine is just one part of your treatment plan.  You can try these action steps to help you manage your pain.    Flowsheets (Taken 1/14/2025 1641)  My Pain Management To Do List:   use cold or heat for pain relief   develop a personal pain management plan  Goal: [place] with Chronic Pain  Outcome: Progressing  Intervention: My Chronic Pain Coping To Do List  Description:   Why is this important?  Stress makes chronic pain feel worse.  Depression, anxiety, stress and anger can make your body feel pain more.  Learning ways to cope with stress or depression may help you find some relief from the pain.    Flowsheets (Taken 1/14/2025 1641)  My Chronic Pain Coping To Do List:   begin counseling   relax or meditate each day   use ways to distract from pain   use relaxation when you feel pain  Goal: Optimal Care Coordination of a Patient Experiencing Chronic Pain  Outcome: Progressing  Intervention: Partner to Develop Chronic Pain Management Plan  Flowsheets (Taken 1/14/2025 1641)  Partner to Develop Chronic Pain Management Plan:   pain assessed   pain management plan developed  Intervention: Alleviate Barriers to Chronic Pain Management  Flowsheets (Taken 1/14/2025 1641)  Alleviate Barriers to Chronic Pain Management:   complementary therapy use encouraged   careful application of heat or cold encouraged   pain assessed   participation in physical therapy encouraged

## 2025-01-15 DIAGNOSIS — K21.00 GASTROESOPHAGEAL REFLUX DISEASE WITH ESOPHAGITIS WITHOUT HEMORRHAGE: Chronic | ICD-10-CM

## 2025-01-15 RX ORDER — PANTOPRAZOLE SODIUM 20 MG/1
20 TABLET, DELAYED RELEASE ORAL DAILY
Qty: 90 TABLET | Refills: 1 | Status: SHIPPED | OUTPATIENT
Start: 2025-01-15

## 2025-01-23 ENCOUNTER — TRANSCRIBE ORDERS (OUTPATIENT)
Dept: ADMINISTRATIVE | Facility: HOSPITAL | Age: 75
End: 2025-01-23
Payer: MEDICARE

## 2025-01-23 ENCOUNTER — OFFICE VISIT (OUTPATIENT)
Dept: CARDIOLOGY | Facility: CLINIC | Age: 75
End: 2025-01-23
Payer: MEDICARE

## 2025-01-23 VITALS
WEIGHT: 195 LBS | HEIGHT: 65 IN | SYSTOLIC BLOOD PRESSURE: 126 MMHG | HEART RATE: 62 BPM | RESPIRATION RATE: 20 BRPM | BODY MASS INDEX: 32.49 KG/M2 | DIASTOLIC BLOOD PRESSURE: 80 MMHG | OXYGEN SATURATION: 98 %

## 2025-01-23 DIAGNOSIS — E78.5 HYPERLIPIDEMIA LDL GOAL <100: ICD-10-CM

## 2025-01-23 DIAGNOSIS — Z12.31 VISIT FOR SCREENING MAMMOGRAM: Primary | ICD-10-CM

## 2025-01-23 DIAGNOSIS — I10 ESSENTIAL HYPERTENSION: Primary | ICD-10-CM

## 2025-01-23 PROCEDURE — 1160F RVW MEDS BY RX/DR IN RCRD: CPT | Performed by: INTERNAL MEDICINE

## 2025-01-23 PROCEDURE — 93000 ELECTROCARDIOGRAM COMPLETE: CPT | Performed by: INTERNAL MEDICINE

## 2025-01-23 PROCEDURE — 1159F MED LIST DOCD IN RCRD: CPT | Performed by: INTERNAL MEDICINE

## 2025-01-23 PROCEDURE — 3079F DIAST BP 80-89 MM HG: CPT | Performed by: INTERNAL MEDICINE

## 2025-01-23 PROCEDURE — 99214 OFFICE O/P EST MOD 30 MIN: CPT | Performed by: INTERNAL MEDICINE

## 2025-01-23 PROCEDURE — 3074F SYST BP LT 130 MM HG: CPT | Performed by: INTERNAL MEDICINE

## 2025-01-23 NOTE — PROGRESS NOTES
"      CARDIOLOGY    Jessie Pedro MD    ENCOUNTER DATE:  01/23/2025    Aydee Moore / 74 y.o. / female        CHIEF COMPLAINT / REASON FOR OFFICE VISIT     Follow-up (6 month follow up/Palpitations, hypertension, dyspnea )      HISTORY OF PRESENT ILLNESS       HPI    Aydee Moore is a 74 y.o. 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 Unfortunately, she has been under a lot of stress as her  is dealing with prostate cancer and being treated at the [facility]. She has hypertension and hyperlipidemia.      I saw her in May 2024 after she had an episode of palpitations and went to [facility] where she had a normal workup.    She had a stress echo in May 2020 fortune normal LV function ejection fraction 62% with normal diastolic function, there was mild anterior leaflet thickening and normal pulmonary pressures.    She was able to go to her hiking trip in [place] this summer 2024 and did great with it.  She has been less active recently due to some orthopedic issues, influenza A and the weather.  She has been doing lots of quilting for a [employer].    Her  came to see me but he has been having a lot of issues with prostate cancer and is not interested in tackling any cardiac issues at this time.    VITAL SIGNS     Visit Vitals  /80   Pulse 62   Resp 20   Ht 165.1 cm (65\")   Wt 88.5 kg (195 lb)   SpO2 98%   BMI 32.45 kg/m²         Wt Readings from Last 3 Encounters:   01/23/25 88.5 kg (195 lb)   01/08/25 88.5 kg (195 lb 3.2 oz)   12/17/24 87.2 kg (192 lb 3.2 oz)     Body mass index is 32.45 kg/m².      PHYSICAL EXAMINATION     Constitutional:       General: Not in acute distress.  Neck:      Vascular: No carotid bruit or JVD.   Pulmonary:      Effort: Pulmonary effort is normal.      Breath sounds: Normal breath sounds.   Cardiovascular:      Normal rate. Regular rhythm.      Murmurs: " There is no murmur.   Psychiatric:         Mood and Affect: Mood and affect normal.           REVIEWED DATA       EKG    Date/Time: 1/23/2025 8:42 AM  Performed by: Jessie Pedro MD    Authorized by: Jessie Pedro MD  Comparison: compared with previous ECG from 5/3/2024  Similar to previous ECG  Rhythm: sinus rhythm  Conduction: conduction normal  ST Segments: ST segments normal  T Waves: T waves normal    Clinical impression: normal ECG            Lipid Panel          10/1/2024    09:58   Lipid Panel   Total Cholesterol 188    Triglycerides 140    HDL Cholesterol 70    VLDL Cholesterol 24    LDL Cholesterol  94        Lab Results   Component Value Date    GLUCOSE 91 12/30/2024    BUN 17 12/30/2024    CREATININE 0.96 12/30/2024     12/30/2024    K 4.1 12/30/2024     12/30/2024    CALCIUM 9.8 12/30/2024    PROTEINTOT 7.2 12/30/2024    ALBUMIN 4.2 12/30/2024    ALT 16 12/30/2024    AST 22 12/30/2024    ALKPHOS 92 12/30/2024    BILITOT 0.5 12/30/2024    GLOB 3.0 12/30/2024    AGRATIO 1.4 12/30/2024    BCR 17.7 12/30/2024    ANIONGAP 10.0 12/30/2024    EGFR 63.0 12/30/2024       ASSESSMENT & PLAN      Diagnosis Plan   1. Essential hypertension  5/3/24      2. Hyperlipidemia LDL goal <100  5/3/24            Hypertension.  Her blood pressure looks great today.  Continue amlodipine.  Hyperlipidemia.  I reviewed her lipid panel. She is at goal on 5 mg of rosuvastatin.  Continue.  Anxiety.  She is working on this with her PCP.  Orthopedic pain.  She is going to see her orthopedic surgeon today.  Hopefully they can help get this taken care of so she can get back to [facility].  She had a great hiking trip to [place] this summer 2024.    Follow-up with me in a year unless something changes.    Orders Placed This Encounter   Procedures    5/3/24     This order was created via procedure documentation     Order Specific Question:   Release to patient     Answer:   Routine Release [[id number]]           MEDICATIONS          Discharge Medications            Accurate as of January 23, 2025  8:43 AM. If you have any questions, ask your nurse or doctor.                Continue These Medications        Instructions Start Date   acetaminophen 500 MG tablet  Commonly known as: TYLENOL   500 mg, Oral, Every 6 Hours PRN      albuterol sulfate  (90 Base) MCG/ACT inhaler  Commonly known as: PROVENTIL HFA;VENTOLIN HFA;PROAIR HFA   2 puffs, Inhalation, Every 6 Hours PRN      amLODIPine 5 MG tablet  Commonly known as: NORVASC   5 mg, Oral, Daily      Aspirin Low Dose 81 MG EC tablet  Generic drug: aspirin   81 mg, Oral, Daily      baclofen 10 MG tablet  Commonly known as: LIORESAL   10 mg, Oral, Nightly PRN      Benefiber Drink Mix pack   1 each, Oral, Daily      BIOTIN PO   Take  by mouth.      Calcium Carbonate 500 MG chewable tablet   1 tablet, Oral, 2 Times Daily      cetirizine 5 MG tablet  Commonly known as: zyrTEC   5 mg, Oral, Nightly      Diclofenac Sodium 1 % gel gel  Commonly known as: VOLTAREN   4 g, Topical, 4 Times Daily PRN      docusate sodium 100 MG capsule  Commonly known as: Colace   100 mg, Oral, Daily      FLUoxetine 10 MG capsule  Commonly known as: PROzac   10 mg, Oral, Daily      fluticasone 50 MCG/ACT nasal spray  Commonly known as: FLONASE   2 sprays, Nasal, Daily      glucosamine-chondroitin 500-400 MG capsule capsule   3 Times Daily With Meals      levothyroxine 50 MCG tablet  Commonly known as: Synthroid   50 mcg, Oral, Daily      Magnesium 400 MG capsule   Take  by mouth. Prn      multivitamin with minerals tablet tablet   1 tablet, Oral, Daily      nystatin 547897 UNIT/GM ointment  Commonly known as: MYCOSTATIN   1 application , Topical, 2 Times Daily      pantoprazole 20 MG EC tablet  Commonly known as: PROTONIX   20 mg, Oral, Daily      rosuvastatin 5 MG tablet  Commonly known as: CRESTOR   5 mg, Oral, Daily      Vitamin D 50 MCG (2000 UT) tablet   2,000 Units, Oral, Daily                 Jessie DAVID  MD Mayela  01/23/25  08:43 EST    Part of this note may be an electronic transcription/translation of spoken language to printed text using the Dragon dictation system.

## 2025-01-27 ENCOUNTER — OFFICE VISIT (OUTPATIENT)
Age: 75
End: 2025-01-27
Payer: MEDICARE

## 2025-01-27 DIAGNOSIS — F34.1 PERSISTENT DEPRESSIVE DISORDER: Primary | ICD-10-CM

## 2025-01-27 PROCEDURE — 90837 PSYTX W PT 60 MINUTES: CPT | Performed by: SOCIAL WORKER

## 2025-01-27 NOTE — PROGRESS NOTES
"Date: January 27, 2025  Time In: 1 pm  Time Out: 2 pm      Aydee Moore is a 74 y.o. female who presents today for individual therapy session at [facility] with DAVID Warren LCSW.     Patient Chief Complaint: Cl has been reckoning with the subjugation she's experienced as a woman especially in her marriage \"I wished I had been born a man.\"    Clinical Maneuvering/Intervention: ACT, insight oriented techniques to engage cl and promote insight that can lead to taking positive actions that increase cl's sense of self worth.  Cl struggled with iding whether she considered herself lovable, worthy of love.  Began exploring how cl can begin to show herself that she loves herself \"I'd have to think about it.\"  That is cl's homework: to think about and/or write about what actions cl can take to show love to herself.  Cl was able to id that getting into see her ortho doc for her knee.  Cl reported concern that she has sleep apnea in spite of recent sleep study showing negative for sleep apnea (stopped breathing but not enough for a cpap machine).        Assisted patient in processing above session content; acknowledged and normalized patient’s thoughts, feelings, and concerns. Discussed triggers associated with patient's mental health symptoms. Also discussed coping skills for patient to implement.    Allowed patient to freely discuss issues without interruption or judgment. Provided safe, confidential environment to facilitate the development of positive therapeutic relationship and encourage open, honest communication. Assisted patient in identifying risk factors which would indicate the need for higher level of care including thoughts to harm self or others and/or self-harming behavior and encouraged patient to contact this office, call 911, or present to the nearest emergency room should any of these events occur. Discussed crisis intervention services and means to access.     Assessment "   Patient appears to maintain relative stability as compared to their baseline. However, patient continues to struggle with mental health symptoms which continues to cause impairment in important areas of functioning. A result, they can be reasonably expected to continue to benefit from treatment and would likely be at increased risk for decompensation otherwise.    MENTAL STATUS EXAM   General Appearance:  Cleanly groomed and dressed  Eye Contact:  Good eye contact  Attitude:  Cooperative  Motor Activity:  Normal gait, posture  Muscle Strength:  Normal  Speech:  Normal rate, tone, volume  Language:  Spontaneous  Mood and affect:  Normal, pleasant, depressed and anxious  Hopelessness:  Denies  Loneliness: Denies  Thought Process:  Logical  Associations/ Thought Content:  No delusions  Hallucinations:  None  Suicidal Ideations:  Not present  Homicidal Ideation:  Not present  Sensorium:  Alert  Orientation:  Person, place, time and situation  Immediate Recall, Recent, and Remote Memory:  Intact  Attention Span/ Concentration:  Good  Fund of Knowledge:  Appropriate for age and educational level  Intellectual Functioning:  Average range  Insight:  Good  Judgement:  Good  Reliability:  Good  Impulse Control:  Good          Patient's Support Network Includes:  self     Progress toward goal: Not at goal           Plan     Recommendations: self care    Patient will continue in individual outpatient therapy with focus on improved functioning and coping skills, maintaining stability, and avoiding decompensation and the need for higher level of care.    Patient will adhere to any medication regimens as prescribed and report any side effects. Patient will contact this office, call 911 or present to the nearest emergency room should suicidal or homicidal ideations occur.              VISIT DIAGNOSIS:     ICD-10-CM ICD-9-CM   1. Persistent depressive disorder  F34.1 300.4            This document has been electronically signed by  DAVID Warren, ROBERT    January 27, 2025 14:14 EST      Part of this note may be an electronic transcription/translation of spoken language to printed text using the Dragon Dictation System.

## 2025-02-03 ENCOUNTER — TREATMENT (OUTPATIENT)
Dept: PHYSICAL THERAPY | Facility: CLINIC | Age: 75
End: 2025-02-03
Payer: MEDICARE

## 2025-02-03 DIAGNOSIS — G89.29 CHRONIC RIGHT SHOULDER PAIN: Primary | ICD-10-CM

## 2025-02-03 DIAGNOSIS — M25.511 CHRONIC RIGHT SHOULDER PAIN: Primary | ICD-10-CM

## 2025-02-03 PROCEDURE — 97161 PT EVAL LOW COMPLEX 20 MIN: CPT | Performed by: PHYSICAL THERAPIST

## 2025-02-03 PROCEDURE — 97112 NEUROMUSCULAR REEDUCATION: CPT | Performed by: PHYSICAL THERAPIST

## 2025-02-03 PROCEDURE — 97110 THERAPEUTIC EXERCISES: CPT | Performed by: PHYSICAL THERAPIST

## 2025-02-03 NOTE — PROGRESS NOTES
"Physical Therapy Initial Evaluation and Plan of Care  [facility]  [address], [address], [place] [address]    Patient: Aydee Moore   : 1950  Diagnosis/ICD-10 Code:  Chronic right shoulder pain [M25.511, G89.29]  Referring practitioner: Jordan Reeder*  Date: 2/3/2025    Subjective Evaluation    History of Present Illness  Onset date: end .  Mechanism of injury: Patient reports receiving a flu shot near the end of September and patient believes it was given too high.  She experienced immediate pain that persisted and in November she gave up knitting, trying to give her shoulder a rest.  She was also having difficulty using her hiking poles.  She received a cortisone injection 10 days ago without relief yet.  She also has pain that travels down (R) upper arm around the elbow and distally along radial forearm into radial wrist.    She has great difficulty sleeping and sleeps with her (R) UE overhead.  Walking is painful to have (R) UE in dependent position, swinging.  She is unable to iron with (R) UE or reach overhead or out to the side with her (R) UE due to pain. She is driving primarily left-handed as well.      She has been sewing for 2-3 hours per day with pain following.      Xray (R) shoulder 2025:  Mild (R) shoulder AC arthritis, tiny subacromial spur.       Patient Occupation: N/A Quality of life: good    Pain  Current pain ratin  At best pain ratin  At worst pain ratin (\"It's the most severe pain I've ever had\")  Location: (R) anterolateral upper arm  Quality: dull ache and sharp  Relieving factors: change in position (supine with (R) UE overhead, prone with (R) arm hanging off edge of bed)  Aggravating factors: lifting, movement, overhead activity, outstretched reach, repetitive movement, prolonged positioning and sleeping  Progression: no change    Hand dominance: right    Diagnostic Tests  X-ray: normal (mild (R) " shoulder AC arthritis, tiny subacromial spur)    Patient Goals  Patient goals for therapy: decreased pain             Subjective Questionnaire: QuickDASH: 61.36%    Objective          Tenderness     Right Shoulder  Tenderness in the subacromial bursa and supraspinatus tendon.     Active Range of Motion   Left Shoulder   Flexion: 170 degrees   Extension: 72 degrees   Abduction: 180 degrees   External rotation 90°: 84 degrees   Internal rotation 90°: 74 degrees     Right Shoulder   Flexion: 173 degrees   Extension: 73 degrees   Abduction: 173 degrees   External rotation 90°: 71 ((R) anterior shoulder/upper arm pain) degrees  Internal rotation 90°: 71 ((R) anterior shoulder/upper arm pain) degrees with pain    Strength/Myotome Testing     Left Shoulder     Planes of Motion   Flexion: 4+   Extension: 4+   Abduction: 4+   External rotation at 0°: 4+   Internal rotation at 0°: 4+     Right Shoulder     Planes of Motion   Flexion: 4 (with pain)   Extension: 4   Abduction: 4- (with pain)   External rotation at 0°: 3+ (pain-limited)   Internal rotation at 0°: 4     Left Elbow   Flexion: 5  Extension: 4+    Right Elbow   Flexion: 4+  Extension: 4          Assessment & Plan       Assessment  Impairments: abnormal or restricted ROM, activity intolerance, impaired physical strength, lacks appropriate home exercise program and pain with function   Functional limitations: carrying objects, lifting, sleeping, pulling, pushing, uncomfortable because of pain, reaching overhead and unable to perform repetitive tasks   Assessment details: Patient is a 74 y.o female who reports onset of (R) shoulder pain around the end of September 2024 after receiving a flu vaccine.  She feels the injection was given too high in her shoulder.  The pain she experienced immediately following has persisted since then and not improved.  She received a (R) shoulder cortisone injection about 10 days ago without notable improvement.  She reports (R) shoulder  "symptoms rated 0-6/10, stating \"it's the most severe pain I've ever had.\"  She has pain and difficulty with sleeping, knitting, swinging her arms while walking, using trekking poles for hiking, ironing, and driving.  She has been compensating significantly with her (L) UE for most of these activities.  She exhibits localized tenderness of (R) anterior and superior shoulder, decreased and painful (R) shoulder AROM, and decreased (R) shoulder strength, especially in plane of external rotation.  Her QuickDASH score is 61.36% indicating a significant perceived level of functional limitation.  She will benefit from skilled PT services to address these deficits and assist patient in painfree return to all ADLs and hobbies painfree for improved QOL.  Prognosis: good    Goals  Plan Goals: STGs: to be met in 6 weeks  1. Patient will be independent with initial HEP  2. Patient will report improved (R) shoulder symptoms 0-3/10 for improved tolerance to ADLs  3. Patient will report 50% improved sleep ability for improved restorative healing  4. Patient will demonstrate (R) shoulder AROM WNL all planes painfree for improved reaching ability and positional tolerance    LTGs: to be met in 12 weeks  1. Patient will be independent with progressed HEP  2. Patient will report resolution of (R) shoulder symptoms for normal ability to perform all ADLs and recreational activities such as knitting  3. Patient will demonstrate improved (R) shoulder MMT grossly >/= 4+/5 for improved functional use of dominant (R) UE  4. Patient will report consistently sleeping without pain interruption due to (R) shoulder for improved restorative healing  5. Patient will have improved QuickDASH score </= 20% for subjective evidence of functional improvement    Plan  Therapy options: will be seen for skilled therapy services  Planned modality interventions: cryotherapy, thermotherapy (hydrocollator packs) and electrical stimulation/Cypriot " stimulation  Planned therapy interventions: flexibility, functional ROM exercises, home exercise program, joint mobilization, manual therapy, soft tissue mobilization, strengthening, stretching and therapeutic activities  Frequency: 1x week  Duration in weeks: 12  Treatment plan discussed with: patient  Plan details: Patient's  is rather ill due to prostate cancer treatments; this will affect her availability to for attending PT sessions in-person        TIMED:  Manual Therapy:         mins  59411;  Therapeutic Exercise:    19     mins  38772;     Neuromuscular Enedelia:    9    mins  35109;    Therapeutic Activity:          mins  68151;     Gait Training:           mins  43151;     Ultrasound:          mins  42606;    Work Conditioning             mins 84548    UNTIMED:  Electrical Stimulation:         mins  80569 ( );  Dry Needling          mins 20561    Timed Treatment:   28   mins   Total Treatment:     42   mins    PT SIGNATURE: Grace Blancas PT, DPT, OCS  Electronically signed by: Grace Blancas PT, 02/03/25, 12:43 PM EST  [place] License #[id number]     DATE TREATMENT INITIATED: 2/3/2025    Medicare Initial Certification  Certification Period: 2/3/2025 thru 5/3/2025  I certify that the therapy services are furnished while this patient is under my care.  The services outlined above are required by this patient, and will be reviewed every 90 days.     PHYSICIAN: Jordan Reeder, APRN  [id number]                                          DATE:     Please sign and return via fax to [phone]. Thank you, [facility].

## 2025-02-07 ENCOUNTER — PATIENT OUTREACH (OUTPATIENT)
Dept: CASE MANAGEMENT | Facility: OTHER | Age: 75
End: 2025-02-07
Payer: MEDICARE

## 2025-02-07 NOTE — OUTREACH NOTE
AMBULATORY CASE MANAGEMENT NOTE    Names and Relationships of Patient/Support Persons: Contact: Aydee Moore; Relationship: Self -     Adult Patient Profile  Questions/Answers      Flowsheet Row Most Recent Value   Symptoms/Conditions Managed at Home musculoskeletal   Musculoskeletal Symptoms/Conditions osteoarthritis   Musculoskeletal Management Strategies medication therapy, routine screening   Musculoskeletal Self-Management Outcome well   Musculoskeletal Comment States she has seen Dr Garcia and had steroid injection in shoulder and is doing PT exercises provided by her at home and symptoms have improved, follow up in 3  months          Patient Outreach    Outreach for follow up.  She prefers an outreach in about one month for follow up.  Plan to complete assessments and work on care goals at this time.   Di [facility]  Ambulatory Case Management    2/7/2025, 16:15 EST

## 2025-02-10 ENCOUNTER — OFFICE VISIT (OUTPATIENT)
Age: 75
End: 2025-02-10
Payer: MEDICARE

## 2025-02-10 DIAGNOSIS — F34.1 PERSISTENT DEPRESSIVE DISORDER: Primary | ICD-10-CM

## 2025-02-10 PROCEDURE — 90837 PSYTX W PT 60 MINUTES: CPT | Performed by: SOCIAL WORKER

## 2025-02-10 NOTE — PROGRESS NOTES
Date: February 10, 2025  Time In: 1 pm  Time Out: 2 pm      Aydee Moore is a 74 y.o. female who presents today for individual therapy session at [facility] with DAVID Warren LCSW.     Patient Chief Complaint: Cl reported that her sister's son made another suicide attempt, anxiety re her 's health, concerns re Trump.      Clinical Maneuvering/Intervention: ACT, Solution focused techniques Validated cl's feelings.  Processed how cl is managing these stressors.  Cl is doing her homework of creating a sense of self love and iding the ways she shows this to herself.  Discussed using her strong Restorationist lydia to disabuse herself of her feelings of unworthiness.  This idea resonated with cl.  Discussed cl getting her hair done (had mentioned wanting to dye it) and doing self care activities.      Assisted patient in processing above session content; acknowledged and normalized patient’s thoughts, feelings, and concerns. Discussed triggers associated with patient's mental health symptoms. Also discussed coping skills for patient to implement.    Allowed patient to freely discuss issues without interruption or judgment. Provided safe, confidential environment to facilitate the development of positive therapeutic relationship and encourage open, honest communication. Assisted patient in identifying risk factors which would indicate the need for higher level of care including thoughts to harm self or others and/or self-harming behavior and encouraged patient to contact this office, call 911, or present to the nearest emergency room should any of these events occur. Discussed crisis intervention services and means to access.     Assessment   Patient appears to maintain relative stability as compared to their baseline. However, patient continues to struggle with mental health symptoms which continues to cause impairment in important areas of functioning. A result, they can be reasonably  expected to continajhkmue to benefit from treatment and would likely be at increased risk for decompensation otherwise.    MENTAL STATUS EXAM   General Appearance:  Cleanly groomed and dressed  Eye Contact:  Good eye contact  Attitude:  Cooperative  Motor Activity:  Normal gait, posture  Muscle Strength:  Normal  Speech:  Normal rate, tone, volume  Language:  Spontaneous  Mood and affect:  Normal, pleasant and anxious  Hopelessness:  Denies  Loneliness: Denies  Thought Process:  Logical  Associations/ Thought Content:  No delusions  Hallucinations:  None  Suicidal Ideations:  Not present  Homicidal Ideation:  Not present  Sensorium:  Alert  Orientation:  Person, place, time and situation  Immediate Recall, Recent, and Remote Memory:  Intact  Attention Span/ Concentration:  Good  Fund of Knowledge:  Appropriate for age and educational level  Intellectual Functioning:  Above average  Insight:  Good  Judgement:  Good  Reliability:  Good  Impulse Control:  Good          Patient's Support Network Includes:  sister, Anglican    Progress toward goal: Not at goal, but is making progress.           Plan     Recommendations: self care    Patient will continue in individual outpatient therapy with focus on improved functioning and coping skills, maintaining stability, and avoiding decompensation and the need for higher level of care.    Patient will adhere to any medication regimens as prescribed and report any side effects. Patient will contact this office, call 911 or present to the nearest emergency room should suicidal or homicidal ideations occur.              VISIT DIAGNOSIS:     ICD-10-CM ICD-9-CM   1. Persistent depressive disorder  F34.1 300.4            This document has been electronically signed by DAVID Warren LCSW    February 10, 2025 14:02 EST      Part of this note may be an electronic transcription/translation of spoken language to printed text using the Dragon Dictation System.

## 2025-03-13 ENCOUNTER — HOSPITAL ENCOUNTER (OUTPATIENT)
Dept: MAMMOGRAPHY | Facility: HOSPITAL | Age: 75
Discharge: HOME | End: 2025-03-13
Admitting: STUDENT IN AN ORGANIZED HEALTH CARE EDUCATION/TRAINING PROGRAM
Payer: MEDICARE

## 2025-03-13 DIAGNOSIS — Z12.31 VISIT FOR SCREENING MAMMOGRAM: ICD-10-CM

## 2025-03-13 PROCEDURE — 77063 BREAST TOMOSYNTHESIS BI: CPT

## 2025-03-13 PROCEDURE — 77067 SCR MAMMO BI INCL CAD: CPT

## 2025-03-20 ENCOUNTER — PATIENT OUTREACH (OUTPATIENT)
Dept: CASE MANAGEMENT | Facility: OTHER | Age: 75
End: 2025-03-20
Payer: MEDICARE

## 2025-03-20 NOTE — OUTREACH NOTE
AMBULATORY CASE MANAGEMENT NOTE    Names and Relationships of Patient/Support Persons: Contact: Aydee Moore; Relationship: Self -     Patient Outreach    Outgoing call to the patient for follow up.  She states that she has been managing her health related needs well and has met the goals she had.  She prefers to opt out of the [facility] program at this time.  Encouraged her to outreach with any future needs.       Di [facility]  Ambulatory Case Management    3/20/2025, 11:01 EDT

## 2025-03-31 ENCOUNTER — OFFICE VISIT (OUTPATIENT)
Age: 75
End: 2025-03-31
Payer: MEDICARE

## 2025-03-31 DIAGNOSIS — F34.1 PERSISTENT DEPRESSIVE DISORDER: Primary | ICD-10-CM

## 2025-03-31 PROCEDURE — 90837 PSYTX W PT 60 MINUTES: CPT | Performed by: SOCIAL WORKER

## 2025-03-31 NOTE — PROGRESS NOTES
"Date: March 31, 2025  Time In: 2 pm  Time Out: 3 pm      Aydee Moore is a 74 y.o. female who presents today for individual therapy session at [facility] with DAVID Warren LCSW.     Patient Chief Complaint: continues to grapple with being treated poorly by her  of 50 years, may need shoulder surgery, will see ortho doc on Friday    Clinical Maneuvering/Intervention: ACT, Solution focused techniques: Cl knows she is treated poorly by her , doesn't feel seen, heard, respected, love by this , son often asks cl \"when are you going to leave him.\"  Cl is clear she will not leave him, uses therapy for validation and to vent.  Gently confronted cl's willingness to stay and not ask for more.  Psycho ed about getting comfortable with changing the steps in a dynamic, and the importance of getting comfortable with his discomfort.  This resonated with cl as an in to taking up more space.      Assisted patient in processing above session content; acknowledged and normalized patient’s thoughts, feelings, and concerns. Discussed triggers associated with patient's mental health symptoms. Also discussed coping skills for patient to implement.    Allowed patient to freely discuss issues without interruption or judgment. Provided safe, confidential environment to facilitate the development of positive therapeutic relationship and encourage open, honest communication. Assisted patient in identifying risk factors which would indicate the need for higher level of care including thoughts to harm self or others and/or self-harming behavior and encouraged patient to contact this office, call 911, or present to the nearest emergency room should any of these events occur. Discussed crisis intervention services and means to access.     Assessment   Patient appears to maintain relative stability as compared to their baseline. However, patient continues to struggle with mental health symptoms " which continues to cause impairment in important areas of functioning. A result, they can be reasonably expected to continue to benefit from treatment and would likely be at increased risk for decompensation otherwise.    MENTAL STATUS EXAM   General Appearance:  Cleanly groomed and dressed  Eye Contact:  Good eye contact  Attitude:  Cooperative  Motor Activity:  Normal gait, posture  Muscle Strength:  Normal  Speech:  Normal rate, tone, volume  Language:  Spontaneous  Mood and affect:  Normal, pleasant, depressed and anxious  Hopelessness:  Denies  Loneliness: Denies  Thought Process:  Logical  Associations/ Thought Content:  No delusions  Hallucinations:  None  Suicidal Ideations:  Not present  Homicidal Ideation:  Not present  Sensorium:  Alert  Orientation:  Person, place, time and situation  Immediate Recall, Recent, and Remote Memory:  Intact  Attention Span/ Concentration:  Good  Fund of Knowledge:  Appropriate for age and educational level  Intellectual Functioning:  Average range and above average  Insight:  Good  Judgement:  Good  Reliability:  Good  Impulse Control:  Good          Patient's Support Network Includes:  children    Progress toward goal: Not at goal           Plan     Recommendations: self care    Patient will continue in individual outpatient therapy with focus on improved functioning and coping skills, maintaining stability, and avoiding decompensation and the need for higher level of care.    Patient will adhere to any medication regimens as prescribed and report any side effects. Patient will contact this office, call 911 or present to the nearest emergency room should suicidal or homicidal ideations occur.              VISIT DIAGNOSIS:     ICD-10-CM ICD-9-CM   1. Persistent depressive disorder  F34.1 300.4            This document has been electronically signed by DAVID Warren LCSW    March 31, 2025 15:03 EDT      Part of this note may be an electronic transcription/translation of  spoken language to printed text using the Dragon Dictation System.

## 2025-04-11 ENCOUNTER — OFFICE VISIT (OUTPATIENT)
Dept: FAMILY MEDICINE CLINIC | Facility: CLINIC | Age: 75
End: 2025-04-11
Payer: MEDICARE

## 2025-04-11 VITALS
DIASTOLIC BLOOD PRESSURE: 80 MMHG | TEMPERATURE: 98 F | BODY MASS INDEX: 31.62 KG/M2 | HEIGHT: 65 IN | SYSTOLIC BLOOD PRESSURE: 142 MMHG | WEIGHT: 189.8 LBS | HEART RATE: 72 BPM | OXYGEN SATURATION: 96 % | RESPIRATION RATE: 16 BRPM

## 2025-04-11 DIAGNOSIS — M85.80 OSTEOPENIA, UNSPECIFIED LOCATION: ICD-10-CM

## 2025-04-11 DIAGNOSIS — G89.29 CHRONIC RIGHT SHOULDER PAIN: ICD-10-CM

## 2025-04-11 DIAGNOSIS — E03.9 HYPOTHYROIDISM, ADULT: Chronic | ICD-10-CM

## 2025-04-11 DIAGNOSIS — M25.511 CHRONIC RIGHT SHOULDER PAIN: ICD-10-CM

## 2025-04-11 DIAGNOSIS — G47.62 NOCTURNAL LEG CRAMPS: ICD-10-CM

## 2025-04-11 DIAGNOSIS — B37.2 YEAST DERMATITIS: ICD-10-CM

## 2025-04-11 DIAGNOSIS — K59.04 CHRONIC IDIOPATHIC CONSTIPATION: Chronic | ICD-10-CM

## 2025-04-11 DIAGNOSIS — E78.2 MIXED HYPERLIPIDEMIA: Chronic | ICD-10-CM

## 2025-04-11 DIAGNOSIS — I10 ESSENTIAL HYPERTENSION: Chronic | ICD-10-CM

## 2025-04-11 DIAGNOSIS — M75.121 NONTRAUMATIC COMPLETE TEAR OF RIGHT ROTATOR CUFF: ICD-10-CM

## 2025-04-11 DIAGNOSIS — F33.1 MODERATE EPISODE OF RECURRENT MAJOR DEPRESSIVE DISORDER: Primary | ICD-10-CM

## 2025-04-11 DIAGNOSIS — J30.89 ENVIRONMENTAL AND SEASONAL ALLERGIES: ICD-10-CM

## 2025-04-11 DIAGNOSIS — F41.9 ANXIETY AND DEPRESSION: Chronic | ICD-10-CM

## 2025-04-11 DIAGNOSIS — F32.A ANXIETY AND DEPRESSION: Chronic | ICD-10-CM

## 2025-04-11 DIAGNOSIS — K21.00 GASTROESOPHAGEAL REFLUX DISEASE WITH ESOPHAGITIS WITHOUT HEMORRHAGE: ICD-10-CM

## 2025-04-11 RX ORDER — FAMOTIDINE 20 MG/1
20 TABLET, FILM COATED ORAL 2 TIMES DAILY PRN
Qty: 60 TABLET | Refills: 5 | Status: SHIPPED | OUTPATIENT
Start: 2025-04-11

## 2025-04-11 RX ORDER — ACETAMINOPHEN 500 MG
500 TABLET ORAL EVERY 6 HOURS PRN
Qty: 90 TABLET | Refills: 0 | Status: SHIPPED | OUTPATIENT
Start: 2025-04-11

## 2025-04-11 RX ORDER — CETIRIZINE HYDROCHLORIDE 5 MG/1
5 TABLET ORAL NIGHTLY
Qty: 90 TABLET | Refills: 3 | Status: SHIPPED | OUTPATIENT
Start: 2025-04-11

## 2025-04-11 RX ORDER — FLUOXETINE 10 MG/1
10 CAPSULE ORAL DAILY
Qty: 90 CAPSULE | Refills: 3 | Status: SHIPPED | OUTPATIENT
Start: 2025-04-11

## 2025-04-11 RX ORDER — FLUTICASONE PROPIONATE 50 MCG
2 SPRAY, SUSPENSION (ML) NASAL DAILY
Qty: 48 G | Refills: 3 | Status: SHIPPED | OUTPATIENT
Start: 2025-04-11

## 2025-04-11 RX ORDER — ROSUVASTATIN CALCIUM 5 MG/1
5 TABLET, COATED ORAL DAILY
Qty: 90 TABLET | Refills: 1 | Status: SHIPPED | OUTPATIENT
Start: 2025-04-11

## 2025-04-11 RX ORDER — ANTACID TABLETS 500 MG/1
1 TABLET, CHEWABLE ORAL 2 TIMES DAILY
Qty: 180 EACH | Refills: 3 | Status: SHIPPED | OUTPATIENT
Start: 2025-04-11

## 2025-04-11 RX ORDER — AMLODIPINE BESYLATE 5 MG/1
5 TABLET ORAL DAILY
Qty: 90 TABLET | Refills: 1 | Status: SHIPPED | OUTPATIENT
Start: 2025-04-11

## 2025-04-11 RX ORDER — MULTIPLE VITAMINS W/ MINERALS TAB 9MG-400MCG
1 TAB ORAL DAILY
Qty: 90 TABLET | Refills: 3 | Status: SHIPPED | OUTPATIENT
Start: 2025-04-11

## 2025-04-11 RX ORDER — NYSTATIN 100000 U/G
1 OINTMENT TOPICAL 2 TIMES DAILY
Qty: 30 G | Refills: 1 | Status: SHIPPED | OUTPATIENT
Start: 2025-04-11

## 2025-04-11 RX ORDER — DOCUSATE SODIUM 100 MG/1
100 CAPSULE, LIQUID FILLED ORAL DAILY
Qty: 90 CAPSULE | Refills: 3 | Status: SHIPPED | OUTPATIENT
Start: 2025-04-11

## 2025-04-11 RX ORDER — LEVOTHYROXINE SODIUM 50 UG/1
50 TABLET ORAL DAILY
Qty: 90 TABLET | Refills: 1 | Status: SHIPPED | OUTPATIENT
Start: 2025-04-11

## 2025-04-11 NOTE — ASSESSMENT & PLAN NOTE
Stable.  Patient denied any suicidal homicidal ideation.    Instructed patient to follow-up with [facility] intensive outpatient program.  Patient refused any change in the mental health medication regimen.

## 2025-04-11 NOTE — ASSESSMENT & PLAN NOTE
Stable.  Educated patient regarding adequate intake of fluids in the form of water & fiber in the form of fruits/vegetables, patient voiced understanding.

## 2025-04-11 NOTE — ASSESSMENT & PLAN NOTE
Stable.  However patient states therapy with a psychologist did not help.  Patient desires alternative.  Instructed patient to follow-up with [facility] for intensive outpatient program.  Patient denied any suicidal homicidal ideation.

## 2025-04-11 NOTE — PROGRESS NOTES
"Chief Complaint  Hypertension, Hyperlipidemia (Unsure of when last dosage; states it is difficult to open bottles), and Med Management (Stopped Protonix since January 2025)    Subjective        Aydee Moore presents to [facility]  History of Present Illness  80-year-old white female here for chronic disease management follow-up visit.    Pt has right shoulder replacement surgery by Dr Whipple on 5/1/25.    No SI/HI. Pt refused rexulti.  Provided Tel # for [facility] intensive outpt program.    Patient states she is not satisfied with her psychologist.  Also provided patient telephone numbers for local psychiatrist who are excepting patients.    Reviewed MRI of the shoulder report the patient brought with her which revealed full tear of the right supraspinatus tendon with some granulation around it.  Patient denied any trauma to the right shoulder that she could recall.  Hypertension    Hyperlipidemia        Objective   Vital Signs:  /80 (BP Location: Left arm, Patient Position: Sitting, Cuff Size: Adult)   Pulse 72   Temp 98 °F (36.7 °C) (Temporal)   Resp 16   Ht 165.1 cm (65\")   Wt 86.1 kg (189 lb 12.8 oz)   SpO2 96%   BMI 31.58 kg/m²   Estimated body mass index is 31.58 kg/m² as calculated from the following:    Height as of this encounter: 165.1 cm (65\").    Weight as of this encounter: 86.1 kg (189 lb 12.8 oz).               Physical Exam  Constitutional:       Appearance: Normal appearance.   HENT:      Head: Normocephalic and atraumatic.   Eyes:      Conjunctiva/sclera: Conjunctivae normal.   Cardiovascular:      Rate and Rhythm: Normal rate and regular rhythm.      Heart sounds: Normal heart sounds.   Pulmonary:      Effort: Pulmonary effort is normal.      Breath sounds: Normal breath sounds.   Abdominal:      General: Bowel sounds are normal.      Palpations: Abdomen is soft.      Comments: Non-tender   Musculoskeletal:      Comments: No significant right " shoulder pain with ROM.   Skin:     General: Skin is warm.   Neurological:      General: No focal deficit present.      Mental Status: She is alert and oriented to person, place, and time.   Psychiatric:         Mood and Affect: Mood normal.         Behavior: Behavior normal.        Result Review :    The following data was reviewed by: ALVARO Alarcon on 04/11/2025:  CMP          10/1/2024    09:58 12/30/2024    16:43   CMP   Glucose 94  91    BUN 25  17    Creatinine 1.06  0.96    EGFR 55.9  63.0    Sodium 138  142    Potassium 4.4  4.1    Chloride 101  106    Calcium 9.7  9.8    Total Protein 6.7  7.2    Albumin 4.5  4.2    Globulin 2.2  3.0    Total Bilirubin 0.3  0.5    Alkaline Phosphatase 96  92    AST (SGOT) 21  22    ALT (SGPT) 15  16    Albumin/Globulin Ratio 2.0  1.4    BUN/Creatinine Ratio 23.6  17.7    Anion Gap  10.0      CBC          10/1/2024    09:58 12/30/2024    16:43   CBC   WBC 7.33  6.27    RBC 4.55  4.48    Hemoglobin 14.3  13.6    Hematocrit 43.2  40.7    MCV 94.9  90.8    MCH 31.4  30.4    MCHC 33.1  33.4    RDW 12.3  12.0    Platelets 240  321      Lipid Panel          10/1/2024    09:58   Lipid Panel   Total Cholesterol 188    Triglycerides 140    HDL Cholesterol 70    VLDL Cholesterol 24    LDL Cholesterol  94      TSH          10/1/2024    09:58   TSH   TSH 2.480            Data reviewed : Consultant notes cardiology consult note with Dr. Pedro from January 2025 for treatment of hypertension.             Assessment and Plan     Diagnoses and all orders for this visit:    1. Moderate episode of recurrent major depressive disorder (Primary)  Assessment & Plan:  Stable.  However patient states therapy with a psychologist did not help.  Patient desires alternative.  Instructed patient to follow-up with [facility] for intensive outpatient program.  Patient denied any suicidal homicidal ideation.      2. Chronic right shoulder pain  Assessment &  Plan:  Awaiting right shoulder replacement surgery to be done soon.      3. Essential hypertension  Assessment & Plan:  Hypertension is stable and controlled  Continue current treatment regimen.  Dietary sodium restriction.  Weight loss.  Regular aerobic exercise.  Ambulatory blood pressure monitoring.  Blood pressure will be reassessed in 3 months.  Discussed the importance of healthy diet, nutrition, and lifestyle. Recommend low salt, fat/cholesterol diet and avoid concentrated sweets. Encouraged DASH diet along with fresh fruits & vegetables and low fat dairy products. Counseled patient to exercise/walk as tolerated. Avoid tobacco and alcohol use.      Orders:  -     amLODIPine (NORVASC) 5 MG tablet; Take 1 tablet by mouth Daily.  Dispense: 90 tablet; Refill: 1  -     Comprehensive metabolic panel  -     Urinalysis With Microscopic - Urine, Clean Catch  -     CBC w AUTO Differential    4. Gastroesophageal reflux disease with esophagitis without hemorrhage  Assessment & Plan:  Stable    Orders:  -     famotidine (PEPCID) 20 MG tablet; Take 1 tablet by mouth 2 (Two) Times a Day As Needed for Heartburn.  Dispense: 60 tablet; Refill: 5    5. Anxiety and depression  Assessment & Plan:  Stable.  Patient denied any suicidal homicidal ideation.    Instructed patient to follow-up with [facility] intensive outpatient program.  Patient refused any change in the mental health medication regimen.    Orders:  -     FLUoxetine (PROzac) 10 MG capsule; Take 1 capsule by mouth Daily.  Dispense: 90 capsule; Refill: 3    6. Mixed hyperlipidemia  Assessment & Plan:   Lipid abnormalities are stable    Plan:  Continue same medication/s without change.      Discussed medication dosage, use, side effects, and goals of treatment in detail.    Counseled patient on lifestyle modifications to help control hyperlipidemia.     Patient Treatment Goals:   LDL goal is less than 70    Followup in 6 months.  Discussed  the importance of healthy diet, nutrition, and lifestyle. Recommend low salt, fat/cholesterol diet and avoid concentrated sweets. Encouraged DASH diet along with fresh fruits & vegetables and low fat dairy products. Counseled patient to exercise/walk as tolerated. Avoid tobacco and alcohol use.      Orders:  -     rosuvastatin (CRESTOR) 5 MG tablet; Take 1 tablet by mouth Daily.  Dispense: 90 tablet; Refill: 1    7. Yeast dermatitis  -     nystatin (MYCOSTATIN) 295543 UNIT/GM ointment; Apply 1 Application topically to the appropriate area as directed 2 (Two) Times a Day.  Dispense: 30 g; Refill: 1    8. Environmental and seasonal allergies  Assessment & Plan:  Stable    Orders:  -     fluticasone (FLONASE) 50 MCG/ACT nasal spray; Administer 2 sprays into the nostril(s) as directed by provider Daily.  Dispense: 48 g; Refill: 3  -     cetirizine (zyrTEC) 5 MG tablet; Take 1 tablet by mouth Every Night.  Dispense: 90 tablet; Refill: 3    9. Hypothyroidism, adult  -     levothyroxine (Synthroid) 50 MCG tablet; Take 1 tablet by mouth Daily.  Dispense: 90 tablet; Refill: 1  -     TSH  -     T4, free    10. Chronic idiopathic constipation  Assessment & Plan:  Stable.  Educated patient regarding adequate intake of fluids in the form of water & fiber in the form of fruits/vegetables, patient voiced understanding.      Orders:  -     docusate sodium (Colace) 100 MG capsule; Take 1 capsule by mouth Daily.  Dispense: 90 capsule; Refill: 3    11. Osteopenia, unspecified location  Assessment & Plan:  Continue calcium and vitamin D pills.    Orders:  -     Calcium Carbonate 500 MG chewable tablet; Chew 1 tablet 2 (Two) Times a Day.  Dispense: 180 each; Refill: 3    12. Nocturnal leg cramps    13. Nontraumatic complete tear of right rotator cuff  Comments:  Supraspinatus  Assessment & Plan:  Counseled patient on limiting right shoulder use until surgery.      Other orders  -     multivitamin with minerals tablet tablet; Take 1  tablet by mouth Daily.  Dispense: 90 tablet; Refill: 3  -     acetaminophen (TYLENOL) 500 MG tablet; Take 1 tablet by mouth Every 6 (Six) Hours As Needed for Mild Pain.  Dispense: 90 tablet; Refill: 0      Instructed patient to contact her cardiologist office for cardiac clearance for upcoming right shoulder replacement surgery.    All chronic conditions have been addressed and treated by the practice or other specialists. Medications have been reconciled and refilled as appropriate. Reiterated compliance and timely follow up appointments. Side effects of all new and old medications reviewed with the patient and patient willing to accept all risks involved. Advised RTO if no improvement or worsening of symptoms or if any new complaints arise. Patient advised to follow up with clinic or call after diagnostic tests, if patient does not hear from office 3 days after the test completion.          Follow Up     Return in about 2 months (around 6/11/2025) for Next scheduled follow up.  Patient was given instructions and counseling regarding her condition or for health maintenance advice. Please see specific information pulled into the AVS if appropriate.

## 2025-04-11 NOTE — ASSESSMENT & PLAN NOTE
Continue calcium and vitamin D pills.   Your discharge plan includes access to our free Care Transitions program.     As your Care Transition Nurse I will contact you via phone within 2 business days after your discharge from the hospital. Please have your discharge instructions and medications ready for review. Over the next 30 days I will call you at least once a week. I am able to assist with arranging follow-up appointments. I have direct contact with your physicians and will alert them with any health or medication concerns or relay your questions.     Your Care Transitions Nurse is Jessica Damon RN.      If you have any questions or concerns after your discharge and prior to our first call, you can reach me at [phone].

## 2025-04-14 ENCOUNTER — TREATMENT (OUTPATIENT)
Dept: PHYSICAL THERAPY | Facility: CLINIC | Age: 75
End: 2025-04-14
Payer: MEDICARE

## 2025-04-14 ENCOUNTER — TELEPHONE (OUTPATIENT)
Dept: CARDIOLOGY | Age: 75
End: 2025-04-14
Payer: MEDICARE

## 2025-04-14 DIAGNOSIS — G89.29 CHRONIC RIGHT SHOULDER PAIN: Primary | ICD-10-CM

## 2025-04-14 DIAGNOSIS — M75.121 COMPLETE TEAR OF RIGHT ROTATOR CUFF, UNSPECIFIED WHETHER TRAUMATIC: ICD-10-CM

## 2025-04-14 DIAGNOSIS — M25.511 CHRONIC RIGHT SHOULDER PAIN: Primary | ICD-10-CM

## 2025-04-14 PROCEDURE — 97530 THERAPEUTIC ACTIVITIES: CPT | Performed by: PHYSICAL THERAPIST

## 2025-04-14 PROCEDURE — 97110 THERAPEUTIC EXERCISES: CPT | Performed by: PHYSICAL THERAPIST

## 2025-04-14 PROCEDURE — 97164 PT RE-EVAL EST PLAN CARE: CPT | Performed by: PHYSICAL THERAPIST

## 2025-04-14 NOTE — PROGRESS NOTES
Re-Assessment / Re-Certification  [facility]  [address], [address], [place] [address]    Patient: Aydee Moore   : 1950  Diagnosis/ICD-10 Code:  Chronic right shoulder pain [M25.511, G89.29]  Referring practitioner: Jordan Reeder*  Date of Initial Visit: 2025  Today's Date: 2025  Patient seen for 2 sessions      Subjective:   Subjective Questionnaire: QuickDASH: 79.55%  Clinical Progress: worse  Home Program Compliance: Yes  Treatment has included: therapeutic exercise, neuromuscular re-education, and therapeutic activity    Subjective Evaluation    History of Present Illness  Mechanism of injury: Patient is scheduled to undergo (R) reverse TSA on 2025 by Dr. Anaya. Shoulder pain and function have been worsening, and it's getting much harder to sleep.  Hurts too much sew.     MRI (R) shoulder 03/15/2025:  Complete, full thickness tear of (R) supraspinatus. Mild to moderate tendinopathy of infraspinatus tendon. Mild tendinopathy of subscapularis tendon without tear. Mild tendinopathy of LHB tendon with partial-thickness tear.     Pain  Pain scale: 2-3/10.  At worst pain ratin  Location: (R) shoulder and upper arm  Aggravating factors: movement, lifting, overhead activity, sleeping, outstretched reach, repetitive movement and prolonged positioning  Progression: worsening    Social Support  Lives with: spouse    Hand dominance: right    Diagnostic Tests  MRI studies: abnormal         Objective          Active Range of Motion     Right Shoulder   Flexion: 165 degrees with pain  Extension: 57 degrees with pain  Abduction: 166 degrees with pain  External rotation 90°: 77 degreeswith pain  Internal rotation 90°: 51 degrees with pain    Strength/Myotome Testing     Right Shoulder     Planes of Motion   Flexion: 4-   Extension: 4   Abduction: 3+   External rotation at 0°: 3+   Internal rotation at 0°: 4     Right Elbow   Flexion: 4+  Extension: 4       Assessment & Plan       Assessment  Assessment details: Since initial PT session, patient has undergone (R) shoulder MRI and is has been determined that she has a full thickness tear of the (R) supraspinatus with tendon retraction.  She has been scheduled to undergo (R) reverse TSA on 05/01/2025 by Dr. Anaya. She reports her (R) shoulder symptoms are worsening and she is having progressively more difficulty sleeping and using her (R) UE for ADLs and recreational activities such as sewing.  She demonstrates a mild decrease in (R) shoulder AROM since initial evaluation.  Her QuickDASH score remains elevated at 79.55% indicating a profoundly limited perceived level of functional use of dominant (R) UE.  She will benefit from compliance with issued (R) shoulder HEP up until she has surgery on 05/01/2025.  All questions are answered to patient's satisfaction at this time.    Goals  Plan Goals: NEW GOALS ESTABLISHED TODAY:  1. Patient will be independent with prescribed (R) shoulder HEP to work on leading up to surgery - MET  2. Patient will be educated regarding (R) shoulder reverse TSA procedure and expected course of rehab - MET      Progress toward previous goals: Not Met   Recommendations: Continue with recommendations - work on prescribed (R) shoulder HEP until reverse TSA surgery on 05/01/2025  Timeframe: 3 weeks  Prognosis to achieve goals: good    PT Signature: Grace Blancas PT, DPT, [facility] License # [id number]      Based upon review of the patient's progress and continued therapy plan, it is my medical opinion that Aydee Moore should continue physical therapy treatment at [facility]  [address] [place] [id number] [phone].    Signature: __________________________________  Jordan Reeder APRN    TIMED:  Manual Therapy:         mins  38969;  Therapeutic Exercise:    10     mins  73005;     Neuromuscular Enedelia:        mins   34304;    Therapeutic Activity:     15     mins  81322;     Gait Training:           mins  86814;     Ultrasound:          mins  84584;    Work Conditioning             mins 02907    UNTIMED:  Electrical Stimulation:         mins  28827 ( );  Dry Needling          mins 20561    Timed Treatment:   25   mins   Total Treatment:     35   mins    Please sign and return via fax to [phone]. Thank you, [facility].

## 2025-04-14 NOTE — TELEPHONE ENCOUNTER
Okay to proceed as scheduled.  Please inform patient that she needs to go back on aspirin as soon as possible after her shoulder surgery.

## 2025-04-14 NOTE — TELEPHONE ENCOUNTER
Pt called to say that She wanted to let Dr. Pedro know that She  is having a Rear Shoulder replacement on 5/1/25 and she has Stopped taking Aspirin.      Is there anything she needs to do further?      Please advise

## 2025-04-15 ENCOUNTER — OFFICE VISIT (OUTPATIENT)
Age: 75
End: 2025-04-15
Payer: MEDICARE

## 2025-04-15 DIAGNOSIS — F34.1 PERSISTENT DEPRESSIVE DISORDER: Primary | ICD-10-CM

## 2025-04-15 RX ORDER — ASPIRIN 81 MG/1
81 TABLET, COATED ORAL DAILY
Qty: 90 TABLET | Refills: 1 | Status: SHIPPED | OUTPATIENT
Start: 2025-04-15

## 2025-04-15 NOTE — PROGRESS NOTES
"Date: April 15, 2025  Time In: 2 pm  Time Out: 3 pm      Aydee Moore is a 74 y.o. female who presents today for individual therapy session at [facility] with DAVID Warren LCSW.     Patient Chief Complaint: Anxiety and depression due to being in chronic pain and fears re her upcoming shoulder surgery on May 1st.    Clinical Maneuvering/Intervention: ACT, Solution focused techniques, CBT:  Engaged cl to understand where her anxiety is coming from \"the unknown.\"  Normalized and empathized, encouraged cl to get as much info as she can to help with what to anticipate.  Processed cl's process with letting her  know how much she is going to need his help (cl's role is to take care of him). Encouraged cl to have a discussion with him about what will be his role in her care.  Engaged cl in a discussion of what she plans of doing during her recovery (3 months).  Cl is reading 1619 and will continue this endeavor.       Assisted patient in processing above session content; acknowledged and normalized patient’s thoughts, feelings, and concerns. Discussed triggers associated with patient's mental health symptoms. Also discussed coping skills for patient to implement.    Allowed patient to freely discuss issues without interruption or judgment. Provided safe, confidential environment to facilitate the development of positive therapeutic relationship and encourage open, honest communication. Assisted patient in identifying risk factors which would indicate the need for higher level of care including thoughts to harm self or others and/or self-harming behavior and encouraged patient to contact this office, call 911, or present to the nearest emergency room should any of these events occur. Discussed crisis intervention services and means to access.     Assessment   Patient appears to maintain relative stability as compared to their baseline. However, patient continues to struggle with mental " health symptoms which continues to cause impairment in important areas of functioning. A result, they can be reasonably expected to continue to benefit from treatment and would likely be at increased risk for decompensation otherwise.    MENTAL STATUS EXAM   General Appearance:  Cleanly groomed and dressed  Eye Contact:  Good eye contact  Attitude:  Cooperative  Motor Activity:  Normal gait, posture  Muscle Strength:  Normal  Speech:  Normal rate, tone, volume  Language:  Spontaneous  Mood and affect:  Normal, pleasant, depressed and anxious  Hopelessness:  Denies  Loneliness: Denies  Thought Process:  Logical  Associations/ Thought Content:  No delusions  Hallucinations:  None  Suicidal Ideations:  Not present  Homicidal Ideation:  Not present  Sensorium:  Alert  Orientation:  Person, place, time and situation  Immediate Recall, Recent, and Remote Memory:  Intact  Attention Span/ Concentration:  Good  Fund of Knowledge:  Appropriate for age and educational level  Intellectual Functioning:  Average range and above average  Insight:  Good  Judgement:  Good  Reliability:  Good  Impulse Control:  Good          Patient's Support Network Includes:  , sister    Progress toward goal: Not at goal           Plan     Recommendations: self care    Patient will continue in individual outpatient therapy with focus on improved functioning and coping skills, maintaining stability, and avoiding decompensation and the need for higher level of care.    Patient will adhere to any medication regimens as prescribed and report any side effects. Patient will contact this office, call 911 or present to the nearest emergency room should suicidal or homicidal ideations occur.              VISIT DIAGNOSIS:     ICD-10-CM ICD-9-CM   1. Persistent depressive disorder  F34.1 300.4            This document has been electronically signed by DAVID Warren LCSW    April 15, 2025 14:16 EDT      Part of this note may be an electronic  transcription/translation of spoken language to printed text using the Dragon Dictation System.

## 2025-04-18 LAB
ALBUMIN SERPL-MCNC: 4.2 G/DL (ref 3.5–5.2)
ALBUMIN/GLOB SERPL: 1.7 G/DL
ALP SERPL-CCNC: 103 U/L (ref 39–117)
ALT SERPL-CCNC: 8 U/L (ref 1–33)
APPEARANCE UR: CLEAR
AST SERPL-CCNC: 16 U/L (ref 1–32)
BACTERIA #/AREA URNS HPF: ABNORMAL /HPF
BASOPHILS # BLD AUTO: 0.04 10*3/MM3 (ref 0–0.2)
BASOPHILS NFR BLD AUTO: 0.6 % (ref 0–1.5)
BILIRUB SERPL-MCNC: 0.5 MG/DL (ref 0–1.2)
BILIRUB UR QL STRIP: NEGATIVE
BUN SERPL-MCNC: 23 MG/DL (ref 8–23)
BUN/CREAT SERPL: 22.1 (ref 7–25)
CALCIUM SERPL-MCNC: 9.9 MG/DL (ref 8.6–10.5)
CASTS URNS MICRO: ABNORMAL
CHLORIDE SERPL-SCNC: 103 MMOL/L (ref 98–107)
CO2 SERPL-SCNC: 27.1 MMOL/L (ref 22–29)
COLOR UR: YELLOW
CREAT SERPL-MCNC: 1.04 MG/DL (ref 0.57–1)
EGFRCR SERPLBLD CKD-EPI 2021: 57.2 ML/MIN/1.73
EOSINOPHIL # BLD AUTO: 0.17 10*3/MM3 (ref 0–0.4)
EOSINOPHIL NFR BLD AUTO: 2.5 % (ref 0.3–6.2)
EPI CELLS #/AREA URNS HPF: ABNORMAL /HPF
ERYTHROCYTE [DISTWIDTH] IN BLOOD BY AUTOMATED COUNT: 12.7 % (ref 12.3–15.4)
GLOBULIN SER CALC-MCNC: 2.5 GM/DL
GLUCOSE SERPL-MCNC: 95 MG/DL (ref 65–99)
GLUCOSE UR QL STRIP: NEGATIVE
HCT VFR BLD AUTO: 42.5 % (ref 34–46.6)
HGB BLD-MCNC: 13.8 G/DL (ref 12–15.9)
HGB UR QL STRIP: NEGATIVE
IMM GRANULOCYTES # BLD AUTO: 0.02 10*3/MM3 (ref 0–0.05)
IMM GRANULOCYTES NFR BLD AUTO: 0.3 % (ref 0–0.5)
KETONES UR QL STRIP: NEGATIVE
LEUKOCYTE ESTERASE UR QL STRIP: ABNORMAL
LYMPHOCYTES # BLD AUTO: 1.8 10*3/MM3 (ref 0.7–3.1)
LYMPHOCYTES NFR BLD AUTO: 26.8 % (ref 19.6–45.3)
MCH RBC QN AUTO: 29.7 PG (ref 26.6–33)
MCHC RBC AUTO-ENTMCNC: 32.5 G/DL (ref 31.5–35.7)
MCV RBC AUTO: 91.6 FL (ref 79–97)
MONOCYTES # BLD AUTO: 0.51 10*3/MM3 (ref 0.1–0.9)
MONOCYTES NFR BLD AUTO: 7.6 % (ref 5–12)
NEUTROPHILS # BLD AUTO: 4.17 10*3/MM3 (ref 1.7–7)
NEUTROPHILS NFR BLD AUTO: 62.2 % (ref 42.7–76)
NITRITE UR QL STRIP: NEGATIVE
NRBC BLD AUTO-RTO: 0 /100 WBC (ref 0–0.2)
PH UR STRIP: 6.5 [PH] (ref 5–8)
PLATELET # BLD AUTO: 239 10*3/MM3 (ref 140–450)
POTASSIUM SERPL-SCNC: 4.5 MMOL/L (ref 3.5–5.2)
PROT SERPL-MCNC: 6.7 G/DL (ref 6–8.5)
PROT UR QL STRIP: NEGATIVE
RBC # BLD AUTO: 4.64 10*6/MM3 (ref 3.77–5.28)
RBC #/AREA URNS HPF: ABNORMAL /HPF
SODIUM SERPL-SCNC: 139 MMOL/L (ref 136–145)
SP GR UR STRIP: 1.02 (ref 1–1.03)
T4 FREE SERPL-MCNC: 1.39 NG/DL (ref 0.92–1.68)
TSH SERPL DL<=0.005 MIU/L-ACNC: 2.72 UIU/ML (ref 0.27–4.2)
UROBILINOGEN UR STRIP-MCNC: ABNORMAL MG/DL
WBC # BLD AUTO: 6.71 10*3/MM3 (ref 3.4–10.8)
WBC #/AREA URNS HPF: ABNORMAL /HPF

## 2025-04-21 ENCOUNTER — PRE-ADMISSION TESTING (OUTPATIENT)
Dept: PREADMISSION TESTING | Facility: HOSPITAL | Age: 75
End: 2025-04-21
Payer: MEDICARE

## 2025-04-21 ENCOUNTER — HOSPITAL ENCOUNTER (OUTPATIENT)
Dept: GENERAL RADIOLOGY | Facility: HOSPITAL | Age: 75
Discharge: HOME | End: 2025-04-21
Payer: MEDICARE

## 2025-04-21 ENCOUNTER — TELEPHONE (OUTPATIENT)
Dept: FAMILY MEDICINE CLINIC | Facility: CLINIC | Age: 75
End: 2025-04-21
Payer: MEDICARE

## 2025-04-21 ENCOUNTER — RESULTS FOLLOW-UP (OUTPATIENT)
Dept: FAMILY MEDICINE CLINIC | Facility: CLINIC | Age: 75
End: 2025-04-21
Payer: MEDICARE

## 2025-04-21 VITALS
HEART RATE: 63 BPM | OXYGEN SATURATION: 97 % | TEMPERATURE: 98.8 F | BODY MASS INDEX: 31.47 KG/M2 | DIASTOLIC BLOOD PRESSURE: 68 MMHG | WEIGHT: 188.9 LBS | SYSTOLIC BLOOD PRESSURE: 149 MMHG | HEIGHT: 65 IN | RESPIRATION RATE: 16 BRPM

## 2025-04-21 DIAGNOSIS — N30.00 ACUTE CYSTITIS WITHOUT HEMATURIA: Primary | ICD-10-CM

## 2025-04-21 LAB
BACTERIA UR QL AUTO: ABNORMAL /HPF
BILIRUB UR QL STRIP: NEGATIVE
CLARITY UR: ABNORMAL
COLOR UR: YELLOW
GLUCOSE UR STRIP-MCNC: NEGATIVE MG/DL
HGB UR QL STRIP.AUTO: NEGATIVE
HYALINE CASTS UR QL AUTO: ABNORMAL /LPF
KETONES UR QL STRIP: NEGATIVE
LEUKOCYTE ESTERASE UR QL STRIP.AUTO: ABNORMAL
NITRITE UR QL STRIP: NEGATIVE
PH UR STRIP.AUTO: 7 [PH] (ref 5–8)
PROT UR QL STRIP: NEGATIVE
RBC # UR STRIP: ABNORMAL /HPF
REF LAB TEST METHOD: ABNORMAL
SP GR UR STRIP: 1.02 (ref 1–1.03)
SQUAMOUS #/AREA URNS HPF: ABNORMAL /HPF
UROBILINOGEN UR QL STRIP: ABNORMAL
WBC # UR STRIP: ABNORMAL /HPF

## 2025-04-21 PROCEDURE — 73030 X-RAY EXAM OF SHOULDER: CPT

## 2025-04-21 PROCEDURE — 71046 X-RAY EXAM CHEST 2 VIEWS: CPT

## 2025-04-21 PROCEDURE — 81001 URINALYSIS AUTO W/SCOPE: CPT | Performed by: ORTHOPAEDIC SURGERY

## 2025-04-21 RX ORDER — NITROFURANTOIN 25; 75 MG/1; MG/1
100 CAPSULE ORAL 2 TIMES DAILY
Qty: 10 CAPSULE | Refills: 0 | Status: SHIPPED | OUTPATIENT
Start: 2025-04-21 | End: 2025-04-24

## 2025-04-21 NOTE — DISCHARGE INSTRUCTIONS
Take the following medications the morning of surgery:  FAMOTIDINE, LEVOTHYROXINE, BRING INHALER      If you are on prescription narcotic pain medication to control your pain you may also take that medication the morning of surgery.      General Instructions:     Do not eat solid food after midnight the night before surgery.  Clear liquids day of surgery are allowed but must be stopped at least two hours before your hospital arrival time.       Allowed clear liquids      Water, sodas, and tea or coffee with no cream or milk added.       12 to 20 ounces of a clear liquid that contains carbohydrates is recommended.  If non-diabetic, have Gatorade or Powerade.  If diabetic, have G2 or Powerade Zero.     Do not have liquids red in color.  Do not consume chicken, beef, pork or vegetable broth or bouillon cubes of any variety as they are not considered clear liquids and are not allowed.      Infants may have breast milk up to four hours before surgery.  Infants drinking formula may drink formula up to six hours before surgery.   Patients who avoid smoking, chewing tobacco and alcohol for 4 weeks prior to surgery have a reduced risk of post-operative complications.  Quit smoking as many days before surgery as you can.  Do not smoke, use chewing tobacco or drink alcohol the day of surgery.   If applicable bring your C-PAP/ BI-PAP machine in with you to preop day of surgery.  Bring any papers given to you in the doctor’s office.  Wear clean comfortable clothes.  Do not wear contact lenses, false eyelashes or make-up.  Bring a case for your glasses.   Bring crutches or walker if applicable.  Remove all piercings.  Leave jewelry and any other valuables at home.  Hair extensions with metal clips must be removed prior to surgery.  The Pre-Admission Testing nurse will instruct you to bring medications if unable to obtain an accurate list in Pre-Admission Testing.    Day of surgery you will need to let the preoperative nurse know  the last time you took each of your medications.  To ensure a safe environment for patients and staff, we kindly ask that children under the age of 16 not accompany patients.  If you must bring a dependent child or dependent adult please ensure a responsible adult, other than yourself, is present to supervise them.          Day of surgery:  Your arrival time is approximately two hours before your scheduled surgery time.  Upon arrival, a Pre-op nurse and Anesthesiologist will review your health history, obtain vital signs, and answer questions you may have.  The only belongings needed at this time will be a list of your home medications and if applicable your C-PAP/BI-PAP machine.  A Pre-op nurse will start an IV and you may receive medication in preparation for surgery, including something to help you relax.     Please be aware that surgery does come with discomfort.  We want to make every effort to control your discomfort so please discuss any uncontrolled symptoms with your nurse.   Your doctor will most likely have prescribed pain medications.      If you are going home after surgery you will receive individualized written care instructions before being discharged.  A responsible adult must drive you to and from the hospital on the day of your surgery and ideally stay with you through the night.  Discharge prescriptions can be filled by the hospital pharmacy during regular pharmacy hours.  If you are having surgery late in the day/evening your prescription may be e-prescribed to your pharmacy.  Please verify your pharmacy hours or chose a 24 hour pharmacy to avoid not having access to your prescription because your pharmacy has closed for the day.    If you are staying overnight following surgery, you will be transported to your hospital room following the recovery period.  [facility] has all private rooms.    If you have any questions please call Pre-Admission Testing at  [phone].  Deductibles and co-payments are collected on the day of service. Please be prepared to pay the required co-pay, deductible or deposit on the day of service as defined by your plan.    Call your surgeon immediately if you experience any of the following symptoms:  Sore Throat  Shortness of Breath or difficulty breathing  Cough  Chills  Body soreness or muscle pain  Headache  Fever  New loss of taste or smell  Do not arrive for your surgery ill.  Your procedure will need to be rescheduled to another time.  You will need to call your physician before the day of surgery to avoid any unnecessary exposure to hospital staff as well as other patients.        PREVENTING INFECTION IN SHOULDER SURGICAL SITES     C. acnes is a bacteria that lives deep within follicles and pores of the skin. It is found in large numbers on the skin of the face, axilla (armpit), chest and back and is the primary bacteria to cause a surgical site infection after shoulder surgery.      Use of a Benzoyl Peroxide solution prior to shoulder surgery decreases C. acnes and reduces post-op infections.   Your surgeon has ordered 5% Benzoyl Peroxide wash to be used three times prior to your surgery.     Please read the following instructions carefully and bring this form with you the day of surgery.     General bathing instructions starting two days before your surgery:    Shower using a fresh bar of anti-bacterial soap (such as Dial) and clean washcloth.  Pay special attention to the neck, shoulder and armpit area.   Wash your hair as usual with your regular shampoo.   Rinse hair and body thoroughly with warm water (not hot water) to remove shampoo and residue.   Dry with a clean towel.              Sleep in a clean bed with clean clothing.  Do not allow pets to sleep with you.     For 2 days before surgery, avoid shaving with a razor because the razor can irritate skin and make it easier to develop an infection.    Any areas of open skin  can increase the risk of a post-operative wound infection by allowing bacteria to enter and travel throughout the body.  Notify your surgeon if you have any skin wounds / rashes even if it is not near the expected surgical site.  The area will need assessed to determine if surgery should be delayed until it is healed.      First application of 5% Benzoyl Peroxide Wash two nights before surgery:                                                                Wash neck, shoulder (front, back, side) and armpit   with warm water, rinse and dry - see picture.  Gently wash the same areas with the Benzoyl Peroxide   cleanser going away from the neck for 10-20 seconds.   Work into a full lather and leave on the skin for   2 minutes for greatest effect.  Rinse thoroughly with warm water, not hot water.                     Pat dry with a clean towel.                                                            Wash your hands thoroughly.  Do not apply lotion, powder, perfume or deodorant.   Put on clean clothes.    Second application the night before surgery:  Repeat the above steps.        Third application morning of surgery:  Repeat the above steps.    Due to shoulder pain or decreased range of motion of your shoulder and arm, you may need assistance washing under the arm or the back portion of your shoulder.     Avoid further washing the areas of the skin treated with Benzoyl Peroxide for at least 1 hour.    For your convenience, you may purchase Benzoyl Peroxide at [facility].     Warning:  Let your physician know if you are allergic to Benzoyl Peroxide or have very sensitive skin and cannot use it.   Stop using and contact your surgeon if you experience any excessive scaling, itching, swelling, skin irritation or other signs of a reaction.  Keep out of eyes, ears, nose and mouth.  Do not apply to sunburned, irritated or broken area on the skin.  Avoid unwanted problems with bleaching effect by following  these tips:  Wash hands after each use.  Avoid contact with hair, clothing, furnishings or carpeting.  Wear clean, old t-shirt or clothing to bed.   Use clean, old white pillow cases and sheets to avoid discoloring your bed linens.                                                                                                                                                                                                                                                                                   Please complete the checklist below, bring it with you to the hospital                               the day of surgery and give to the Pre-op Nurse     Preoperative Skin Prep Checklist        Patient Name Label             Enter dates and ?  boxes to indicate completed    Surgery Date: _______________ Regular Shower  Benzoyl Peroxide Wash   First Application:  2 days before_______________  (Stop shaving all body parts)           Morning or Evening                        Evening    Second Application:  1 day  before________________        Morning or Evening                          Evening   Third Application:  Day of Surgery______________                           Morning                   Morning

## 2025-04-21 NOTE — TELEPHONE ENCOUNTER
"Relay     \"Results area available for viewing on [employer].  Please  the antibiotic that was called into your pharmacy.  Any questions or concerns please call the office.  A message to your [employer] will be sent as well.\"                "

## 2025-04-23 ENCOUNTER — TELEPHONE (OUTPATIENT)
Dept: ORTHOPEDIC SURGERY | Facility: HOSPITAL | Age: 75
End: 2025-04-23
Payer: MEDICARE

## 2025-04-23 NOTE — TELEPHONE ENCOUNTER
Called and spoke with Ms. Moore to see if she would be interested in going home after her upcoming procedure after meeting goals. She said she was told she would stay overnight and see PT prior to D/C. Explained that OT would see her before discharge whether she stayed overnight or left the same day. She also said she was worried about pain control. Educated about the block she would receive. She said she preferred to stay overnight. Patient to be an overnight stay at this time. MD aware. Ms. Moore was given my contact information should she need anything.

## 2025-04-24 ENCOUNTER — OFFICE VISIT (OUTPATIENT)
Dept: FAMILY MEDICINE CLINIC | Facility: CLINIC | Age: 75
End: 2025-04-24
Payer: MEDICARE

## 2025-04-24 ENCOUNTER — TREATMENT (OUTPATIENT)
Dept: PHYSICAL THERAPY | Facility: CLINIC | Age: 75
End: 2025-04-24
Payer: MEDICARE

## 2025-04-24 VITALS
SYSTOLIC BLOOD PRESSURE: 140 MMHG | TEMPERATURE: 97.8 F | HEART RATE: 64 BPM | WEIGHT: 187.8 LBS | DIASTOLIC BLOOD PRESSURE: 80 MMHG | BODY MASS INDEX: 31.29 KG/M2 | OXYGEN SATURATION: 98 % | HEIGHT: 65 IN

## 2025-04-24 DIAGNOSIS — R30.0 DYSURIA: Primary | ICD-10-CM

## 2025-04-24 DIAGNOSIS — M47.26 OSTEOARTHRITIS OF SPINE WITH RADICULOPATHY, LUMBAR REGION: ICD-10-CM

## 2025-04-24 DIAGNOSIS — R26.2 DIFFICULTY WALKING: ICD-10-CM

## 2025-04-24 DIAGNOSIS — G89.29 CHRONIC LEFT-SIDED LOW BACK PAIN WITH LEFT-SIDED SCIATICA: Primary | ICD-10-CM

## 2025-04-24 DIAGNOSIS — I10 ESSENTIAL HYPERTENSION: Chronic | ICD-10-CM

## 2025-04-24 DIAGNOSIS — M54.42 CHRONIC LEFT-SIDED LOW BACK PAIN WITH LEFT-SIDED SCIATICA: Primary | ICD-10-CM

## 2025-04-24 LAB
BILIRUB BLD-MCNC: NEGATIVE MG/DL
CLARITY, POC: CLEAR
COLOR UR: YELLOW
EXPIRATION DATE: NORMAL
GLUCOSE UR STRIP-MCNC: NEGATIVE MG/DL
KETONES UR QL: NEGATIVE
LEUKOCYTE EST, POC: NEGATIVE
Lab: NORMAL
NITRITE UR-MCNC: NEGATIVE MG/ML
PH UR: 7 [PH] (ref 5–8)
PROT UR STRIP-MCNC: NEGATIVE MG/DL
RBC # UR STRIP: NEGATIVE /UL
SP GR UR: 1.02 (ref 1–1.03)
UROBILINOGEN UR QL: NORMAL

## 2025-04-24 PROCEDURE — 97530 THERAPEUTIC ACTIVITIES: CPT | Performed by: PHYSICAL THERAPIST

## 2025-04-24 PROCEDURE — 97162 PT EVAL MOD COMPLEX 30 MIN: CPT | Performed by: PHYSICAL THERAPIST

## 2025-04-24 PROCEDURE — 97110 THERAPEUTIC EXERCISES: CPT | Performed by: PHYSICAL THERAPIST

## 2025-04-24 RX ORDER — TRIAMCINOLONE ACETONIDE 1 MG/G
1 CREAM TOPICAL AS NEEDED
COMMUNITY

## 2025-04-24 RX ORDER — SULFAMETHOXAZOLE AND TRIMETHOPRIM 800; 160 MG/1; MG/1
1 TABLET ORAL 2 TIMES DAILY
COMMUNITY
Start: 2025-04-21

## 2025-04-24 NOTE — PROGRESS NOTES
"Chief Complaint  Urinary Tract Infection (Started this morning )    Subjective          Aydee Moore presents to [facility]              History of Present Illness  The patient is a 74-year-old female who presents today for UTI symptoms.    She is scheduled for right shoulder replacement surgery on 05/01/2025. UA on 4/21/25 which was positive for leukocytes and she states she was instructed by preoperative testing to start Bactrim which she initiated 2 days ago. She reports an unusual sensation during urination but does not experience any associated pain. A few days prior, skin irritation in the genital area was noted, which was alleviated with nystatin cream. Currently, Bactrim is being taken, and Macrobid has been discontinued. A history of yeast infections is reported.    With hypertension, blood pressure has not been monitored at home. No symptoms of headache, chest pain, shortness of breath, blurred vision, or peripheral edema are reported. Amlodipine is being taken for blood pressure management.          Objective   Vital Signs:  /80 (BP Location: Left arm, Patient Position: Sitting, Cuff Size: Adult) Comment: Recheck BP  Pulse 64   Temp 97.8 °F (36.6 °C) (Infrared)   Ht 165.1 cm (65\")   Wt 85.2 kg (187 lb 12.8 oz)   SpO2 98%   BMI 31.25 kg/m²   Estimated body mass index is 31.25 kg/m² as calculated from the following:    Height as of this encounter: 165.1 cm (65\").    Weight as of this encounter: 85.2 kg (187 lb 12.8 oz).            Physical Exam  Constitutional:       Appearance: Normal appearance.   HENT:      Head: Normocephalic.   Cardiovascular:      Rate and Rhythm: Normal rate and regular rhythm.      Heart sounds: Normal heart sounds.   Pulmonary:      Effort: Pulmonary effort is normal.      Breath sounds: Normal breath sounds.   Musculoskeletal:      Cervical back: Neck supple.   Neurological:      Mental Status: She is alert and oriented to person, " place, and time.   Psychiatric:         Mood and Affect: Mood normal.        Result Review :             Component      Latest Ref Rng 4/24/2025   Color, UA      Yellow, Straw, Dark Yellow, Olivia  Yellow    Appearance, UA      Clear  Clear    Specific Gravity, UA      1.005 - 1.030  1.025    pH, UA      5.0 - 8.0  7.0    Leukocytes, UA      Negative  Negative    Nitrite, UA      Negative  Negative    Protein, UA      Negative mg/dL Negative    Glucose      Negative mg/dL Negative    Ketones, UA      Negative  Negative    Urobilinogen, UA      Normal, 0.2 E.U./dL  0.2 E.U./dL    Bilirubin, UA      Negative  Negative    Blood, UA      Negative  Negative    Lot Number [id number]    Expiration Date 06/10/2025           Assessment and Plan   Diagnoses and all orders for this visit:    1. Dysuria (Primary)  Assessment & Plan:  - Symptoms include odd sensation during urination without pain.  - UA normal   - Patient requesting urine culture  - Currently taking Bactrim; results of the urine culture will be communicated upon receipt.    Orders:  -     POCT urinalysis dipstick, automated  -     Urine Culture - Urine, Urine, Clean Catch    2. Essential hypertension  Assessment & Plan:  Hypertension is stable and controlled  Continue current treatment regimen.  Blood pressure will be reassessed  at next scheduled follow up .             Patient agrees with plan of care and understands instructions. Call if worsening symptoms or any problems or concerns.     EMR Dragon/Transcription Disclaimer:  Much of this encounter note is an electronic transcription/translation of spoken language to printed text.  The electronic translation of spoken language may permit erroneous, or at times, nonsensical words or phrases to be inadvertently transcribed; Although I have reviewed the note for such errors, some may still exist.              Follow Up   Return for Next scheduled follow up.  Patient was given instructions and counseling regarding  her condition or for health maintenance advice. Please see specific information pulled into the AVS if appropriate.     Patient or patient representative verbalized consent for the use of Ambient Listening during the visit with  ALVARO Steward for chart documentation. 4/24/2025  09:49 EDT

## 2025-04-24 NOTE — PROGRESS NOTES
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  She reports she was attempting to do a bridge around 2024 and felt a sharp \"tearing\" pain of (L) anterior and lateral thigh.  She denies any radicular symptoms but notes she does have significant (B) lower leg/ankles/toes leg cramps every evening/night.  These are only somewhat relieved by walking.  She denies any notable low back pain but her (L) anterolateral thigh pain has persisted/not improved.    She reports (L) anterolateral thigh pain is worst with prolonged walking, squatting, and negotiating steps.  She saw Dr. Garcia's APRN recently who believes this may be referred pain from the lumbar region. She was previously referred to [facility] spine center at a time when she thought something was wrong with her (L) hip, but she is unsure what lumbar MRI showed at that time. Xray of lumbar region showed diffuse moderate to severe degenerative lumbar changes.     She is scheduled to undergo (R) reverse TSA on 2025.       Patient Occupation: N/A Quality of life: good    Pain  Current pain ratin  At best pain ratin  At worst pain ratin  Location: (L) anterior and lateral thigh  Quality: dull ache  Aggravating factors: stairs, ambulation, squatting and standing  Progression: no change    Diagnostic Tests  X-ray: abnormal    Patient Goals  Patient goal: learn HEP for lumbar region           Subjective Questionnaire: Oswestry:  = 18%     Objective    "       Tenderness     Additional Tenderness Details  Neg TTP lumbar region    Neurological Testing     Sensation     Lumbar   Left   Intact: light touch    Right   Intact: light touch    Additional Neurological Details  Intact/equal (B) LE dermatomes    Active Range of Motion     Additional Active Range of Motion Details  Lumbar AROM (%):  Flexion 100%  Extension 75%  Right lateral flexion 75%  Left lateral flexion 50%  Right rotation 50%  Left rotation 75%    *All lumbar AROM is painfree and non-provocative of (L) anterolateral thigh symptoms    Strength/Myotome Testing     Left Hip   Planes of Motion   Flexion: 4-    Right Hip   Planes of Motion   Flexion: 4    Left Knee   Flexion: 4  Extension: 4+    Right Knee   Flexion: 4  Extension: 4+    Left Ankle/Foot   Dorsiflexion: 4+    Right Ankle/Foot   Dorsiflexion: 4+    Ambulation     Observational Gait   Gait: antalgic and asymmetric     Additional Observational Gait Details  Patient exhibits mild antalgia of gait (L) LE          Assessment & Plan       Assessment  Impairments: abnormal gait, abnormal muscle firing, activity intolerance, impaired balance, impaired physical strength, lacks appropriate home exercise program and pain with function   Functional limitations: walking, uncomfortable because of pain and stooping (Negotiating steps  )  Assessment details: Patient is a 74 y.o female who reports a long history of lumbar degenerative changes but without notably limiting back pain.  Around September 2024, she notes onset of (L) anterolateral thigh pain she experienced while performing a bridge exercise.  Since then, (L) anterolateral thigh symptoms have persisted intermittently, rated 0-4/10, worst with walking longer distances, squatting, and negotiating steps.  She exhibits mild decrease in (L) proximal LE strength and moderate core/trunk strength deficits.  Her Oswestry score is 18% indicating a mild perceived level of functional limitation.  She will benefit  from skilled PT services to establish core strengthening HEP to reduce symptoms and improve function.  Prognosis: good    Goals  Plan Goals: STGs: to be met today  1. Patient will be issued a lumbar/core strengthening HEP - MET  2. All questions will be answered to patient satisfaction    Plan  Therapy options: will be seen for skilled therapy services  Planned therapy interventions: home exercise program  Duration in visits: 1  Treatment plan discussed with: patient  Plan details: 1 visit for lumbar/core strengthening HEP        TIMED:  Manual Therapy:         mins  69357;  Therapeutic Exercise:    18     mins  30753;     Neuromuscular Enedelia:        mins  54610;    Therapeutic Activity:     10     mins  09737;     Gait Training:           mins  61461;     Ultrasound:          mins  47961;    Work Conditioning             mins 32929    UNTIMED:  Electrical Stimulation:         mins  80507 ( );  Dry Needling          mins 20561    Timed Treatment:   28   mins   Total Treatment:     41   mins    PT SIGNATURE: Grace Blancas PT, DPT, OCS  Electronically signed by: Grace Blancas PT, 04/24/25, 8:22 AM EDT  [place] License #[id number]     DATE TREATMENT INITIATED: 4/24/2025    Medicare Initial Certification  Certification Period: 4/24/2025 thru 7/22/2025  I certify that the therapy services are furnished while this patient is under my care.  The services outlined above are required by this patient, and will be reviewed every 90 days.     PHYSICIAN: Anh Garcia MD  [phone]                                          DATE:     Please sign and return via fax to [phone]. Thank you, [facility].

## 2025-04-25 LAB
BACTERIA UR CULT: NORMAL
BACTERIA UR CULT: NORMAL

## 2025-04-29 NOTE — H&P
HPI  Chief complaint right shoulder pain and weakness  History of present illness: 74-year-old female who is a retired  presents with right shoulder pain pleasant for last 7 months. She had a flu shot in September and felt it was too high. It never really stopped afterwards. It gradually worsened. She had to give up knitting November. She finally saw her physician who worked this up and determined that she had a significant rotator cuff tear with chronicity. The patient's had soreness, weakness and trouble sleeping on because of her right shoulder. Pain levels at rest are 6 out of 10 with activity 10 out of 10. She has been through a course of physical therapy and had a previous corticosteroid injection in January but because of her plateaued symptom improvement Dr. Garcia nicely referred her for further evaluation and treatment.  Physical Exam  Right shoulder:  Skin is normal. There is no atrophy. There is no effusion. There is no warmth. No erythema. Lymphadenopathy is negative.  Shoulder passive ROM today shows: Elevation: 150 ER(side): 40 ER(abd): 80 IR(abd): 60 IR(vert): to waist. Abduction: 100.  Shoulder strength: Elevation: 3/5 ER: 3/5 IR: 5-/5 ABD: 4/5.  Special tests: Neer test is positive. Thomas test is positive. Arc of motion is positive. Empty can test was positive.  Pulses are normal. Normal sensation. Capillary refill is normal.  Assessment / Plan  Three-view x-ray right shoulder shows normal osseous quality and joint space with anterior curvature of the acromion without significant additional abnormality    MRI right shoulder Proscan imaging 3/15/2025 images reviewed and independently interpreted and shared with the patient demonstrate chronic complete full-thickness supraspinatus tendon tear with greater than 3 cm tendon retraction with scarring and granulation tissue. There is 2+ volumetric muscle atrophy of the shoulder musculature with fatty streaking. There is a chronic thin  nondisplaced tear of the posterior superior glenoid labrum and some mild glenohumeral and AC joint osteoarthritis.    Assessment:  1. Chronic unrepairable rotator cuff tear right shoulder having failed a prolonged course of conservative care.    Plan:  We discussed treatment options including further conservative treatment versus attempts at repair of the tendon versus reverse total shoulder arthroplasty. Based on the MRI and her exam I think the tear is very chronic and likely unrepairable or at least even if repairable I think the chance of a successful repair is low. The most reliable option for treatment would be reverse shoulder arthroplasty. I agree with Dr. Garcia that this would be the best operative option for this patient.    1. I recommend CT scan right shoulder [employer] protocol for preoperative planning and intraoperative computer guidance.  2. I recommend scheduling right reverse total shoulder arthroplasty [employer] components  3. Opti preop preop nutritional program discussion  4. We discussed the benefits and risks of surgical intervention, as well as alternative treatments. Potential surgical risks and complications include but are not limited to deep venous thrombosis, infection, neurovascular injury, fracture, implant wear, implant failure, implant dislocation, possible need for revision surgery, loss of motion, limb length changes. Sufficient opportunity was given to discuss the condition and treatment plan and all questions were answered for the patient. The patient agreed to proceed with the surgical plan.  5. This patient will experience restricted and limited mobility post-surgically. A Caprini DVT Risk Assessment for development of deep vein thrombosis (DVT) during the first 30 days of the post-procedural period finds this patient at high risk. Based on the patient's limited mobility following surgery, and past medical history and post-op risk of DVT, I am ordering home-use Portable  Mechanical Compression devices (PMC) to stimulate circulation, decrease swelling, and reduce the likelihood of a VTE event. I have prescribed Portable Mechanical Compression devices for home-use for a period of 30 days post-surgery. I find this to be medically necessary to limit any additional risk of complications and bleeding.  Portable Mechanical Compression devices applied to the lower extremities will be used 3 hours QD and any time the patient is at rest, including during bed rest.

## 2025-05-01 ENCOUNTER — ANESTHESIA EVENT (OUTPATIENT)
Dept: PERIOP | Facility: HOSPITAL | Age: 75
End: 2025-05-01
Payer: MEDICARE

## 2025-05-01 ENCOUNTER — APPOINTMENT (OUTPATIENT)
Dept: GENERAL RADIOLOGY | Facility: HOSPITAL | Age: 75
End: 2025-05-01
Payer: MEDICARE

## 2025-05-01 ENCOUNTER — ANESTHESIA (OUTPATIENT)
Dept: PERIOP | Facility: HOSPITAL | Age: 75
End: 2025-05-01
Payer: MEDICARE

## 2025-05-01 ENCOUNTER — HOSPITAL ENCOUNTER (OUTPATIENT)
Facility: HOSPITAL | Age: 75
Setting detail: OBSERVATION
Discharge: HOME | End: 2025-05-02
Attending: ORTHOPAEDIC SURGERY | Admitting: ORTHOPAEDIC SURGERY
Payer: MEDICARE

## 2025-05-01 DIAGNOSIS — Z96.611 S/P REVERSE TOTAL SHOULDER ARTHROPLASTY, RIGHT: Primary | ICD-10-CM

## 2025-05-01 PROCEDURE — 25010000002 FENTANYL CITRATE (PF) 50 MCG/ML SOLUTION: Performed by: ANESTHESIOLOGY

## 2025-05-01 PROCEDURE — C1776 JOINT DEVICE (IMPLANTABLE): HCPCS | Performed by: ORTHOPAEDIC SURGERY

## 2025-05-01 PROCEDURE — 25010000002 PHENYLEPHRINE 10 MG/ML SOLUTION

## 2025-05-01 PROCEDURE — C1713 ANCHOR/SCREW BN/BN,TIS/BN: HCPCS | Performed by: ORTHOPAEDIC SURGERY

## 2025-05-01 PROCEDURE — 25010000002 FENTANYL CITRATE (PF) 50 MCG/ML SOLUTION

## 2025-05-01 PROCEDURE — 73020 X-RAY EXAM OF SHOULDER: CPT

## 2025-05-01 PROCEDURE — 25010000002 LIDOCAINE 2% SOLUTION

## 2025-05-01 PROCEDURE — G0378 HOSPITAL OBSERVATION PER HR: HCPCS

## 2025-05-01 PROCEDURE — 25010000002 FAMOTIDINE 10 MG/ML SOLUTION: Performed by: ANESTHESIOLOGY

## 2025-05-01 PROCEDURE — 25010000002 ONDANSETRON PER 1 MG

## 2025-05-01 PROCEDURE — 25010000002 CEFAZOLIN PER 500 MG: Performed by: ORTHOPAEDIC SURGERY

## 2025-05-01 PROCEDURE — 25010000002 BUPIVACAINE (PF) 0.5 % SOLUTION: Performed by: ANESTHESIOLOGY

## 2025-05-01 PROCEDURE — 25010000002 MIDAZOLAM PER 1 MG: Performed by: ANESTHESIOLOGY

## 2025-05-01 PROCEDURE — 25010000002 PROPOFOL 10 MG/ML EMULSION

## 2025-05-01 PROCEDURE — 25010000002 HYDROMORPHONE PER 4 MG

## 2025-05-01 PROCEDURE — 25010000002 SUGAMMADEX 200 MG/2ML SOLUTION

## 2025-05-01 PROCEDURE — 25810000003 LACTATED RINGERS PER 1000 ML

## 2025-05-01 PROCEDURE — 25010000002 MAGNESIUM SULFATE PER 500 MG OF MAGNESIUM

## 2025-05-01 PROCEDURE — 25010000002 ONDANSETRON PER 1 MG: Performed by: ORTHOPAEDIC SURGERY

## 2025-05-01 PROCEDURE — 25010000002 BUPIVACAINE LIPOSOME 1.3 % SUSPENSION: Performed by: ANESTHESIOLOGY

## 2025-05-01 PROCEDURE — 25010000002 DEXAMETHASONE PER 1 MG

## 2025-05-01 DEVICE — GLENOSPHERE
Type: IMPLANTABLE DEVICE | Site: SHOULDER | Status: FUNCTIONAL
Brand: EQUINOXE

## 2025-05-01 DEVICE — IMPLANTABLE DEVICE
Type: IMPLANTABLE DEVICE | Site: SHOULDER | Status: FUNCTIONAL
Brand: EQUINOXE

## 2025-05-01 DEVICE — GLENOID PLATE
Type: IMPLANTABLE DEVICE | Site: SHOULDER | Status: FUNCTIONAL
Brand: EQUINOXE

## 2025-05-01 DEVICE — HUMERAL ADAPTER TRAY
Type: IMPLANTABLE DEVICE | Site: SHOULDER | Status: FUNCTIONAL
Brand: EQUINOXE®

## 2025-05-01 DEVICE — IMPLANTABLE DEVICE: Type: IMPLANTABLE DEVICE | Status: FUNCTIONAL

## 2025-05-01 DEVICE — HUMERAL LINER, CONSTRAINED
Type: IMPLANTABLE DEVICE | Site: SHOULDER | Status: FUNCTIONAL
Brand: EQUINOXE®

## 2025-05-01 RX ORDER — SODIUM CHLORIDE 450 MG/100ML
75 INJECTION, SOLUTION INTRAVENOUS CONTINUOUS
Status: DISCONTINUED | OUTPATIENT
Start: 2025-05-01 | End: 2025-05-02 | Stop reason: HOSPADM

## 2025-05-01 RX ORDER — PROPOFOL 10 MG/ML
VIAL (ML) INTRAVENOUS AS NEEDED
Status: DISCONTINUED | OUTPATIENT
Start: 2025-05-01 | End: 2025-05-01 | Stop reason: SURG

## 2025-05-01 RX ORDER — LABETALOL HYDROCHLORIDE 5 MG/ML
5 INJECTION, SOLUTION INTRAVENOUS
Status: DISCONTINUED | OUTPATIENT
Start: 2025-05-01 | End: 2025-05-01 | Stop reason: HOSPADM

## 2025-05-01 RX ORDER — MIDAZOLAM HYDROCHLORIDE 1 MG/ML
0.5 INJECTION, SOLUTION INTRAMUSCULAR; INTRAVENOUS
Status: DISCONTINUED | OUTPATIENT
Start: 2025-05-01 | End: 2025-05-01 | Stop reason: HOSPADM

## 2025-05-01 RX ORDER — ONDANSETRON 2 MG/ML
4 INJECTION INTRAMUSCULAR; INTRAVENOUS ONCE AS NEEDED
Status: COMPLETED | OUTPATIENT
Start: 2025-05-01 | End: 2025-05-01

## 2025-05-01 RX ORDER — PROMETHAZINE HYDROCHLORIDE 25 MG/1
25 TABLET ORAL ONCE AS NEEDED
Status: DISCONTINUED | OUTPATIENT
Start: 2025-05-01 | End: 2025-05-01 | Stop reason: HOSPADM

## 2025-05-01 RX ORDER — LIDOCAINE HYDROCHLORIDE 10 MG/ML
0.5 INJECTION, SOLUTION INFILTRATION; PERINEURAL ONCE AS NEEDED
Status: DISCONTINUED | OUTPATIENT
Start: 2025-05-01 | End: 2025-05-01 | Stop reason: HOSPADM

## 2025-05-01 RX ORDER — FLUTICASONE PROPIONATE 50 MCG
2 SPRAY, SUSPENSION (ML) NASAL DAILY
Status: DISCONTINUED | OUTPATIENT
Start: 2025-05-01 | End: 2025-05-02 | Stop reason: HOSPADM

## 2025-05-01 RX ORDER — DOCUSATE SODIUM 100 MG/1
100 CAPSULE, LIQUID FILLED ORAL DAILY
Status: DISCONTINUED | OUTPATIENT
Start: 2025-05-01 | End: 2025-05-02 | Stop reason: HOSPADM

## 2025-05-01 RX ORDER — MORPHINE SULFATE 2 MG/ML
6 INJECTION, SOLUTION INTRAMUSCULAR; INTRAVENOUS
Status: DISCONTINUED | OUTPATIENT
Start: 2025-05-01 | End: 2025-05-02 | Stop reason: HOSPADM

## 2025-05-01 RX ORDER — MAGNESIUM SULFATE HEPTAHYDRATE 500 MG/ML
INJECTION, SOLUTION INTRAMUSCULAR; INTRAVENOUS AS NEEDED
Status: DISCONTINUED | OUTPATIENT
Start: 2025-05-01 | End: 2025-05-01 | Stop reason: SURG

## 2025-05-01 RX ORDER — HYDROCODONE BITARTRATE AND ACETAMINOPHEN 5; 325 MG/1; MG/1
1 TABLET ORAL ONCE AS NEEDED
Status: DISCONTINUED | OUTPATIENT
Start: 2025-05-01 | End: 2025-05-01 | Stop reason: HOSPADM

## 2025-05-01 RX ORDER — SODIUM CHLORIDE 0.9 % (FLUSH) 0.9 %
3-10 SYRINGE (ML) INJECTION AS NEEDED
Status: DISCONTINUED | OUTPATIENT
Start: 2025-05-01 | End: 2025-05-01 | Stop reason: HOSPADM

## 2025-05-01 RX ORDER — PHENYLEPHRINE HYDROCHLORIDE 10 MG/ML
INJECTION INTRAVENOUS AS NEEDED
Status: DISCONTINUED | OUTPATIENT
Start: 2025-05-01 | End: 2025-05-01 | Stop reason: SURG

## 2025-05-01 RX ORDER — SODIUM CHLORIDE, SODIUM LACTATE, POTASSIUM CHLORIDE, CALCIUM CHLORIDE 600; 310; 30; 20 MG/100ML; MG/100ML; MG/100ML; MG/100ML
9 INJECTION, SOLUTION INTRAVENOUS CONTINUOUS
Status: ACTIVE | OUTPATIENT
Start: 2025-05-01 | End: 2025-05-02

## 2025-05-01 RX ORDER — NALOXONE HCL 0.4 MG/ML
0.2 VIAL (ML) INJECTION AS NEEDED
Status: DISCONTINUED | OUTPATIENT
Start: 2025-05-01 | End: 2025-05-01 | Stop reason: HOSPADM

## 2025-05-01 RX ORDER — DIAZEPAM 5 MG/1
5 TABLET ORAL EVERY 6 HOURS PRN
Status: DISCONTINUED | OUTPATIENT
Start: 2025-05-01 | End: 2025-05-02 | Stop reason: HOSPADM

## 2025-05-01 RX ORDER — DROPERIDOL 2.5 MG/ML
0.62 INJECTION, SOLUTION INTRAMUSCULAR; INTRAVENOUS
Status: DISCONTINUED | OUTPATIENT
Start: 2025-05-01 | End: 2025-05-01 | Stop reason: HOSPADM

## 2025-05-01 RX ORDER — ONDANSETRON 4 MG/1
4 TABLET, ORALLY DISINTEGRATING ORAL EVERY 6 HOURS PRN
Status: DISCONTINUED | OUTPATIENT
Start: 2025-05-01 | End: 2025-05-02 | Stop reason: HOSPADM

## 2025-05-01 RX ORDER — NALOXONE HCL 0.4 MG/ML
0.4 VIAL (ML) INJECTION
Status: DISCONTINUED | OUTPATIENT
Start: 2025-05-01 | End: 2025-05-02 | Stop reason: HOSPADM

## 2025-05-01 RX ORDER — LEVOTHYROXINE SODIUM 50 UG/1
50 TABLET ORAL
Status: DISCONTINUED | OUTPATIENT
Start: 2025-05-02 | End: 2025-05-02 | Stop reason: HOSPADM

## 2025-05-01 RX ORDER — OXYCODONE AND ACETAMINOPHEN 5; 325 MG/1; MG/1
1 TABLET ORAL EVERY 4 HOURS PRN
Status: DISCONTINUED | OUTPATIENT
Start: 2025-05-01 | End: 2025-05-02 | Stop reason: HOSPADM

## 2025-05-01 RX ORDER — FAMOTIDINE 10 MG/ML
20 INJECTION, SOLUTION INTRAVENOUS ONCE
Status: COMPLETED | OUTPATIENT
Start: 2025-05-01 | End: 2025-05-01

## 2025-05-01 RX ORDER — ATROPINE SULFATE 0.4 MG/ML
0.4 INJECTION, SOLUTION INTRAMUSCULAR; INTRAVENOUS; SUBCUTANEOUS ONCE AS NEEDED
Status: DISCONTINUED | OUTPATIENT
Start: 2025-05-01 | End: 2025-05-01 | Stop reason: HOSPADM

## 2025-05-01 RX ORDER — ALBUTEROL SULFATE 0.83 MG/ML
2.5 SOLUTION RESPIRATORY (INHALATION) EVERY 6 HOURS PRN
Status: DISCONTINUED | OUTPATIENT
Start: 2025-05-01 | End: 2025-05-02 | Stop reason: HOSPADM

## 2025-05-01 RX ORDER — HYDROMORPHONE HYDROCHLORIDE 1 MG/ML
0.5 INJECTION, SOLUTION INTRAMUSCULAR; INTRAVENOUS; SUBCUTANEOUS
Status: DISCONTINUED | OUTPATIENT
Start: 2025-05-01 | End: 2025-05-01 | Stop reason: HOSPADM

## 2025-05-01 RX ORDER — ASPIRIN 81 MG/1
81 TABLET ORAL DAILY
Status: DISCONTINUED | OUTPATIENT
Start: 2025-05-01 | End: 2025-05-02 | Stop reason: HOSPADM

## 2025-05-01 RX ORDER — DIPHENHYDRAMINE HYDROCHLORIDE 50 MG/ML
12.5 INJECTION, SOLUTION INTRAMUSCULAR; INTRAVENOUS
Status: DISCONTINUED | OUTPATIENT
Start: 2025-05-01 | End: 2025-05-01 | Stop reason: HOSPADM

## 2025-05-01 RX ORDER — ROSUVASTATIN CALCIUM 5 MG/1
5 TABLET, COATED ORAL DAILY
Status: DISCONTINUED | OUTPATIENT
Start: 2025-05-01 | End: 2025-05-02 | Stop reason: HOSPADM

## 2025-05-01 RX ORDER — FAMOTIDINE 20 MG/1
20 TABLET, FILM COATED ORAL 2 TIMES DAILY PRN
Status: DISCONTINUED | OUTPATIENT
Start: 2025-05-01 | End: 2025-05-02 | Stop reason: HOSPADM

## 2025-05-01 RX ORDER — FENTANYL CITRATE 50 UG/ML
25 INJECTION, SOLUTION INTRAMUSCULAR; INTRAVENOUS ONCE AS NEEDED
Status: COMPLETED | OUTPATIENT
Start: 2025-05-01 | End: 2025-05-01

## 2025-05-01 RX ORDER — IPRATROPIUM BROMIDE AND ALBUTEROL SULFATE 2.5; .5 MG/3ML; MG/3ML
3 SOLUTION RESPIRATORY (INHALATION) ONCE AS NEEDED
Status: DISCONTINUED | OUTPATIENT
Start: 2025-05-01 | End: 2025-05-01 | Stop reason: HOSPADM

## 2025-05-01 RX ORDER — FENTANYL CITRATE 50 UG/ML
INJECTION, SOLUTION INTRAMUSCULAR; INTRAVENOUS AS NEEDED
Status: DISCONTINUED | OUTPATIENT
Start: 2025-05-01 | End: 2025-05-01 | Stop reason: SURG

## 2025-05-01 RX ORDER — SODIUM CHLORIDE 0.9 % (FLUSH) 0.9 %
3 SYRINGE (ML) INJECTION EVERY 12 HOURS SCHEDULED
Status: DISCONTINUED | OUTPATIENT
Start: 2025-05-01 | End: 2025-05-01 | Stop reason: HOSPADM

## 2025-05-01 RX ORDER — OXYCODONE AND ACETAMINOPHEN 5; 325 MG/1; MG/1
2 TABLET ORAL EVERY 4 HOURS PRN
Status: DISCONTINUED | OUTPATIENT
Start: 2025-05-01 | End: 2025-05-02 | Stop reason: HOSPADM

## 2025-05-01 RX ORDER — TRANEXAMIC ACID 100 MG/ML
INJECTION, SOLUTION INTRAVENOUS AS NEEDED
Status: DISCONTINUED | OUTPATIENT
Start: 2025-05-01 | End: 2025-05-01 | Stop reason: SURG

## 2025-05-01 RX ORDER — LIDOCAINE HYDROCHLORIDE 20 MG/ML
INJECTION, SOLUTION INFILTRATION; PERINEURAL AS NEEDED
Status: DISCONTINUED | OUTPATIENT
Start: 2025-05-01 | End: 2025-05-01 | Stop reason: SURG

## 2025-05-01 RX ORDER — FLUMAZENIL 0.1 MG/ML
0.2 INJECTION INTRAVENOUS AS NEEDED
Status: DISCONTINUED | OUTPATIENT
Start: 2025-05-01 | End: 2025-05-01 | Stop reason: HOSPADM

## 2025-05-01 RX ORDER — EPHEDRINE SULFATE 50 MG/ML
5 INJECTION, SOLUTION INTRAVENOUS ONCE AS NEEDED
Status: DISCONTINUED | OUTPATIENT
Start: 2025-05-01 | End: 2025-05-01 | Stop reason: HOSPADM

## 2025-05-01 RX ORDER — BUPIVACAINE HYDROCHLORIDE 5 MG/ML
INJECTION, SOLUTION EPIDURAL; INTRACAUDAL; PERINEURAL
Status: COMPLETED | OUTPATIENT
Start: 2025-05-01 | End: 2025-05-01

## 2025-05-01 RX ORDER — HYDRALAZINE HYDROCHLORIDE 20 MG/ML
5 INJECTION INTRAMUSCULAR; INTRAVENOUS
Status: DISCONTINUED | OUTPATIENT
Start: 2025-05-01 | End: 2025-05-01 | Stop reason: HOSPADM

## 2025-05-01 RX ORDER — PROMETHAZINE HYDROCHLORIDE 25 MG/1
25 SUPPOSITORY RECTAL ONCE AS NEEDED
Status: DISCONTINUED | OUTPATIENT
Start: 2025-05-01 | End: 2025-05-01 | Stop reason: HOSPADM

## 2025-05-01 RX ORDER — MORPHINE SULFATE 2 MG/ML
4 INJECTION, SOLUTION INTRAMUSCULAR; INTRAVENOUS
Status: DISCONTINUED | OUTPATIENT
Start: 2025-05-01 | End: 2025-05-02 | Stop reason: HOSPADM

## 2025-05-01 RX ORDER — SODIUM CHLORIDE, SODIUM LACTATE, POTASSIUM CHLORIDE, CALCIUM CHLORIDE 600; 310; 30; 20 MG/100ML; MG/100ML; MG/100ML; MG/100ML
INJECTION, SOLUTION INTRAVENOUS CONTINUOUS PRN
Status: DISCONTINUED | OUTPATIENT
Start: 2025-05-01 | End: 2025-05-01 | Stop reason: SURG

## 2025-05-01 RX ORDER — ROCURONIUM BROMIDE 10 MG/ML
INJECTION, SOLUTION INTRAVENOUS AS NEEDED
Status: DISCONTINUED | OUTPATIENT
Start: 2025-05-01 | End: 2025-05-01 | Stop reason: SURG

## 2025-05-01 RX ORDER — ALBUTEROL SULFATE 90 UG/1
2 INHALANT RESPIRATORY (INHALATION) EVERY 6 HOURS PRN
Status: DISCONTINUED | OUTPATIENT
Start: 2025-05-01 | End: 2025-05-01 | Stop reason: CLARIF

## 2025-05-01 RX ORDER — FLUOXETINE 10 MG/1
10 CAPSULE ORAL DAILY
Status: DISCONTINUED | OUTPATIENT
Start: 2025-05-01 | End: 2025-05-02 | Stop reason: HOSPADM

## 2025-05-01 RX ORDER — OXYCODONE AND ACETAMINOPHEN 7.5; 325 MG/1; MG/1
1 TABLET ORAL EVERY 4 HOURS PRN
Status: DISCONTINUED | OUTPATIENT
Start: 2025-05-01 | End: 2025-05-01 | Stop reason: HOSPADM

## 2025-05-01 RX ORDER — ONDANSETRON 2 MG/ML
4 INJECTION INTRAMUSCULAR; INTRAVENOUS EVERY 6 HOURS PRN
Status: DISCONTINUED | OUTPATIENT
Start: 2025-05-01 | End: 2025-05-02 | Stop reason: HOSPADM

## 2025-05-01 RX ORDER — AMLODIPINE BESYLATE 5 MG/1
5 TABLET ORAL DAILY
Status: DISCONTINUED | OUTPATIENT
Start: 2025-05-01 | End: 2025-05-02 | Stop reason: HOSPADM

## 2025-05-01 RX ORDER — DEXAMETHASONE SODIUM PHOSPHATE 4 MG/ML
INJECTION, SOLUTION INTRA-ARTICULAR; INTRALESIONAL; INTRAMUSCULAR; INTRAVENOUS; SOFT TISSUE AS NEEDED
Status: DISCONTINUED | OUTPATIENT
Start: 2025-05-01 | End: 2025-05-01 | Stop reason: SURG

## 2025-05-01 RX ORDER — FENTANYL CITRATE 50 UG/ML
50 INJECTION, SOLUTION INTRAMUSCULAR; INTRAVENOUS
Status: DISCONTINUED | OUTPATIENT
Start: 2025-05-01 | End: 2025-05-01 | Stop reason: HOSPADM

## 2025-05-01 RX ADMIN — PHENYLEPHRINE HYDROCHLORIDE 25 MCG: 10 INJECTION INTRAVENOUS at 11:29

## 2025-05-01 RX ADMIN — CEFAZOLIN 2000 MG: 2 INJECTION, POWDER, FOR SOLUTION INTRAMUSCULAR; INTRAVENOUS at 10:31

## 2025-05-01 RX ADMIN — ASPIRIN 81 MG: 81 TABLET, COATED ORAL at 17:30

## 2025-05-01 RX ADMIN — BUPIVACAINE HYDROCHLORIDE 10 ML: 5 INJECTION, SOLUTION EPIDURAL; INTRACAUDAL; PERINEURAL at 09:54

## 2025-05-01 RX ADMIN — AMLODIPINE BESYLATE 5 MG: 5 TABLET ORAL at 17:30

## 2025-05-01 RX ADMIN — TRANEXAMIC ACID 1000 MG: 100 INJECTION INTRAVENOUS at 11:07

## 2025-05-01 RX ADMIN — CEFAZOLIN 2000 MG: 2 INJECTION, POWDER, FOR SOLUTION INTRAMUSCULAR; INTRAVENOUS at 19:21

## 2025-05-01 RX ADMIN — FENTANYL CITRATE 25 MCG: 50 INJECTION, SOLUTION INTRAMUSCULAR; INTRAVENOUS at 09:53

## 2025-05-01 RX ADMIN — DOCUSATE SODIUM 100 MG: 100 CAPSULE, LIQUID FILLED ORAL at 17:30

## 2025-05-01 RX ADMIN — ONDANSETRON 4 MG: 2 INJECTION, SOLUTION INTRAMUSCULAR; INTRAVENOUS at 12:48

## 2025-05-01 RX ADMIN — FENTANYL CITRATE 25 MCG: 50 INJECTION, SOLUTION INTRAMUSCULAR; INTRAVENOUS at 10:40

## 2025-05-01 RX ADMIN — SODIUM CHLORIDE, POTASSIUM CHLORIDE, SODIUM LACTATE AND CALCIUM CHLORIDE: 600; 310; 30; 20 INJECTION, SOLUTION INTRAVENOUS at 10:40

## 2025-05-01 RX ADMIN — SUGAMMADEX 200 MG: 100 INJECTION, SOLUTION INTRAVENOUS at 11:47

## 2025-05-01 RX ADMIN — PROPOFOL 30 MG: 10 INJECTION, EMULSION INTRAVENOUS at 11:11

## 2025-05-01 RX ADMIN — PHENYLEPHRINE HYDROCHLORIDE 25 MCG: 10 INJECTION INTRAVENOUS at 11:25

## 2025-05-01 RX ADMIN — ROSUVASTATIN CALCIUM 5 MG: 5 TABLET, FILM COATED ORAL at 17:30

## 2025-05-01 RX ADMIN — PROPOFOL 200 MG: 10 INJECTION, EMULSION INTRAVENOUS at 10:40

## 2025-05-01 RX ADMIN — FENTANYL CITRATE 25 MCG: 50 INJECTION, SOLUTION INTRAMUSCULAR; INTRAVENOUS at 11:09

## 2025-05-01 RX ADMIN — BUPIVACAINE 10 ML: 13.3 INJECTION, SUSPENSION, LIPOSOMAL INFILTRATION at 09:54

## 2025-05-01 RX ADMIN — LIDOCAINE HYDROCHLORIDE 80 MG: 20 INJECTION, SOLUTION INFILTRATION; PERINEURAL at 10:40

## 2025-05-01 RX ADMIN — DEXAMETHASONE SODIUM PHOSPHATE 8 MG: 4 INJECTION, SOLUTION INTRA-ARTICULAR; INTRALESIONAL; INTRAMUSCULAR; INTRAVENOUS; SOFT TISSUE at 10:40

## 2025-05-01 RX ADMIN — ONDANSETRON 4 MG: 2 INJECTION, SOLUTION INTRAMUSCULAR; INTRAVENOUS at 21:37

## 2025-05-01 RX ADMIN — FAMOTIDINE 20 MG: 10 INJECTION, SOLUTION INTRAVENOUS at 09:21

## 2025-05-01 RX ADMIN — SODIUM CHLORIDE 75 ML/HR: 450 INJECTION, SOLUTION INTRAVENOUS at 17:33

## 2025-05-01 RX ADMIN — PHENYLEPHRINE HYDROCHLORIDE 50 MCG: 10 INJECTION INTRAVENOUS at 11:16

## 2025-05-01 RX ADMIN — HYDROMORPHONE HYDROCHLORIDE 0.5 MG: 1 INJECTION, SOLUTION INTRAMUSCULAR; INTRAVENOUS; SUBCUTANEOUS at 12:53

## 2025-05-01 RX ADMIN — MAGNESIUM SULFATE HEPTAHYDRATE 2 G: 500 INJECTION, SOLUTION INTRAMUSCULAR; INTRAVENOUS at 10:40

## 2025-05-01 RX ADMIN — FLUOXETINE HYDROCHLORIDE 10 MG: 10 CAPSULE ORAL at 17:30

## 2025-05-01 RX ADMIN — OXYCODONE AND ACETAMINOPHEN 1 TABLET: 5; 325 TABLET ORAL at 17:30

## 2025-05-01 RX ADMIN — MIDAZOLAM 1 MG: 1 INJECTION INTRAMUSCULAR; INTRAVENOUS at 09:53

## 2025-05-01 RX ADMIN — ROCURONIUM BROMIDE 100 MG: 10 INJECTION INTRAVENOUS at 10:40

## 2025-05-01 RX ADMIN — SUGAMMADEX 200 MG: 100 INJECTION, SOLUTION INTRAVENOUS at 11:46

## 2025-05-01 RX ADMIN — PHENYLEPHRINE HYDROCHLORIDE 50 MCG: 10 INJECTION INTRAVENOUS at 11:42

## 2025-05-01 NOTE — ANESTHESIA PROCEDURE NOTES
Peripheral Block      Patient reassessed immediately prior to procedure    Patient location during procedure: pre-op  Reason for block: at surgeon's request and post-op pain management  Performed by  Anesthesiologist: Dylan Lee MD  Preanesthetic Checklist  Completed: patient identified, IV checked, site marked, risks and benefits discussed, surgical consent, monitors and equipment checked, pre-op evaluation and timeout performed  Prep:  Pt Position: sitting  Sterile barriers:cap, gloves, mask and washed/disinfected hands  Prep: ChloraPrep  Patient monitoring: blood pressure monitoring, continuous pulse oximetry and EKG  Procedure    Sedation: yes    Guidance:ultrasound guided    ULTRASOUND INTERPRETATION.  Using ultrasound guidance a gauge needle was placed in close proximity to the brachial plexus nerve, at which point, under ultrasound guidance anesthetic was injected in the area of the nerve and spread of the anesthesia was seen on ultrasound in close proximity thereto.  There were no abnormalities seen on ultrasound; a digital image was taken; and the patient tolerated the procedure with no complications. Images:still images obtained, printed/placed on chart    Laterality:right  Block Type:interscalene  Injection Technique:single-shot  Needle Type:echogenic  Needle Gauge:21 G  Resistance on Injection: none    Medications Used: bupivacaine liposome (EXPAREL) 1.3 % injection - Infiltration   10 mL - 5/1/2025 9:54:00 AM  bupivacaine (PF) (MARCAINE) 0.5 % injection - Injection   10 mL - 5/1/2025 9:54:00 AM      Medications  Comment:Ultrasound guidance used for visualization of pertinent anatomy and confirmation of needle positioning.    Post Assessment  Injection Assessment: negative aspiration for heme, no paresthesia on injection and incremental injection  Patient Tolerance:comfortable throughout block  Complications:no  Performed by: Dylan Lee MD

## 2025-05-01 NOTE — ANESTHESIA PROCEDURE NOTES
Airway  Reason: elective    Date/Time: 5/1/2025 10:43 AM  Airway not difficult    General Information and Staff    Patient location during procedure: OR  Anesthesiologist: Royer Porter MD  CRNA/CAA: Monica Marr CRNA    Indications and Patient Condition  Indications for airway management: airway protection    Preoxygenated: yes    Mask difficulty assessment: 1 - vent by mask    Final Airway Details    Final airway type: endotracheal airway      Successful airway: ETT  Cuffed: yes   Successful intubation technique: direct laryngoscopy  Adjuncts used in placement: intubating stylet  Endotracheal tube insertion site: oral  Blade: King  Blade size: 3  ETT size (mm): 7.0  Cormack-Lehane Classification: grade I - full view of glottis  Placement verified by: chest auscultation and capnometry   Cuff volume (mL): 7  Measured from: lips  ETT/EBT  to lips (cm): 21  Number of attempts at approach: 1  Assessment: lips, teeth, and gum same as pre-op and atraumatic intubation

## 2025-05-01 NOTE — PLAN OF CARE
Goal Outcome Evaluation:              Outcome Evaluation: SP reverse RTA, AOx4, Asist x1, BRP, DC home tomorrow

## 2025-05-01 NOTE — ANESTHESIA PREPROCEDURE EVALUATION
Anesthesia Evaluation     Patient summary reviewed and Nursing notes reviewed   NPO Solid Status: > 6 hours  NPO Liquid Status: > 2 hours           Airway   Mallampati: II  TM distance: >3 FB  Neck ROM: full  Small opening  Dental - normal exam     Pulmonary    (-) COPD, asthma, not a smoker  Cardiovascular   Exercise tolerance: good (4-7 METS)    ECG reviewed    (+) hypertension, hyperlipidemia  (-) past MI, dysrhythmias, angina    ROS comment: Pt states in the past she has noted SoA w/ strenuous activity. Most recent stress test 5/2024 normal. Echo at that time showed nl LV/RV fxn and no major valve issues. Pt states she has been walking stairs in preparation for today's surgery and had no issues w/ CP or SoA.     Neuro/Psych  (+) psychiatric history Anxiety and Depression  (-) seizures, CVA  GI/Hepatic/Renal/Endo    (+) obesity, hiatal hernia, GERD well controlled, liver disease, renal disease- CRI, thyroid problem hypothyroidism  (-) diabetes    Musculoskeletal     (+) back pain  Abdominal    Substance History      OB/GYN          Other   arthritis,                 Anesthesia Plan    ASA 3     general with block       Anesthetic plan, risks, benefits, and alternatives have been provided, discussed and informed consent has been obtained with: patient.    CODE STATUS:

## 2025-05-01 NOTE — ANESTHESIA POSTPROCEDURE EVALUATION
Patient: Aydee Moore    Procedure Summary       Date: 05/01/25 Room / Location:  [facility]  [facility]    Anesthesia Start: 1038 Anesthesia Stop: 1210    Procedure: TOTAL SHOULDER REVERSE ARTHROPLASTY (Right: Shoulder) Diagnosis:     Surgeons: Kleber Anaya MD Provider: Royer Porter MD    Anesthesia Type: general with block ASA Status: 3            Anesthesia Type: general with block    Vitals  Vitals Value Taken Time   /76 05/01/25 12:30   Temp 36.3 °C (97.4 °F) 05/01/25 12:08   Pulse 61 05/01/25 12:31   Resp 16 05/01/25 12:08   SpO2 97 % 05/01/25 12:31   Vitals shown include unfiled device data.        Anesthesia Post Evaluation

## 2025-05-01 NOTE — OP NOTE
Date: 5/1/2025  Time: 11:52 EDT TOTAL SHOULDER REVERSE ARTHROPLASTY  Procedure Note    Aydee Moore  5/1/2025    Pre-op Diagnosis:   Chronic unrepairable rotator cuff tear right shoulder      Post-op Diagnosis  Chronic unrepairable rotator cuff tear right shoulder    Procedure:  1.  RightReverse total shoulder arthroplasty  2.  Intraoperative computer navigation for right reverse total shoulder    Surgeon: Kleber Anaya M.D.    Surgical assistant: NILDA PICKETT      Anesthesia: General with Block  Anesthesiologist: Royer Porter MD  CRNA: Monica Marr CRNA    Staff:   Circulator: Lc Centeno RN  Scrub Person: Bladimir Rodriguez  Vendor Representative: Jose Guadalupe Whitlock  Assistant: Nilda Pickett APRN    Indication for Asst.  A surgical assistant, NILDA PICKETT, was utilized as a medical necessity and was present through the entire procedure assisting in exposure, placement of implants, closure of wounds allowing safe completion of the procedure as well as reducing overall operative time and morbidity for the patient.    Estimated Blood Loss: 200 mL    Specimens: * No orders in the log *    Complications: None     Implant specifics:  Implant Name Type Inv. Item Serial No.  Lot No. LRB No. Used Action   SCRW EQUINOXE TORQ DEFINE REV SHLDR KT - [id number] - [id number] Implant SCRW EQUINOXE TORQ DEFINE REV SHLDR KT [id number] EXACTECH . Right 1 Implanted   SCRW LK EQUINOXE GLENOSPHERE REV/SHLDR - [id number] - [id number] Implant SCRW LK EQUINOXE GLENOSPHERE REV/SHLDR [id number] EXACTECH . Right 1 Implanted   SCRW COMPR EQUINOXE LK 4.5X18MM - [id number] - [id number] Implant SCRW COMPR EQUINOXE LK 4.5X18MM [id number] EXACTECH . Right 1 Implanted   GLENOSPHERE REV SHLDR EQUINOXE 36MM SM - [id number] - [id number] Implant GLENOSPHERE REV SHLDR EQUINOXE 36MM SM [id number] EXACTECH . Right 1 Implanted   SCRW COMPR EQUINOXE LK 4.5X34MM - [id number] - [id number] Implant SCRW COMPR EQUINOXE LK  4.5X34MM [id number] EXACTECH . Right 1 Implanted   SCRW COMPR EQUINOXE LK 4.5X22MM - [id number] - [id number] Implant SCRW COMPR EQUINOXE LK 4.5X22MM [id number] EXACTECH . Right 1 Implanted   SCRW COMPR EQUINOXE LK 4.5X18MM - [id number] - [id number] Implant SCRW COMPR EQUINOXE LK 4.5X18MM [id number] EXACTECH . Right 1 Implanted   PLT AUG MAX EQUINOXE POST REV 8DEG SM RT - [id number] - [id number] Implant PLT AUG MAX EQUINOXE POST REV 8DEG SM RT [id number] EXACTECH . Right 1 Implanted   TRY ADAPT HUM/SHLDR EQUINOXE FOR/REV/TOTL PLS0 - [id number] - [id number] Implant TRY ADAPT HUM/SHLDR EQUINOXE FOR/REV/TOTL PLS0 [id number] EXACTECH . Right 1 Implanted   LINER HUM/SHLDR EQUINOXE/REV PE 145DEG 36MM PLS0 - [id number] - [id number] Implant LINER HUM/SHLDR EQUINOXE/REV PE 145DEG 36MM PLS0 [id number] EXACTECH . Right 1 Implanted   STEM HUM EQUINOXE PRESERVE 7MM - [id number] - [id number] Implant STEM HUM EQUINOXE PRESERVE 7MM [id number] EXACTECH . Right 1 Implanted       SURGICAL APPROACH: Deltopectoral      SURGICAL TECHNIQUE: Peel off         Procedure: The patient was taken to the operative suite.  After adequate anesthesia was established the upper extremity was prepped and draped in the usual fashion.  The head was placed in a neutral position and bony prominences were padded.  Ioban was utilized covering the skin over the shoulder and axilla.  Preoperative antibiotics and transexamic acid were confirmed.  An anterior incision was made starting lateral to the coracoid towards the axillary crease through skin and subcutaneous tissues.  The deltopectoral interval was entered.  The cephalic vein was identified, preserved, and retracted laterally.  The conjoined tendon was reflected medially.  The biceps tendon was identified near the pectoralis insertion site.  A small portion of the pectoralis tendon was released and then the biceps tendon was tenodesed to the pectoralis tendon insertion on the humeral.  The biceps tendon was then tenotomized more  proximally.  The subscapularis was peeled off the lesser tuberosity and tagged.  Arthropathic changes were noted in the glenohumeral joint.  The rotator cuff was in poor condition with significant tearing of the rotator cuff with retraction and atrophy.  The capsule was released off the humeral neck and the posterior soft tissue attachments were protected. An external cutting guide was utilized and the head was cut at a 20° retroverted angle.  Excess osteophytes were excised with a rongeur.  Sequential impaction broaching was carried out using the Preserve system broaches.  The final broach was left in place for later trialing.   I then visualized the glenoid and retractors were placed starting posteriorly and superiorly.  The capsule was reflected off the deep surface of the subscapularis.  An anterior retractor was placed.  The labrum was then circumferentially excised off of the glenoid.  The biceps stump was released and removed as well.  A careful capsular release off the inferior glenoid was performed protecting the axillary nerve and vasculature.  This exposed the scapular pillar.    This allowed for visualization of the glenoid.  The coracoid was skeletonized with the Bovie exposing the anterior portion and the neck of the coracoid.  A tracker was fixed to the coracoid with 2 small bicortical screws with firm fixation.  Acquisitions were then obtained from the bony surface of the coracoid and glenoid to transfer data to the computer to allow for navigation.  Once this was complete I was able to use computer guidance for placement of the glenoid.  The preoperative plan demonstrated 12 degrees of retroversion that corrected well with an 8 degree mini augmented baseplate to Preserve bone during reaming.  Using navigation a central drill point was drilled.  Sequential reaming was carried out at the prescribed angle and to the prescribed depth based on the preoperative plan using intraoperative computer guidance.     A central hole was then drilled for the cage using computer navigation.  This assured that we were within the vault of the glenoid and preserved as much bone as possible during reaming.    Autogenous bone graft was harvested from the humeral head and packed into the cage of the baseplate.  The baseplate was then compressed onto the glenoid.  Rotation was checked with computer navigation.  Computer navigation was then used to drill holes for the compression screws.  These were then placed to the appropriate depth and locking caps were placed over the screw heads.  Good baseplate placement and stable fixation were noted.  An appropriate size glenosphere based on the preoperative plan and intraoperative visualization of the anatomy was chosen and fixed to the baseplate with a central compression screw.  I then removed the tracker from the coracoid by removing both bicortical screws.  I then trialed the humeral tray and polyethylene.  I was looking for smooth nonimpinged range of motion.  I also wanted to assure good stability by placing traction on the arm and also checking for lateral stability.  Once satisfied with range of motion and stability I removed the humeral components.  I pulse lavaged the proximal humerus and press-fit the appropriate size stem with good purchase and fixation.  Next I re-trialed the humeral tray and humeral liner.  When I was satisfied with range of motion and stability I removed the trial liner and tray.  The appropriate humeral tray was then placed and held with a torque limiting screw.  The chosen polyethylene was then inserted and compressed into place and the shoulder was reduced.  Subdeltoid scar tissue was excised.  Good range of motion was noted.  Good stability was noted.  Vigorous irrigation and suctioning was performed.  The subscapularis was evaluated for repair.  It was not repaired due to the fact that would have over tighten the shoulder.  The deltopectoral interval was  closed with 0 Vicryl.  The subcutaneous tissues were closed with 2-0 Vicryl.  The skin was closed with a zip line device.  The patient had a sterile compression dressing applied and was awoken and taken to the postanesthetic recovery unit in good condition.    Kleber Anaya MD     Date: 5/1/2025  Time: 11:52 EDT

## 2025-05-01 NOTE — DISCHARGE INSTRUCTIONS
Dr. Kleber Anaya  [address] [address]  [phone]    Outpatient  TOTAL SHOULDER REPLACEMENT  HOSPITAL DISCHARGE INSTRUCTIONS     GENERAL INFORMATION: With improved surgical techniques for total shoulder and reverse total shoulder replacement and the Religion of ultrasound guided long acting nerve blocks, the hospital stay for patients has decreased significantly.  Many people can go home right after surgery as an outpatient.    SPECIFIC POST-OP INSTRUCTIONS:  * FOLLOW UP APPOINTMENT: You should have been given an appointment to follow up 10-14 days after your date of surgery. We can see you back sooner if there are any problems or concerns.   * BLOOD THINNERS: All patients will be on some type of blood thinner post-op to prevent blood clot. Most patients who are not already on a blood thinner are discharged on over-the-counter aspirin 81 mg or 325mg daily which you will take for three weeks. If you were taking a blood thinner prior to surgery, we will have you resume this on the first day post-op.   * ICE: Ice helps to decrease both pain and swelling. Ice should be applied to the shoulder 20-25 minutes each hour while awake.   *DRESSING CHANGES: We ask that you keep the incision clean and dry.  Your surgical dressing can be removed 48 hours after surgery.  There will be a yellow strip of gauze and a Zipline device that will be underneath the dressing.  The yellow gauze can be removed but leave the Zipline alone.  You can then apply a watertight dressing over the Zipline to protect it.  You can get this type of dressing from the hospital before you leave or at your local pharmacy.   *SHOWERING: The wound edges typically come together and seal by 3-5 days post-op if there is no drainage. Again, please keep the same waterproof dressing on. DO NOT SUBMERSE SHOULDER IN POOL,HOT TUB OR BATH UNTIL AT LEAST 3-4 WEEKS POST-OP (EVEN WITH WATERPROOF BANDAIDS).  * PAIN  MEDICINE: You will be given a prescription for  oral pain medication prior to discharge from the hospital. Please let us know if you have a sensitivity to certain pain medications prior to discharge. Additional pain medication prescriptions can be called into your pharmacy during normal business hours. NO medications can be called in over the weekends or after business hours.   * ORAL ANTI-INFLAMMATORIES:   You will be asked to discontinue ALL oral anti-inflammatories prior to surgery. You can resume these the first day post-op.These can be combined with the oral pain medications safely. DO NOT TAKE ADDITIONAL TYLENOL WITH THE NARCOTIC PAIN MEDICATION (it already has Tylenol in it). If you were not taking an anti-inflammatory pre-op, you can start one on the first day post-op. It will help decrease pain and swelling. Typical medications and dosages are as follows:   Advil/Motrin/Ibuprofen 200mg, 4 pills every 8 hours   Aleve/Naproxen Sodium 220mg, 2 pills every 12 hours  * SLING: We recommend you wear the sling at all times, other than when showering or doing physical therapy exercises.  *For total shoulder patient's I recommend you do not rotate your arm away from your body into external rotation to protect the front of the shoulder.  If you imagine a box in front of your waist we want to keep your hand inside of the box and not outside of the box.  * WEIGHT BEARING: We ask that you be non-weight bearing on the operative shoulder. Do not use the operative arm to lift/push/pull greater than 5lbs.   * PHYSICAL THERAPY: Before you leave the hospital staff will teach you some very gentle range of motion exercises to do at home for the first 10-14 days.  You will get a prescription for formal physical therapy after the surgery.  This can be done downstairs at [facility] or at a location of your choice. Physical therapy is typically 2-3 times a week for 4-6 weeks, depending on the individual and their progress.   * DRIVING A CAR: Medically/legally we can not recommend you  "drive a car while in the sling or while on pain medication (roughly 10-14 days).   * RETURNING TO DAILY AND RECREATIONAL ACTIVITIES: For the most part patients can progress to daily activities as tolerated (keeping in mind the restrictions listed above). Initially, we do not want you to \"overdo\" it in an attempt to minimize post-op pain and swelling. Once the swelling is controlled, you can progress with activities as tolerated. Pain and swelling should be your guide to increasing your activity level.   * RETURN TO WORK: The return to work date depends on many factors and is very dependent on the individual. You would most likely be able to return to a sedentary job after your first post-op visit (10-14 days after surgery). A more physical job would obviously require a longer recovery time before return to work.  "

## 2025-05-02 ENCOUNTER — READMISSION MANAGEMENT (OUTPATIENT)
Dept: CALL CENTER | Facility: HOSPITAL | Age: 75
End: 2025-05-02
Payer: MEDICARE

## 2025-05-02 VITALS
OXYGEN SATURATION: 97 % | WEIGHT: 196.65 LBS | BODY MASS INDEX: 32.76 KG/M2 | TEMPERATURE: 97.5 F | HEART RATE: 74 BPM | HEIGHT: 65 IN | RESPIRATION RATE: 16 BRPM | DIASTOLIC BLOOD PRESSURE: 65 MMHG | SYSTOLIC BLOOD PRESSURE: 129 MMHG

## 2025-05-02 LAB
ANION GAP SERPL CALCULATED.3IONS-SCNC: 11.5 MMOL/L (ref 5–15)
BUN SERPL-MCNC: 11 MG/DL (ref 8–23)
BUN/CREAT SERPL: 12.5 (ref 7–25)
CALCIUM SPEC-SCNC: 9.1 MG/DL (ref 8.6–10.5)
CHLORIDE SERPL-SCNC: 104 MMOL/L (ref 98–107)
CO2 SERPL-SCNC: 22.5 MMOL/L (ref 22–29)
CREAT SERPL-MCNC: 0.88 MG/DL (ref 0.57–1)
EGFRCR SERPLBLD CKD-EPI 2021: 69.5 ML/MIN/1.73
GLUCOSE SERPL-MCNC: 112 MG/DL (ref 65–99)
HCT VFR BLD AUTO: 40 % (ref 34–46.6)
HGB BLD-MCNC: 13.2 G/DL (ref 12–15.9)
POTASSIUM SERPL-SCNC: 4.2 MMOL/L (ref 3.5–5.2)
SODIUM SERPL-SCNC: 138 MMOL/L (ref 136–145)

## 2025-05-02 PROCEDURE — 80048 BASIC METABOLIC PNL TOTAL CA: CPT | Performed by: ORTHOPAEDIC SURGERY

## 2025-05-02 PROCEDURE — 85018 HEMOGLOBIN: CPT | Performed by: ORTHOPAEDIC SURGERY

## 2025-05-02 PROCEDURE — 25010000002 ONDANSETRON PER 1 MG: Performed by: ORTHOPAEDIC SURGERY

## 2025-05-02 PROCEDURE — 97166 OT EVAL MOD COMPLEX 45 MIN: CPT

## 2025-05-02 PROCEDURE — 25010000002 CEFAZOLIN PER 500 MG: Performed by: ORTHOPAEDIC SURGERY

## 2025-05-02 PROCEDURE — 85014 HEMATOCRIT: CPT | Performed by: ORTHOPAEDIC SURGERY

## 2025-05-02 PROCEDURE — 97110 THERAPEUTIC EXERCISES: CPT

## 2025-05-02 PROCEDURE — G0378 HOSPITAL OBSERVATION PER HR: HCPCS

## 2025-05-02 RX ORDER — ONDANSETRON 4 MG/1
4 TABLET, ORALLY DISINTEGRATING ORAL EVERY 6 HOURS PRN
Qty: 30 TABLET | Refills: 0 | Status: SHIPPED | OUTPATIENT
Start: 2025-05-02

## 2025-05-02 RX ORDER — ASPIRIN 81 MG/1
81 TABLET ORAL DAILY
Qty: 21 TABLET | Refills: 0 | Status: SHIPPED | OUTPATIENT
Start: 2025-05-02

## 2025-05-02 RX ORDER — HYDROCODONE BITARTRATE AND ACETAMINOPHEN 5; 325 MG/1; MG/1
1 TABLET ORAL EVERY 4 HOURS PRN
Qty: 30 TABLET | Refills: 0 | Status: SHIPPED | OUTPATIENT
Start: 2025-05-02

## 2025-05-02 RX ADMIN — FLUOXETINE HYDROCHLORIDE 10 MG: 10 CAPSULE ORAL at 08:58

## 2025-05-02 RX ADMIN — LEVOTHYROXINE SODIUM 50 MCG: 50 TABLET ORAL at 05:52

## 2025-05-02 RX ADMIN — ONDANSETRON 4 MG: 2 INJECTION, SOLUTION INTRAMUSCULAR; INTRAVENOUS at 08:56

## 2025-05-02 RX ADMIN — DOCUSATE SODIUM 100 MG: 100 CAPSULE, LIQUID FILLED ORAL at 08:58

## 2025-05-02 RX ADMIN — CEFAZOLIN 2000 MG: 2 INJECTION, POWDER, FOR SOLUTION INTRAMUSCULAR; INTRAVENOUS at 03:26

## 2025-05-02 RX ADMIN — ASPIRIN 81 MG: 81 TABLET, COATED ORAL at 08:57

## 2025-05-02 RX ADMIN — FAMOTIDINE 20 MG: 20 TABLET, FILM COATED ORAL at 02:04

## 2025-05-02 RX ADMIN — ROSUVASTATIN CALCIUM 5 MG: 5 TABLET, FILM COATED ORAL at 08:57

## 2025-05-02 RX ADMIN — ONDANSETRON 4 MG: 2 INJECTION, SOLUTION INTRAMUSCULAR; INTRAVENOUS at 03:26

## 2025-05-02 RX ADMIN — OXYCODONE AND ACETAMINOPHEN 1 TABLET: 5; 325 TABLET ORAL at 10:35

## 2025-05-02 NOTE — THERAPY EVALUATION
"Patient Name: Aydee Moore  : 1950    MRN: [id number]                              Today's Date: 2025       Admit Date: 2025    Visit Dx:     ICD-10-CM ICD-9-CM   1. S/P reverse total shoulder arthroplasty, right  Z96.611 V43.61     Patient Active Problem List   Diagnosis    Hyperlipidemia LDL goal <100    Elevated fasting glucose    Anxiety and depression    Hypothyroidism, adult    Wasp sting    Essential hypertension    Acute cystitis without hematuria    Environmental and seasonal allergies    Hip pain    Fecal incontinence    Puncture wound of index finger    Post-menopausal    Chronic gastritis without bleeding    Chronic diarrhea    Chronic pain of left knee    Chronic pain of right knee    Family history of diabetes mellitus    Rabies exposure    Penicillin allergy    Intermittent left-sided chest pain    Gastroesophageal reflux disease with esophagitis without hemorrhage    Gastritis without bleeding    Anxiety disorder    Osteopenia    Hiatal hernia    Moderate episode of recurrent major depressive disorder    Adjustment disorder with mixed anxiety and depressed mood    Chronic right shoulder pain    Nocturnal leg cramps    Chronic idiopathic constipation    Yeast dermatitis    Mixed hyperlipidemia    Nontraumatic complete tear of right rotator cuff    S/P reverse total shoulder arthroplasty, right     Past Medical History:   Diagnosis Date    ACL tear     Acute bronchitis 2024    Acute cystitis without hematuria 2022    Allergic     Penicillins, tetracyclines, surgical tape; latex sensitivity    Anemia     HX    Anxiety and depression     Apnea     \"STOPS BREATHING WHENEVER FALLS ASLEEP BUT STATES NOT SLEEP APNEA\"    Arthritis     At risk for sleep apnea     Back pain     Cataract     rt eye surgery 10/24/2018    Chronic kidney disease low GFR    Colon polyp Dr Melissa    Constipation     Elevated fasting glucose     GERD (gastroesophageal reflux disease) Dr Melissa    Headache  "    History of fall     History of pelvic inflammatory disease     History of sleep apnea     IMPROVED AFTER WEIGHT LOSS    History of suicidal ideation     Hyperlipidemia     Hypertension     Hypothyroidism     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    Obesity     Rash 08/22/2022    Shoulder pain     Skin picking habit     Skin yeast infection     HX/RELATED TO STOOL INCONTINENCE    Stool incontinence     Torn meniscus     Urinary tract infection often michelle when away from home     Past Surgical History:   Procedure Laterality Date    BREAST SURGERY  1986    biopsy    CATARACT EXTRACTION Right 10/24/2018    COLONOSCOPY      FRACTURE SURGERY  Oct 2022    rt ring finger    HYSTERECTOMY      KNEE SURGERY Right 08/08/2017    TONSILLECTOMY      TOTAL SHOULDER ARTHROPLASTY W/ DISTAL CLAVICLE EXCISION Right 5/1/2025    Procedure: TOTAL SHOULDER REVERSE ARTHROPLASTY;  Surgeon: Kleber Anaya MD;  [place] [facility] [place]  Service: Orthopedics;  Laterality: Right;      General Information       Row Name 05/02/25 1154          OT Time and Intention    Document Type discharge evaluation/summary  -JR     Mode of Treatment individual therapy;occupational therapy  -JR     Patient Effort excellent  -JR     Symptoms Noted During/After Treatment none  -JR       Row Name 05/02/25 1154          General Information    Patient Profile Reviewed yes  -JR     Prior Level of Function independent:;ADL's  -JR     Existing Precautions/Restrictions right;non-weight bearing;shoulder  -JR     Barriers to Rehab none identified  -JR       Row Name 05/02/25 1154          Living Environment    Current Living Arrangements home  -JR     People in Home spouse  -JR       Row Name 05/02/25 1154          Home Main Entrance    Number of Stairs, Main Entrance two  -JR     Stair Railings, Main Entrance none  -JR       Row Name 05/02/25 1154          Stairs Within Home, Primary    Number of Stairs, Within Home, Primary twelve  -JR     Stair Railings, Within Home, Primary  railings safe and in good condition  -     Stairs Comment, Within Home, Primary 12 steps to BR  -       Row Name 05/02/25 1154          Cognition    Orientation Status (Cognition) oriented x 4  -       Row Name 05/02/25 1154          Safety Issues/Impairments Affecting Functional Mobility    Comment, Safety Issues/Impairments (Mobility) gait belt and non skid socks donned  -               User Key  (r) = Recorded By, (t) = Taken By, (c) = Cosigned By      Initials Name Provider Type    Trevor Zambrano OT Occupational Therapist                     Mobility/ADL's       Row Name 05/02/25 1155          Bed Mobility    Bed Mobility supine-sit  -     Supine-Sit [place] (Bed Mobility) supervision  -     Bed Mobility, Safety Issues decreased use of arms for pushing/pulling  -     Assistive Device (Bed Mobility) head of bed elevated  -[facility][1] Name 05/02/25 1155          Transfers    Transfers sit-stand transfer  -[facility][1] Name 05/02/25 1155          Sit-Stand Transfer    Sit-Stand [place] (Transfers) supervision  -     Comment, (Sit-Stand Transfer) No Ad  -[facility][1] Name 05/02/25 1155          Functional Mobility    Functional Mobility- Ind. Level supervision required  -     Patient was able to Ambulate yes  -[facility][1] Name 05/02/25 1155          Activities of Daily Living    BADL Assessment/Intervention --  Patient declined dressing due to nursing having to change bandage  -[facility][1] Name 05/02/25 1155          Mobility    Extremity Weight-bearing Status right upper extremity  -     Right Upper Extremity (Weight-bearing Status) non weight-bearing (NWB)  -               User Key  (r) = Recorded By, (t) = Taken By, (c) = Cosigned By      Initials Name Provider Type    Trevor Zambrano OT Occupational Therapist                   Obj/Interventions       Row Name 05/02/25 1157          Sensory Assessment (Somatosensory)    Sensory Subjective Reports numbness  -     Sensory  Assessment Patient reports RUE nerve block 50% intact.  -       Row Name 05/02/25 1157          Vision Assessment/Intervention    Visual Impairment/Limitations WFL  -[facility] Name 05/02/25 1157          Range of Motion Comprehensive    General Range of Motion upper extremity range of motion deficits identified  -     Comment, General Range of Motion expected post op RUE ROM deficit.  -       Row Name 05/02/25 1157          Strength Comprehensive (MMT)    General Manual Muscle Testing (MMT) Assessment upper extremity strength deficits identified  -     Comment, General Manual Muscle Testing (MMT) Assessment expected post op RUE strength deficit  -[facility] Name 05/02/25 1157          Shoulder (Therapeutic Exercise)    Shoulder (Therapeutic Exercise) pendulum exercises;other (see comments)  Patient able to perform standing pendulum exercises for all appropriate exercises.  -[facility] Name 05/02/25 1157          Motor Skills    Therapeutic Exercise shoulder  -[facility] Name 05/02/25 1157          Balance    Balance Assessment sitting static balance;sitting dynamic balance;standing static balance;standing dynamic balance  -     Static Sitting Balance supervision  -     Dynamic Sitting Balance supervision  -     Position, Sitting Balance sitting edge of bed  -     Static Standing Balance supervision  -     Dynamic Standing Balance supervision  -     Position/Device Used, Standing Balance supported  -               User Key  (r) = Recorded By, (t) = Taken By, (c) = Cosigned By      Initials Name Provider Type    Trveor Zambrano OT Occupational Therapist                   Goals/Plan       [facility] Name 05/02/25 1205          Problem Specific Goal 1 (OT)    Problem Specific Goal 1 (OT) Patient to verbalize understanding of shoulder precautions, donning/doffing sling, ADL modification, HEP, prior to d/c.  -     Time Frame (Problem Specific Goal 1, OT) short term goal (STG);2 weeks  -      Progress/Outcome (Problem Specific Goal 1, OT) goal met  -       Row Name 05/02/25 1205          Therapy Assessment/Plan (OT)    Planned Therapy Interventions (OT) activity tolerance training;adaptive equipment training;BADL retraining;neuromuscular control/coordination retraining;functional balance retraining;occupation/activity based interventions;passive ROM/stretching;transfer/mobility retraining;strengthening exercise;ROM/therapeutic exercise  -               User Key  (r) = Recorded By, (t) = Taken By, (c) = Cosigned By      Initials Name Provider Type    Trevor Zambrano, OT Occupational Therapist                   Clinical Impression       Row Name 05/02/25 1201          Pain Assessment    Pretreatment Pain Rating 0/10 - no pain  -     Posttreatment Pain Rating 0/10 - no pain  -       Row Name 05/02/25 1201          Plan of Care Review    Plan of Care Reviewed With patient;spouse  -     Progress improving  -     Outcome Evaluation 75 y.o. female POD 1 Right TSRA.  At baseline, patient lives with spouse at home (2 DAINA, 12 steps to BR) Independent with ADLs and functional mobility (cane when hiking).  A&Ox4, LUE WFL, 4/5.  Spouse present for Eval.  Patient educated and verbalized understanding of donning/doffing sling, shoulder precautions, ADL modifications and HEP.  Patient requested to get dressed later due to nursing having to change her bandage.  Patient able to perform standing pendulum exercises for all appropriate movements.  Patient to follow up with Md and OP therapy when cleared.  Spouse to assist patient at home when d/c.  All OT goals met.  OT to sign off.  -       Row Name 05/02/25 1201          Therapy Assessment/Plan (OT)    Rehab Potential (OT) good  -     Therapy Frequency (OT) evaluation only  -       Row Name 05/02/25 1201          Therapy Plan Review/Discharge Plan (OT)    Anticipated Discharge Disposition (OT) home with assist;home with outpatient therapy services  -        Row Name 05/02/25 1201          Vital Signs    O2 Delivery Pre Treatment room air  -JR     O2 Delivery Intra Treatment room air  -JR     O2 Delivery Post Treatment room air  -JR     Pre Patient Position Supine  -JR     Intra Patient Position Standing  -JR     Post Patient Position Supine  -JR       Row Name 05/02/25 1201          Positioning and Restraints    Pre-Treatment Position in bed  -JR     Post Treatment Position bed  -JR     In Bed notified nsg;call light within reach;encouraged to call for assist;exit alarm on;with family/caregiver  -               User Key  (r) = Recorded By, (t) = Taken By, (c) = Cosigned By      Initials Name Provider Type    Trevor Zambrano, OT Occupational Therapist                   Outcome Measures       Row Name 05/02/25 1207          How much help from another is currently needed...    Putting on and taking off regular lower body clothing? 3  -JR     Bathing (including washing, rinsing, and drying) 3  -JR     Toileting (which includes using toilet bed pan or urinal) 3  -JR     Putting on and taking off regular upper body clothing 3  -JR     Taking care of personal grooming (such as brushing teeth) 4  -JR     Eating meals 4  -JR     AM-PAC 6 Clicks Score (OT) 20  -JR       Row Name 05/02/25 0100          How much help from another person do you currently need...    Turning from your back to your side while in flat bed without using bedrails? 2  -SK     Moving from lying on back to sitting on the side of a flat bed without bedrails? 3  -SK     Moving to and from a bed to a chair (including a wheelchair)? 2  -SK     Standing up from a chair using your arms (e.g., wheelchair, bedside chair)? 2  -SK     Climbing 3-5 steps with a railing? 2  -SK     To walk in hospital room? 2  -SK     AM-PAC 6 Clicks Score (PT) 13  -SK       Row Name 05/02/25 1207          Modified Kate Scale    Modified Kate Scale 3 - Moderate disability.  Requiring some help, but able to walk without  assistance.  -       Row Name 05/02/25 1207          Functional Assessment    Outcome Measure Options AM-PAC 6 Clicks Daily Activity (OT);Modified [place]  -               User Key  (r) = Recorded By, (t) = Taken By, (c) = Cosigned By      Initials Name Provider Type     Trevor Villeda OT Occupational Therapist    Juan Vega, RN Registered Nurse                    Occupational Therapy Education       Title: PT OT SLP Therapies (Done)       Topic: Occupational Therapy (Done)       Point: ADL training (Done)       Learning Progress Summary            Patient Eager, E, VU by  at 5/2/2025 1207    Comment: ADL modification, donning/doffing sling, shoulder precautions, HEP                      Point: Home exercise program (Done)       Learning Progress Summary            Patient Eager, E, VU by  at 5/2/2025 1207    Comment: ADL modification, donning/doffing sling, shoulder precautions, HEP                      Point: Precautions (Done)       Learning Progress Summary            Patient Eager, E, VU by  at 5/2/2025 1207    Comment: ADL modification, donning/doffing sling, shoulder precautions, HEP                      Point: Body mechanics (Done)       Learning Progress Summary            Patient Eager, E, VU by  at 5/2/2025 1207    Comment: ADL modification, donning/doffing sling, shoulder precautions, HEP                                      User Key       Initials Effective Dates Name Provider Type [facility] 07/24/24 -  Trevor Villeda OT Occupational Therapist OT                  OT Recommendation and Plan  Planned Therapy Interventions (OT): activity tolerance training, adaptive equipment training, BADL retraining, neuromuscular control/coordination retraining, functional balance retraining, occupation/activity based interventions, passive ROM/stretching, transfer/mobility retraining, strengthening exercise, ROM/therapeutic exercise  Therapy Frequency (OT): evaluation only  Plan of Care  Review  Plan of Care Reviewed With: patient, spouse  Progress: improving  Outcome Evaluation: 75 y.o. female POD 1 Right TSRA.  At baseline, patient lives with spouse at home (2 DAINA, 12 steps to BR) Independent with ADLs and functional mobility (cane when hiking).  A&Ox4, LUE WFL, 4/5.  Spouse present for Eval.  Patient educated and verbalized understanding of donning/doffing sling, shoulder precautions, ADL modifications and HEP.  Patient requested to get dressed later due to nursing having to change her bandage.  Patient able to perform standing pendulum exercises for all appropriate movements.  Patient to follow up with Md and OP therapy when cleared.  Spouse to assist patient at home when d/c.  All OT goals met.  OT to sign off.     Time Calculation:   Evaluation Complexity (OT)  Review Occupational Profile/Medical/Therapy History Complexity: expanded/moderate complexity  Assessment, Occupational Performance/Identification of Deficit Complexity: 3-5 performance deficits  Clinical Decision Making Complexity (OT): detailed assessment/moderate complexity  Overall Complexity of Evaluation (OT): moderate complexity     Time Calculation- OT       Row Name 05/02/25 1208             Time Calculation- OT    OT Start Time 0916  -JR      OT Stop Time 0940  -JR      OT Time Calculation (min) 24 min  -JR      Total Timed Code Minutes- OT 14 minute(s)  -JR      OT Received On 05/02/25  -JR         Timed Charges    19039 - OT Therapeutic Exercise Minutes 14  -JR         Untimed Charges    OT Eval/Re-eval Minutes 10  -JR         Total Minutes    Timed Charges Total Minutes 14  -JR      Untimed Charges Total Minutes 10  -JR       Total Minutes 24  -JR                User Key  (r) = Recorded By, (t) = Taken By, (c) = Cosigned By      Initials Name Provider Type    Trevor Zambrano OT Occupational Therapist                  Therapy Charges for Today       Code Description Service Date Service Provider Modifiers Qty    [id number]   OT THER PROC EA 15 MIN 5/2/2025 Trevor Villeda OT GO 1    [id number] HC OT EVAL MOD COMPLEXITY 3 5/2/2025 Trevor Villeda OT GO 1                 Trevor Villeda OT  5/2/2025

## 2025-05-02 NOTE — OUTREACH NOTE
Keep your scheduled appointment with your provider.    Call your Doctor if you have any of the following:  Temperature above 100 degrees  Nausea, vomiting and/or diarrhea  Severe headache, dizziness, or blurred vision  Notable increase in swelling of hands or feet  Notable swelling of face and lips  Difficulty, pain or burning with urination  Foul smelling vaginal discharge  Decreased fetal movement    Come to the hospital if you have any of the following symptoms:  Your water breaks  More than 4-6 contractions in 1 hour for 2 or more hours  Vaginal bleeding like a period    After 28 weeks, you should feel 10 distinct fetal movements within a 2 hour period.    It is recommended that you drink 1/2 a gallon of water each day.  Tea, Soda and Juice are  in addition to this.     Prep Survey      Flowsheet Row Responses   [facility] patient discharged from? [facility]   Is LACE score < 7 ? Yes   Eligibility [facility]   Date of Admission 05/01/25   Date of Discharge 05/02/25   Discharge Disposition Home or Self Care   Discharge diagnosis S/P reverse total shoulder arthroplasty, righ   Does the patient have one of the following disease processes/diagnoses(primary or secondary)? Total Joint Replacement   Does the patient have Home health ordered? No   Is there a DME ordered? No   Prep survey completed? Yes            SHASHI GRANGER - Registered Nurse

## 2025-05-02 NOTE — DISCHARGE SUMMARY
Orthopedic Discharge Summary      Patient: Aydee Moore      YOB: 1950    Medical Record Number: [id number]    Attending Physician: Kleber Anaya MD  Consulting Physician(s):   Date of Admission: 5/1/2025  8:10 AM  Date of Discharge:       S/P reverse total shoulder arthroplasty, right    [unfilled]  R RTSA  Allergies:   Allergies   Allergen Reactions    Tetracyclines & Related Rash and Mental Status Change     Loss of Consciousness.   Rash and drowsiness    Nsaids Other (See Comments)     Pt has kidney issues and contraindicated.     Penicillins Rash       Current Medications:     Discharge Medications        ASK your doctor about these medications        Instructions Start Date   albuterol sulfate  (90 Base) MCG/ACT inhaler  Commonly known as: PROVENTIL HFA;VENTOLIN HFA;PROAIR HFA   2 puffs, Inhalation, Every 6 Hours PRN      amLODIPine 5 MG tablet  Commonly known as: NORVASC   5 mg, Oral, Daily      Aspirin Low Dose 81 MG EC tablet  Generic drug: aspirin   81 mg, Oral, Daily      Bactrim -160 MG per tablet  Generic drug: sulfamethoxazole-trimethoprim   1 tablet, 2 Times Daily      Benefiber Drink Mix pack   1 each, Oral, Daily      BIOTIN PO   1 tablet, Daily      Calcium Carbonate 500 MG chewable tablet   1 tablet, Oral, 2 Times Daily      cetirizine 5 MG tablet  Commonly known as: zyrTEC   5 mg, Oral, Nightly      Diclofenac Sodium 1 % gel gel  Commonly known as: VOLTAREN   4 g, Topical, 4 Times Daily PRN      docusate sodium 100 MG capsule  Commonly known as: Colace   100 mg, Oral, Daily      famotidine 20 MG tablet  Commonly known as: PEPCID   20 mg, Oral, 2 Times Daily PRN      FLUoxetine 10 MG capsule  Commonly known as: PROzac   10 mg, Oral, Daily      fluticasone 50 MCG/ACT nasal spray  Commonly known as: FLONASE   2 sprays, Nasal, Daily      glucosamine-chondroitin 500-400 MG capsule capsule   1 capsule, 3 Times Daily With Meals      levothyroxine 50 MCG  "tablet  Commonly known as: Synthroid   50 mcg, Oral, Daily      Magnesium 400 MG capsule   1 Capful, Daily      multivitamin with minerals tablet tablet   1 tablet, Oral, Daily      nystatin 350921 UNIT/GM ointment  Commonly known as: MYCOSTATIN   1 Application, Topical, 2 Times Daily      rosuvastatin 5 MG tablet  Commonly known as: CRESTOR   5 mg, Oral, Daily      triamcinolone 0.1 % cream  Commonly known as: KENALOG   1 Application, As Needed      Vitamin D 50 MCG (2000 UT) tablet   2,000 Units, Oral, Daily               Past Medical History:   Diagnosis Date    ACL tear     Acute bronchitis 12/17/2024    Acute cystitis without hematuria 03/16/2022    Allergic     Penicillins, tetracyclines, surgical tape; latex sensitivity    Anemia     HX    Anxiety and depression     Apnea     \"STOPS BREATHING WHENEVER FALLS ASLEEP BUT STATES NOT SLEEP APNEA\"    Arthritis     At risk for sleep apnea     Back pain     Cataract     rt eye surgery 10/24/2018    Chronic kidney disease low GFR    Colon polyp Dr Melissa    Constipation     Elevated fasting glucose     GERD (gastroesophageal reflux disease) Dr Melissa    Headache     History of fall     History of pelvic inflammatory disease     History of sleep apnea     IMPROVED AFTER WEIGHT LOSS    History of suicidal ideation     Hyperlipidemia     Hypertension     Hypothyroidism     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    Obesity     Rash 08/22/2022    Shoulder pain     Skin picking habit     Skin yeast infection     HX/RELATED TO STOOL INCONTINENCE    Stool incontinence     Torn meniscus     Urinary tract infection often michelle when away from home     Past Surgical History:   Procedure Laterality Date    BREAST SURGERY  1986    biopsy    CATARACT EXTRACTION Right 10/24/2018    COLONOSCOPY      FRACTURE SURGERY  Oct 2022    rt ring finger    HYSTERECTOMY      KNEE SURGERY Right 08/08/2017    TONSILLECTOMY       Social History     Occupational History    Not on file   Tobacco Use    Smoking status: " Never     Passive exposure: Never    Smokeless tobacco: Never   Vaping Use    Vaping status: Never Used   Substance and Sexual Activity    Alcohol use: Yes     Comment: social drinker no more than 1 drink/day if that    Drug use: No    Sexual activity: Yes     Partners: Male     Birth control/protection: Hysterectomy     Comment:  only partner      Social History     Social History Narrative    Not on file     Family History   Problem Relation Age of Onset    Breast cancer Mother     Colon cancer Mother     Thyroid disease Mother     Vision loss Mother     Cancer Father         mesothelioma    Diabetes Sister         twin    Uterine cancer Sister     Thyroid disease Sister     Anemia Sister     Hypertension Sister     Uterine cancer Sister         suicide 2016    Early death Sister     Suicide Attempts Sister     No Known Problems Brother     Alcohol abuse Paternal Aunt     Heart attack Paternal Grandfather     Malig Hyperthermia Neg Hx          Vitals:    05/01/25 2018 05/01/25 2331 05/02/25 0421 05/02/25 0710   BP: 117/67 109/64 105/66 137/70   BP Location: Left arm Left arm Left arm Left arm   Patient Position: Lying Lying Lying Lying   Pulse: 68 72 66 71   Resp: 16 16 16 16   Temp: 97.5 °F (36.4 °C) 97.6 °F (36.4 °C) 97.6 °F (36.4 °C) 98 °F (36.7 °C)   TempSrc: Oral Oral Oral Oral   SpO2: 96% 94% 95% 95%   Weight:       Height:             Physical Exam: 75 y.o. female  General Appearance:    Alert, cooperative, in no acute distress           Extremities: intact       Pulses:  Pulses palpable and equal bilaterally.       Skin:  No bleeding, bruising or rash.       Lymph nodes:  No palpable adenopathy.       Neurologic:  No deficits noted.              Hospital Course:  75 y.o. female admitted to [facility] to services of Kleber Anaya MDto Kleber Anaya MD for R RTSA.  Antibiotic and VTE prophylaxis were per SCIP protocols. Post-operatively the patient transferred to the post-operative floor  where the patient underwent mobilization therapy that included active as well as passive ROM exercises. Opioids were titrated to achieve appropriate pain management to allow for participation in mobilization exercises. Vital signs are now stable. The incision is intact without signs or symptoms of infection and dressing was changed. Appropriate education re: incision care, activity levels, medications, and follow up visits was completed and all questions were answered. The patient is now deemed stable for discharge.    Discharge and Follow up Instructions: Follow up in 10-14 days.    Date: [unfilled]  Kleber Anaya MD

## 2025-05-02 NOTE — PROGRESS NOTES
Case Management Discharge Note      Final Note: Dc home no DC needs         Selected Continued Care - Discharged on 5/2/2025 Admission date: 5/1/2025 - Discharge disposition: Home or Self Care      Destination    No services have been selected for the patient.                Durable Medical Equipment    No services have been selected for the patient.                Dialysis/Infusion    No services have been selected for the patient.                Home Medical Care    No services have been selected for the patient.                Therapy    No services have been selected for the patient.                Community Resources    No services have been selected for the patient.                Community & DME    No services have been selected for the patient.                    Selected Continued Care - Episodes Includes continued care and service providers with selected services from the active episodes listed below               Final Discharge Disposition Code: 01 - home or self-care

## 2025-05-02 NOTE — PLAN OF CARE
Goal Outcome Evaluation:  Plan of Care Reviewed With: patient, spouse        Progress: improving  Outcome Evaluation: 75 y.o. female POD 1 Right TSRA.  At baseline, patient lives with spouse at home (2 DAINA, 12 steps to BR) Independent with ADLs and functional mobility (cane when hiking).  A&Ox4, LUE WFL, 4/5.  Spouse present for Eval.  Patient educated and verbalized understanding of donning/doffing sling, shoulder precautions, ADL modifications and HEP.  Patient requested to get dressed later due to nursing having to change her bandage.  Patient able to perform standing pendulum exercises for all appropriate movements.  Patient to follow up with Md and OP therapy when cleared.  Spouse to assist patient at home when d/c.  All OT goals met.  OT to sign off.    Anticipated Discharge Disposition (OT): home with assist, home with outpatient therapy services

## 2025-05-02 NOTE — PROGRESS NOTES
Orthopedic Progress Note    Subjective     Post-Operative Day: 1 post-right RTSA  Systemic or Specific Complaints: No Complaints. Pain is under control with PO meds.     Objective     Vital signs in last 24 hours:  Temp:  [97.4 °F (36.3 °C)-98.3 °F (36.8 °C)] 98 °F (36.7 °C)  Heart Rate:  [53-72] 71  Resp:  [16-18] 16  BP: (105-145)/(62-83) 137/70    General: alert, appears stated age and cooperative   Neurovascular: intact  Radial pulse: 2+.   Wound: Dressing clean and dry.    Range of Motion: Limited ROM Post op     Data Review  CBC:  Results from last 7 days   Lab Units 05/02/25  0418   HEMOGLOBIN g/dL 13.2   HEMATOCRIT % 40.0       Assessment & Plan     IMPRESSION:stable status post RIGHT REVERSE total shoulder arthroplasty    PLAN: D/C home today. Change dressing to a light watertight bandage. Pendulum swings and elbow and wrist range of motion initiated.  Follow up in office 10-14 days (already scheduled).  Physical therapy is scheduled.    Activity: As directed by physical therapy.  Sling for protection.  Pendulum swings, elbow and wrist range of motion.  Ice for swelling     LOS: 0 days     Kleber Anaya MD    Date: 5/2/2025  Time: 07:57 EDT

## 2025-05-02 NOTE — PLAN OF CARE
Problem: Adult Inpatient Plan of Care  Goal: Absence of Hospital-Acquired Illness or Injury  Intervention: Prevent and Manage VTE (Venous Thromboembolism) Risk  Recent Flowsheet Documentation  Taken 5/2/2025 0100 by Juan Kemp RN  VTE Prevention/Management: SCDs (sequential compression devices) on     Problem: Shoulder Arthroplasty (Total, [place], Reverse)  Goal: Effective Bowel Elimination  Intervention: Enhance Bowel Motility and Elimination  Recent Flowsheet Documentation  Taken 5/2/2025 0100 by Juan Kemp RN  Bowel Motility Enhancement: fluid intake encouraged     Problem: Adult Inpatient Plan of Care  Goal: Absence of Hospital-Acquired Illness or Injury  Intervention: Identify and Manage Fall Risk  Recent Flowsheet Documentation  Taken 5/2/2025 0100 by Juan Kemp RN  Safety Promotion/Fall Prevention:   fall prevention program maintained   safety round/check completed   Goal Outcome Evaluation:   Patient here after R shoulder surgery. Patient denies any pain. Alert and responsive. Patient had episode of nausea/vomiting on getting heather, she had prns. Up with assist of one. Patient on atb , vitals wnl no pan. Will continur to monitor

## 2025-05-02 NOTE — PLAN OF CARE
Goal Outcome Evaluation:POD1 right reverse total shoulder, VSS,a febrile, worked with therapy, dressing changed to optifoam, no drainage noted, incision c/d/I. IV DC'd, DC instructions reviewed with pt and spouse, all questions answered. Pt taken with all belongings via WC to discharge exit.

## 2025-05-05 ENCOUNTER — TRANSITIONAL CARE MANAGEMENT TELEPHONE ENCOUNTER (OUTPATIENT)
Dept: CALL CENTER | Facility: HOSPITAL | Age: 75
End: 2025-05-05
Payer: MEDICARE

## 2025-05-05 ENCOUNTER — TELEPHONE (OUTPATIENT)
Dept: FAMILY MEDICINE CLINIC | Facility: CLINIC | Age: 75
End: 2025-05-05
Payer: MEDICARE

## 2025-05-05 DIAGNOSIS — G47.09 OTHER INSOMNIA: ICD-10-CM

## 2025-05-05 DIAGNOSIS — K59.00 CONSTIPATION, UNSPECIFIED CONSTIPATION TYPE: Primary | ICD-10-CM

## 2025-05-05 RX ORDER — HYDROXYZINE HYDROCHLORIDE 25 MG/1
50 TABLET, FILM COATED ORAL NIGHTLY PRN
Qty: 60 TABLET | Refills: 1 | Status: SHIPPED | OUTPATIENT
Start: 2025-05-05 | End: 2025-07-04

## 2025-05-05 RX ORDER — POLYETHYLENE GLYCOL 3350 17 G/17G
17 POWDER, FOR SOLUTION ORAL DAILY
Qty: 1500 G | Refills: 1 | Status: SHIPPED | OUTPATIENT
Start: 2025-05-05 | End: 2025-11-01

## 2025-05-05 RX ORDER — AMOXICILLIN 250 MG
1 CAPSULE ORAL 2 TIMES DAILY PRN
Qty: 60 TABLET | Refills: 5 | Status: SHIPPED | OUTPATIENT
Start: 2025-05-05 | End: 2025-11-01

## 2025-05-05 NOTE — OUTREACH NOTE
Call Center TCM Note      Flowsheet Row Responses   [facility], [place] patient discharged from? [place]   Does the patient have one of the following disease processes/diagnoses(primary or secondary)? Total Joint Replacement   Joint surgery performed? Shoulder   TCM attempt successful? Yes   Call start time 1455   Call end time 1502   Discharge diagnosis S/P reverse total shoulder arthroplasty, righ   Does the patient have all medications related to this admission filled (includes all antibiotics, pain medications, etc.) Yes   Is the patient taking all medications as directed (includes completed medication regime)? Yes   Is the patient able to teach back alternate methods of pain control? Ice, Shoulder-elevate above heart/ keep in sling as advised, Short, frequent activity   Comments Pt states she wantts to only f/u with surgeon at this time.   Does the patient have an appointment with their PCP within 7-14 days of discharge? No   Nursing Interventions Patient desires to follow up with specialty only   Psychosocial issues? No   Does the patient have a wound vac in place? N/A   Did the patient receive a copy of their discharge instructions? Yes   Nursing interventions Reviewed instructions with patient   What is the patient's perception of their functional status since discharge? New symptoms unrelated to diagnosis   Is the patient able to teach back signs and symptoms of infection? Incisional drainage, Temp >100.4 for 24h or longer, Blisters around incision, Increased swelling or redness around incision (not associated with surgical edema), Severe discomfort or pain   Is the patient able to teach back how to prevent infection? Check incision daily, Keep incision covered if drainage   Did the patient implement home safety suggestions from pre-surgery classes if attended? N/A   If the patient is a current smoker, are they able to teach back resources for cessation? Not a smoker   Is the patient/caregiver able to teach  back the hierarchy of who to call/visit for symptoms/problems? PCP, Specialist, Home health nurse, Urgent Care, ED, 911 Yes   Additional teach back comments States she has been having issues with constipation and increased the stool softner with no success.  States she is drinking plenty of fluids.  Has issues with nausea.  She is having trouble sleeping and wants to take Benadryl.  Advised to contact surgeon but states she gets disconnected every time she calls.   TCM call completed? Yes   Wrap up additional comments Will message PCP regarding constipation and if she can take Benadryl to help sleep.   Call end time 2999            Onelia MCDONNELL - Licensed Nurse    5/5/2025, 15:03 EDT

## 2025-05-05 NOTE — OUTREACH NOTE
Call Center TCM Note      Flowsheet Row Responses   [facility] patient discharged from? [place]   Does the patient have one of the following disease processes/diagnoses(primary or secondary)? Total Joint Replacement   Joint surgery performed? Shoulder   TCM attempt successful? No   Unsuccessful attempts Attempt 1   Call Status Left message            Onelia MCDONNELL - Licensed Nurse    5/5/2025, 09:06 EDT

## 2025-05-06 NOTE — TELEPHONE ENCOUNTER
Anatoliy JOHN note:    I have sent in MiraLAX once daily and senna S as needed for constipation.  I have also sent hydroxyzine nightly as needed for insomnia.     The constipation is definitely related to the opiates given for pain relief after surgery.  I believe even the insomnia is related to the opiates causing a paradoxical effect.     Please instruct patient to monitor her opioid intake.  Also call her surgeon's office as needed.

## 2025-05-06 NOTE — TELEPHONE ENCOUNTER
Name: Aydee Moore BOBBY    Relationship: Self    Best Callback Number:   Telephone Information:   Mobile [phone]     HUB PROVIDED THE RELAY MESSAGE FROM THE OFFICE   PATIENT VOICED UNDERSTANDING AND HAS NO FURTHER QUESTIONS AT THIS TIME    ADDITIONAL INFORMATION:

## 2025-05-08 PROBLEM — R30.0 DYSURIA: Status: ACTIVE | Noted: 2025-05-08

## 2025-05-08 NOTE — ASSESSMENT & PLAN NOTE
- Symptoms include odd sensation during urination without pain.  - UA normal   - Patient requesting urine culture  - Currently taking Bactrim; results of the urine culture will be communicated upon receipt.

## 2025-05-08 NOTE — ASSESSMENT & PLAN NOTE
Hypertension is stable and controlled  Continue current treatment regimen.  Blood pressure will be reassessed  at next scheduled follow up .

## 2025-06-10 ENCOUNTER — OFFICE VISIT (OUTPATIENT)
Dept: FAMILY MEDICINE CLINIC | Facility: CLINIC | Age: 75
End: 2025-06-10
Payer: MEDICARE

## 2025-06-10 VITALS
HEIGHT: 65 IN | DIASTOLIC BLOOD PRESSURE: 82 MMHG | HEART RATE: 64 BPM | SYSTOLIC BLOOD PRESSURE: 122 MMHG | WEIGHT: 193.6 LBS | BODY MASS INDEX: 32.26 KG/M2 | OXYGEN SATURATION: 97 % | TEMPERATURE: 97.8 F

## 2025-06-10 DIAGNOSIS — M79.676 PAIN OF GREAT TOE, UNSPECIFIED LATERALITY: Primary | ICD-10-CM

## 2025-06-10 DIAGNOSIS — N30.00 ACUTE CYSTITIS WITHOUT HEMATURIA: ICD-10-CM

## 2025-06-10 DIAGNOSIS — M54.50 CHRONIC MIDLINE LOW BACK PAIN WITHOUT SCIATICA: ICD-10-CM

## 2025-06-10 DIAGNOSIS — Z96.611 STATUS POST REVERSE TOTAL REPLACEMENT OF RIGHT SHOULDER: ICD-10-CM

## 2025-06-10 DIAGNOSIS — E78.2 MIXED HYPERLIPIDEMIA: Chronic | ICD-10-CM

## 2025-06-10 DIAGNOSIS — M25.552 LEFT HIP PAIN: ICD-10-CM

## 2025-06-10 DIAGNOSIS — I10 ESSENTIAL HYPERTENSION: Chronic | ICD-10-CM

## 2025-06-10 DIAGNOSIS — M79.652 LEFT THIGH PAIN: ICD-10-CM

## 2025-06-10 DIAGNOSIS — G89.29 CHRONIC MIDLINE LOW BACK PAIN WITHOUT SCIATICA: ICD-10-CM

## 2025-06-10 PROBLEM — B37.2 YEAST DERMATITIS: Status: RESOLVED | Noted: 2025-04-11 | Resolved: 2025-06-10

## 2025-06-10 PROBLEM — R07.89 INTERMITTENT LEFT-SIDED CHEST PAIN: Status: RESOLVED | Noted: 2024-04-16 | Resolved: 2025-06-10

## 2025-06-10 PROBLEM — R30.0 DYSURIA: Status: RESOLVED | Noted: 2025-05-08 | Resolved: 2025-06-10

## 2025-06-10 PROBLEM — S61.238A: Status: RESOLVED | Noted: 2024-01-31 | Resolved: 2025-06-10

## 2025-06-10 PROBLEM — T63.461A WASP STING: Status: RESOLVED | Noted: 2017-07-28 | Resolved: 2025-06-10

## 2025-06-10 RX ORDER — PREDNISONE 10 MG/1
10 TABLET ORAL DAILY
Qty: 5 TABLET | Refills: 0 | Status: SHIPPED | OUTPATIENT
Start: 2025-06-10 | End: 2025-06-15

## 2025-06-10 RX ORDER — SULFAMETHOXAZOLE AND TRIMETHOPRIM 800; 160 MG/1; MG/1
1 TABLET ORAL 2 TIMES DAILY
Qty: 14 TABLET | Refills: 0 | Status: SHIPPED | OUTPATIENT
Start: 2025-06-10

## 2025-06-10 NOTE — PROGRESS NOTES
"Chief Complaint  Hypertension (Follow up ), Depression (Follow up ), Feet Pain (Big toes are painful while laying in bed / so painful nothing can touch them / ), and Hip Pain (Left hip very painful when tying to get up out of a chair or the toilet )    Subjective        Aydee Moore presents to [facility]  History of Present Illness  75-year-old white female here for chronic disease management follow-up visit.      Pt reports s/p recent right shoulder surgery and s/p PT and doing well.  Pt c/o b/l great toe pain even when the bedsheet falls on it.      Pt c/o 1 week hx of left Hip Pain states its painful when trying to get up out of a chair or the toilet.  Patient also complaining of pain in the left upper thigh region.  Patient also reports history of back pain in the lumbar region.  Hypertension  Depression    Nighttime awakenings:     PMH Includes: depression    Hip Pain         Objective   Vital Signs:  /82 (BP Location: Left arm, Patient Position: Sitting, Cuff Size: Adult)   Pulse 64   Temp 97.8 °F (36.6 °C) (Infrared)   Ht 165.1 cm (65\")   Wt 87.8 kg (193 lb 9.6 oz)   SpO2 97%   BMI 32.22 kg/m²   Estimated body mass index is 32.22 kg/m² as calculated from the following:    Height as of this encounter: 165.1 cm (65\").    Weight as of this encounter: 87.8 kg (193 lb 9.6 oz).               Physical Exam  Constitutional:       Appearance: Normal appearance.   HENT:      Head: Normocephalic and atraumatic.   Eyes:      Conjunctiva/sclera: Conjunctivae normal.   Cardiovascular:      Rate and Rhythm: Normal rate and regular rhythm.      Heart sounds: Normal heart sounds.   Pulmonary:      Effort: Pulmonary effort is normal.      Breath sounds: Normal breath sounds.   Abdominal:      General: Bowel sounds are normal.      Palpations: Abdomen is soft.      Comments: Non-tender   Musculoskeletal:      Comments: Pain in left thigh upon rising from seated position.  No hip " pain with lateral hip ROM B/L.     Skin:     General: Skin is warm.   Neurological:      General: No focal deficit present.      Mental Status: She is alert and oriented to person, place, and time.   Psychiatric:         Mood and Affect: Mood normal.         Behavior: Behavior normal.        Result Review :    The following data was reviewed by: ALVARO Alarcon on 06/10/2025:  CMP          12/30/2024    16:43 4/18/2025    09:53 5/2/2025    04:18   CMP   Glucose 91  95  112    BUN 17  23  11    Creatinine 0.96  1.04  0.88    EGFR 63.0  57.2  69.5    Sodium 142  139  138    Potassium 4.1  4.5  4.2    Chloride 106  103  104    Calcium 9.8  9.9  9.1    Total Protein 7.2  6.7     Albumin 4.2  4.2     Globulin 3.0  2.5     Total Bilirubin 0.5  0.5     Alkaline Phosphatase 92  103     AST (SGOT) 22  16     ALT (SGPT) 16  8     Albumin/Globulin Ratio 1.4  1.7     BUN/Creatinine Ratio 17.7  22.1  12.5    Anion Gap 10.0   11.5      CBC          12/30/2024    16:43 4/18/2025    09:53 5/2/2025    04:18   CBC   WBC 6.27  6.71     RBC 4.48  4.64     Hemoglobin 13.6  13.8  13.2    Hematocrit 40.7  42.5  40.0    MCV 90.8  91.6     MCH 30.4  29.7     MCHC 33.4  32.5     RDW 12.0  12.7     Platelets 321  239       Lipid Panel          10/1/2024    09:58   Lipid Panel   Total Cholesterol 188    Triglycerides 140    HDL Cholesterol 70    VLDL Cholesterol 24    LDL Cholesterol  94      TSH          10/1/2024    09:58 4/18/2025    09:53   TSH   TSH 2.480  2.720            Data reviewed : Consultant notes reviewed last note by Dr. Anaya orthopedic surgeon from 5/1/2025.             Assessment and Plan     Diagnoses and all orders for this visit:    1. Pain of great toe, unspecified laterality (Primary)  Comments:  bilateral  Assessment & Plan:  Discussed podiatry referral, patient voiced understanding.    Orders:  -     Ambulatory Referral to Podiatry    2. Left hip pain  Assessment & Plan:  Provided patient x-ray  requisition sheet for x-ray of the left hip to be done at [facility].    Orders:  -     XR Hips Bilateral With or Without Pelvis 2 View; Future  -     Diclofenac Sodium (VOLTAREN) 1 % gel gel; Apply 4 g topically to the appropriate area as directed 4 (Four) Times a Day As Needed (pain).  Dispense: 350 g; Refill: 0  -     Cancel: Ambulatory Referral to Physical Therapy for Evaluation & Treatment  -     predniSONE (DELTASONE) 10 MG tablet; Take 1 tablet by mouth Daily for 5 days. Avoid NSAIDS while taking steroids.  Dispense: 5 tablet; Refill: 0  -     XR Spine Lumbar 2 or 3 View; Future  -     Ambulatory Referral to Physical Therapy for Evaluation & Treatment    3. Acute cystitis without hematuria  -     Bactrim -160 MG per tablet; Take 1 tablet by mouth 2 (Two) Times a Day.  Dispense: 14 tablet; Refill: 0  -     Urinalysis With Microscopic - Urine, Clean Catch    4. Essential hypertension  Assessment & Plan:  Hypertension is stable and controlled  Continue current treatment regimen.  Dietary sodium restriction.  Weight loss.  Regular aerobic exercise.  Ambulatory blood pressure monitoring.  Blood pressure will be reassessed in 3 months.  Discussed the importance of healthy diet, nutrition, and lifestyle. Recommend low salt, fat/cholesterol diet and avoid concentrated sweets. Encouraged DASH diet along with fresh fruits & vegetables and low fat dairy products. Counseled patient to exercise/walk as tolerated. Avoid tobacco and alcohol use.      Orders:  -     Lipid Panel  -     Comprehensive Metabolic Panel  -     Urinalysis With Microscopic - Urine, Clean Catch    5. Mixed hyperlipidemia  Assessment & Plan:  Discussed the importance of healthy diet, nutrition, and lifestyle. Recommend low salt, fat/cholesterol diet and avoid concentrated sweets. Encouraged DASH diet along with fresh fruits & vegetables and low fat dairy products. Counseled patient to exercise/walk as tolerated. Avoid tobacco and  alcohol use.      Orders:  -     Lipid Panel    6. Left thigh pain  -     Cancel: Ambulatory Referral to Physical Therapy for Evaluation & Treatment  -     XR Spine Lumbar 2 or 3 View; Future  -     XR femur 2 vw left; Future  -     Ambulatory Referral to Physical Therapy for Evaluation & Treatment    7. Chronic midline low back pain without sciatica  -     Cancel: Ambulatory Referral to Physical Therapy for Evaluation & Treatment  -     Ambulatory Referral to Physical Therapy for Evaluation & Treatment    8. Status post reverse total replacement of right shoulder  Assessment & Plan:  Stable.  Instructed patient to follow-up with orthopedic surgery for any right shoulder issues.      Instructed patient to avoid NSAIDs while taking steroids.  Also instructed patient to monitor blood sugar and blood pressure while on steroids.      All chronic conditions have been addressed and treated by the practice or other specialists. Medications have been reconciled and refilled as appropriate. Reiterated compliance and timely follow up appointments. Side effects of all new and old medications reviewed with the patient and patient willing to accept all risks involved. Advised RTO if no improvement or worsening of symptoms or if any new complaints arise. Patient advised to follow up with clinic or call after diagnostic tests, if patient does not hear from office 3 days after the test completion.            Follow Up     Return in about 3 months (around 9/10/2025) for Next scheduled follow up, Medicare Wellness.  Patient was given instructions and counseling regarding her condition or for health maintenance advice. Please see specific information pulled into the AVS if appropriate.

## 2025-06-10 NOTE — ASSESSMENT & PLAN NOTE
Provided patient x-ray requisition sheet for x-ray of the left hip to be done at [facility].

## 2025-06-11 LAB
ALBUMIN SERPL-MCNC: 4.6 G/DL (ref 3.8–4.8)
ALP SERPL-CCNC: 121 IU/L (ref 44–121)
ALT SERPL-CCNC: 12 IU/L (ref 0–32)
APPEARANCE UR: CLEAR
AST SERPL-CCNC: 22 IU/L (ref 0–40)
BACTERIA #/AREA URNS HPF: NORMAL /[HPF]
BILIRUB SERPL-MCNC: 0.3 MG/DL (ref 0–1.2)
BILIRUB UR QL STRIP: NEGATIVE
BUN SERPL-MCNC: 24 MG/DL (ref 8–27)
BUN/CREAT SERPL: 27 (ref 12–28)
CALCIUM SERPL-MCNC: 9.9 MG/DL (ref 8.7–10.3)
CASTS URNS QL MICRO: NORMAL /LPF
CHLORIDE SERPL-SCNC: 100 MMOL/L (ref 96–106)
CHOLEST SERPL-MCNC: 191 MG/DL (ref 100–199)
CO2 SERPL-SCNC: 23 MMOL/L (ref 20–29)
COLOR UR: YELLOW
CREAT SERPL-MCNC: 0.9 MG/DL (ref 0.57–1)
EGFRCR SERPLBLD CKD-EPI 2021: 68 ML/MIN/1.73
EPI CELLS #/AREA URNS HPF: NORMAL /HPF (ref 0–10)
GLOBULIN SER CALC-MCNC: 2.5 G/DL (ref 1.5–4.5)
GLUCOSE SERPL-MCNC: 90 MG/DL (ref 70–99)
GLUCOSE UR QL STRIP: NEGATIVE
HDLC SERPL-MCNC: 70 MG/DL
HGB UR QL STRIP: NEGATIVE
KETONES UR QL STRIP: NEGATIVE
LDLC SERPL CALC-MCNC: 102 MG/DL (ref 0–99)
LEUKOCYTE ESTERASE UR QL STRIP: NEGATIVE
MICRO URNS: NORMAL
MICRO URNS: NORMAL
NITRITE UR QL STRIP: NEGATIVE
PH UR STRIP: 6.5 [PH] (ref 5–7.5)
POTASSIUM SERPL-SCNC: 4.4 MMOL/L (ref 3.5–5.2)
PROT SERPL-MCNC: 7.1 G/DL (ref 6–8.5)
PROT UR QL STRIP: NEGATIVE
RBC #/AREA URNS HPF: NORMAL /HPF (ref 0–2)
SODIUM SERPL-SCNC: 139 MMOL/L (ref 134–144)
SP GR UR STRIP: 1.02 (ref 1–1.03)
TRIGL SERPL-MCNC: 110 MG/DL (ref 0–149)
UROBILINOGEN UR STRIP-MCNC: 0.2 MG/DL (ref 0.2–1)
VLDLC SERPL CALC-MCNC: 19 MG/DL (ref 5–40)
WBC #/AREA URNS HPF: NORMAL /HPF (ref 0–5)

## 2025-06-25 ENCOUNTER — HOSPITAL ENCOUNTER (OUTPATIENT)
Dept: GENERAL RADIOLOGY | Facility: HOSPITAL | Age: 75
Discharge: HOME | End: 2025-06-25
Payer: MEDICARE

## 2025-06-25 DIAGNOSIS — M79.652 LEFT THIGH PAIN: ICD-10-CM

## 2025-06-25 DIAGNOSIS — M25.552 LEFT HIP PAIN: ICD-10-CM

## 2025-06-25 PROCEDURE — 73552 X-RAY EXAM OF FEMUR 2/>: CPT

## 2025-06-25 PROCEDURE — 73521 X-RAY EXAM HIPS BI 2 VIEWS: CPT

## 2025-06-25 PROCEDURE — 72100 X-RAY EXAM L-S SPINE 2/3 VWS: CPT

## 2025-06-26 DIAGNOSIS — I70.90 ARTERIAL CALCIFICATION: Primary | ICD-10-CM

## 2025-06-26 DIAGNOSIS — M47.816 FACET ARTHROPATHY, LUMBAR: ICD-10-CM

## 2025-06-26 DIAGNOSIS — M16.0 OSTEOARTHRITIS OF BOTH HIPS, UNSPECIFIED OSTEOARTHRITIS TYPE: ICD-10-CM

## 2025-06-26 DIAGNOSIS — S32.030S COMPRESSION FRACTURE OF L3 VERTEBRA, SEQUELA: ICD-10-CM

## 2025-06-26 DIAGNOSIS — M51.369 DEGENERATION OF INTERVERTEBRAL DISC OF LUMBAR REGION, UNSPECIFIED WHETHER PAIN PRESENT: ICD-10-CM

## 2025-06-26 PROBLEM — S32.030A COMPRESSION FRACTURE OF THIRD LUMBAR VERTEBRA: Status: ACTIVE | Noted: 2025-06-26

## 2025-06-27 ENCOUNTER — TELEPHONE (OUTPATIENT)
Dept: FAMILY MEDICINE CLINIC | Facility: CLINIC | Age: 75
End: 2025-06-27
Payer: MEDICARE

## 2025-06-27 NOTE — TELEPHONE ENCOUNTER
Patient mycharted back and said they will complete everything that dianna mycharted them on the 26th

## 2025-07-07 ENCOUNTER — HOSPITAL ENCOUNTER (OUTPATIENT)
Dept: ULTRASOUND IMAGING | Facility: HOSPITAL | Age: 75
Discharge: HOME | End: 2025-07-07
Admitting: STUDENT IN AN ORGANIZED HEALTH CARE EDUCATION/TRAINING PROGRAM
Payer: MEDICARE

## 2025-07-07 DIAGNOSIS — I70.90 ARTERIAL CALCIFICATION: ICD-10-CM

## 2025-07-07 PROCEDURE — 76706 US ABDL AORTA SCREEN AAA: CPT

## 2025-07-08 ENCOUNTER — OFFICE VISIT (OUTPATIENT)
Age: 75
End: 2025-07-08
Payer: MEDICARE

## 2025-07-08 VITALS — RESPIRATION RATE: 20 BRPM | HEIGHT: 65 IN | BODY MASS INDEX: 32.15 KG/M2 | WEIGHT: 193 LBS

## 2025-07-08 DIAGNOSIS — B35.1 ONYCHOMYCOSIS: ICD-10-CM

## 2025-07-08 DIAGNOSIS — L60.0 INGROWN TOENAIL: Primary | ICD-10-CM

## 2025-07-09 NOTE — PROGRESS NOTES
"07/08/2025  Foot and Ankle Surgery - New Patient   Provider: Dr. Kunal Hare DPM  Location: [facility]    Subjective:  Aydee Moore is a 75 y.o. female presents to clinic with complaints of painful incurvated toenails bilaterally.  Patient states her nails have become thickened and starting to curve causing her chronic pain to bilateral hallux and second digit.  Patient states they are difficult to trim due to thickness.  Patient has not had any ingrown procedure performed in the past.  Patient is not taking any fungal medication in the past for her nails.  Patient is planning a trip to [place] for hiking and wants to know if there is anything she can do for her pain at this time.    Chief Complaint   Patient presents with    Left Foot - Pain, Ingrown Toenail     Curved long toenails    Right Foot - Pain, Ingrown Toenail     Curved long toenails    Initial Evaluation     PCP: Jordan Reeder APRN  Last PCP Visit: 6/10/25       Allergies   Allergen Reactions    Tetracyclines & Related Rash and Mental Status Change     Loss of Consciousness.   Rash and drowsiness    Nsaids Other (See Comments)     Pt has kidney issues and contraindicated.     Penicillins Rash       Past Medical History:   Diagnosis Date    ACL tear     Acute bronchitis 12/17/2024    Acute cystitis without hematuria 03/16/2022    Allergic     Penicillins, tetracyclines, surgical tape; latex sensitivity    Anemia     HX    Anxiety and depression     Apnea     \"STOPS BREATHING WHENEVER FALLS ASLEEP BUT STATES NOT SLEEP APNEA\"    Arthritis     At risk for sleep apnea     Back pain     Cataract     rt eye surgery 10/24/2018    Chronic kidney disease low GFR    Colon polyp Dr Melissa    Constipation     Elevated fasting glucose     GERD (gastroesophageal reflux disease) Dr Melissa    Headache     History of fall     History of pelvic inflammatory disease     History of sleep apnea     IMPROVED AFTER WEIGHT LOSS    History of suicidal " ideation     Hyperlipidemia     Hypertension     Hypothyroidism     Ingrown toenail     Intermittent left-sided chest pain 04/16/2024    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    Obesity     Plantar fasciitis     Puncture wound of index finger 01/31/2024    Rash 08/22/2022    Shoulder pain     Skin picking habit     Skin yeast infection     HX/RELATED TO STOOL INCONTINENCE    Stool incontinence     Torn meniscus     Urinary tract infection often michelle when away from home       Past Surgical History:   Procedure Laterality Date    BREAST SURGERY  1986    biopsy    CATARACT EXTRACTION Right 10/24/2018    COLONOSCOPY      FRACTURE SURGERY  Oct 2022    rt ring finger    HYSTERECTOMY      KNEE SURGERY Right 08/08/2017    TOENAIL EXCISION  1995    TONSILLECTOMY      TOTAL SHOULDER ARTHROPLASTY W/ DISTAL CLAVICLE EXCISION Right 05/01/2025    Procedure: TOTAL SHOULDER REVERSE ARTHROPLASTY;  Surgeon: Kleber Anaya MD;  Location: [facility];  Service: Orthopedics;  Laterality: Right;       Family History   Problem Relation Age of Onset    Breast cancer Mother     Colon cancer Mother     Thyroid disease Mother     Vision loss Mother     Cancer Father         mesothelioma    Diabetes Sister         twin    Uterine cancer Sister     Thyroid disease Sister     Anemia Sister     Hypertension Sister     Uterine cancer Sister         suicide 2016    Early death Sister     Suicide Attempts Sister     No Known Problems Brother     Alcohol abuse Paternal Aunt     Heart attack Paternal Grandfather     Malig Hyperthermia Neg Hx        Social History     Socioeconomic History    Marital status:    Tobacco Use    Smoking status: Never     Passive exposure: Never    Smokeless tobacco: Never   Vaping Use    Vaping status: Never Used   Substance and Sexual Activity    Alcohol use: Yes     Alcohol/week: 4.0 standard drinks of alcohol     Types: 1 Glasses of wine, 1 Cans of beer, 1 Shots of liquor, 1 Drinks containing 0.5 oz of alcohol per week      Comment: social drinker no more than 1 drink/day if that    Drug use: No    Sexual activity: Yes     Partners: Male     Birth control/protection: Hysterectomy     Comment:  only partner        Current Outpatient Medications on File Prior to Visit   Medication Sig Dispense Refill    albuterol sulfate  (90 Base) MCG/ACT inhaler Inhale 2 puffs Every 6 (Six) Hours As Needed for Wheezing (cough). 8 g 5    amLODIPine (NORVASC) 5 MG tablet Take 1 tablet by mouth Daily. (Patient taking differently: Take 1 tablet by mouth Every Night.) 90 tablet 1    aspirin (Aspirin Low Dose) 81 MG EC tablet Take 1 tablet by mouth Daily. 21 tablet 0    Bactrim -160 MG per tablet Take 1 tablet by mouth 2 (Two) Times a Day. 14 tablet 0    BIOTIN PO Take 1 tablet by mouth Daily. PT HOLDING FOR SURGERY      Calcium Carbonate 500 MG chewable tablet Chew 1 tablet 2 (Two) Times a Day. 180 each 3    cetirizine (zyrTEC) 5 MG tablet Take 1 tablet by mouth Every Night. 90 tablet 3    Diclofenac Sodium (VOLTAREN) 1 % gel gel Apply 4 g topically to the appropriate area as directed 4 (Four) Times a Day As Needed (pain). 350 g 0    docusate sodium (Colace) 100 MG capsule Take 1 capsule by mouth Daily. 90 capsule 3    famotidine (PEPCID) 20 MG tablet Take 1 tablet by mouth 2 (Two) Times a Day As Needed for Heartburn. 60 tablet 5    FLUoxetine (PROzac) 10 MG capsule Take 1 capsule by mouth Daily. (Patient taking differently: Take 1 capsule by mouth Every Night.) 90 capsule 3    fluticasone (FLONASE) 50 MCG/ACT nasal spray Administer 2 sprays into the nostril(s) as directed by provider Daily. 48 g 3    glucosamine-chondroitin 500-400 MG capsule capsule Take 1 capsule by mouth 3 (Three) Times a Day With Meals. HAS NOT STARTED YET, HOLD UNTIL AFTER SURGERY      HYDROcodone-acetaminophen (NORCO) 5-325 MG per tablet Take 1 tablet by mouth Every 4 (Four) Hours As Needed for Moderate Pain. 30 tablet 0    levothyroxine (Synthroid) 50 MCG  "tablet Take 1 tablet by mouth Daily. 90 tablet 1    Magnesium 400 MG capsule Take 1 Capful by mouth Daily. Prn      multivitamin with minerals tablet tablet Take 1 tablet by mouth Daily. 90 tablet 3    nystatin (MYCOSTATIN) 208479 UNIT/GM ointment Apply 1 Application topically to the appropriate area as directed 2 (Two) Times a Day. 30 g 1    ondansetron ODT (ZOFRAN-ODT) 4 MG disintegrating tablet Take 1 tablet by mouth Every 6 (Six) Hours As Needed for Nausea or Vomiting. 30 tablet 0    polyethylene glycol (MiraLax) 17 GM/SCOOP powder Take 17 g by mouth Daily for 180 days. Mix one scoopfull in a full glass of water and mix and drink once a day. 1500 g 1    rosuvastatin (CRESTOR) 5 MG tablet Take 1 tablet by mouth Daily. 90 tablet 1    sennosides-docusate (Senna S) 8.6-50 MG per tablet Take 1 tablet by mouth 2 (Two) Times a Day As Needed for Constipation for up to 180 days. 60 tablet 5    triamcinolone (KENALOG) 0.1 % cream Apply 1 Application topically to the appropriate area as directed As Needed.      Wheat Dextrin (Benefiber Drink Mix) pack Take 1 each by mouth Daily. 56 each 3     No current facility-administered medications on file prior to visit.         Objective   Resp 20   Ht 165.1 cm (65\")   Wt 87.5 kg (193 lb)   BMI 32.12 kg/m²     Foot/Ankle Exam    GENERAL  Appearance:  appears stated age  Orientation:  AAOx3  Affect:  appropriate  Gait:  unimpaired  Assistance:  independent  Right shoe gear: casual shoe  Left shoe gear: casual shoe    VASCULAR     Right Foot Vascularity   Dorsalis pedis:  2+  Posterior tibial:  2+  Skin temperature:  warm  Edema gradin+  CFT:  < 3 seconds  Pedal hair growth:  Present  Varicosities:  none     Left Foot Vascularity   Dorsalis pedis:  2+  Posterior tibial:  2+  Skin temperature:  warm  Edema gradin+  CFT:  < 3 seconds  Pedal hair growth:  Present  Varicosities:  none     NEUROLOGIC     Right Foot Neurologic   Normal sensation    Light touch sensation: " normal  Vibratory sensation: normal  Hot/Cold sensation: normal  Normal reflexes    Achilles reflex:  2+  Babinski reflex:  2+     Left Foot Neurologic   Normal sensation    Light touch sensation: normal  Vibratory sensation: normal  Hot/Cold sensation:  normal  Normal reflexes    Achilles reflex:  2+  Babinski reflex:  2+    MUSCULOSKELETAL     Right Foot Musculoskeletal   Ecchymosis:  none  Tenderness:  toenail problem       Left Foot Musculoskeletal   Ecchymosis:  none  Tenderness:  toenail problem    MUSCLE STRENGTH     Right Foot Muscle Strength   Normal strength    Foot dorsiflexion:  5  Foot plantar flexion:  5  Foot inversion:  5  Foot eversion:  5     Left Foot Muscle Strength   Normal strength    Foot dorsiflexion:  5  Foot plantar flexion:  5  Foot inversion:  5  Foot eversion:  5    RANGE OF MOTION     Right Foot Range of Motion   Foot and ankle ROM within normal limits       Left Foot Range of Motion   Foot and ankle ROM within normal limits      DERMATOLOGIC      Right Foot Dermatologic   Nails  1.  Positive for elongated, abnormal thickness, subungual debris and pincer.  2.  Positive for elongated, abnormal thickness, dystrophic nail and pincer nail.  3.  Positive for elongated, abnormal thickness, dystrophic nail and pincer.  4.  Positive for elongated, abnormal thickness and dystrophic nail.  5.  Positive for elongated, abnormal thickness and dystrophic nail.     Left Foot Dermatologic   Nails  1.  Positive for elongated, abnormal thickness, subungual debris and pincer.  2.  Positive for elongated, abnormal thickness, dystrophic nail and pincer.  3.  Positive for elongated, abnormal thickness, dystrophic nail and pincer.  4.  Positive for elongated, abnormally thick and dystrophic nail.  5.  Positive for elongated, abnormally thick and dystrophic nail.        Assessment & Plan   Diagnoses and all orders for this visit:    1. Ingrown toenail (Primary)    2. Onychomycosis       Discussed with the  patient diagnosis, prognosis, treatment options for hallux ingrown nail. Patient has failed conservative treatment consisting of Epsom salt soaks, local antibiotic ointment, bandaging, and is requesting surgical intervention for the problem this time.  We will hold off on any nail avulsion or matricectomy at this time due to patient's planned vacation.  Patient to trim toenails short and wear wider toebox shoes for their trip.      Assessment and diagnosis of onychomycosis discussed with patient.  Treatment plans discussed in detail today including identifying risk factors, implementing topical versus systemic antifungal therapies, patient education on foot hygiene and self-care practices, and monitoring for treatment response and adverse effects.    Treatment options include oral antifungals, topical antifungals, nail debridements with potential nail removal.    We will hold off on any medical management till patient returns back from her trip to [place].    Patient to follow-up in 3 to 4 weeks    Reviewed proper basic stretching and manual therapy exercises along with appropriate shoes and activity.  Discussed proper use and/or avoidance of OTC anti-inflammatories.  Patient is to call with any additional issues or concerns.  Greater than 30 minutes was spent before, during, and after evaluation for patient care.           Procedures     Kaleb Hare DPM

## 2025-07-10 DIAGNOSIS — S32.030S COMPRESSION FRACTURE OF L3 VERTEBRA, SEQUELA: ICD-10-CM

## 2025-07-10 DIAGNOSIS — G89.29 CHRONIC MIDLINE LOW BACK PAIN WITHOUT SCIATICA: Primary | ICD-10-CM

## 2025-07-10 DIAGNOSIS — M16.0 OSTEOARTHRITIS OF BOTH HIPS, UNSPECIFIED OSTEOARTHRITIS TYPE: ICD-10-CM

## 2025-07-10 DIAGNOSIS — M54.50 CHRONIC MIDLINE LOW BACK PAIN WITHOUT SCIATICA: Primary | ICD-10-CM

## 2025-07-16 ENCOUNTER — PATIENT MESSAGE (OUTPATIENT)
Dept: FAMILY MEDICINE CLINIC | Facility: CLINIC | Age: 75
End: 2025-07-16
Payer: MEDICARE

## 2025-07-16 DIAGNOSIS — N30.00 ACUTE CYSTITIS WITHOUT HEMATURIA: Primary | ICD-10-CM

## 2025-07-18 RX ORDER — SULFAMETHOXAZOLE AND TRIMETHOPRIM 800; 160 MG/1; MG/1
1 TABLET ORAL 2 TIMES DAILY
Qty: 14 TABLET | Refills: 0 | Status: SHIPPED | OUTPATIENT
Start: 2025-07-18

## 2025-07-26 ENCOUNTER — APPOINTMENT (OUTPATIENT)
Dept: GENERAL RADIOLOGY | Facility: HOSPITAL | Age: 75
End: 2025-07-26
Payer: MEDICARE

## 2025-07-26 ENCOUNTER — HOSPITAL ENCOUNTER (EMERGENCY)
Facility: HOSPITAL | Age: 75
Discharge: HOME | End: 2025-07-26
Attending: EMERGENCY MEDICINE
Payer: MEDICARE

## 2025-07-26 VITALS
TEMPERATURE: 98.2 F | WEIGHT: 195 LBS | DIASTOLIC BLOOD PRESSURE: 71 MMHG | RESPIRATION RATE: 20 BRPM | SYSTOLIC BLOOD PRESSURE: 141 MMHG | HEART RATE: 64 BPM | OXYGEN SATURATION: 99 % | BODY MASS INDEX: 32.49 KG/M2 | HEIGHT: 65 IN

## 2025-07-26 DIAGNOSIS — M25.511 ACUTE PAIN OF RIGHT SHOULDER: ICD-10-CM

## 2025-07-26 DIAGNOSIS — S92.325A CLOSED NONDISPLACED FRACTURE OF SECOND METATARSAL BONE OF LEFT FOOT, INITIAL ENCOUNTER: Primary | ICD-10-CM

## 2025-07-26 LAB
ANION GAP SERPL CALCULATED.3IONS-SCNC: 11.7 MMOL/L (ref 5–15)
BUN SERPL-MCNC: 16 MG/DL (ref 8–23)
BUN/CREAT SERPL: 16.5 (ref 7–25)
CALCIUM SPEC-SCNC: 10 MG/DL (ref 8.6–10.5)
CHLORIDE SERPL-SCNC: 100 MMOL/L (ref 98–107)
CO2 SERPL-SCNC: 26.3 MMOL/L (ref 22–29)
CREAT SERPL-MCNC: 0.97 MG/DL (ref 0.57–1)
EGFRCR SERPLBLD CKD-EPI 2021: 61.8 ML/MIN/1.73
GLUCOSE SERPL-MCNC: 104 MG/DL (ref 65–99)
POTASSIUM SERPL-SCNC: 3.9 MMOL/L (ref 3.5–5.2)
SODIUM SERPL-SCNC: 138 MMOL/L (ref 136–145)

## 2025-07-26 PROCEDURE — 80048 BASIC METABOLIC PNL TOTAL CA: CPT | Performed by: EMERGENCY MEDICINE

## 2025-07-26 PROCEDURE — 99283 EMERGENCY DEPT VISIT LOW MDM: CPT

## 2025-07-26 PROCEDURE — 36415 COLL VENOUS BLD VENIPUNCTURE: CPT

## 2025-07-26 PROCEDURE — 73030 X-RAY EXAM OF SHOULDER: CPT

## 2025-07-26 PROCEDURE — 73620 X-RAY EXAM OF FOOT: CPT

## 2025-07-26 RX ORDER — HYDROCODONE BITARTRATE AND ACETAMINOPHEN 5; 325 MG/1; MG/1
1 TABLET ORAL EVERY 4 HOURS PRN
Qty: 12 TABLET | Refills: 0 | Status: SHIPPED | OUTPATIENT
Start: 2025-07-26

## 2025-07-26 NOTE — ED NOTES
Pt arrives ambulatory to triage for left foot pain that started yesterday. Pt reports she had muscle cramps in both legs yesterday after receiving injections in her knees. Pt states the pain that remains is in her left foot.   Pt reports she fell last night and has right shoulder pain.

## 2025-07-26 NOTE — ED PROVIDER NOTES
EMERGENCY DEPARTMENT ENCOUNTER  Room Number:  11/11  PCP: Jordan Reeder APRN  Independent Historians: Patient      HPI:  Chief Complaint: had concerns including Foot Pain, Fall, and Shoulder Pain.     A complete HPI/ROS/PMH/PSH/SH/FH are unobtainable due to:   Chronic or social conditions impacting patient care (Social Determinants of Health):       Context: Aydee Moore is a 75 y.o. female with a medical history of hypertension, hyperlipidemia, chronic pain who presents to the ED c/o acute bilateral feet pain and cramping, fall and right shoulder pain.  Patient had injections of both knees yesterday in orthopedic clinic.  She had cramping and spasms in both feet and toes throughout the night last night.  Because of cramping in her toes she had a fall and landed on her right shoulder.  She states she is not supposed to fall on that shoulder because she has had prior surgery there.  She does report mild pain to the right shoulder.  Denies head injury or loss of consciousness.  Patient denies chest pain or trouble breathing.      Review of prior external notes (non-ED) -and- Review of prior external test results outside of this encounter:   I reviewed prior medical records including most recent visit with primary care provider.  Patient does have history of hypertension, depression and bilateral feet pain.      Prescription drug monitoring program review: JERED reviewed by Manolo Terrell MD   EM_Jered : N/A and JERED query complete and reviewed. Treatment plan to include limited course of prescribed controlled substance. Risks including addiction, benefits, and alternatives presented to patient.    PAST MEDICAL HISTORY  Active Ambulatory Problems     Diagnosis Date Noted    Hyperlipidemia LDL goal <100     Elevated fasting glucose     Anxiety and depression     Hypothyroidism, adult 11/23/2016    Essential hypertension 08/27/2019    Acute cystitis without hematuria 03/16/2022    Environmental  and seasonal allergies 08/22/2022    Hip pain 08/22/2022    Fecal incontinence 12/01/2022    Post-menopausal 01/31/2024    Chronic gastritis without bleeding 01/31/2024    Chronic diarrhea 01/31/2024    Chronic pain of left knee 01/31/2024    Chronic pain of right knee 01/31/2024    Family history of diabetes mellitus 01/31/2024    Rabies exposure 03/27/2024    Penicillin allergy 04/16/2024    Gastroesophageal reflux disease with esophagitis without hemorrhage 04/16/2024    Gastritis without bleeding 04/16/2024    Anxiety disorder 04/30/2024    Osteopenia 07/05/2024    Hiatal hernia 10/01/2024    Moderate episode of recurrent major depressive disorder 11/21/2024    Adjustment disorder with mixed anxiety and depressed mood 11/21/2024    Chronic right shoulder pain 01/08/2025    Nocturnal leg cramps 04/11/2025    Chronic idiopathic constipation 04/11/2025    Mixed hyperlipidemia 04/11/2025    Nontraumatic complete tear of right rotator cuff 04/11/2025    S/P reverse total shoulder arthroplasty, right 05/01/2025    Other insomnia 05/05/2025    Constipation 05/05/2025    Left hip pain 06/10/2025    Pain of great toe 06/10/2025    Left thigh pain 06/10/2025    Chronic midline low back pain without sciatica 06/10/2025    Status post reverse total replacement of right shoulder 06/10/2025    Arteriosclerosis 06/26/2025    Arterial calcification 06/26/2025    Compression fracture of third lumbar vertebra 06/26/2025    Osteoarthritis of both hips 06/26/2025    Facet arthropathy, lumbar 06/26/2025    Degeneration of intervertebral disc of lumbar region 06/26/2025     Resolved Ambulatory Problems     Diagnosis Date Noted    Wasp sting 07/28/2017    Morbidly obese 01/03/2020    Rash 08/22/2022    Puncture wound of index finger 01/31/2024    Breast cancer screening by mammogram 01/31/2024    Medicare annual wellness visit, subsequent 04/16/2024    Intermittent left-sided chest pain 04/16/2024    Urinary tract infection without  hematuria 11/01/2024    Acute cough 12/17/2024    Acute non-recurrent sinusitis 12/17/2024    Sorethroat 12/17/2024    Acute bronchitis 12/17/2024    Yeast dermatitis 04/11/2025    Dysuria 05/08/2025     Past Medical History:   Diagnosis Date    ACL tear     Allergic     Anemia     Apnea     Arthritis     At risk for sleep apnea     Back pain     Cataract     Chronic kidney disease low GFR    Colon polyp Dr Melissa    GERD (gastroesophageal reflux disease) Dr Melissa    Headache     History of fall     History of pelvic inflammatory disease     History of sleep apnea     History of suicidal ideation     Hyperlipidemia     Hypertension     Hypothyroidism     Ingrown toenail     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    Obesity     Plantar fasciitis     Shoulder pain     Skin picking habit     Skin yeast infection     Stool incontinence     Torn meniscus     Urinary tract infection often michelle when away from home         PAST SURGICAL HISTORY  Past Surgical History:   Procedure Laterality Date    BREAST SURGERY  1986    biopsy    CATARACT EXTRACTION Right 10/24/2018    COLONOSCOPY      FRACTURE SURGERY  Oct 2022    rt ring finger    HYSTERECTOMY      KNEE SURGERY Right 08/08/2017    TOENAIL EXCISION  1995    TONSILLECTOMY      TOTAL SHOULDER ARTHROPLASTY W/ DISTAL CLAVICLE EXCISION Right 05/01/2025    Procedure: TOTAL SHOULDER REVERSE ARTHROPLASTY;  Surgeon: Kleber Anaya MD;  Location: [facility];  Service: Orthopedics;  Laterality: Right;         FAMILY HISTORY  Family History   Problem Relation Age of Onset    Breast cancer Mother     Colon cancer Mother     Thyroid disease Mother     Vision loss Mother     Cancer Father         mesothelioma    Diabetes Sister         twin    Uterine cancer Sister     Thyroid disease Sister     Anemia Sister     Hypertension Sister     Uterine cancer Sister         suicide 2016    Early death Sister     Suicide Attempts Sister     No Known Problems Brother     Alcohol abuse Paternal Aunt      Heart attack Paternal Grandfather     Malig Hyperthermia Neg Hx          SOCIAL HISTORY  Social History     Socioeconomic History    Marital status:    Tobacco Use    Smoking status: Never     Passive exposure: Never    Smokeless tobacco: Never   Vaping Use    Vaping status: Never Used   Substance and Sexual Activity    Alcohol use: Yes     Alcohol/week: 4.0 standard drinks of alcohol     Types: 1 Glasses of wine, 1 Cans of beer, 1 Shots of liquor, 1 Drinks containing 0.5 oz of alcohol per week     Comment: social drinker no more than 1 drink/day if that    Drug use: No    Sexual activity: Yes     Partners: Male     Birth control/protection: Hysterectomy     Comment:  only partner         ALLERGIES  Tetracyclines & related, Nsaids, and Penicillins      REVIEW OF SYSTEMS  Review of Systems   Constitutional:  Negative for fever.   Respiratory:  Negative for shortness of breath.    Cardiovascular:  Negative for chest pain.   Musculoskeletal:  Positive for arthralgias.        Cramping pain in bilateral feet and toes as per HPI.   All other systems reviewed and are negative.    Included in HPI  All systems reviewed and negative except for those discussed in HPI.      PHYSICAL EXAM    I have reviewed the triage vital signs and nursing notes.    ED Triage Vitals   Temp Heart Rate Resp BP SpO2   07/26/25 0733 07/26/25 0733 07/26/25 0733 07/26/25 0735 07/26/25 0733   98.2 °F (36.8 °C) (!) 124 20 164/79 96 %      Temp src Heart Rate Source Patient Position BP Location FiO2 (%)   -- -- -- -- --              Physical Exam  GENERAL: Alert well-appearing female no obvious distress.  Triage vitals reviewed and notable for initial pulse of 124, improved to the bedside.  Temperature, blood pressure and O2 sats are as documented above  SKIN: Warm, dry-no unusual discoloration  HENT: Normocephalic, atraumatic  EYES: no scleral icterus  CV: regular rhythm, regular rate-no murmur, heart rate in the 90s  RESPIRATORY: normal  effort, lungs clear-O2 sats mid 90s room air  ABDOMEN: soft, nontender, nondistended  MUSCULOSKELETAL: Spine-no significant bony tenderness palpation  Upper extremities-mild diffuse tenderness palpation about the right shoulder, good range of motion, no noted bony deformity  Lower extremities-there are bandages on both knees where she received knee injections yesterday.  She has good range of motion of the knees without noted bony deformity.  Examination of the feet bilaterally is fairly unremarkable.  I do not see any bony deformities swelling or discoloration to the feet or toes.  NEURO: alert, moves all extremities, follows commands      LAB RESULTS  Recent Results (from the past 24 hours)   Basic Metabolic Panel    Collection Time: 07/26/25 10:33 AM    Specimen: Blood   Result Value Ref Range    Glucose 104 (H) 65 - 99 mg/dL    BUN 16.0 8.0 - 23.0 mg/dL    Creatinine 0.97 0.57 - 1.00 mg/dL    Sodium 138 136 - 145 mmol/L    Potassium 3.9 3.5 - 5.2 mmol/L    Chloride 100 98 - 107 mmol/L    CO2 26.3 22.0 - 29.0 mmol/L    Calcium 10.0 8.6 - 10.5 mg/dL    BUN/Creatinine Ratio 16.5 7.0 - 25.0    Anion Gap 11.7 5.0 - 15.0 mmol/L    eGFR 61.8 >60.0 mL/min/1.73         RADIOLOGY  XR Foot 2 View Left  Result Date: 7/26/2025  XR FOOT 2 VW LEFT-  DATE OF EXAM: 7/26/2025 8:46 AM  INDICATION: Foot pain status post fall.  COMPARISON: None available.  TECHNIQUE: 2 views of the foot were obtained.  FINDINGS: Diffuse osteopenia. Mildly displaced obliquely oriented fracture of the mid to distal second metatarsal. The tibiotalar and subtalar joint spaces are well-maintained. Mild to moderate multifocal DJD at the midfoot. The midfoot is well aligned. Mild to moderate DJD of the great toe MTP joint. No definitive erosions. Soft tissue swelling at the distal forefoot.       1. Mildly displaced fracture of the mid to distal second metatarsal. 2. Mild to moderate multifocal DJD at the midfoot and mild to moderate DJD at the great toe  MTP joint.  This report was finalized on 7/26/2025 9:24 AM by Bienvenido Zavala MD on Workstation: [id number]      XR Shoulder 2+ View Right  Result Date: 7/26/2025  XR SHOULDER 2+ VW RIGHT-  DATE OF EXAM: 7/26/2025 8:44 AM  INDICATION: Shoulder pain status post fall..  COMPARISON: Radiographs 5/1/2025 and 4/21/2025.  TECHNIQUE: 2 views of the shoulder were obtained.  FINDINGS: Reverse TSA with the hardware in its expected position and without evidence of hardware complications. No evidence of acute fracture or dislocation. Mild age-appropriate hypertrophic degenerative changes at the acromioclavicular joint. Benign calcified granuloma in the lower right lung. Mild perihilar and basilar interstitial thickening in the right lung.       1. No radiographic evidence of acute fracture or dislocation. 2. Reverse TSA with the hardware in its expected position and without evidence of hardware complications. 3. Mild age-appropriate DJD at the AC joint. 4. Mild perihilar and basilar interstitial thickening in the right lung, which could represent mild chronic lung disease, atypical pneumonia, or mild pulmonary edema.  This report was finalized on 7/26/2025 9:23 AM by Bienvenido Zavala MD on Workstation: [id number]          MEDICATIONS GIVEN IN ER  Medications - No data to display      ORDERS PLACED DURING THIS VISIT:  Orders Placed This Encounter   Procedures    [facility] DME 10. Short CAM Boot (); Left    XR Shoulder 2+ View Right    XR Foot 2 View Left    Basic Metabolic Panel         OUTPATIENT MEDICATION MANAGEMENT:  No current Epic-ordered facility-administered medications on file.     Current Outpatient Medications Ordered in Epic   Medication Sig Dispense Refill    albuterol sulfate  (90 Base) MCG/ACT inhaler Inhale 2 puffs Every 6 (Six) Hours As Needed for Wheezing (cough). 8 g 5    amLODIPine (NORVASC) 5 MG tablet Take 1 tablet by mouth Daily. (Patient taking differently: Take 1 tablet by mouth Every Night.)  90 tablet 1    aspirin (Aspirin Low Dose) 81 MG EC tablet Take 1 tablet by mouth Daily. 21 tablet 0    BIOTIN PO Take 1 tablet by mouth Daily. PT HOLDING FOR SURGERY      Calcium Carbonate 500 MG chewable tablet Chew 1 tablet 2 (Two) Times a Day. 180 each 3    cetirizine (zyrTEC) 5 MG tablet Take 1 tablet by mouth Every Night. 90 tablet 3    Diclofenac Sodium (VOLTAREN) 1 % gel gel Apply 4 g topically to the appropriate area as directed 4 (Four) Times a Day As Needed (pain). 350 g 0    docusate sodium (Colace) 100 MG capsule Take 1 capsule by mouth Daily. 90 capsule 3    famotidine (PEPCID) 20 MG tablet Take 1 tablet by mouth 2 (Two) Times a Day As Needed for Heartburn. 60 tablet 5    FLUoxetine (PROzac) 10 MG capsule Take 1 capsule by mouth Daily. (Patient taking differently: Take 1 capsule by mouth Every Night.) 90 capsule 3    fluticasone (FLONASE) 50 MCG/ACT nasal spray Administer 2 sprays into the nostril(s) as directed by provider Daily. 48 g 3    glucosamine-chondroitin 500-400 MG capsule capsule Take 1 capsule by mouth 3 (Three) Times a Day With Meals. HAS NOT STARTED YET, HOLD UNTIL AFTER SURGERY      HYDROcodone-acetaminophen (NORCO) 5-325 MG per tablet Take 1 tablet by mouth Every 4 (Four) Hours As Needed (Pain). 12 tablet 0    levothyroxine (Synthroid) 50 MCG tablet Take 1 tablet by mouth Daily. 90 tablet 1    Magnesium 400 MG capsule Take 1 Capful by mouth Daily. Prn      multivitamin with minerals tablet tablet Take 1 tablet by mouth Daily. 90 tablet 3    nystatin (MYCOSTATIN) 419928 UNIT/GM ointment Apply 1 Application topically to the appropriate area as directed 2 (Two) Times a Day. 30 g 1    ondansetron ODT (ZOFRAN-ODT) 4 MG disintegrating tablet Take 1 tablet by mouth Every 6 (Six) Hours As Needed for Nausea or Vomiting. 30 tablet 0    polyethylene glycol (MiraLax) 17 GM/SCOOP powder Take 17 g by mouth Daily for 180 days. Mix one scoopfull in a full glass of water and mix and drink once a day.  1500 g 1    rosuvastatin (CRESTOR) 5 MG tablet Take 1 tablet by mouth Daily. 90 tablet 1    sennosides-docusate (Senna S) 8.6-50 MG per tablet Take 1 tablet by mouth 2 (Two) Times a Day As Needed for Constipation for up to 180 days. 60 tablet 5    sulfamethoxazole-trimethoprim (BACTRIM DS,SEPTRA DS) 800-160 MG per tablet Take 1 tablet by mouth 2 (Two) Times a Day. 14 tablet 0    triamcinolone (KENALOG) 0.1 % cream Apply 1 Application topically to the appropriate area as directed As Needed.      Wheat Dextrin (Benefiber Drink Mix) pack Take 1 each by mouth Daily. 56 each 3         PROCEDURES  Procedures            PROGRESS, DATA ANALYSIS, CONSULTS, AND MEDICAL DECISION MAKING  All labs have been independently interpreted by me.  All radiology studies have been reviewed by me. All EKG's have been independently viewed and interpreted by me.  Discussion below represents my analysis of pertinent findings related to patient's condition, differential diagnosis, treatment plan and final disposition.    Differential diagnosis includes but is not limited to shoulder fracture, dislocation.  Foot sprain, fracture, contusion.  Electrolyte problems such as hypokalemia or hypercalcemia.                 AS OF 11:20 EDT VITALS:    BP - 154/100  HR - 79  TEMP - 98.2 °F (36.8 °C)  O2 SATS - 100%    COMPLEXITY OF CARE  75-year-old female presents with bilateral feet cramping and spasms.  She then had a fall and is complaining of pain to the right shoulder and left foot.    I did independently interpret and evaluate ED testing notable for the following:    X-rays of the right shoulder showed no obvious fracture or dislocation, hardware intact  X-rays of the left foot show a nondisplaced fracture of the second metatarsal  Labs notable for normal chemistries, potassium and bicarbonate    Will treat with cam boot, weight-bear as tolerated, follow-up orthopedics outpatient  I did review Rjaiv report and will prescribe short course of  narcotic pain relievers      DIAGNOSIS  Final diagnoses:   Closed nondisplaced fracture of second metatarsal bone of left foot, initial encounter   Acute pain of right shoulder         DISPOSITION  ED Disposition       ED Disposition   Discharge    Condition   Stable    Comment   --                Please note that portions of this document were completed with a voice recognition program.    Note Disclaimer: At [facility], we believe that sharing information builds trust and better relationships. You are receiving this note because you recently visited [facility]. It is possible you will see health information before a provider has talked with you about it. This kind of information can be easy to misunderstand. To help you fully understand what it means for your health, we urge you to discuss this note with your provider.         Manolo Terrell MD  07/26/25 [id number]

## 2025-07-26 NOTE — DISCHARGE INSTRUCTIONS
Weight-bear with walking boot.  You may remove the boot when lying down or sitting.  Please follow-up with Dr. Kleber Anaya or other orthopedic provider for further evaluation

## 2025-08-12 ENCOUNTER — OFFICE VISIT (OUTPATIENT)
Dept: FAMILY MEDICINE CLINIC | Facility: CLINIC | Age: 75
End: 2025-08-12
Payer: MEDICARE

## 2025-08-12 VITALS
DIASTOLIC BLOOD PRESSURE: 64 MMHG | WEIGHT: 202 LBS | RESPIRATION RATE: 16 BRPM | BODY MASS INDEX: 33.66 KG/M2 | TEMPERATURE: 98 F | SYSTOLIC BLOOD PRESSURE: 130 MMHG | HEART RATE: 84 BPM | HEIGHT: 65 IN

## 2025-08-12 DIAGNOSIS — M84.40XD PATHOLOGICAL FRACTURE WITH ROUTINE HEALING, UNSPECIFIED FRACTURE SITE, UNSPECIFIED PATHOLOGICAL CAUSE, SUBSEQUENT ENCOUNTER: Primary | ICD-10-CM

## 2025-08-12 DIAGNOSIS — M85.80 OSTEOPENIA, UNSPECIFIED LOCATION: ICD-10-CM

## 2025-08-12 DIAGNOSIS — E03.9 HYPOTHYROIDISM, ADULT: Chronic | ICD-10-CM

## 2025-08-12 DIAGNOSIS — E78.5 HYPERLIPIDEMIA LDL GOAL <100: Chronic | ICD-10-CM

## 2025-08-12 RX ORDER — ANTACID TABLETS 500 MG/1
1 TABLET, CHEWABLE ORAL 2 TIMES DAILY
Qty: 180 EACH | Refills: 3 | Status: SHIPPED | OUTPATIENT
Start: 2025-08-12

## 2025-08-12 RX ORDER — MULTIVIT-MIN/IRON/FOLIC ACID/K 18-600-40
1 CAPSULE ORAL DAILY
Qty: 90 CAPSULE | Refills: 3 | Status: SHIPPED | OUTPATIENT
Start: 2025-08-12

## 2025-08-13 ENCOUNTER — RESULTS FOLLOW-UP (OUTPATIENT)
Dept: FAMILY MEDICINE CLINIC | Facility: CLINIC | Age: 75
End: 2025-08-13
Payer: MEDICARE

## 2025-08-13 DIAGNOSIS — E55.9 VITAMIN D DEFICIENCY: Primary | ICD-10-CM

## 2025-08-13 LAB
25(OH)D3+25(OH)D2 SERPL-MCNC: 25 NG/ML (ref 30–100)
ALBUMIN SERPL-MCNC: 4.5 G/DL (ref 3.8–4.8)
ALP SERPL-CCNC: 106 IU/L (ref 44–121)
ALT SERPL-CCNC: 12 IU/L (ref 0–32)
AST SERPL-CCNC: 20 IU/L (ref 0–40)
BILIRUB SERPL-MCNC: 0.3 MG/DL (ref 0–1.2)
BUN SERPL-MCNC: 24 MG/DL (ref 8–27)
BUN/CREAT SERPL: 25 (ref 12–28)
CALCIUM SERPL-MCNC: 10 MG/DL (ref 8.7–10.3)
CHLORIDE SERPL-SCNC: 101 MMOL/L (ref 96–106)
CO2 SERPL-SCNC: 22 MMOL/L (ref 20–29)
CREAT SERPL-MCNC: 0.96 MG/DL (ref 0.57–1)
EGFRCR SERPLBLD CKD-EPI 2021: 62 ML/MIN/1.73
GLOBULIN SER CALC-MCNC: 2.5 G/DL (ref 1.5–4.5)
GLUCOSE SERPL-MCNC: 105 MG/DL (ref 70–99)
POTASSIUM SERPL-SCNC: 4.3 MMOL/L (ref 3.5–5.2)
PROT SERPL-MCNC: 7 G/DL (ref 6–8.5)
SODIUM SERPL-SCNC: 141 MMOL/L (ref 134–144)
T4 FREE SERPL-MCNC: 1.35 NG/DL (ref 0.82–1.77)
TSH SERPL DL<=0.005 MIU/L-ACNC: 2.52 UIU/ML (ref 0.45–4.5)
VIT B12 SERPL-MCNC: 533 PG/ML (ref 232–1245)

## 2025-08-13 RX ORDER — ERGOCALCIFEROL 1.25 MG/1
50000 CAPSULE, LIQUID FILLED ORAL WEEKLY
Qty: 12 CAPSULE | Refills: 0 | Status: SHIPPED | OUTPATIENT
Start: 2025-08-13

## 2025-08-15 ENCOUNTER — TELEPHONE (OUTPATIENT)
Dept: FAMILY MEDICINE CLINIC | Facility: CLINIC | Age: 75
End: 2025-08-15
Payer: MEDICARE

## 2025-08-25 ENCOUNTER — OFFICE VISIT (OUTPATIENT)
Age: 75
End: 2025-08-25
Payer: MEDICARE

## 2025-08-25 VITALS — WEIGHT: 202 LBS | BODY MASS INDEX: 33.66 KG/M2 | HEIGHT: 65 IN | RESPIRATION RATE: 20 BRPM

## 2025-08-25 DIAGNOSIS — L60.0 INGROWN TOENAIL: Primary | ICD-10-CM

## 2025-08-25 PROCEDURE — 1159F MED LIST DOCD IN RCRD: CPT | Performed by: PODIATRIST

## 2025-08-25 PROCEDURE — 11750 EXCISION NAIL&NAIL MATRIX: CPT | Performed by: PODIATRIST

## 2025-08-25 PROCEDURE — 1160F RVW MEDS BY RX/DR IN RCRD: CPT | Performed by: PODIATRIST

## 2025-08-28 ENCOUNTER — TELEPHONE (OUTPATIENT)
Dept: ENDOCRINOLOGY | Age: 75
End: 2025-08-28
Payer: MEDICARE

## 2025-08-29 ENCOUNTER — OFFICE VISIT (OUTPATIENT)
Dept: ENDOCRINOLOGY | Age: 75
End: 2025-08-29
Payer: MEDICARE

## 2025-08-29 VITALS
OXYGEN SATURATION: 96 % | BODY MASS INDEX: 33.72 KG/M2 | HEIGHT: 65 IN | HEART RATE: 63 BPM | DIASTOLIC BLOOD PRESSURE: 80 MMHG | SYSTOLIC BLOOD PRESSURE: 122 MMHG | WEIGHT: 202.4 LBS

## 2025-08-29 DIAGNOSIS — M85.80 OSTEOPENIA, UNSPECIFIED LOCATION: Primary | ICD-10-CM

## 2025-08-29 RX ORDER — ALENDRONATE SODIUM 70 MG/1
70 TABLET ORAL
Qty: 12 TABLET | Refills: 3 | Status: SHIPPED | OUTPATIENT
Start: 2025-08-29 | End: 2026-08-29

## (undated) DEVICE — DRAPE,U/ SHT,SPLIT,PLAS,STERIL: Brand: MEDLINE

## (undated) DEVICE — ZIP 16 SURGICAL SKIN CLOSURE DEVICE, PSA: Brand: ZIP 16 SURGICAL SKIN CLOSURE DEVICE

## (undated) DEVICE — KWIRE SMOTH DBL/TROC .062X4IN
Type: IMPLANTABLE DEVICE | Site: SHOULDER | Status: NON-FUNCTIONAL
Removed: 2025-05-01

## (undated) DEVICE — DUAL CUT SAGITTAL BLADE

## (undated) DEVICE — KT SHLDR EXACTECHGPS DISP

## (undated) DEVICE — GLV SURG BIOGEL LTX PF 8 1/2

## (undated) DEVICE — PATIENT RETURN ELECTRODE, SINGLE-USE, CONTACT QUALITY MONITORING, ADULT, WITH 9FT CORD, FOR PATIENTS WEIGING OVER 33LBS. (15KG): Brand: MEGADYNE

## (undated) DEVICE — KT BIT DRL EXATECHGPS REV 2MM 3.2MM DISP

## (undated) DEVICE — BLANKT WARM LOWR/BDY 100X120CM

## (undated) DEVICE — DRESSING,GAUZE,XEROFORM,CURAD,1"X8",ST: Brand: CURAD

## (undated) DEVICE — KT PIN HEX SHLDR CORACOID EXACTECHGPS DISP

## (undated) DEVICE — 450 ML BOTTLE OF 0.05% CHLORHEXIDINE GLUCONATE IN 99.95% STERILE WATER FOR IRRIGATION, USP AND APPLICATOR.: Brand: IRRISEPT ANTIMICROBIAL WOUND LAVAGE

## (undated) DEVICE — MAT FLR ABSORBENT LG 4FT 10 2.5FT

## (undated) DEVICE — SUT VIC 2/0 X1 27IN J459H

## (undated) DEVICE — GLV SURG BIOGEL LTX PF 8

## (undated) DEVICE — SUT VIC 0 CT1 36IN J946H

## (undated) DEVICE — YANKAUER,POOLE TIP,STERILE,50/CS: Brand: MEDLINE

## (undated) DEVICE — SUT ETHIB 2 CV V37 MS/4 30IN MX69G

## (undated) DEVICE — HANDPIECE SET WITH COAXIAL HIGH FLOW TIP AND SUCTION TUBE: Brand: INTERPULSE

## (undated) DEVICE — SKIN PREP TRAY W/CHG: Brand: MEDLINE INDUSTRIES, INC.

## (undated) DEVICE — SHEET, DRAPE, SPLIT, STERILE: Brand: MEDLINE

## (undated) DEVICE — PK SHLDR OPN 40